# Patient Record
Sex: FEMALE | Race: WHITE | NOT HISPANIC OR LATINO
[De-identification: names, ages, dates, MRNs, and addresses within clinical notes are randomized per-mention and may not be internally consistent; named-entity substitution may affect disease eponyms.]

---

## 2024-07-30 ENCOUNTER — NON-APPOINTMENT (OUTPATIENT)
Age: 79
End: 2024-07-30

## 2024-08-05 PROBLEM — Z00.00 ENCOUNTER FOR PREVENTIVE HEALTH EXAMINATION: Status: ACTIVE | Noted: 2024-08-05

## 2024-08-21 ENCOUNTER — NON-APPOINTMENT (OUTPATIENT)
Age: 79
End: 2024-08-21

## 2024-08-22 ENCOUNTER — APPOINTMENT (OUTPATIENT)
Dept: INTERNAL MEDICINE | Facility: CLINIC | Age: 79
End: 2024-08-22
Payer: MEDICARE

## 2024-08-22 VITALS
BODY MASS INDEX: 29.59 KG/M2 | HEART RATE: 93 BPM | HEIGHT: 63 IN | DIASTOLIC BLOOD PRESSURE: 72 MMHG | WEIGHT: 167 LBS | OXYGEN SATURATION: 97 % | SYSTOLIC BLOOD PRESSURE: 154 MMHG | TEMPERATURE: 97.5 F

## 2024-08-22 DIAGNOSIS — M85.80 OTHER SPECIFIED DISORDERS OF BONE DENSITY AND STRUCTURE, UNSPECIFIED SITE: ICD-10-CM

## 2024-08-22 DIAGNOSIS — I10 ESSENTIAL (PRIMARY) HYPERTENSION: ICD-10-CM

## 2024-08-22 DIAGNOSIS — L65.9 NONSCARRING HAIR LOSS, UNSPECIFIED: ICD-10-CM

## 2024-08-22 DIAGNOSIS — F32.A DEPRESSION, UNSPECIFIED: ICD-10-CM

## 2024-08-22 DIAGNOSIS — E03.9 HYPOTHYROIDISM, UNSPECIFIED: ICD-10-CM

## 2024-08-22 DIAGNOSIS — D80.2 SELECTIVE DEFICIENCY OF IMMUNOGLOBULIN A [IGA]: ICD-10-CM

## 2024-08-22 DIAGNOSIS — H93.19 TINNITUS, UNSPECIFIED EAR: ICD-10-CM

## 2024-08-22 DIAGNOSIS — Z00.00 ENCOUNTER FOR GENERAL ADULT MEDICAL EXAMINATION W/OUT ABNORMAL FINDINGS: ICD-10-CM

## 2024-08-22 DIAGNOSIS — M20.20 HALLUX RIGIDUS, UNSPECIFIED FOOT: ICD-10-CM

## 2024-08-22 DIAGNOSIS — R91.1 SOLITARY PULMONARY NODULE: ICD-10-CM

## 2024-08-22 DIAGNOSIS — R73.03 PREDIABETES.: ICD-10-CM

## 2024-08-22 DIAGNOSIS — K76.0 FATTY (CHANGE OF) LIVER, NOT ELSEWHERE CLASSIFIED: ICD-10-CM

## 2024-08-22 DIAGNOSIS — Z80.1 FAMILY HISTORY OF MALIGNANT NEOPLASM OF TRACHEA, BRONCHUS AND LUNG: ICD-10-CM

## 2024-08-22 PROCEDURE — G0439: CPT

## 2024-08-22 PROCEDURE — G0444 DEPRESSION SCREEN ANNUAL: CPT | Mod: 59

## 2024-08-22 PROCEDURE — 99205 OFFICE O/P NEW HI 60 MIN: CPT | Mod: 25

## 2024-08-22 RX ORDER — HYDROCHLOROTHIAZIDE 25 MG/1
25 TABLET ORAL
Refills: 0 | Status: ACTIVE | COMMUNITY

## 2024-08-22 RX ORDER — CLONAZEPAM 0.5 MG/1
0.5 TABLET ORAL
Refills: 0 | Status: ACTIVE | COMMUNITY

## 2024-08-22 RX ORDER — LISDEXAMFETAMINE 30 MG/1
30 CAPSULE ORAL
Refills: 0 | Status: ACTIVE | COMMUNITY

## 2024-08-22 RX ORDER — POTASSIUM CHLORIDE 1125 MG/1
TABLET, EXTENDED RELEASE ORAL
Refills: 0 | Status: ACTIVE | COMMUNITY

## 2024-08-22 RX ORDER — LEVOTHYROXINE SODIUM 0.07 MG/1
75 TABLET ORAL
Refills: 0 | Status: ACTIVE | COMMUNITY

## 2024-08-22 RX ORDER — PRAMIPEXOLE DIHYDROCHLORIDE 1.5 MG/1
1.5 TABLET ORAL
Refills: 0 | Status: ACTIVE | COMMUNITY

## 2024-08-22 RX ORDER — BUPROPION HYDROCHLORIDE 450 MG/1
450 TABLET, FILM COATED, EXTENDED RELEASE ORAL
Refills: 0 | Status: ACTIVE | COMMUNITY

## 2024-08-22 RX ORDER — ACYCLOVIR 400 MG/1
400 TABLET ORAL
Refills: 0 | Status: ACTIVE | COMMUNITY

## 2024-08-22 RX ORDER — MINOXIDIL 2.5 MG/1
2.5 TABLET ORAL
Refills: 0 | Status: ACTIVE | COMMUNITY

## 2024-08-22 RX ORDER — CARVEDILOL 3.12 MG/1
3.12 TABLET, FILM COATED ORAL
Refills: 0 | Status: ACTIVE | COMMUNITY

## 2024-08-22 NOTE — HEALTH RISK ASSESSMENT
[Yes] : Yes [1 or 2 (0 pts)] : 1 or 2 (0 points) [Never (0 pts)] : Never (0 points) [No] : In the past 12 months have you used drugs other than those required for medical reasons? No [No falls in past year] : Patient reported no falls in the past year [Little interest or pleasure doing things] : 1) Little interest or pleasure doing things [Feeling down, depressed, or hopeless] : 2) Feeling down, depressed, or hopeless [0] : 2) Feeling down, depressed, or hopeless: Not at all (0) [PHQ-2 Negative - No further assessment needed] : PHQ-2 Negative - No further assessment needed [Time Spent: ___ Minutes] : I spent [unfilled] minutes performing a depression screening for this patient. [Patient reported colonoscopy was normal] : Patient reported colonoscopy was normal [Alone] : lives alone [Employed] : employed [Graduate School] : graduate school [] :  [Fully functional (bathing, dressing, toileting, transferring, walking, feeding)] : Fully functional (bathing, dressing, toileting, transferring, walking, feeding) [Fully functional (using the telephone, shopping, preparing meals, housekeeping, doing laundry, using] : Fully functional and needs no help or supervision to perform IADLs (using the telephone, shopping, preparing meals, housekeeping, doing laundry, using transportation, managing medications and managing finances) [Former] : Former [20 or more] : 20 or more [> 15 Years] : > 15 Years [de-identified] : active [de-identified] :  I spent 5 minutes performing a depression screening on this patient [[id number]] : 0 [LowDoseCTScan] : 2024 (pt will send me the copy) [Reports changes in hearing] : Reports no changes in hearing [Reports changes in vision] : Reports no changes in vision [BoneDensityDate] : 2024 [BoneDensityComments] : pt will send me the copy [ColonoscopyDate] : 2020

## 2024-08-22 NOTE — HISTORY OF PRESENT ILLNESS
[FreeTextEntry1] : 79-year-old female with a past medical history of hallux rigidus, depression, osteopenia comes to the clinic to establish care.  She would like to follow-up with ENT for complaint of chronic tinnitus. we did a complete medication reconciliation and discussed about the past medical history in detail. Patient had to walk to the clinic for long distance which is why her blood pressure is slightly elevated.  Patient will have her blood work, recent DEXA and colonoscopy results faxed over to the clinic.  She is up-to-date with her vaccinations.

## 2024-09-10 ENCOUNTER — APPOINTMENT (OUTPATIENT)
Dept: OTOLARYNGOLOGY | Facility: CLINIC | Age: 79
End: 2024-09-10
Payer: MEDICARE

## 2024-09-10 VITALS
SYSTOLIC BLOOD PRESSURE: 166 MMHG | TEMPERATURE: 98.3 F | DIASTOLIC BLOOD PRESSURE: 74 MMHG | HEART RATE: 88 BPM | OXYGEN SATURATION: 97 % | HEIGHT: 63 IN | BODY MASS INDEX: 26.58 KG/M2 | WEIGHT: 150 LBS

## 2024-09-10 DIAGNOSIS — F17.210 NICOTINE DEPENDENCE, CIGARETTES, UNCOMPLICATED: ICD-10-CM

## 2024-09-10 DIAGNOSIS — H93.13 TINNITUS, BILATERAL: ICD-10-CM

## 2024-09-10 DIAGNOSIS — Z82.0 FAMILY HISTORY OF EPILEPSY AND OTHER DISEASES OF THE NERVOUS SYSTEM: ICD-10-CM

## 2024-09-10 DIAGNOSIS — H90.3 SENSORINEURAL HEARING LOSS, BILATERAL: ICD-10-CM

## 2024-09-10 DIAGNOSIS — H61.21 IMPACTED CERUMEN, RIGHT EAR: ICD-10-CM

## 2024-09-10 DIAGNOSIS — Z80.9 FAMILY HISTORY OF MALIGNANT NEOPLASM, UNSPECIFIED: ICD-10-CM

## 2024-09-10 DIAGNOSIS — Z86.79 PERSONAL HISTORY OF OTHER DISEASES OF THE CIRCULATORY SYSTEM: ICD-10-CM

## 2024-09-10 DIAGNOSIS — Z78.9 OTHER SPECIFIED HEALTH STATUS: ICD-10-CM

## 2024-09-10 PROCEDURE — G0268 REMOVAL OF IMPACTED WAX MD: CPT

## 2024-09-10 PROCEDURE — 99203 OFFICE O/P NEW LOW 30 MIN: CPT | Mod: 25

## 2024-09-10 PROCEDURE — 92550 TYMPANOMETRY & REFLEX THRESH: CPT | Mod: 52

## 2024-09-10 PROCEDURE — 92557 COMPREHENSIVE HEARING TEST: CPT

## 2024-09-10 RX ORDER — CLONAZEPAM 2 MG/1
TABLET ORAL
Refills: 0 | Status: ACTIVE | COMMUNITY

## 2024-09-10 RX ORDER — PRAMIPEXOLE 3.75 MG/1
TABLET, EXTENDED RELEASE ORAL
Refills: 0 | Status: ACTIVE | COMMUNITY

## 2024-09-10 NOTE — ASSESSMENT
[FreeTextEntry1] : 1. r cerumen impaction  -cerumen removed -ear felt better -avoid qtip usage 2. b tinnitus, b snhl - showed b hf snhl, b nl tympanograms -results reviewed with pt  -explained tinnitus is likely d/t hearing loss -for tinnitus recommended increasing background noise/ trying a white noise machine, air pod masking apps, hae with maskers, trt -given printed information sheet  RTC in 1 yr with rpt ; call sooner for any issues

## 2024-09-10 NOTE — PROCEDURE
[Cerumen Impaction] : Cerumen Impaction [Same] : same as the Pre Op Dx. [] : Removal of Cerumen [FreeTextEntry5] : l nl, r copious cerumen removed atraumatically with curette, b TMs nl

## 2024-09-10 NOTE — PHYSICAL EXAM
[de-identified] : l nl, r copious cerumen removed atraumatically with curette, b TMs nl  [Normal] : no nystagmus [de-identified] : gait steady

## 2024-09-10 NOTE — HISTORY OF PRESENT ILLNESS
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 Insidious onset. She denies hearing loss or aural fullness. No FH pertinent to cc. No childhood ear issues. Former smoker, quit in 1984.

## 2024-10-07 ENCOUNTER — TRANSCRIPTION ENCOUNTER (OUTPATIENT)
Age: 79
End: 2024-10-07

## 2024-10-22 ENCOUNTER — APPOINTMENT (OUTPATIENT)
Dept: INTERNAL MEDICINE | Facility: CLINIC | Age: 79
End: 2024-10-22
Payer: MEDICARE

## 2024-10-22 VITALS
TEMPERATURE: 97.2 F | HEART RATE: 75 BPM | HEIGHT: 63 IN | OXYGEN SATURATION: 97 % | BODY MASS INDEX: 29.41 KG/M2 | DIASTOLIC BLOOD PRESSURE: 69 MMHG | SYSTOLIC BLOOD PRESSURE: 148 MMHG | WEIGHT: 166 LBS

## 2024-10-22 DIAGNOSIS — Z23 ENCOUNTER FOR IMMUNIZATION: ICD-10-CM

## 2024-10-22 DIAGNOSIS — M20.20 HALLUX RIGIDUS, UNSPECIFIED FOOT: ICD-10-CM

## 2024-10-22 DIAGNOSIS — R63.4 ABNORMAL WEIGHT LOSS: ICD-10-CM

## 2024-10-22 DIAGNOSIS — I10 ESSENTIAL (PRIMARY) HYPERTENSION: ICD-10-CM

## 2024-10-22 PROCEDURE — 90662 IIV NO PRSV INCREASED AG IM: CPT

## 2024-10-22 PROCEDURE — G0008: CPT

## 2024-10-22 PROCEDURE — 99215 OFFICE O/P EST HI 40 MIN: CPT

## 2024-10-22 PROCEDURE — G2211 COMPLEX E/M VISIT ADD ON: CPT

## 2024-10-22 PROCEDURE — 36415 COLL VENOUS BLD VENIPUNCTURE: CPT

## 2024-10-23 ENCOUNTER — NON-APPOINTMENT (OUTPATIENT)
Age: 79
End: 2024-10-23

## 2024-10-23 LAB
ALBUMIN SERPL ELPH-MCNC: 4.3 G/DL
ALP BLD-CCNC: 73 U/L
ALT SERPL-CCNC: 26 U/L
ANION GAP SERPL CALC-SCNC: 13 MMOL/L
AST SERPL-CCNC: 31 U/L
BASOPHILS # BLD AUTO: 0.04 K/UL
BASOPHILS NFR BLD AUTO: 0.6 %
BILIRUB SERPL-MCNC: 0.4 MG/DL
BUN SERPL-MCNC: 28 MG/DL
CALCIUM SERPL-MCNC: 9.4 MG/DL
CHLORIDE SERPL-SCNC: 102 MMOL/L
CHOLEST SERPL-MCNC: 202 MG/DL
CO2 SERPL-SCNC: 23 MMOL/L
CREAT SERPL-MCNC: 1.16 MG/DL
EGFR: 48 ML/MIN/1.73M2
EOSINOPHIL # BLD AUTO: 0.28 K/UL
EOSINOPHIL NFR BLD AUTO: 4.4 %
ESTIMATED AVERAGE GLUCOSE: 120 MG/DL
GLUCOSE SERPL-MCNC: 86 MG/DL
HBA1C MFR BLD HPLC: 5.8 %
HCT VFR BLD CALC: 37.6 %
HDLC SERPL-MCNC: 56 MG/DL
HGB BLD-MCNC: 12.1 G/DL
IMM GRANULOCYTES NFR BLD AUTO: 0.3 %
LDLC SERPL CALC-MCNC: 122 MG/DL
LYMPHOCYTES # BLD AUTO: 1.88 K/UL
LYMPHOCYTES NFR BLD AUTO: 29.5 %
MAN DIFF?: NORMAL
MCHC RBC-ENTMCNC: 29.8 PG
MCHC RBC-ENTMCNC: 32.2 GM/DL
MCV RBC AUTO: 92.6 FL
MONOCYTES # BLD AUTO: 0.61 K/UL
MONOCYTES NFR BLD AUTO: 9.6 %
NEUTROPHILS # BLD AUTO: 3.55 K/UL
NEUTROPHILS NFR BLD AUTO: 55.6 %
NONHDLC SERPL-MCNC: 146 MG/DL
PLATELET # BLD AUTO: 292 K/UL
POTASSIUM SERPL-SCNC: 4.2 MMOL/L
PROT SERPL-MCNC: 6.9 G/DL
RBC # BLD: 4.06 M/UL
RBC # FLD: 15.1 %
SODIUM SERPL-SCNC: 139 MMOL/L
TRIGL SERPL-MCNC: 138 MG/DL
WBC # FLD AUTO: 6.38 K/UL

## 2024-12-12 ENCOUNTER — APPOINTMENT (OUTPATIENT)
Dept: DERMATOLOGY | Facility: CLINIC | Age: 79
End: 2024-12-12
Payer: MEDICARE

## 2024-12-12 VITALS — BODY MASS INDEX: 29.23 KG/M2 | WEIGHT: 165 LBS | HEIGHT: 63 IN

## 2024-12-12 DIAGNOSIS — L82.1 OTHER SEBORRHEIC KERATOSIS: ICD-10-CM

## 2024-12-12 DIAGNOSIS — L85.3 XEROSIS CUTIS: ICD-10-CM

## 2024-12-12 DIAGNOSIS — D22.9 MELANOCYTIC NEVI, UNSPECIFIED: ICD-10-CM

## 2024-12-12 DIAGNOSIS — L81.4 OTHER MELANIN HYPERPIGMENTATION: ICD-10-CM

## 2024-12-12 DIAGNOSIS — L82.0 INFLAMED SEBORRHEIC KERATOSIS: ICD-10-CM

## 2024-12-12 DIAGNOSIS — D18.01 HEMANGIOMA OF SKIN AND SUBCUTANEOUS TISSUE: ICD-10-CM

## 2024-12-12 DIAGNOSIS — I83.90 ASYMPTOMATIC VARICOSE VEINS OF UNSPECIFIED LOWER EXTREMITY: ICD-10-CM

## 2024-12-12 PROCEDURE — 99204 OFFICE O/P NEW MOD 45 MIN: CPT

## 2024-12-12 RX ORDER — AMMONIUM LACTATE 12 %
12 CREAM (GRAM) TOPICAL TWICE DAILY
Qty: 1 | Refills: 3 | Status: ACTIVE | COMMUNITY
Start: 2024-12-12 | End: 1900-01-01

## 2024-12-17 ENCOUNTER — NON-APPOINTMENT (OUTPATIENT)
Age: 79
End: 2024-12-17

## 2024-12-17 ENCOUNTER — APPOINTMENT (OUTPATIENT)
Dept: HEART AND VASCULAR | Facility: CLINIC | Age: 79
End: 2024-12-17
Payer: MEDICARE

## 2024-12-17 VITALS
TEMPERATURE: 97.6 F | BODY MASS INDEX: 29.23 KG/M2 | WEIGHT: 164.99 LBS | OXYGEN SATURATION: 95 % | HEART RATE: 87 BPM | SYSTOLIC BLOOD PRESSURE: 120 MMHG | HEIGHT: 63 IN | DIASTOLIC BLOOD PRESSURE: 58 MMHG

## 2024-12-17 DIAGNOSIS — I10 ESSENTIAL (PRIMARY) HYPERTENSION: ICD-10-CM

## 2024-12-17 DIAGNOSIS — Z86.69 PERSONAL HISTORY OF OTHER DISEASES OF THE NERVOUS SYSTEM AND SENSE ORGANS: ICD-10-CM

## 2024-12-17 DIAGNOSIS — L65.9 NONSCARRING HAIR LOSS, UNSPECIFIED: ICD-10-CM

## 2024-12-17 DIAGNOSIS — Z86.79 PERSONAL HISTORY OF OTHER DISEASES OF THE CIRCULATORY SYSTEM: ICD-10-CM

## 2024-12-17 DIAGNOSIS — R06.09 OTHER FORMS OF DYSPNEA: ICD-10-CM

## 2024-12-17 DIAGNOSIS — R41.840 ATTENTION AND CONCENTRATION DEFICIT: ICD-10-CM

## 2024-12-17 DIAGNOSIS — Z82.0 FAMILY HISTORY OF EPILEPSY AND OTHER DISEASES OF THE NERVOUS SYSTEM: ICD-10-CM

## 2024-12-17 DIAGNOSIS — F32.A DEPRESSION, UNSPECIFIED: ICD-10-CM

## 2024-12-17 DIAGNOSIS — Z12.83 ENCOUNTER FOR SCREENING FOR MALIGNANT NEOPLASM OF SKIN: ICD-10-CM

## 2024-12-17 DIAGNOSIS — Z78.9 OTHER SPECIFIED HEALTH STATUS: ICD-10-CM

## 2024-12-17 DIAGNOSIS — H61.21 IMPACTED CERUMEN, RIGHT EAR: ICD-10-CM

## 2024-12-17 DIAGNOSIS — Z80.9 FAMILY HISTORY OF MALIGNANT NEOPLASM, UNSPECIFIED: ICD-10-CM

## 2024-12-17 DIAGNOSIS — Z87.891 PERSONAL HISTORY OF NICOTINE DEPENDENCE: ICD-10-CM

## 2024-12-17 DIAGNOSIS — Z86.59 PERSONAL HISTORY OF OTHER MENTAL AND BEHAVIORAL DISORDERS: ICD-10-CM

## 2024-12-17 PROCEDURE — G2211 COMPLEX E/M VISIT ADD ON: CPT

## 2024-12-17 PROCEDURE — 99203 OFFICE O/P NEW LOW 30 MIN: CPT

## 2024-12-17 PROCEDURE — 93000 ELECTROCARDIOGRAM COMPLETE: CPT

## 2025-01-16 ENCOUNTER — NON-APPOINTMENT (OUTPATIENT)
Age: 80
End: 2025-01-16

## 2025-01-17 ENCOUNTER — NON-APPOINTMENT (OUTPATIENT)
Age: 80
End: 2025-01-17

## 2025-03-06 ENCOUNTER — APPOINTMENT (OUTPATIENT)
Dept: INTERNAL MEDICINE | Facility: CLINIC | Age: 80
End: 2025-03-06

## 2025-03-06 ENCOUNTER — NON-APPOINTMENT (OUTPATIENT)
Age: 80
End: 2025-03-06

## 2025-03-06 VITALS
WEIGHT: 160 LBS | SYSTOLIC BLOOD PRESSURE: 135 MMHG | HEART RATE: 75 BPM | BODY MASS INDEX: 28.35 KG/M2 | OXYGEN SATURATION: 96 % | DIASTOLIC BLOOD PRESSURE: 67 MMHG | HEIGHT: 63 IN

## 2025-03-06 DIAGNOSIS — R42 DIZZINESS AND GIDDINESS: ICD-10-CM

## 2025-03-06 DIAGNOSIS — E78.5 HYPERLIPIDEMIA, UNSPECIFIED: ICD-10-CM

## 2025-03-06 DIAGNOSIS — Z23 ENCOUNTER FOR IMMUNIZATION: ICD-10-CM

## 2025-03-06 DIAGNOSIS — M20.20 HALLUX RIGIDUS, UNSPECIFIED FOOT: ICD-10-CM

## 2025-03-06 DIAGNOSIS — Z91.89 OTHER SPECIFIED PERSONAL RISK FACTORS, NOT ELSEWHERE CLASSIFIED: ICD-10-CM

## 2025-03-06 PROCEDURE — 99214 OFFICE O/P EST MOD 30 MIN: CPT

## 2025-03-06 PROCEDURE — 91320 SARSCV2 VAC 30MCG TRS-SUC IM: CPT

## 2025-03-06 PROCEDURE — 90480 ADMN SARSCOV2 VAC 1/ONLY CMP: CPT | Mod: GY

## 2025-03-06 PROCEDURE — G0537: CPT

## 2025-03-06 PROCEDURE — G2211 COMPLEX E/M VISIT ADD ON: CPT

## 2025-03-06 RX ORDER — MECLIZINE HYDROCHLORIDE 12.5 MG/1
12.5 TABLET ORAL 3 TIMES DAILY
Qty: 30 | Refills: 1 | Status: ACTIVE | COMMUNITY
Start: 2025-03-06 | End: 1900-01-01

## 2025-03-06 RX ORDER — ROSUVASTATIN CALCIUM 5 MG/1
5 TABLET, FILM COATED ORAL DAILY
Qty: 90 | Refills: 3 | Status: ACTIVE | COMMUNITY
Start: 2025-03-06 | End: 1900-01-01

## 2025-03-21 ENCOUNTER — APPOINTMENT (OUTPATIENT)
Dept: ORTHOPEDIC SURGERY | Facility: CLINIC | Age: 80
End: 2025-03-21
Payer: MEDICARE

## 2025-03-21 VITALS — WEIGHT: 160 LBS | BODY MASS INDEX: 28.35 KG/M2 | HEIGHT: 63 IN

## 2025-03-21 DIAGNOSIS — M19.079 PRIMARY OSTEOARTHRITIS, UNSPECIFIED ANKLE AND FOOT: ICD-10-CM

## 2025-03-21 PROCEDURE — 99203 OFFICE O/P NEW LOW 30 MIN: CPT

## 2025-03-21 PROCEDURE — 73630 X-RAY EXAM OF FOOT: CPT | Mod: 50

## 2025-03-25 ENCOUNTER — NON-APPOINTMENT (OUTPATIENT)
Age: 80
End: 2025-03-25

## 2025-03-27 ENCOUNTER — EMERGENCY (EMERGENCY)
Facility: HOSPITAL | Age: 80
LOS: 1 days | Discharge: HOME | End: 2025-03-27
Admitting: EMERGENCY MEDICINE
Payer: MEDICARE

## 2025-03-27 VITALS
HEART RATE: 88 BPM | HEIGHT: 63 IN | OXYGEN SATURATION: 97 % | TEMPERATURE: 98 F | SYSTOLIC BLOOD PRESSURE: 143 MMHG | WEIGHT: 149.91 LBS | RESPIRATION RATE: 18 BRPM | DIASTOLIC BLOOD PRESSURE: 67 MMHG

## 2025-03-27 DIAGNOSIS — M54.50 LOW BACK PAIN, UNSPECIFIED: ICD-10-CM
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
## 2025-03-27 DIAGNOSIS — E78.5 HYPERLIPIDEMIA, UNSPECIFIED: ICD-10-CM

## 2025-03-27 DIAGNOSIS — I10 ESSENTIAL (PRIMARY) HYPERTENSION: ICD-10-CM

## 2025-03-27 DIAGNOSIS — E03.9 HYPOTHYROIDISM, UNSPECIFIED: ICD-10-CM
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
## 2025-03-27 PROCEDURE — 99283 EMERGENCY DEPT VISIT LOW MDM: CPT | Mod: 25

## 2025-03-27 PROCEDURE — 99284 EMERGENCY DEPT VISIT MOD MDM: CPT

## 2025-03-27 PROCEDURE — 96372 THER/PROPH/DIAG INJ SC/IM: CPT

## 2025-03-27 RX ORDER — KETOROLAC TROMETHAMINE 30 MG/ML
30 INJECTION, SOLUTION INTRAMUSCULAR; INTRAVENOUS ONCE
Refills: 0 | Status: DISCONTINUED | OUTPATIENT
Start: 2025-03-27 | End: 2025-03-27

## 2025-03-27 RX ORDER — LIDOCAINE HYDROCHLORIDE 20 MG/ML
1 JELLY TOPICAL ONCE
Refills: 0 | Status: COMPLETED | OUTPATIENT
Start: 2025-03-27 | End: 2025-03-27

## 2025-03-27 RX ORDER — OXYCODONE HYDROCHLORIDE AND ACETAMINOPHEN 10; 325 MG/1; MG/1
1 TABLET ORAL
Qty: 12 | Refills: 0
Start: 2025-03-27

## 2025-03-27 RX ADMIN — LIDOCAINE HYDROCHLORIDE 1 PATCH: 20 JELLY TOPICAL at 15:05

## 2025-03-27 RX ADMIN — KETOROLAC TROMETHAMINE 30 MILLIGRAM(S): 30 INJECTION, SOLUTION INTRAMUSCULAR; INTRAVENOUS at 15:04

## 2025-03-27 NOTE — ED ADULT NURSE NOTE - OBJECTIVE STATEMENT
Pt presents to the ED with complaints of back pain. Per pt, she recently had a fall, was see at [facility], had scans and was prescribed pain medication. Pt states scans showed no fractures, presents to [facility] with complaints of worsening pain today. On arrival, pt is alert and oriented, ambulatory, denies chest pain, SOB, palpitations, numbness/tingling to lower extremities, or bladder/bowel incontinence. Pt changed into gown for examination.

## 2025-03-27 NOTE — ED ADULT NURSE NOTE - TEMPLATE LIST FOR HEAD TO TOE ASSESSMENT
Gen: Alert, NAD  Head: NC, AT   Eyes: PERRL, EOMI, normal lids/conjunctiva  ENT: normal hearing, patent oropharynx without erythema/exudate, uvula midline  Neck: supple, no tenderness, Trachea midline  Pulm: Bilateral BS, normal resp effort, no wheeze/stridor/retractions  CV: RRR, no 2/6 holosystolic murmur, 2+ radial and dp pulses bl, no edema  Abd: soft, NT/ND, +BS, no hepatosplenomegaly  Mskel: left arm in splint. no ctl spine ttp.   Skin: no rash, no bruising   Neuro: AAOx3, no sensory. 4/5 motor strength in the left upper and lower extremities, CN 2-12 intact Back Pain

## 2025-03-27 NOTE — ED PROVIDER NOTE - MUSCULOSKELETAL, MLM
Spine appears normal, +lower lumbar TTP and upper sacral TTP, no swelling, no crepitus, no deformity, no overlying ecchymosis

## 2025-03-27 NOTE — ED PROVIDER NOTE - PATIENT PORTAL LINK FT
You can access the FollowMyHealth Patient Portal offered by [facility] by registering at the following website: [email or web address]. By joining [facility]’s FollowMyHealth portal, you will also be able to view your health information using other applications (apps) compatible with our system.

## 2025-03-27 NOTE — ED ADULT TRIAGE NOTE - CHIEF COMPLAINT QUOTE
Pt presents to ED here for low back pain after fall Sunday night at home, denies head injury or LOC or blood thinner use. Pt was seen at [facility] and did some xrays  and came out negative for any farctures. Pt was sent home and dc with some tylenol and oxy. Pt A&Ox4, NAD and ambulatory.

## 2025-03-27 NOTE — ED PROVIDER NOTE - CHIEF COMPLAINT
[address] [facility] THERAPY  [address] [facility]  DAILY NOTE     TIME   SLP Individual Minutes  Time In: 0930  Time Out: 1000  Minutes: 30  Timed Code Treatment Minutes: 30 Minutes       Date: 2022  Patient Name: Christine España      CSN: [id number]   : 1962  (61 y.o.)  Gender: male   Referring Physician:  Dr. Mey Cosby   Diagnosis: Acute stroke due to ischemia, right hemiparesis secondary to stroke   Precautions: aspiration, fall risk   Current Diet: Regular diet with thin liquids   Swallowing Strategies: Standard Universal Swallow Precautions  Date of Last MBS/FEES: Not Applicable    Pain:  No pain reported. Subjective:  Patient awake in recliner upon ST arrival. Patient agreeable to transfer to wheelchair for hospital navigation task. Pleasant, cooperative, and actively engaged throughout. Short-Term Goals:  SHORT TERM GOAL #1:  Goal 1: Patient will complete complex executive functioning tasks (i.e., medication management, complex reasoning/deductive reasoning, etc.) with 90% accuracy and minimal cuing in order to allow for safe return to work (40/hours week). INTERVENTIONS:  Hospital Navigation Task   Patient completed hospital wide navigation task this date. Patient independently navigated 10/10 directions. Patient located/answered target information 6/7 independently and 1/7 given min cues. Patient with appropriate visual scanning, reasoning, and attention throughout task this date. SHORT TERM GOAL #2:  Goal 2: Patient will complete higher level recall/short term memory and working memory tasks with 80% accuracy provided independent use of compensatory strategies in order to improve overall recall of newly learned theraputic information and anticipation to return to work ([employer] in [employer]). INTERVENTIONS: Did not address this date d/t focus on other goals.      SHORT TERM GOAL #3:  Goal 3: Patient will demonstrate independent use of compensatory strategies (S-speak up, O-open mouth, S-slow down) within strucutred and unstrucutred speech tasks in order to improve overall articulatory precision. INTERVENTIONS: Did not address due to focus on other goals. Long-Term Goals:  Time Frame for Long Term Goals: 2 weeks from time of evaluation    LONG TERM GOAL #1:  Goal 1: Patient will improve overall cognitive function to return to independent living with wife and careing for children (16 year old). LONG TERM GOAL #2:  Goal 2: Patient will improve overall speech intelligablity and clearity of speech production within informal conversation in order to return to baseline speech to improve successful ability to return to work, speaking with co-works and medical staff and family/friends. Comprehension: 7 - Patient understands complex ideas (math/planning)  Expression: 6 - Device used to express complex ideas/needs  Social Interaction: 7 - Patient has appropriate behavior/relations 100% of the time  Problem Solvin - Independent with device (e.g. notes, schedules)  Memory: 6 - Patient requires device to recall (e.g. memory book)    EDUCATION:  Learner: Patient  Education:  Reviewed results and recommendations of this evaluation, Reviewed ST goals and Plan of Care, and Reviewed recommendations for follow-up  Evaluation of Education: Verbalizes understanding, Demonstrates with assistance, and Family not present    ASSESSMENT/PLAN:  Activity Tolerance:  Patient tolerance of  treatment: good. Assessment/Plan: Patient progressing toward established goals. Continues to require skilled care of licensed speech pathologist to progress toward achievement of established goals and plan of care. .     Plan for Next Session: High level cognitive treatment- deductive thinking, memory  Discharge Recommendations: Home with [address] [place] [address] ([facility] LUCY Gillespie, [address] The patient is a 79y Female complaining of back pain general.

## 2025-03-27 NOTE — ED PROVIDER NOTE - CONDITION AT DISCHARGE:
Reviewed labs with Dr Souza, patient to be admitted to [facility] for biopsy and treatment.  Called Dr Shahid to let him know and he communicated orders to the NP.  Reservation was made, patient called to let her know to come in for admittance at 3 pm per Charge Nurse on TSU.  Patient repeated and verbalized understanding.  
Improved

## 2025-03-27 NOTE — ED ADULT NURSE NOTE - ISOLATION TYPE:
[de-identified] : R thumb \par Swelling MCP\par Stiffness MCP\par No triggering \par \par Xrays neg None

## 2025-03-27 NOTE — ED PROVIDER NOTE - CLINICAL SUMMARY MEDICAL DECISION MAKING FREE TEXT BOX
80 y/o f hx hypothyroidism, HTN, HLD presents c/o persistent low back pain s/p trip and fall yesterday.  Pt with neg w/u at [place] yesterday, had CT lumbar spine which showed no fracture, is ambulatory in ED with no neuro deficits.  Pt given toradol and lidocaine patch in ED, no indication for repeat imaging.  Will d/c with rx percocet for severe pain, to take tylenol for moderate pain, f/u pmd, ortho

## 2025-03-27 NOTE — ED ADULT NURSE NOTE - NSFALLRISKINTERV_ED_ALL_ED

## 2025-03-27 NOTE — ED PROVIDER NOTE - OBJECTIVE STATEMENT
80 y/o f hx hypothyroidism, HTN, HLD 78 y/o f hx hypothyroidism, HTN, HLD presents c/o low back pain s/p trip and fall yesterday.  Pt stating she was seen at [place] ED yesterday, had xrays and CT lumbar spine which showed no fracture.  Pt stating she was given 2 percocets and discharged, took them both over the past day and now having further pain.  Pt stating she is able to walk, just going more slowly than usual.  Pt denies numbness/tingling to ext, weakness, saddle anesthesia, bowel/bladder incontinence, all other ROS negative.

## 2025-03-31 DIAGNOSIS — M54.9 DORSALGIA, UNSPECIFIED: ICD-10-CM

## 2025-04-01 DIAGNOSIS — K59.00 CONSTIPATION, UNSPECIFIED: ICD-10-CM

## 2025-04-01 RX ORDER — STANDARDIZED SENNA CONCENTRATE 8.6 MG/1
8.6 TABLET ORAL
Qty: 1 | Refills: 0 | Status: ACTIVE | COMMUNITY
Start: 2025-04-01 | End: 1900-01-01

## 2025-04-01 RX ORDER — OXYCODONE AND ACETAMINOPHEN 5; 325 MG/1; MG/1
5-325 TABLET ORAL
Qty: 10 | Refills: 0 | Status: ACTIVE | COMMUNITY
Start: 2025-04-01 | End: 1900-01-01

## 2025-04-02 ENCOUNTER — APPOINTMENT (OUTPATIENT)
Dept: ORTHOPEDIC SURGERY | Facility: CLINIC | Age: 80
End: 2025-04-02
Payer: MEDICARE

## 2025-04-02 VITALS — HEIGHT: 63 IN | WEIGHT: 150 LBS | BODY MASS INDEX: 26.58 KG/M2

## 2025-04-02 DIAGNOSIS — M54.50 LOW BACK PAIN, UNSPECIFIED: ICD-10-CM

## 2025-04-02 DIAGNOSIS — S32.010A WEDGE COMPRESSION FRACTURE OF FIRST LUMBAR VERTEBRA, INITIAL ENCOUNTER FOR CLOSED FRACTURE: ICD-10-CM

## 2025-04-02 DIAGNOSIS — M43.16 SPONDYLOLISTHESIS, LUMBAR REGION: ICD-10-CM

## 2025-04-02 PROCEDURE — 99214 OFFICE O/P EST MOD 30 MIN: CPT

## 2025-04-02 PROCEDURE — 72110 X-RAY EXAM L-2 SPINE 4/>VWS: CPT

## 2025-04-02 RX ORDER — ACETAMINOPHEN 500 MG/1
500 TABLET ORAL EVERY 8 HOURS
Qty: 180 | Refills: 0 | Status: ACTIVE | COMMUNITY
Start: 2025-04-02 | End: 1900-01-01

## 2025-04-02 RX ORDER — OXYCODONE 5 MG/1
5 TABLET ORAL EVERY 4 HOURS
Qty: 42 | Refills: 0 | Status: ACTIVE | COMMUNITY
Start: 2025-04-02 | End: 1900-01-01

## 2025-04-02 RX ORDER — IBUPROFEN 800 MG/1
800 TABLET, FILM COATED ORAL 3 TIMES DAILY
Qty: 90 | Refills: 0 | Status: ACTIVE | COMMUNITY
Start: 2025-04-02 | End: 1900-01-01

## 2025-04-02 RX ORDER — CYCLOBENZAPRINE HYDROCHLORIDE 10 MG/1
10 TABLET, FILM COATED ORAL 3 TIMES DAILY
Qty: 90 | Refills: 1 | Status: ACTIVE | COMMUNITY
Start: 2025-04-02 | End: 1900-01-01

## 2025-04-04 ENCOUNTER — NON-APPOINTMENT (OUTPATIENT)
Age: 80
End: 2025-04-04

## 2025-04-16 ENCOUNTER — APPOINTMENT (OUTPATIENT)
Dept: ORTHOPEDIC SURGERY | Facility: CLINIC | Age: 80
End: 2025-04-16
Payer: MEDICARE

## 2025-04-16 DIAGNOSIS — M54.50 LOW BACK PAIN, UNSPECIFIED: ICD-10-CM

## 2025-04-16 DIAGNOSIS — S32.010A WEDGE COMPRESSION FRACTURE OF FIRST LUMBAR VERTEBRA, INITIAL ENCOUNTER FOR CLOSED FRACTURE: ICD-10-CM

## 2025-04-16 PROCEDURE — 99214 OFFICE O/P EST MOD 30 MIN: CPT

## 2025-04-16 PROCEDURE — 72110 X-RAY EXAM L-2 SPINE 4/>VWS: CPT

## 2025-04-16 RX ORDER — LIDOCAINE 5% 700 MG/1
5 PATCH TOPICAL
Qty: 1 | Refills: 2 | Status: ACTIVE | COMMUNITY
Start: 2025-04-16 | End: 1900-01-01

## 2025-04-28 ENCOUNTER — APPOINTMENT (OUTPATIENT)
Dept: INTERNAL MEDICINE | Facility: CLINIC | Age: 80
End: 2025-04-28
Payer: MEDICARE

## 2025-04-28 DIAGNOSIS — R26.89 OTHER ABNORMALITIES OF GAIT AND MOBILITY: ICD-10-CM

## 2025-04-28 PROCEDURE — 99214 OFFICE O/P EST MOD 30 MIN: CPT | Mod: 2W

## 2025-04-28 PROCEDURE — G2211 COMPLEX E/M VISIT ADD ON: CPT | Mod: 2W

## 2025-04-30 ENCOUNTER — APPOINTMENT (OUTPATIENT)
Dept: ORTHOPEDIC SURGERY | Facility: CLINIC | Age: 80
End: 2025-04-30
Payer: MEDICARE

## 2025-04-30 DIAGNOSIS — S32.010A WEDGE COMPRESSION FRACTURE OF FIRST LUMBAR VERTEBRA, INITIAL ENCOUNTER FOR CLOSED FRACTURE: ICD-10-CM

## 2025-04-30 PROCEDURE — 99214 OFFICE O/P EST MOD 30 MIN: CPT

## 2025-04-30 PROCEDURE — 72100 X-RAY EXAM L-S SPINE 2/3 VWS: CPT

## 2025-05-02 ENCOUNTER — NON-APPOINTMENT (OUTPATIENT)
Age: 80
End: 2025-05-02

## 2025-05-03 ENCOUNTER — INPATIENT (INPATIENT)
Facility: HOSPITAL | Age: 80
LOS: 10 days | Discharge: NURSING, REHAB OR LONG-TERM CARE | DRG: 447 | End: 2025-05-14
Attending: STUDENT IN AN ORGANIZED HEALTH CARE EDUCATION/TRAINING PROGRAM | Admitting: INTERNAL MEDICINE
Payer: MEDICARE

## 2025-05-03 VITALS
HEIGHT: 63 IN | WEIGHT: 149.91 LBS | SYSTOLIC BLOOD PRESSURE: 146 MMHG | OXYGEN SATURATION: 98 % | HEART RATE: 98 BPM | RESPIRATION RATE: 17 BRPM | DIASTOLIC BLOOD PRESSURE: 78 MMHG | TEMPERATURE: 98 F

## 2025-05-03 DIAGNOSIS — F32.9 MAJOR DEPRESSIVE DISORDER, SINGLE EPISODE, UNSPECIFIED: ICD-10-CM

## 2025-05-03 DIAGNOSIS — S32.000A WEDGE COMPRESSION FRACTURE OF UNSPECIFIED LUMBAR VERTEBRA, INITIAL ENCOUNTER FOR CLOSED FRACTURE: ICD-10-CM

## 2025-05-03 DIAGNOSIS — Z29.9 ENCOUNTER FOR PROPHYLACTIC MEASURES, UNSPECIFIED: ICD-10-CM

## 2025-05-03 DIAGNOSIS — Z86.59 PERSONAL HISTORY OF OTHER MENTAL AND BEHAVIORAL DISORDERS: ICD-10-CM

## 2025-05-03 DIAGNOSIS — R79.89 OTHER SPECIFIED ABNORMAL FINDINGS OF BLOOD CHEMISTRY: ICD-10-CM

## 2025-05-03 DIAGNOSIS — E78.5 HYPERLIPIDEMIA, UNSPECIFIED: ICD-10-CM

## 2025-05-03 DIAGNOSIS — E03.9 HYPOTHYROIDISM, UNSPECIFIED: ICD-10-CM

## 2025-05-03 DIAGNOSIS — R55 SYNCOPE AND COLLAPSE: ICD-10-CM

## 2025-05-03 DIAGNOSIS — I10 ESSENTIAL (PRIMARY) HYPERTENSION: ICD-10-CM

## 2025-05-03 DIAGNOSIS — N17.9 ACUTE KIDNEY FAILURE, UNSPECIFIED: ICD-10-CM

## 2025-05-03 DIAGNOSIS — R33.9 RETENTION OF URINE, UNSPECIFIED: ICD-10-CM

## 2025-05-03 DIAGNOSIS — R29.6 REPEATED FALLS: ICD-10-CM

## 2025-05-03 DIAGNOSIS — R91.1 SOLITARY PULMONARY NODULE: ICD-10-CM

## 2025-05-03 DIAGNOSIS — Z86.79 PERSONAL HISTORY OF OTHER DISEASES OF THE CIRCULATORY SYSTEM: ICD-10-CM

## 2025-05-03 LAB
ADD ON TEST-SPECIMEN IN LAB: SIGNIFICANT CHANGE UP
ALBUMIN SERPL ELPH-MCNC: 3.9 G/DL — SIGNIFICANT CHANGE UP (ref 3.3–5)
ALBUMIN SERPL ELPH-MCNC: 4.4 G/DL — SIGNIFICANT CHANGE UP (ref 3.3–5)
ALP SERPL-CCNC: 102 U/L — SIGNIFICANT CHANGE UP (ref 40–120)
ALP SERPL-CCNC: 120 U/L — SIGNIFICANT CHANGE UP (ref 40–120)
ALT FLD-CCNC: 10 U/L — SIGNIFICANT CHANGE UP (ref 10–45)
ALT FLD-CCNC: 13 U/L — SIGNIFICANT CHANGE UP (ref 10–45)
ANION GAP SERPL CALC-SCNC: 17 MMOL/L — SIGNIFICANT CHANGE UP (ref 5–17)
ANION GAP SERPL CALC-SCNC: 21 MMOL/L — HIGH (ref 5–17)
APTT BLD: 28.2 SEC — SIGNIFICANT CHANGE UP (ref 26.1–36.8)
AST SERPL-CCNC: 18 U/L — SIGNIFICANT CHANGE UP (ref 10–40)
AST SERPL-CCNC: 19 U/L — SIGNIFICANT CHANGE UP (ref 10–40)
B-OH-BUTYR SERPL-SCNC: 0.3 MMOL/L — SIGNIFICANT CHANGE UP
BASOPHILS # BLD AUTO: 0.02 K/UL — SIGNIFICANT CHANGE UP (ref 0–0.2)
BASOPHILS NFR BLD AUTO: 0.2 % — SIGNIFICANT CHANGE UP (ref 0–2)
BILIRUB SERPL-MCNC: 0.7 MG/DL — SIGNIFICANT CHANGE UP (ref 0.2–1.2)
BILIRUB SERPL-MCNC: 0.8 MG/DL — SIGNIFICANT CHANGE UP (ref 0.2–1.2)
BUN SERPL-MCNC: 72 MG/DL — HIGH (ref 7–23)
BUN SERPL-MCNC: 82 MG/DL — HIGH (ref 7–23)
CALCIUM SERPL-MCNC: 8.4 MG/DL — SIGNIFICANT CHANGE UP (ref 8.4–10.5)
CALCIUM SERPL-MCNC: 8.8 MG/DL — SIGNIFICANT CHANGE UP (ref 8.4–10.5)
CHLORIDE SERPL-SCNC: 95 MMOL/L — LOW (ref 96–108)
CHLORIDE SERPL-SCNC: 98 MMOL/L — SIGNIFICANT CHANGE UP (ref 96–108)
CO2 SERPL-SCNC: 17 MMOL/L — LOW (ref 22–31)
CO2 SERPL-SCNC: 18 MMOL/L — LOW (ref 22–31)
CREAT SERPL-MCNC: 5.25 MG/DL — HIGH (ref 0.5–1.3)
CREAT SERPL-MCNC: 5.86 MG/DL — HIGH (ref 0.5–1.3)
EGFR: 7 ML/MIN/1.73M2 — LOW
EGFR: 7 ML/MIN/1.73M2 — LOW
EGFR: 8 ML/MIN/1.73M2 — LOW
EGFR: 8 ML/MIN/1.73M2 — LOW
EOSINOPHIL # BLD AUTO: 0.01 K/UL — SIGNIFICANT CHANGE UP (ref 0–0.5)
EOSINOPHIL NFR BLD AUTO: 0.1 % — SIGNIFICANT CHANGE UP (ref 0–6)
GLUCOSE SERPL-MCNC: 173 MG/DL — HIGH (ref 70–99)
GLUCOSE SERPL-MCNC: 95 MG/DL — SIGNIFICANT CHANGE UP (ref 70–99)
HCT VFR BLD CALC: 30.7 % — LOW (ref 34.5–45)
HCT VFR BLD CALC: 33.8 % — LOW (ref 34.5–45)
HGB BLD-MCNC: 10.4 G/DL — LOW (ref 11.5–15.5)
HGB BLD-MCNC: 11.6 G/DL — SIGNIFICANT CHANGE UP (ref 11.5–15.5)
IMM GRANULOCYTES NFR BLD AUTO: 0.6 % — SIGNIFICANT CHANGE UP (ref 0–0.9)
INR BLD: 1.05 — SIGNIFICANT CHANGE UP (ref 0.85–1.16)
LACTATE SERPL-SCNC: 1.2 MMOL/L — SIGNIFICANT CHANGE UP (ref 0.5–2)
LYMPHOCYTES # BLD AUTO: 0.71 K/UL — LOW (ref 1–3.3)
LYMPHOCYTES # BLD AUTO: 5.6 % — LOW (ref 13–44)
MAGNESIUM SERPL-MCNC: 1.9 MG/DL — SIGNIFICANT CHANGE UP (ref 1.6–2.6)
MCHC RBC-ENTMCNC: 29.7 PG — SIGNIFICANT CHANGE UP (ref 27–34)
MCHC RBC-ENTMCNC: 30 PG — SIGNIFICANT CHANGE UP (ref 27–34)
MCHC RBC-ENTMCNC: 33.9 G/DL — SIGNIFICANT CHANGE UP (ref 32–36)
MCHC RBC-ENTMCNC: 34.3 G/DL — SIGNIFICANT CHANGE UP (ref 32–36)
MCV RBC AUTO: 87.3 FL — SIGNIFICANT CHANGE UP (ref 80–100)
MCV RBC AUTO: 87.7 FL — SIGNIFICANT CHANGE UP (ref 80–100)
MONOCYTES # BLD AUTO: 0.81 K/UL — SIGNIFICANT CHANGE UP (ref 0–0.9)
MONOCYTES NFR BLD AUTO: 6.4 % — SIGNIFICANT CHANGE UP (ref 2–14)
NEUTROPHILS # BLD AUTO: 11 K/UL — HIGH (ref 1.8–7.4)
NEUTROPHILS NFR BLD AUTO: 87.1 % — HIGH (ref 43–77)
NRBC BLD AUTO-RTO: 0 /100 WBCS — SIGNIFICANT CHANGE UP (ref 0–0)
NRBC BLD AUTO-RTO: 0 /100 WBCS — SIGNIFICANT CHANGE UP (ref 0–0)
PHOSPHATE SERPL-MCNC: 5.6 MG/DL — HIGH (ref 2.5–4.5)
PLATELET # BLD AUTO: 261 K/UL — SIGNIFICANT CHANGE UP (ref 150–400)
PLATELET # BLD AUTO: 313 K/UL — SIGNIFICANT CHANGE UP (ref 150–400)
POTASSIUM SERPL-MCNC: 3.4 MMOL/L — LOW (ref 3.5–5.3)
POTASSIUM SERPL-MCNC: 3.9 MMOL/L — SIGNIFICANT CHANGE UP (ref 3.5–5.3)
POTASSIUM SERPL-SCNC: 3.4 MMOL/L — LOW (ref 3.5–5.3)
POTASSIUM SERPL-SCNC: 3.9 MMOL/L — SIGNIFICANT CHANGE UP (ref 3.5–5.3)
PROT SERPL-MCNC: 7.1 G/DL — SIGNIFICANT CHANGE UP (ref 6–8.3)
PROT SERPL-MCNC: 8 G/DL — SIGNIFICANT CHANGE UP (ref 6–8.3)
PROTHROM AB SERPL-ACNC: 12.1 SEC — SIGNIFICANT CHANGE UP (ref 9.9–13.4)
RBC # BLD: 3.5 M/UL — LOW (ref 3.8–5.2)
RBC # BLD: 3.87 M/UL — SIGNIFICANT CHANGE UP (ref 3.8–5.2)
RBC # FLD: 14.7 % — HIGH (ref 10.3–14.5)
RBC # FLD: 14.9 % — HIGH (ref 10.3–14.5)
SODIUM SERPL-SCNC: 132 MMOL/L — LOW (ref 135–145)
SODIUM SERPL-SCNC: 134 MMOL/L — LOW (ref 135–145)
TROPONIN T, HIGH SENSITIVITY RESULT: 85 NG/L — CRITICAL HIGH (ref 0–51)
TROPONIN T, HIGH SENSITIVITY RESULT: 89 NG/L — CRITICAL HIGH (ref 0–51)
WBC # BLD: 12.62 K/UL — HIGH (ref 3.8–10.5)
WBC # BLD: 9.31 K/UL — SIGNIFICANT CHANGE UP (ref 3.8–10.5)
WBC # FLD AUTO: 12.62 K/UL — HIGH (ref 3.8–10.5)
WBC # FLD AUTO: 9.31 K/UL — SIGNIFICANT CHANGE UP (ref 3.8–10.5)

## 2025-05-03 PROCEDURE — 93010 ELECTROCARDIOGRAM REPORT: CPT

## 2025-05-03 PROCEDURE — 71250 CT THORAX DX C-: CPT | Mod: 26

## 2025-05-03 PROCEDURE — 99285 EMERGENCY DEPT VISIT HI MDM: CPT

## 2025-05-03 PROCEDURE — 72128 CT CHEST SPINE W/O DYE: CPT | Mod: 26

## 2025-05-03 PROCEDURE — 99223 1ST HOSP IP/OBS HIGH 75: CPT

## 2025-05-03 PROCEDURE — 70498 CT ANGIOGRAPHY NECK: CPT | Mod: 26

## 2025-05-03 PROCEDURE — 71045 X-RAY EXAM CHEST 1 VIEW: CPT | Mod: 26

## 2025-05-03 PROCEDURE — 72125 CT NECK SPINE W/O DYE: CPT | Mod: 26

## 2025-05-03 PROCEDURE — 70450 CT HEAD/BRAIN W/O DYE: CPT | Mod: 26,XU

## 2025-05-03 PROCEDURE — 70496 CT ANGIOGRAPHY HEAD: CPT | Mod: 26

## 2025-05-03 PROCEDURE — 0042T: CPT

## 2025-05-03 PROCEDURE — 72131 CT LUMBAR SPINE W/O DYE: CPT | Mod: 26

## 2025-05-03 RX ORDER — CARVEDILOL 3.12 MG/1
3.12 TABLET, FILM COATED ORAL EVERY 12 HOURS
Refills: 0 | Status: DISCONTINUED | OUTPATIENT
Start: 2025-05-03 | End: 2025-05-06

## 2025-05-03 RX ORDER — ACETAMINOPHEN 500 MG/5ML
650 LIQUID (ML) ORAL EVERY 6 HOURS
Refills: 0 | Status: DISCONTINUED | OUTPATIENT
Start: 2025-05-03 | End: 2025-05-10

## 2025-05-03 RX ORDER — LABETALOL HYDROCHLORIDE 200 MG/1
10 TABLET, FILM COATED ORAL ONCE
Refills: 0 | Status: COMPLETED | OUTPATIENT
Start: 2025-05-03 | End: 2025-05-03

## 2025-05-03 RX ORDER — HEPARIN SODIUM 1000 [USP'U]/ML
5000 INJECTION INTRAVENOUS; SUBCUTANEOUS EVERY 8 HOURS
Refills: 0 | Status: DISCONTINUED | OUTPATIENT
Start: 2025-05-03 | End: 2025-05-04

## 2025-05-03 RX ORDER — CLOSTRIDIUM TETANI TOXOID ANTIGEN (FORMALDEHYDE INACTIVATED), CORYNEBACTERIUM DIPHTHERIAE TOXOID ANTIGEN (FORMALDEHYDE INACTIVATED), BORDETELLA PERTUSSIS TOXOID ANTIGEN (GLUTARALDEHYDE INACTIVATED), BORDETELLA PERTUSSIS FILAMENTOUS HEMAGGLUTININ ANTIGEN (FORMALDEHYDE INACTIVATED), BORDETELLA PERTUSSIS PERTACTIN ANTIGEN, AND BORDETELLA PERTUSSIS FIMBRIAE 2/3 ANTIGEN 5; 2; 2.5; 5; 3; 5 [LF]/.5ML; [LF]/.5ML; UG/.5ML; UG/.5ML; UG/.5ML; UG/.5ML
0.5 INJECTION, SUSPENSION INTRAMUSCULAR ONCE
Refills: 0 | Status: COMPLETED | OUTPATIENT
Start: 2025-05-03 | End: 2025-05-03

## 2025-05-03 RX ORDER — MELATONIN 5 MG
3 TABLET ORAL AT BEDTIME
Refills: 0 | Status: DISCONTINUED | OUTPATIENT
Start: 2025-05-03 | End: 2025-05-14

## 2025-05-03 RX ORDER — HEPARIN SODIUM 1000 [USP'U]/ML
5000 INJECTION INTRAVENOUS; SUBCUTANEOUS EVERY 12 HOURS
Refills: 0 | Status: DISCONTINUED | OUTPATIENT
Start: 2025-05-03 | End: 2025-05-03

## 2025-05-03 RX ORDER — ATORVASTATIN CALCIUM 80 MG/1
80 TABLET, FILM COATED ORAL AT BEDTIME
Refills: 0 | Status: DISCONTINUED | OUTPATIENT
Start: 2025-05-03 | End: 2025-05-14

## 2025-05-03 RX ORDER — BUPROPION HYDROBROMIDE 522 MG/1
450 TABLET, EXTENDED RELEASE ORAL EVERY 24 HOURS
Refills: 0 | Status: DISCONTINUED | OUTPATIENT
Start: 2025-05-03 | End: 2025-05-04

## 2025-05-03 RX ORDER — LEVOTHYROXINE SODIUM 300 MCG
75 TABLET ORAL DAILY
Refills: 0 | Status: DISCONTINUED | OUTPATIENT
Start: 2025-05-03 | End: 2025-05-14

## 2025-05-03 RX ADMIN — Medication 1000 MILLILITER(S): at 17:17

## 2025-05-03 RX ADMIN — LABETALOL HYDROCHLORIDE 10 MILLIGRAM(S): 200 TABLET, FILM COATED ORAL at 17:53

## 2025-05-03 RX ADMIN — ATORVASTATIN CALCIUM 80 MILLIGRAM(S): 80 TABLET, FILM COATED ORAL at 23:55

## 2025-05-03 RX ADMIN — CLOSTRIDIUM TETANI TOXOID ANTIGEN (FORMALDEHYDE INACTIVATED), CORYNEBACTERIUM DIPHTHERIAE TOXOID ANTIGEN (FORMALDEHYDE INACTIVATED), BORDETELLA PERTUSSIS TOXOID ANTIGEN (GLUTARALDEHYDE INACTIVATED), BORDETELLA PERTUSSIS FILAMENTOUS HEMAGGLUTININ ANTIGEN (FORMALDEHYDE INACTIVATED), BORDETELLA PERTUSSIS PERTACTIN ANTIGEN, AND BORDETELLA PERTUSSIS FIMBRIAE 2/3 ANTIGEN 0.5 MILLILITER(S): 5; 2; 2.5; 5; 3; 5 INJECTION, SUSPENSION INTRAMUSCULAR at 15:38

## 2025-05-03 RX ADMIN — Medication 40 MILLIEQUIVALENT(S): at 19:46

## 2025-05-03 RX ADMIN — Medication 1000 MILLILITER(S): at 17:56

## 2025-05-03 RX ADMIN — Medication 100 MILLILITER(S): at 23:56

## 2025-05-03 RX ADMIN — BUPROPION HYDROBROMIDE 450 MILLIGRAM(S): 522 TABLET, EXTENDED RELEASE ORAL at 23:55

## 2025-05-03 NOTE — H&P ADULT - PROBLEM SELECTOR PLAN 8
Seen by Dr. Murray in December with instruction to undergo stress test and TTE    - obtain limited TTE   - Home medication bupropion 450mg qd    #ADHD  - Home medication bupropion 450mg qd    #ADHD  Lisdexamfetamine Dimesylate  30 MG Oral Capsule Home medication bupropion 450mg qd  -resume home medication    #ADHD  Lisdexamfetamine Dimesylate  30 MG Oral Capsule  - hold for now

## 2025-05-03 NOTE — H&P ADULT - PROBLEM SELECTOR PLAN 2
Followed by Dr. Barcenas   Patient suffered mechanical fall several weeks ago and has been following with ortho for planned procedure  Outpatient Lumbar MRI: acute/subacute severe anteriorly wedged compression deformity of the L1 vertebral body with moderate associated retropulsion and severe stenosis.   Per note from 5/1, pending T11-L3 Decompression and posterior instrumented fusion.   Started on cyclobenzaprine 10mg TID as need, oxycodone 5mg and percocet  At [facility]   CT Lumbar Spine: Acute-appearing burst type compression fracture at L1 with severe vertebral body height loss and moderate osseous retropulsion. There is severe associated spinal canal stenosis. Acute right transverse process fractures at L1 and L2. Severe spinal canal stenosis at L4-L5.  Patient denies back pain   - ensure patient wears TSLO brace   - will not resume muscle relaxant or opioid medications as they are likely contributing to patient's worsening instability   - ortho consult in AM  - PT eval   - monitor for signs of saddle anesthesia Followed by Dr. Barcenas   Patient suffered mechanical fall several weeks ago and has been following with ortho for planned procedure  Outpatient Lumbar MRI: acute/subacute severe anteriorly wedged compression deformity of the L1 vertebral body with moderate associated retropulsion and severe stenosis.   Per note from 5/1, pending T11-L3 Decompression and posterior instrumented fusion.   Started on cyclobenzaprine 10mg TID as need, oxycodone 5mg and percocet  At [facility]   CT Lumbar Spine: Acute-appearing burst type compression fracture at L1 with severe vertebral body height loss and moderate osseous retropulsion. There is severe associated spinal canal stenosis. Acute right transverse process fractures at L1 and L2. Severe spinal canal stenosis at L4-L5.  Patient denies back pain   --ortho consult  - patient is instructed to wear TSLO brace but is not currently with her   - will not resume muscle relaxant or opioid medications as they are likely contributing to patient's worsening instability   - PT eval once cleared   - monitor for signs of saddle anesthesia

## 2025-05-03 NOTE — ED PROVIDER NOTE - PHYSICAL EXAMINATION
VITAL SIGNS: I have reviewed nursing notes and confirm.  CONSTITUTIONAL: Well appearing, in no acute distress.   SKIN:  warm and dry, no acute rash.   HEAD:  normocephalic, laceration to the scalp.   EYES: EOM intact; conjunctiva and sclera clear.  ENT: No nasal discharge; airway clear.   NECK: Supple.  CARD: S1, S2, Regular rate and rhythm.   RESP:  Clear to auscultation b/l, no wheezes, rales or rhonchi.  ABD: Normal bowel sounds; soft; non-distended; non-tender; no guarding/ rebound.  EXT: Normal ROM. No peripheral edema. Pulses intact and equal b/l.  NEURO: A&Ox3 to name, place, year. CN II-XII intact, except for L facial droop and slurry speech.. 5/5 strength in b/l UE and LE. Symmetrical sensation to light touch to both sides of face, and proximal/distal b/l UE and LE. No pronator drift.    SLAM 2

## 2025-05-03 NOTE — ED ADULT NURSE NOTE - NSFALLHARMRISKINTERV_ED_ALL_ED
Assistance OOB with selected safe patient handling equipment if applicable/Communicate risk of Fall with Harm to all staff, patient, and family/Monitor gait and stability/Provide patient with walking aids/Provide visual cue: red socks, yellow wristband, yellow gown, etc/Reinforce activity limits and safety measures with patient and family/Bed in lowest position, wheels locked, appropriate side rails in place/Call bell, personal items and telephone in reach/Instruct patient to call for assistance before getting out of bed/chair/stretcher/Non-slip footwear applied when patient is off stretcher/[place] to call system/Physically safe environment - no spills, clutter or unnecessary equipment/Purposeful Proactive Rounding/Room/bathroom lighting operational, light cord in reach

## 2025-05-03 NOTE — H&P ADULT - ATTENDING COMMENTS
78 yo F with PMHx HTN, HLD, hypothyroidism, depression, ADHD, frequent falls, recent L1 compression fx (pending OR 5/12) px from home s/p unwitnessed fall with ?head strike, found to have labs concerning for MARY LOU with Cr 5.86, admitted for further evaluation and management of frequent falls and MARY LOU.      ED course notable for 1x /80, improved s/p Labetalol 10mg IVP x1. Remainder of VSS. EKG reviewed (NSR, ). Troponin T 89 > 85. Low suspicion for cardiac event/ACS. Stroke code called on ED arrival due to concern for slurred speech and L facial droop (NIHSS 2), s/p negative CT stroke code imaging in ED for CVA. Labs reviewed. CT Spine demonstrating known L1 compression fx (from recent fall) with acute transverse process fx at L1 and L2. Pending surgical intervention with Dr. Barcenas on 5/12. Unclear etiology of frequent falls. Possible component of polypharmacy given pain regimen, SSRI, BZD use. Remainder of labs/imaging reviewed. WBC 12.62. Na 134. K 3.4. Cl 95. Bicarb 18. AG 21. BUN 82. Cr 5.86. No known hx of CKD. Prior Cr 1.16 on most recent labs 10/2024.      [ ] Repeat BMP/Lactate @ 10PM (Ordered)     [ ] Gentle hydration with mIVF-NS overnight*   [ ] Pickett placement > Strict I/O monitoring   [ ] UA + ULytes   [ ] US Renal    [ ] +/- Renal consult (Pending repeat BMP/Cr)     [ ] Ortho-Spine consult (Lumbar compression fractures)   - Originally planned for OR with orthopedic surgery 5/12   [ ] Pain control PRN  [ ] Fall precaution   [ ] PT evaluation     [ ] TTE (Ordered)     [ ] Outpatient PCP/Pulmonary follow-up on discharge    - CT Chest with 5 cm RUL lesion suspicious for malignancy

## 2025-05-03 NOTE — H&P ADULT - NSICDXPASTMEDICALHX_GEN_ALL_CORE_FT
PAST MEDICAL HISTORY:  Depression     Osteoarthritis      PAST MEDICAL HISTORY:  Depression     HLD (hyperlipidemia)     HTN (hypertension)     Hypothyroidism     Osteoarthritis

## 2025-05-03 NOTE — ED ADULT TRIAGE NOTE - CHIEF COMPLAINT QUOTE
Pt c/o fall with back of head strike. Scheduled for L1 spinal fracture with Dr. Barcenas on 5/12, endorses multiple falls within the past week. Denies anticoagulant use, vision changes, n/v. unknown LOC.

## 2025-05-03 NOTE — CONSULT NOTE ADULT - SUBJECTIVE AND OBJECTIVE BOX
**STROKE CODE CONSULT NOTE**    Last known well time/Time of onset of symptoms: 10am    HPI: 79y Female with PMHx of L1 compression fracture (april 2025, wears TLSO brace, scheduled for surgery with Dr. Barcenas this month) HTN, HLD, hypothyroid, depression, ADHD presents to ED s/p fall. Reports was feeling her baseline, was brushing her teeth at 10am then all of a sudden fell, +LOC, woke up 3 mins later in her bathtub with some blood surrounding her head. Does not remember falling. Denies prodromal sx. Reports recent falls. When her HHA arrived at 1130am she found the patient on the floor next to her bed with dysarthria so brought her to ED. Patient and HHA denied noting any focal weakness, numbness, vision changes.     T(C): 36.4 (05-03-25 @ 14:03), Max: 36.4 (05-03-25 @ 14:03)  HR: 84 (05-03-25 @ 15:45) (62 - 98)  BP: 173/82 (05-03-25 @ 15:45) (146/78 - 182/75)  RR: 18 (05-03-25 @ 15:45) (17 - 18)  SpO2: 98% (05-03-25 @ 15:45) (96% - 99%)    PAST MEDICAL & SURGICAL HISTORY:  Osteoarthritis      Depression          FAMILY HISTORY:    ROS:   see HPI    MEDICATIONS  (STANDING):    MEDICATIONS  (PRN):    Allergies    No Known Allergies    Intolerances      Vital Signs Last 24 Hrs  T(C): 36.4 (03 May 2025 14:03), Max: 36.4 (03 May 2025 14:03)  T(F): 97.5 (03 May 2025 14:03), Max: 97.5 (03 May 2025 14:03)  HR: 84 (03 May 2025 15:45) (62 - 98)  BP: 173/82 (03 May 2025 15:45) (146/78 - 182/75)  BP(mean): --  RR: 18 (03 May 2025 15:45) (17 - 18)  SpO2: 98% (03 May 2025 15:45) (96% - 99%)    Parameters below as of 03 May 2025 15:45  Patient On (Oxygen Delivery Method): room air        Neurologic:  -Mental status: Awake, alert, oriented to person, place, and time. Speech is fluent with intact naming, repetition, and comprehension, mild dysarthria. Recent and remote memory intact. Follows commands. Attention/concentration intact.    -Cranial nerves:   II: Visual fields are full to confrontation.  III, IV, VI: Extraocular movements are intact without nystagmus. Pupils equally round and reactive to light  V:  Facial sensation V1-V3 equal and intact   VII: L NLFF  VIII: Hearing is bilaterally intact to voice  Motor: Normal bulk and tone. No pronator drift. Strength bilateral upper extremity 5/5, bilateral lower extremities 5/5.  Sensation: Intact to light touch bilaterally. No neglect or extinction on double simultaneous testing.  Coordination: No dysmetria on finger-to-nose bilaterally  Gait: unsteady at first, not wide based or leaning to one side, became more steady as she walked    NIHSS: 2    Fingerstick Blood Glucose: CAPILLARY BLOOD GLUCOSE  184 (03 May 2025 16:35)      POCT Blood Glucose.: 184 mg/dL (03 May 2025 14:37)    LABS:                        11.6   12.62 )-----------( 313      ( 03 May 2025 14:34 )             33.8     05-03    134[L]  |  95[L]  |  82[H]  ----------------------------<  173[H]  3.4[L]   |  18[L]  |  5.86[H]    Ca    8.8      03 May 2025 14:34    TPro  8.0  /  Alb  4.4  /  TBili  0.7  /  DBili  x   /  AST  19  /  ALT  13  /  AlkPhos  120  05-03    PT/INR - ( 03 May 2025 14:34 )   PT: 12.1 sec;   INR: 1.05          PTT - ( 03 May 2025 14:34 )  PTT:28.2 sec      Urinalysis Basic - ( 03 May 2025 14:34 )    Color: x / Appearance: x / SG: x / pH: x  Gluc: 173 mg/dL / Ketone: x  / Bili: x / Urobili: x   Blood: x / Protein: x / Nitrite: x   Leuk Esterase: x / RBC: x / WBC x   Sq Epi: x / Non Sq Epi: x / Bacteria: x        RADIOLOGY & ADDITIONAL STUDIES:    NONCONTRAST HEAD CT: No acute intracranial abnormality. Volume loss and chronic microvascular ischemic disease as above..    CTA HEAD: No large vessel occlusion or hemodynamically significant stenosis. 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  CTA NECK: No occlusion, dissection, aneurysm, or hemodynamically significant stenosis. 4.1 x 2.5 cm right upper lobe opacity as detailed above. Further evaluation with dedicated CT of the chest without contrast   is recommended.    CT BRAIN PERFUSION:  Normal.    -----------------------------------------------------------------------------------------------------------------  IV-tenecteplase:    no, OGAMAL

## 2025-05-03 NOTE — H&P ADULT - PROBLEM SELECTOR PLAN 5
Baseline Cr: upon admission BUN/Cr  82/5.86  Likely iso of dehydration as patient shares she has had diminished oral intake 2/2 dry mouth  s/p IL NS in ED     - obtain urine studies   - avoid nephrotoxic medication  - trend Cr daily Patient with hx of HTN on hydrochlorthiazide and coreg at home, has not taken home meds in several weeks since starting muscle relaxant and opioid medications. Denies symptoms of lightheadedness, dizziness, nausea or chest pain   BP >  s/p labetolol 10mg in ED with improvement of blood pressure     - resume home coreg 3.123  - will hold thiazide for now  - if persistently hypertensive add Patient with hx of HTN on hydrochlorthiazide and coreg at home, has not taken home meds in several weeks since starting muscle relaxant and opioid medications. Denies symptoms of lightheadedness, dizziness, nausea or chest pain    BP trend in /78 >  205/80  s/p labetolol 10mg in ED with improvement of blood pressure     - resume home coreg 3.125 BID   - will hold thiazide for now  - goal SBP <160 Patient with hx of HTN on hydrochlorthiazide and coreg at home, has not taken home meds in several weeks since starting muscle relaxant and opioid medications. Denies symptoms of lightheadedness, dizziness, nausea or chest pain    BP trend in /78 >  205/80  s/p labetolol 10mg in ED with improvement of blood pressure   - resume home coreg 3.125 BID   - will hold thiazide for now  - goal SBP <160 Patient with hx of HTN on hydrochlorthiazide and coreg at home, has not taken home meds in several weeks since starting muscle relaxant and opioid medications. Denies symptoms of lightheadedness, dizziness, nausea or chest pain    BP trend in /78 >  205/80  s/p labetalol 10mg in ED with improvement of blood pressure   - resume home coreg 3.125 BID   - will hold thiazide for now  - goal SBP <160

## 2025-05-03 NOTE — H&P ADULT - NSHPSOCIALHISTORY_GEN_ALL_CORE
Lives alone with HHA   Denies prior smoking history; drinks 1,2 times weekly, denies recreational drug use Lives alone with HHA   Patient endorses 20 pack year history with quiting in 1980s  drinks 1,2 times weekly, denies recreational drug use

## 2025-05-03 NOTE — H&P ADULT - PROBLEM SELECTOR PLAN 6
Patient with hx of HTN on hydrochlorthiazide and coreg at home, has not taken home meds in several weeks since starting muscle relaxant and opioid medications. Denies symptoms of lightheadedness, dizziness, nausea or chest pain   BP >  s/p labetolol 10mg in ED with improvement of blood pressure     - resume home coreg 3.123  - will hold thiazide for now  - if persistently hypertensive add Likely 2/2 to demand ischemia, low concern for ACS given normal EKG and denial of anginal symptoms + hemodynamic stability   Troponin trend 89 (peak) > 85    - obtain TTE  - repeat EKG in AM  - continue to monitor for signs of EKG Likely 2/2 to demand ischemia, low concern for ACS given normal EKG and denial of anginal symptoms + hemodynamic stability   Troponin trend 89 (peak) > 85  - obtain TTE  - repeat EKG in AM  - continue to monitor for signs of EKG

## 2025-05-03 NOTE — H&P ADULT - HISTORY OF PRESENT ILLNESS
Patient is 79F with PMHx of HTN, HLD,  hypothyroidism, generalized depression, ADHD and L1 compression fracture 2/2 to recent  fal (april 2025, wears TLSO brace, scheduled for surgery with Dr. Barcenas this month)  rheumatic fever and pericarditis presents after unwitnessed fall. Patient states she was standing up brushing her teeth this morning and suddenly felt lightheaded and woke up on the floor  hitting her head on the bathtub +LOC +HT with noticeable blood from the scalp. Patient does not fully recall the event but denies lightheadedness dizziness, paresthesia blurred vision nausea or aura. She states she arose from the floor and called her  to bring her to the hospital.   Upon arrival to ED; Code stoke due to concern of dysarthria and left sided facial droop. Imaging negative for     Of note, patient shares she has been experiencing several episodes on instability due to weakness in her legs resulting in recurrent falls, most recently in April where she was in bed and missed her footing, falling to the ground. At that time she was sent for imaging of her lumbar spine and found to have acute/subacute severe anteriorly wedged compression deformity of the L1 vertebral body. Instructed to wear TLSO brace and prescribed muscle relaxants and oxycodone for painRecently seen by Dr. Barcenas on 5/1 pending T11-L3 Decompression and posterior instrumented fusion.     Patient also recently seen by PCP Dr. Michael Fields  4/28 with complaints of dry mouth and instability, instructed to discontinue use of cyclobenzaprine         In the ED,  VS: T   97.5, HR 98  , /78 >  205/80 , RR   20 , SpO2    99 % RA  Labs:  Urine:  EKG: normal sinue rhythm with rate of 88; qtc 496  CXR:   Imaging:  CTA HEAD: No large vessel occlusion or hemodynamically significant stenosis. 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  CTA NECK: No occlusion, dissection, aneurysm, or hemodynamically significant stenosis. 4.1 x 2.5 cm right upper lobe opacity as detailed above. Further evaluation with dedicated CT of the chest without contrast is recommended.  CT BRAIN PERFUSION:  Normal.  CT Lumbar Spine: Acute-appearing burst type compression fracture at L1 with severe vertebral body height loss and moderate osseous retropulsion. There is severe associated spinal canal stenosis. Acute right transverse process fractures at L1 and L2. Severe spinal canal stenosis at L4-L5.  CT Chest: 5 cm infiltrative opacity in the right upper lobe suspicious for malignancy. Consider PET/CT and tissue sampling.    Interventions; stroke team notified, s/p labetolol 10mg IVP   Patient is 79F with PMHx of HTN, HLD,  hypothyroidism, generalized depression, ADHD and L1 compression fracture 2/2 to recent  fall  (April 2025, wears TLSO brace, scheduled for surgery with Dr. Barcenas this month)  rheumatic fever and pericarditis presents after unwitnessed fall. Patient states she was standing up brushing her teeth this morning and suddenly felt lightheaded and woke up on the floor  hitting her head on the bathtub +LOC +HT with noticeable blood from the scalp. Patient does not fully recall the event but denies lightheadedness dizziness, paresthesia blurred vision nausea or aura. She states she arose from the floor and called her  to bring her to the hospital.   Upon arrival to ED; Code stoke due to concern of dysarthria and left sided facial droop. Imaging negative for     Of note, patient shares she has been experiencing several episodes on instability due to weakness in her legs resulting in recurrent falls, most recently in April where she was in bed and missed her footing, falling to the ground. At that time she was sent for imaging of her lumbar spine and found to have acute/subacute severe anteriorly wedged compression deformity of the L1 vertebral body. Instructed to wear TLSO brace and prescribed muscle relaxants and oxycodone for painRecently seen by Dr. Barcenas on 5/1 pending T11-L3 Decompression and posterior instrumented fusion.     Patient also recently seen by PCP Dr. Michael Fields  4/28 with complaints of dry mouth and instability, instructed to discontinue use of cyclobenzaprine     In the ED,  VS: T   97.5, HR 98  , /78 >  205/80 , RR   20 , SpO2    99 % RA  Labs: leukocytosis to 12.6, trop 89>85; sodium 134, potassium 3.4 AG 21 with bicarb of 18 BUN 82/Cr 5.86  EKG: normal sinus rhythm with rate of 88; qtc 496    Imaging:  CTA HEAD: No large vessel occlusion or hemodynamically significant stenosis. 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  CTA NECK: No occlusion, dissection, aneurysm, or hemodynamically significant stenosis. 4.1 x 2.5 cm right upper lobe opacity as detailed above. Further evaluation with dedicated CT of the chest without contrast is recommended.  CT BRAIN PERFUSION:  Normal.  CT Lumbar Spine: Acute-appearing burst type compression fracture at L1 with severe vertebral body height loss and moderate osseous retropulsion. There is severe associated spinal canal stenosis. Acute right transverse process fractures at L1 and L2. Severe spinal canal stenosis at L4-L5.  CT Chest: 5 cm infiltrative opacity in the right upper lobe suspicious for malignancy. Consider PET/CT and tissue sampling.    Interventions; stroke team notified, s/p labetolol 10mg IVP   Patient is 79F with PMHx of HTN, HLD,  hypothyroidism, generalized depression, ADHD and L1 compression fracture 2/2 to recent  fall  (April 2025, wears TLSO brace, scheduled for surgery with Dr. Barcenas this month)  rheumatic fever and pericarditis presents after unwitnessed fall. Patient states she was standing up brushing her teeth this morning and suddenly felt lightheaded and woke up on the floor  hitting her head on the bathtub +LOC +HT with noticeable blood from the scalp. Patient does not fully recall the event but denies lightheadedness dizziness, paresthesia blurred vision nausea or aura. She states she arose from the floor and called her  to bring her to the hospital. Upon arrival to ED; Code stoke due to concern of dysarthria and left sided facial droop.     Of note, patient shares she has been experiencing several episodes on instability due to weakness in her legs resulting in recurrent falls, most recently in April where she was in bed and missed her footing, falling to the ground. At that time she was sent for imaging of her lumbar spine and found to have acute/subacute severe anteriorly wedged compression deformity of the L1 vertebral body. Instructed to wear TLSO brace and prescribed muscle relaxants and oxycodone for pain. Recently seen by Dr. Barcenas on 5/1 pending T11-L3 Decompression and posterior instrumented fusion.     Patient also recently seen by PCP Dr. Michael Fields  4/28 with complaints of dry mouth and instability, instructed to discontinue use of cyclobenzaprine     In the ED,  VS: T   97.5, HR 98  , /78 >  205/80 , RR   20 , SpO2    99 % RA  Labs: leukocytosis to 12.6, trop 89>85; sodium 134, potassium 3.4 AG 21 with bicarb of 18 BUN 82/Cr 5.86  EKG: normal sinus rhythm with rate of 88; qtc 496    Imaging:  CTA HEAD: No large vessel occlusion or hemodynamically significant stenosis. 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  CTA NECK: No occlusion, dissection, aneurysm, or hemodynamically significant stenosis. 4.1 x 2.5 cm right upper lobe opacity as detailed above. Further evaluation with dedicated CT of the chest without contrast is recommended.  CT BRAIN PERFUSION:  Normal.  CT Lumbar Spine: Acute-appearing burst type compression fracture at L1 with severe vertebral body height loss and moderate osseous retropulsion. There is severe associated spinal canal stenosis. Acute right transverse process fractures at L1 and L2. Severe spinal canal stenosis at L4-L5.  CT Chest: 5 cm infiltrative opacity in the right upper lobe suspicious for malignancy. Consider PET/CT and tissue sampling.    Interventions; code stroke called, s/p scalp laceration repair, labetolol 10mg IVP

## 2025-05-03 NOTE — ED PROVIDER NOTE - OBJECTIVE STATEMENT
79 F, hx of HTN, HLD, hypothyroidism, lumbar fx with back brace, ambulatory with walker, p/w a fall at 10am today, now with slurry speech and L facial droop. LWK 6pm per home aide. pt states she fell in her bathtub, unable to tell me why or how she fell. Denied LOC, amnesia, vomiting. she crawled back into her room. At 1130am, home aide arrived and found her lying next to her bed with some bleeding on her head. home aide then noticed the slurry speech. pt denied pain to chest, trouble breathing, abd pain, pain in UE or LE

## 2025-05-03 NOTE — ED PROVIDER NOTE - CLINICAL SUMMARY MEDICAL DECISION MAKING FREE TEXT BOX
presenting with fall and symptoms of stroke. .    -presentation concerning for possible CVA. unclear cause of fall. could be related to CVA. Will keep broad ddx infectious vs cardiac vs electrolyte vs other neurological etiology, but primary goal at this time is r/o CVA. will wash and repair wound. will update tdap.   -gem, cbc, bmp, lfts, coags, troponin  -head ct, ct angio h/n, ct perfusion  -ekg  -stroke team consult, PA bedside

## 2025-05-03 NOTE — H&P ADULT - NSHPPHYSICALEXAM_GEN_ALL_CORE
.  VITAL SIGNS:  T(C): 36.4 (05-03-25 @ 14:03), Max: 36.4 (05-03-25 @ 14:03)  T(F): 97.5 (05-03-25 @ 14:03), Max: 97.5 (05-03-25 @ 14:03)  HR: 74 (05-03-25 @ 17:56) (62 - 98)  BP: 170/68 (05-03-25 @ 17:56) (146/78 - 205/80)  BP(mean): --  RR: 20 (05-03-25 @ 17:56) (17 - 20)  SpO2: 99% (05-03-25 @ 17:56) (96% - 99%)  Wt(kg): --    PHYSICAL EXAM:    Constitutional: Resting comfortably in bed; NAD  Head: NC/AT  Eyes: PERRL, EOMI, clear conjunctiva  ENT: no nasal discharge; uvula midline, no oropharyngeal erythema or exudates; MMM  Neck: supple; no JVD or thyromegaly  Respiratory: CTA B/L; no W/R/R, no retractions  Cardiac: +S1/S2; RRR; no M/R/G;  Gastrointestinal: soft, NT/ND; no rebound or guarding; +BSx4  Extremities: WWP, no clubbing or cyanosis; no peripheral edema  Musculoskeletal: NROM x4; no joint swelling, tenderness or erythema  Vascular: 2+ radial, femoral, DP/PT pulses B/L  Dermatologic: skin warm, dry and intact; no rashes, wounds, or scars  Neurologic: AAOx3; CNII-XII grossly intact; no focal deficits  Psychiatric: affect and characteristics of appearance, verbalizations, behaviors are appropriate VITAL SIGNS:  T(C): 36.4 (05-03-25 @ 14:03), Max: 36.4 (05-03-25 @ 14:03)  T(F): 97.5 (05-03-25 @ 14:03), Max: 97.5 (05-03-25 @ 14:03)  HR: 74 (05-03-25 @ 17:56) (62 - 98)  BP: 170/68 (05-03-25 @ 17:56) (146/78 - 205/80)  BP(mean): --  RR: 20 (05-03-25 @ 17:56) (17 - 20)  SpO2: 99% (05-03-25 @ 17:56) (96% - 99%)  Wt(kg): --    PHYSICAL EXAM:    Constitutional: Resting comfortably in bed; NAD  Head: NC/AT  Eyes: PERRL, EOMI, clear conjunctiva  ENT: no nasal discharge; uvula midline, no oropharyngeal erythema or exudates; MMM  Neck: supple; no JVD or thyromegaly  Respiratory: CTA B/L; no W/R/R, no retractions  Cardiac: +S1/S2; RRR; no M/R/G;  Gastrointestinal: soft, NT/ND; no rebound or guarding; +BSx4  Extremities: WWP, no clubbing or cyanosis; no peripheral edema  Musculoskeletal: NROM x4; no joint swelling, tenderness or erythema  Vascular: 2+ radial, femoral, DP/PT pulses B/L  Dermatologic: skin warm, dry and intact; no rashes, wounds, or scars  Neurologic: AAOx3; CNII-XII grossly intact; no focal deficits  Psychiatric: affect and characteristics of appearance, verbalizations, behaviors are appropriate VITAL SIGNS:  T(C): 36.4 (05-03-25 @ 14:03), Max: 36.4 (05-03-25 @ 14:03)  T(F): 97.5 (05-03-25 @ 14:03), Max: 97.5 (05-03-25 @ 14:03)  HR: 74 (05-03-25 @ 17:56) (62 - 98)  BP: 170/68 (05-03-25 @ 17:56) (146/78 - 205/80)  BP(mean): --  RR: 20 (05-03-25 @ 17:56) (17 - 20)  SpO2: 99% (05-03-25 @ 17:56) (96% - 99%)  Wt(kg): --    PHYSICAL EXAM:    Constitutional: Resting comfortably in bed; NAD, pleasant   Head: NC/AT  Eyes: PERRL, EOMI, clear conjunctiva  ENT: no nasal discharge; uvula midline, no oropharyngeal erythema or exudates; MMM  Neck: supple; no JVD or thyromegaly  Respiratory: CTA B/L; no W/R/R, no retractions  Cardiac: +S1/S2; RRR; no M/R/G;  Gastrointestinal: soft, NT/ND; no rebound or guarding; +BSx4; primafit in place   Extremities: WWP, no clubbing or cyanosis; no peripheral edema  Musculoskeletal: NROM x4; no joint swelling, tenderness or erythema  Vascular: 2+ radial, femoral, DP/PT pulses B/L  Dermatologic: skin warm, dry and intact; no rashes, wounds, or scars  Neurologic: AAOx3; CNII-XII grossly intact; with 4/5 UE/LE strength bilaterally, without sensory deficits or paresthesias  Psychiatric: tangential speech requiring frequent reorientation VITAL SIGNS:  T(C): 36.4 (05-03-25 @ 14:03), Max: 36.4 (05-03-25 @ 14:03)  T(F): 97.5 (05-03-25 @ 14:03), Max: 97.5 (05-03-25 @ 14:03)  HR: 74 (05-03-25 @ 17:56) (62 - 98)  BP: 170/68 (05-03-25 @ 17:56) (146/78 - 205/80)  BP(mean): --  RR: 20 (05-03-25 @ 17:56) (17 - 20)  SpO2: 99% (05-03-25 @ 17:56) (96% - 99%)  Wt(kg): --    PHYSICAL EXAM:    Constitutional: Resting comfortably in bed; NAD, pleasant   Head: notable for mild tenderness and dried blood at the base of the scalp s/p laceration repair without active bleeding  Eyes: PERRL, EOMI, clear conjunctiva  ENT: no nasal discharge; uvula midline, no oropharyngeal erythema or exudates; MMM  Neck: supple; no JVD or thyromegaly  Respiratory: CTA B/L; no W/R/R, no retractions  Cardiac: +S1/S2; RRR; no M/R/G;  Gastrointestinal: soft, NT/ND; no rebound or guarding; +BSx4; primafit in place   Extremities: WWP, no clubbing or cyanosis; no peripheral edema  Musculoskeletal: NROM x4; no joint swelling, tenderness or erythema  Vascular: 2+ radial, femoral, DP/PT pulses B/L  Dermatologic: skin warm, dry and intact; no rashes, wounds, or scars  Neurologic: AAOx3; CNII-XII grossly intact; with 4/5 UE/LE strength bilaterally, without sensory deficits or paresthesias  Psychiatric: tangential speech requiring frequent reorientation

## 2025-05-03 NOTE — H&P ADULT - PROBLEM SELECTOR PLAN 7
Likely 2/2 to demand ischemia, low concern for ACS given normal EKG and denial of anginal symptoms + hemodynamic stability   Troponin trend 89 (peak) > 85    - obtain TTE  - repeat EKG in AM  - continue to monitor for signs of EKG Seen by Dr. Murray in December with instruction to undergo stress test and TTE    - obtain limited TTE   - Patient shares prior hx of rheumatic fever when she was [age] years old but denies residual valvulopathy.   Seen by Dr. Murray in December with instruction to undergo stress test and TTE but no record of imaging. EKG normal sinus rhythm, no murmurs auscultated on physical exam     -obtain further collateral from family in AM  - obtain limited TTE   - Patient shares prior hx of rheumatic fever when she was [age] years old but denies residual valvulopathy.   Seen by Dr. Murray in December with instruction to undergo stress test and TTE but no record of imaging. EKG normal sinus rhythm, no murmurs auscultated on physical exam   -obtain further collateral from family in AM  - obtain limited TTE

## 2025-05-03 NOTE — H&P ADULT - PROBLEM SELECTOR PLAN 4
Patient states she has had issue with urinary retention in the past   Denies dysuria     - will monitor off antibiotics for now; low threshold to start ceftriaxone if leukocytosis persists  -pending urinalysis   - bladder scan q6 Patient with 20 pack year smoking history (stopped in 1984) presents after syncopal episode with incidental finding of pulmonary nodule in RUL. Endorses recent loss of appetite, but denies   CT Chest: 5 cm infiltrative opacity in the right upper lobe suspicious for malignancy. Consider PET/CT and tissue sampling. Patient with 20 pack year smoking history (stopped in 1984) presents after syncopal episode with incidental finding of pulmonary nodule in RUL. Endorses recent loss of appetite, but denies cough, weight loss, prior occupational exposures  CT Chest: 5 cm infiltrative opacity in the right upper lobe suspicious for malignancy. Consider PET/CT and tissue sampling.    - pulmonology consult in AM  - Patient with 20 pack year smoking history (stopped in 1984) presents after syncopal episode with incidental finding of pulmonary nodule in RUL. Endorses recent loss of appetite, but denies cough, weight loss, prior occupational exposures  CT Chest: 5 cm infiltrative opacity in the right upper lobe suspicious for malignancy. Consider PET/CT and tissue sampling.    - Outpatient PCP/Pulmonary follow-up on discharge

## 2025-05-03 NOTE — CONSULT NOTE ADULT - ASSESSMENT
79y Female with PMHx of L1 compression fracture (april 2025, wears TLSO brace, scheduled for surgery with Dr. Barcenas this month) HTN, HLD, hypothyroid, depression, ADHD presents to ED s/p fall at 10am. +LOC, woke up 3 mins later in her bathtub with some blood surrounding her head. When her HHA arrived at 1130am she found the patient on the floor next to her bed with dysarthria so brought her to ED. On exam patient with mild dysarthria and mild L NLFF. CTH neg. 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 4.1 x 2.5 cm right upper lobe opacity. CTP neg. Not a candidate for acute intervention. Labs significant for BUN 82, Cr 5.86, trop 89. Recommend medicine admission given lab abnormalities and stroke to follow along.     1. Secondary stroke prevention  - hold off on AC/AP for now pending MRI  - Start Atorvastatin 80mg PO daily    2. Stroke risk factors  - A1C: pls obtain  - LDL: pls obtain  - Atrial fibrillation  - HTN, HLD    3. Further management  - Obtain MRI brain without  - Recommend SBP goal <180  - Recommend q4hr stroke neuro checks  - May need outpt neurology follow up  - provided stroke education about signs and symptoms      Discussed with Neurology Attending Dr. Beauchamp       79y Female with PMHx of L1 compression fracture (april 2025, wears TLSO brace, scheduled for surgery with Dr. Barcenas this month) HTN, HLD, hypothyroid, depression, ADHD presents to ED s/p fall at 10am. +LOC, woke up 3 mins later in her bathtub with some blood surrounding her head. HHA noted dysarthria so brought her to ED. On exam patient with mild dysarthria and mild L NLFF. NIHSS 2. CTH neg. 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 4.1 x 2.5 cm right upper lobe opacity. CTP neg. Not a candidate for acute intervention. Labs significant for BUN 82, Cr 5.86, trop 89. Recommend medicine admission given lab abnormalities and stroke to follow along.     1. Secondary stroke prevention  - hold off on AP for now pending MRI  - Start Atorvastatin 80mg PO daily    2. Stroke risk factors  - A1C: pls obtain  - LDL: pls obtain  - HTN, HLD    3. Further management  - Obtain MRI brain without  - Recommend SBP goal <180  - Recommend q4hr stroke neuro checks  - May need outpt neurology follow up  - provided stroke education about signs and symptoms      Discussed with Neurology Attending Dr. Beauchamp

## 2025-05-03 NOTE — H&P ADULT - PROBLEM SELECTOR PLAN 3
Patient with 20 pack year smoking history (stopped in 1984) presents after syncopal episode with incidental finding of pulmonary nodule in RUL. Endorses recent loss of appetite, but denies   CT Chest: 5 cm infiltrative opacity in the right upper lobe suspicious for malignancy. Consider PET/CT and tissue sampling. #AGMA  Baseline Cr: upon admission BUN/Cr  82/5.86  Likely iso of dehydration as patient shares she has had diminished oral intake 2/2 dry mouth  s/p IL NS in ED   - obtain urine studies   - avoid nephrotoxic medication  - trend Cr daily    #Urinary retention  Patient states she has had issue with urinary retention in the past   Denies dysuria   - will monitor off antibiotics for now; low threshold to start ceftriaxone if leukocytosis persists  -pending urinalysis   - bladder scan q6 #AGMA  Baseline Cr: upon admission BUN/Cr  82/5.86  Likely iso of dehydration as patient shares she has had diminished oral intake 2/2 dry mouth  s/p IL NS in ED   - start maintenance fluids NS @ 100  - obtain renal ultrasound  - obtain urine studies   - avoid nephrotoxic medication  - trend Cr daily  - strict I/O  #Urinary retention  Patient states she has had issue with urinary retention in the past   Denies dysuria   - will monitor off antibiotics for now; low threshold to start ceftriaxone if leukocytosis persists  -pending urinalysis   - bladder scan q6  - paul placement

## 2025-05-03 NOTE — H&P ADULT - ASSESSMENT
Patient is 79F with PMHx of HTN, HLD,  hypothyroidism, generalized depression, ADHD and L1 compression fracture 2/2 mechanical fall (pending surgery),  rheumatic fever, pericarditis presents after syncopal event and head trauma likely 2/2 to medication side effect vs dehydration complicated by MARY LOU with incidental finding of RUL opacity c/f malignancy  Patient is 79F with PMHx of HTN, HLD,  hypothyroidism, generalized depression, ADHD and L1 compression fracture 2/2 mechanical fall (pending surgery),  rheumatic fever, pericarditis presents after syncopal event and head trauma likely 2/2 to medication side effect vs dehydration complicated by MARY LOU, with incidental finding of RUL opacity c/f malignancy  Patient is 79F with PMHx of HTN, HLD,  hypothyroidism, generalized depression, ADHD rheumatic fever, pericarditis with recent  L1 compression fracture 2/2 mechanical fall (pending  orthopedic intervention with Dr. Barcenas),  presents after unwitnessed syncopal event and head trauma likely 2/2 to medication side effect vs dehydration complicated by MARY LOU, with incidental finding of RUL opacity c/f malignancy

## 2025-05-03 NOTE — H&P ADULT - PROBLEM SELECTOR PLAN 10
Home medication bupropion 450mg qd Home medication Home medication: Levothyroxine Sodium 75 MCG Oral  - resume home medication Home medication: Levothyroxine Sodium 75 MCG Oral  - resume home medication  - TSH Home medication: Levothyroxine Sodium 75 MCG Oral  - resume home medication  - TSH in AM

## 2025-05-03 NOTE — H&P ADULT - NSHPLABSRESULTS_GEN_ALL_CORE
.  LABS:                         11.6   12.62 )-----------( 313      ( 03 May 2025 14:34 )             33.8     05-03    134[L]  |  95[L]  |  82[H]  ----------------------------<  173[H]  3.4[L]   |  18[L]  |  5.86[H]    Ca    8.8      03 May 2025 14:34    TPro  8.0  /  Alb  4.4  /  TBili  0.7  /  DBili  x   /  AST  19  /  ALT  13  /  AlkPhos  120  05-03    PT/INR - ( 03 May 2025 14:34 )   PT: 12.1 sec;   INR: 1.05          PTT - ( 03 May 2025 14:34 )  PTT:28.2 sec  Urinalysis Basic - ( 03 May 2025 14:34 )    Color: x / Appearance: x / SG: x / pH: x  Gluc: 173 mg/dL / Ketone: x  / Bili: x / Urobili: x   Blood: x / Protein: x / Nitrite: x   Leuk Esterase: x / RBC: x / WBC x   Sq Epi: x / Non Sq Epi: x / Bacteria: x            RADIOLOGY, EKG & ADDITIONAL TESTS: Reviewed. LABS:                         11.6   12.62 )-----------( 313      ( 03 May 2025 14:34 )             33.8     05-03    134[L]  |  95[L]  |  82[H]  ----------------------------<  173[H]  3.4[L]   |  18[L]  |  5.86[H]    Ca    8.8      03 May 2025 14:34    TPro  8.0  /  Alb  4.4  /  TBili  0.7  /  DBili  x   /  AST  19  /  ALT  13  /  AlkPhos  120  05-03    PT/INR - ( 03 May 2025 14:34 )   PT: 12.1 sec;   INR: 1.05          PTT - ( 03 May 2025 14:34 )  PTT:28.2 sec  Urinalysis Basic - ( 03 May 2025 14:34 )    Color: x / Appearance: x / SG: x / pH: x  Gluc: 173 mg/dL / Ketone: x  / Bili: x / Urobili: x   Blood: x / Protein: x / Nitrite: x   Leuk Esterase: x / RBC: x / WBC x   Sq Epi: x / Non Sq Epi: x / Bacteria: x            RADIOLOGY, EKG & ADDITIONAL TESTS: Reviewed.

## 2025-05-03 NOTE — H&P ADULT - PROBLEM SELECTOR PLAN 9
Home medication rosuvastain 5mg     - initiatie rosuvastin   -obtain lipid panel and A1x Home medication rosuvastatin 5mg     - initiate atorvostatin 80mg qd  -obtain lipid panel and A1c in ED Home medication rosuvastatin 5mg   - initiate atorvostatin 80mg qd  -obtain lipid panel and A1c in ED Home medication rosuvastatin 5mg   - initiate atorvastatin 80mg qd  -obtain lipid panel and A1c in ED

## 2025-05-03 NOTE — ED PROVIDER NOTE - PROGRESS NOTE DETAILS
bladder scan 150  no acute intervnetion per stroke team. no asa. given MARY LOU, admit to medicine service.

## 2025-05-03 NOTE — H&P ADULT - PROBLEM SELECTOR PLAN 1
#Syncope   May be medication induced BEERS (recently started cyclobenzaprine, opiods  vs orthostatic iso poor oral intake less likely arrhythmogenic given normal EKG however given patient's history of rheumatic fever and percardis woulf perform limited TTE   Trop 89>85  Stoke imaging negative, no signs of facial droop or dysarthria on repeat exam   s/p scalp laceration repair in ED with three staples  - obtain orthostatics   - obtain CK  - obtain UA; low concern for infection at this times as patient  denies dysuria but given retention  - obtain limited TTE in AM #Syncope   May be medication induced BEERS (recently started cyclobenzaprine, opiods  vs orthostatic iso poor oral intake less likely arrhythmogenic given normal EKG however given patient's history of rheumatic fever and percarditis would  perform limited TTE   Trop 89>85  Stoke imaging negative, no signs of facial droop or dysarthria on repeat exam   s/p scalp laceration repair in ED with three staples  - hold off  orthostatics for now    - obtain CK  - obtain UA; low concern for infection at this times as patient  denies dysuria but given retention  - obtain limited TTE in AM #Syncope   May be medication induced BEERS (recently started cyclobenzaprine, opiods  vs orthostatic iso poor oral intake less likely arrhythmogenic given normal EKG however given patient's history of rheumatic fever and pericarditis would  perform limited TTE   Trop 89>85  Stroke imaging negative, no signs of facial droop or dysarthria on repeat exam   s/p scalp laceration repair in ED with three staples  - hold off  orthostatics for now    - obtain CK  - obtain UA; low concern for infection at this times as patient  denies dysuria but given retention  - obtain limited TTE in AM

## 2025-05-03 NOTE — H&P ADULT - PROBLEM SELECTOR PLAN 11
Home medication rosuvastain 5mg     - initiatie rosuvastin   -obtain lipid panel and A1x F: None  E: Replete PRN  N: Diet,  DVT ppx:  GI ppx:  Bowel:  Dispo:    FULL CODE F: NS @ 100cc   E: Replete PRN  N: Regular   DVT ppx: Heparin sub q  GI ppx: None  Bowel: Not indicated   Dispo: F     FULL CODE

## 2025-05-04 LAB
A1C WITH ESTIMATED AVERAGE GLUCOSE RESULT: 5.6 % — SIGNIFICANT CHANGE UP (ref 4–5.6)
ALBUMIN SERPL ELPH-MCNC: 3.9 G/DL — SIGNIFICANT CHANGE UP (ref 3.3–5)
ALP SERPL-CCNC: 99 U/L — SIGNIFICANT CHANGE UP (ref 40–120)
ALT FLD-CCNC: 10 U/L — SIGNIFICANT CHANGE UP (ref 10–45)
ANION GAP SERPL CALC-SCNC: 16 MMOL/L — SIGNIFICANT CHANGE UP (ref 5–17)
ANION GAP SERPL CALC-SCNC: 17 MMOL/L — SIGNIFICANT CHANGE UP (ref 5–17)
APPEARANCE UR: CLEAR — SIGNIFICANT CHANGE UP
AST SERPL-CCNC: 18 U/L — SIGNIFICANT CHANGE UP (ref 10–40)
BASOPHILS # BLD AUTO: 0.03 K/UL — SIGNIFICANT CHANGE UP (ref 0–0.2)
BASOPHILS NFR BLD AUTO: 0.4 % — SIGNIFICANT CHANGE UP (ref 0–2)
BILIRUB SERPL-MCNC: 0.8 MG/DL — SIGNIFICANT CHANGE UP (ref 0.2–1.2)
BILIRUB UR-MCNC: NEGATIVE — SIGNIFICANT CHANGE UP
BUN SERPL-MCNC: 67 MG/DL — HIGH (ref 7–23)
BUN SERPL-MCNC: 71 MG/DL — HIGH (ref 7–23)
CALCIUM SERPL-MCNC: 7.8 MG/DL — LOW (ref 8.4–10.5)
CALCIUM SERPL-MCNC: 8.2 MG/DL — LOW (ref 8.4–10.5)
CHLORIDE SERPL-SCNC: 100 MMOL/L — SIGNIFICANT CHANGE UP (ref 96–108)
CHLORIDE SERPL-SCNC: 102 MMOL/L — SIGNIFICANT CHANGE UP (ref 96–108)
CHOLEST SERPL-MCNC: 163 MG/DL — SIGNIFICANT CHANGE UP
CO2 SERPL-SCNC: 18 MMOL/L — LOW (ref 22–31)
CO2 SERPL-SCNC: 18 MMOL/L — LOW (ref 22–31)
COLOR SPEC: YELLOW — SIGNIFICANT CHANGE UP
CREAT ?TM UR-MCNC: 71 MG/DL — SIGNIFICANT CHANGE UP
CREAT SERPL-MCNC: 4.79 MG/DL — HIGH (ref 0.5–1.3)
CREAT SERPL-MCNC: 5.05 MG/DL — HIGH (ref 0.5–1.3)
DIFF PNL FLD: ABNORMAL
EGFR: 8 ML/MIN/1.73M2 — LOW
EGFR: 8 ML/MIN/1.73M2 — LOW
EGFR: 9 ML/MIN/1.73M2 — LOW
EGFR: 9 ML/MIN/1.73M2 — LOW
EOSINOPHIL # BLD AUTO: 0.15 K/UL — SIGNIFICANT CHANGE UP (ref 0–0.5)
EOSINOPHIL NFR BLD AUTO: 1.9 % — SIGNIFICANT CHANGE UP (ref 0–6)
ESTIMATED AVERAGE GLUCOSE: 114 MG/DL — SIGNIFICANT CHANGE UP (ref 68–114)
FOLATE SERPL-MCNC: 13.4 NG/ML — SIGNIFICANT CHANGE UP
GLUCOSE SERPL-MCNC: 127 MG/DL — HIGH (ref 70–99)
GLUCOSE SERPL-MCNC: 91 MG/DL — SIGNIFICANT CHANGE UP (ref 70–99)
GLUCOSE UR QL: NEGATIVE MG/DL — SIGNIFICANT CHANGE UP
HCT VFR BLD CALC: 30.4 % — LOW (ref 34.5–45)
HDLC SERPL-MCNC: 36 MG/DL — LOW
HGB BLD-MCNC: 10.3 G/DL — LOW (ref 11.5–15.5)
IMM GRANULOCYTES NFR BLD AUTO: 0.4 % — SIGNIFICANT CHANGE UP (ref 0–0.9)
KETONES UR-MCNC: NEGATIVE MG/DL — SIGNIFICANT CHANGE UP
LDLC SERPL-MCNC: 103 MG/DL — HIGH
LEUKOCYTE ESTERASE UR-ACNC: ABNORMAL
LIPID PNL WITH DIRECT LDL SERPL: 103 MG/DL — HIGH
LYMPHOCYTES # BLD AUTO: 1.21 K/UL — SIGNIFICANT CHANGE UP (ref 1–3.3)
LYMPHOCYTES # BLD AUTO: 15.7 % — SIGNIFICANT CHANGE UP (ref 13–44)
MAGNESIUM SERPL-MCNC: 1.8 MG/DL — SIGNIFICANT CHANGE UP (ref 1.6–2.6)
MCHC RBC-ENTMCNC: 29.9 PG — SIGNIFICANT CHANGE UP (ref 27–34)
MCHC RBC-ENTMCNC: 33.9 G/DL — SIGNIFICANT CHANGE UP (ref 32–36)
MCV RBC AUTO: 88.4 FL — SIGNIFICANT CHANGE UP (ref 80–100)
MONOCYTES # BLD AUTO: 0.76 K/UL — SIGNIFICANT CHANGE UP (ref 0–0.9)
MONOCYTES NFR BLD AUTO: 9.9 % — SIGNIFICANT CHANGE UP (ref 2–14)
NEUTROPHILS # BLD AUTO: 5.52 K/UL — SIGNIFICANT CHANGE UP (ref 1.8–7.4)
NEUTROPHILS NFR BLD AUTO: 71.7 % — SIGNIFICANT CHANGE UP (ref 43–77)
NITRITE UR-MCNC: NEGATIVE — SIGNIFICANT CHANGE UP
NONHDLC SERPL-MCNC: 127 MG/DL — SIGNIFICANT CHANGE UP
NRBC BLD AUTO-RTO: 0 /100 WBCS — SIGNIFICANT CHANGE UP (ref 0–0)
OSMOLALITY UR: 393 MOSM/KG — SIGNIFICANT CHANGE UP (ref 300–900)
PH UR: 5.5 — SIGNIFICANT CHANGE UP (ref 5–8)
PHOSPHATE SERPL-MCNC: 5 MG/DL — HIGH (ref 2.5–4.5)
PLATELET # BLD AUTO: 282 K/UL — SIGNIFICANT CHANGE UP (ref 150–400)
POTASSIUM SERPL-MCNC: 3.2 MMOL/L — LOW (ref 3.5–5.3)
POTASSIUM SERPL-MCNC: 3.5 MMOL/L — SIGNIFICANT CHANGE UP (ref 3.5–5.3)
POTASSIUM SERPL-SCNC: 3.2 MMOL/L — LOW (ref 3.5–5.3)
POTASSIUM SERPL-SCNC: 3.5 MMOL/L — SIGNIFICANT CHANGE UP (ref 3.5–5.3)
POTASSIUM UR-SCNC: 34 MMOL/L — SIGNIFICANT CHANGE UP
PROT ?TM UR-MCNC: 21 MG/DL — HIGH (ref 0–12)
PROT SERPL-MCNC: 6.9 G/DL — SIGNIFICANT CHANGE UP (ref 6–8.3)
PROT UR-MCNC: 30 MG/DL
PROT/CREAT UR-RTO: 0.3 RATIO — HIGH (ref 0–0.2)
RBC # BLD: 3.44 M/UL — LOW (ref 3.8–5.2)
RBC # FLD: 15.2 % — HIGH (ref 10.3–14.5)
SODIUM SERPL-SCNC: 134 MMOL/L — LOW (ref 135–145)
SODIUM SERPL-SCNC: 137 MMOL/L — SIGNIFICANT CHANGE UP (ref 135–145)
SODIUM UR-SCNC: 51 MMOL/L — SIGNIFICANT CHANGE UP
SP GR SPEC: 1.02 — SIGNIFICANT CHANGE UP (ref 1–1.03)
TRIGL SERPL-MCNC: 133 MG/DL — SIGNIFICANT CHANGE UP
TSH SERPL-MCNC: 1.05 UIU/ML — SIGNIFICANT CHANGE UP (ref 0.27–4.2)
UROBILINOGEN FLD QL: 0.2 MG/DL — SIGNIFICANT CHANGE UP (ref 0.2–1)
UUN UR-MCNC: 433 MG/DL — SIGNIFICANT CHANGE UP
VIT B12 SERPL-MCNC: 739 PG/ML — SIGNIFICANT CHANGE UP (ref 232–1245)
WBC # BLD: 7.7 K/UL — SIGNIFICANT CHANGE UP (ref 3.8–10.5)
WBC # FLD AUTO: 7.7 K/UL — SIGNIFICANT CHANGE UP (ref 3.8–10.5)

## 2025-05-04 PROCEDURE — 99233 SBSQ HOSP IP/OBS HIGH 50: CPT | Mod: GC

## 2025-05-04 PROCEDURE — 76775 US EXAM ABDO BACK WALL LIM: CPT | Mod: 26

## 2025-05-04 PROCEDURE — 70551 MRI BRAIN STEM W/O DYE: CPT | Mod: 26

## 2025-05-04 PROCEDURE — 99223 1ST HOSP IP/OBS HIGH 75: CPT

## 2025-05-04 RX ORDER — DEXTROSE 50 % IN WATER 50 %
15 SYRINGE (ML) INTRAVENOUS ONCE
Refills: 0 | Status: DISCONTINUED | OUTPATIENT
Start: 2025-05-04 | End: 2025-05-14

## 2025-05-04 RX ORDER — DEXTROSE 50 % IN WATER 50 %
25 SYRINGE (ML) INTRAVENOUS ONCE
Refills: 0 | Status: DISCONTINUED | OUTPATIENT
Start: 2025-05-04 | End: 2025-05-14

## 2025-05-04 RX ORDER — SODIUM CHLORIDE 9 G/1000ML
1000 INJECTION, SOLUTION INTRAVENOUS
Refills: 0 | Status: DISCONTINUED | OUTPATIENT
Start: 2025-05-04 | End: 2025-05-14

## 2025-05-04 RX ORDER — SODIUM BICARBONATE 1 MEQ/ML
650 SYRINGE (ML) INTRAVENOUS EVERY 8 HOURS
Refills: 0 | Status: DISCONTINUED | OUTPATIENT
Start: 2025-05-04 | End: 2025-05-05

## 2025-05-04 RX ORDER — INSULIN LISPRO 100 U/ML
INJECTION, SOLUTION INTRAVENOUS; SUBCUTANEOUS
Refills: 0 | Status: DISCONTINUED | OUTPATIENT
Start: 2025-05-04 | End: 2025-05-05

## 2025-05-04 RX ORDER — CYCLOBENZAPRINE HYDROCHLORIDE 15 MG/1
5 CAPSULE, EXTENDED RELEASE ORAL ONCE
Refills: 0 | Status: COMPLETED | OUTPATIENT
Start: 2025-05-04 | End: 2025-05-04

## 2025-05-04 RX ORDER — GLUCAGON 3 MG/1
1 POWDER NASAL ONCE
Refills: 0 | Status: DISCONTINUED | OUTPATIENT
Start: 2025-05-04 | End: 2025-05-14

## 2025-05-04 RX ORDER — DEXTROSE 50 % IN WATER 50 %
12.5 SYRINGE (ML) INTRAVENOUS ONCE
Refills: 0 | Status: DISCONTINUED | OUTPATIENT
Start: 2025-05-04 | End: 2025-05-14

## 2025-05-04 RX ORDER — ACETAMINOPHEN 500 MG/5ML
1000 LIQUID (ML) ORAL ONCE
Refills: 0 | Status: COMPLETED | OUTPATIENT
Start: 2025-05-04 | End: 2025-05-04

## 2025-05-04 RX ORDER — BUPROPION HYDROBROMIDE 522 MG/1
300 TABLET, EXTENDED RELEASE ORAL DAILY
Refills: 0 | Status: DISCONTINUED | OUTPATIENT
Start: 2025-05-04 | End: 2025-05-14

## 2025-05-04 RX ADMIN — CARVEDILOL 3.12 MILLIGRAM(S): 3.12 TABLET, FILM COATED ORAL at 18:33

## 2025-05-04 RX ADMIN — Medication 650 MILLIGRAM(S): at 21:50

## 2025-05-04 RX ADMIN — HEPARIN SODIUM 5000 UNIT(S): 1000 INJECTION INTRAVENOUS; SUBCUTANEOUS at 06:26

## 2025-05-04 RX ADMIN — ATORVASTATIN CALCIUM 80 MILLIGRAM(S): 80 TABLET, FILM COATED ORAL at 21:50

## 2025-05-04 RX ADMIN — Medication 75 MICROGRAM(S): at 06:26

## 2025-05-04 RX ADMIN — CARVEDILOL 3.12 MILLIGRAM(S): 3.12 TABLET, FILM COATED ORAL at 06:26

## 2025-05-04 NOTE — PHYSICAL THERAPY INITIAL EVALUATION ADULT - ADDITIONAL COMMENTS
Pt reports living in apartment, without LORE, alone. Reports being independent with daily activities without use of DME prior to two weeks ago. Reports multiple falls in two weeks, reports  assisting with ambulation and community access.

## 2025-05-04 NOTE — CONSULT NOTE ADULT - ASSESSMENT
79-year-old F with PMH of recent mechanical fall with L1 compression fracture admitted with syncope and TBI.  Patient reports having used ibuprofen on a daily basis.  Nephrology consulted for MARY LOU    1– MARY LOU on CKD:  Last know SCr 1 on 10/24  DDx including hemodynamic MARY LOU (prerenal azotemia, ATN), AIN from NSAID use  On admission (5/3): SCr 5.9, BUN 82, CO2 18, CK 29, no eosinophilia  Patient was started on sodium chloride 0.9% infusion at a rate of 100 mL/h  Most recent labs (5/4): SCr 5, BUN 67, phosphorus 5 CO2 18, AGAP 16 | UA: UPC 0.3, blood large, RBC 32  –Repeat Urinalysis  –Renal ultrasound to rule obstructive uropathy  –Strict I/O monitoring  –Monitor BMP every 12 hours      Thank you for consulting Nephrology.    Latanya Tomas MD  PGY-4 Nephrology Fellow

## 2025-05-04 NOTE — PROGRESS NOTE ADULT - SUBJECTIVE AND OBJECTIVE BOX
Patient is a 79y old  Female who presents with a chief complaint of Fall vs syncope, MARY LOU (04 May 2025 08:24)        SUBJECTIVE:  Patient was seen and examined at bedside.    Overnight Events : Pickett placed last night , no urine noted in bag , patient denies abd pain     Last Bowel Movement: 02-May-2025 (05-04)      Review of systems: 12 point Review of systems negative unless otherwise documented elsewhere in note.     Diet, Regular (05-03-25 @ 21:46) [Active]      MEDICATIONS:  MEDICATIONS  (STANDING):  atorvastatin 80 milliGRAM(s) Oral at bedtime  buPROPion XL (24-Hour) . 450 milliGRAM(s) Oral every 24 hours  carvedilol 3.125 milliGRAM(s) Oral every 12 hours  dextrose 5%. 1000 milliLiter(s) (50 mL/Hr) IV Continuous <Continuous>  dextrose 5%. 1000 milliLiter(s) (100 mL/Hr) IV Continuous <Continuous>  dextrose 50% Injectable 25 Gram(s) IV Push once  dextrose 50% Injectable 12.5 Gram(s) IV Push once  dextrose 50% Injectable 25 Gram(s) IV Push once  glucagon  Injectable 1 milliGRAM(s) IntraMuscular once  heparin   Injectable 5000 Unit(s) SubCutaneous every 8 hours  insulin lispro (ADMELOG) corrective regimen sliding scale   SubCutaneous Before meals and at bedtime  levothyroxine 75 MICROGram(s) Oral daily  sodium bicarbonate 650 milliGRAM(s) Oral every 8 hours  sodium chloride 0.9%. 1000 milliLiter(s) (100 mL/Hr) IV Continuous <Continuous>    MEDICATIONS  (PRN):  acetaminophen     Tablet .. 650 milliGRAM(s) Oral every 6 hours PRN Temp greater or equal to 38C (100.4F), Mild Pain (1 - 3)  dextrose Oral Gel 15 Gram(s) Oral once PRN Blood Glucose LESS THAN 70 milliGRAM(s)/deciliter  melatonin 3 milliGRAM(s) Oral at bedtime PRN Insomnia      Allergies    No Known Allergies    Intolerances        OBJECTIVE:  Vital Signs Last 24 Hrs  T(C): 36.5 (04 May 2025 11:00), Max: 36.7 (04 May 2025 05:31)  T(F): 97.7 (04 May 2025 11:00), Max: 98 (04 May 2025 05:31)  HR: 76 (04 May 2025 11:00) (62 - 98)  BP: 153/71 (04 May 2025 11:00) (146/78 - 205/80)  BP(mean): 98 (04 May 2025 11:00) (98 - 98)  RR: 16 (04 May 2025 11:00) (16 - 20)  SpO2: 97% (04 May 2025 11:00) (96% - 100%)    Parameters below as of 04 May 2025 11:00  Patient On (Oxygen Delivery Method): room air      I&O's Summary    03 May 2025 07:01  -  04 May 2025 07:00  --------------------------------------------------------  IN: 700 mL / OUT: 0 mL / NET: 700 mL    04 May 2025 07:01  -  04 May 2025 13:32  --------------------------------------------------------  IN: 400 mL / OUT: 550 mL / NET: -150 mL        PHYSICAL EXAM:  Gen: Resting in bed at time of exam, not in distress   HEENT: moist mucosa, no lesions   Neck: supple, trachea at midline  CV: RRR, +S1/S2  Pulm: no wheezing , no crackles  no increase in work of breathing  Abd: soft, NTND  Skin: warm and dry, no new rashes   Ext: moving all 4 extremities spontaneously , no edema  ,  Neuro: AOx3, no gross focal neurological deficits  Psych: affect and behavior appropriate, pleasant at time of interview    LABS:                        10.3   7.70  )-----------( 282      ( 04 May 2025 08:39 )             30.4     05-04    134[L]  |  100  |  67[H]  ----------------------------<  91  3.5   |  18[L]  |  5.05[H]    Ca    8.2[L]      04 May 2025 08:39  Phos  5.0     05-04  Mg     1.8     05-04    TPro  6.9  /  Alb  3.9  /  TBili  0.8  /  DBili  x   /  AST  18  /  ALT  10  /  AlkPhos  99  05-04    LIVER FUNCTIONS - ( 04 May 2025 08:39 )  Alb: 3.9 g/dL / Pro: 6.9 g/dL / ALK PHOS: 99 U/L / ALT: 10 U/L / AST: 18 U/L / GGT: x           PT/INR - ( 03 May 2025 14:34 )   PT: 12.1 sec;   INR: 1.05          PTT - ( 03 May 2025 14:34 )  PTT:28.2 sec  CAPILLARY BLOOD GLUCOSE  184 (03 May 2025 16:35)      POCT Blood Glucose.: 184 mg/dL (03 May 2025 14:37)    Urinalysis Basic - ( 04 May 2025 08:39 )    Color: x / Appearance: x / SG: x / pH: x  Gluc: 91 mg/dL / Ketone: x  / Bili: x / Urobili: x   Blood: x / Protein: x / Nitrite: x   Leuk Esterase: x / RBC: x / WBC x   Sq Epi: x / Non Sq Epi: x / Bacteria: x        MICRODATA:      RADIOLOGY/OTHER STUDIES:

## 2025-05-04 NOTE — CONSULT NOTE ADULT - SUBJECTIVE AND OBJECTIVE BOX
NEPHROLOGY SERVICE INITIAL CONSULT NOTE    HPI:  79-year-old F with PMH of recent mechanical fall with L1 compression fracture admitted with syncope and TBI.  Nephrology consulted for MARY LOU  Patient reports having used ibuprofen on a daily basis.  Nephrology consulted for MARY LOU  Patient also recently seen by PCP Dr. Michael Fields   with complaints of dry mouth and instability, instructed to discontinue use of cyclobenzaprine     In the ED,  VS: T   97.5, HR 98  , /78 >  205/80 , RR   20 , SpO2    99 % RA  Labs: leukocytosis to 12.6, trop 89>85; sodium 134, potassium 3.4 AG 21 with bicarb of 18 BUN 82/Cr 5.86  EKG: normal sinus rhythm with rate of 88; qtc 496    Imaging:  CTA HEAD: No large vessel occlusion or hemodynamically significant stenosis. 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  CTA NECK: No occlusion, dissection, aneurysm, or hemodynamically significant stenosis. 4.1 x 2.5 cm right upper lobe opacity as detailed above. Further evaluation with dedicated CT of the chest without contrast is recommended.  CT BRAIN PERFUSION:  Normal.  CT Lumbar Spine: Acute-appearing burst type compression fracture at L1 with severe vertebral body height loss and moderate osseous retropulsion. There is severe associated spinal canal stenosis. Acute right transverse process fractures at L1 and L2. Severe spinal canal stenosis at L4-L5.  CT Chest: 5 cm infiltrative opacity in the right upper lobe suspicious for malignancy. Consider PET/CT and tissue sampling.    Interventions; code stroke called, s/p scalp laceration repair, labetolol 10mg IVP   (03 May 2025 18:57)        REVIEW OF SYSTEMS: Otherwise negative except as specified in HPI  PAST MEDICAL & SURGICAL HISTORY:  Osteoarthritis      Depression      HTN (hypertension)      HLD (hyperlipidemia)      Hypothyroidism      No significant past surgical history        FAMILY HISTORY:  No pertinent family history in first degree relatives      SOCIAL HISTORY:  HOME MEDICATIONS:    Allergies    No Known Allergies    Intolerances        ACTIVE MEDICATIONS:  MEDICATIONS  (STANDING):  atorvastatin 80 milliGRAM(s) Oral at bedtime  buPROPion XL (24-Hour) . 300 milliGRAM(s) Oral daily  carvedilol 3.125 milliGRAM(s) Oral every 12 hours  dextrose 5%. 1000 milliLiter(s) (50 mL/Hr) IV Continuous <Continuous>  dextrose 5%. 1000 milliLiter(s) (100 mL/Hr) IV Continuous <Continuous>  dextrose 50% Injectable 25 Gram(s) IV Push once  dextrose 50% Injectable 12.5 Gram(s) IV Push once  dextrose 50% Injectable 25 Gram(s) IV Push once  glucagon  Injectable 1 milliGRAM(s) IntraMuscular once  heparin   Injectable 5000 Unit(s) SubCutaneous every 8 hours  insulin lispro (ADMELOG) corrective regimen sliding scale   SubCutaneous Before meals and at bedtime  levothyroxine 75 MICROGram(s) Oral daily  sodium bicarbonate 650 milliGRAM(s) Oral every 8 hours  sodium chloride 0.9%. 1000 milliLiter(s) (100 mL/Hr) IV Continuous <Continuous>    MEDICATIONS  (PRN):  acetaminophen     Tablet .. 650 milliGRAM(s) Oral every 6 hours PRN Temp greater or equal to 38C (100.4F), Mild Pain (1 - 3)  dextrose Oral Gel 15 Gram(s) Oral once PRN Blood Glucose LESS THAN 70 milliGRAM(s)/deciliter  melatonin 3 milliGRAM(s) Oral at bedtime PRN Insomnia        VITAL SIGNS:  Vital Signs Last 24 Hrs  T(C): 36.5 (04 May 2025 11:00), Max: 36.7 (04 May 2025 05:31)  T(F): 97.7 (04 May 2025 11:00), Max: 98 (04 May 2025 05:31)  HR: 76 (04 May 2025 11:00) (74 - 84)  BP: 153/71 (04 May 2025 11:00) (153/71 - 205/80)  BP(mean): 98 (04 May 2025 11:00) (98 - 98)  RR: 16 (04 May 2025 11:00) (16 - 20)  SpO2: 97% (04 May 2025 11:00) (97% - 100%)    Parameters below as of 04 May 2025 11:00  Patient On (Oxygen Delivery Method): room air        25 @ 07:01  -  25 @ 07:00  --------------------------------------------------------  IN:    sodium chloride 0.9%: 700 mL  Total IN: 700 mL    OUT:  Total OUT: 0 mL    Total NET: 700 mL      25 @ 07:01  -  25 @ 15:46  --------------------------------------------------------  IN:    sodium chloride 0.9%: 400 mL  Total IN: 400 mL    OUT:    Voided (mL): 550 mL  Total OUT: 550 mL    Total NET: -150 mL          PE:  General: Not in acute distress   Chest: CTAP b/l, no use of accessory respiratory muscles  Heart: RRR, S1/S2 wnl, no MRG  Abdomen: Soft, nontender, nondistended   Extremities: No edema  Neuro: Alert, no apparent focal deficits, answer questions appropriately    Pertinent labs & Imaging:                        10.3   7.70  )-----------( 282      ( 04 May 2025 08:39 )             30.4     05-    134[L]  |  100  |  67[H]  ----------------------------<  91  3.5   |  18[L]  |  5.05[H]    Ca    8.2[L]      04 May 2025 08:39  Phos  5.0     05-  Mg     1.8     -    TPro  6.9  /  Alb  3.9  /  TBili  0.8  /  DBili  x   /  AST  18  /  ALT  10  /  AlkPhos  99  05-04    PT/INR - ( 03 May 2025 14:34 )   PT: 12.1 sec;   INR: 1.05          PTT - ( 03 May 2025 14:34 )  PTT:28.2 sec  Urinalysis Basic - ( 04 May 2025 10:48 )    Color: Yellow / Appearance: Clear / S.024 / pH: x  Gluc: x / Ketone: Negative mg/dL  / Bili: Negative / Urobili: 0.2 mg/dL   Blood: x / Protein: 30 mg/dL / Nitrite: Negative   Leuk Esterase: Moderate / RBC: 32 /HPF / WBC 8 /HPF   Sq Epi: x / Non Sq Epi: 1 /HPF / Bacteria: Few /HPF          CAPILLARY BLOOD GLUCOSE  184 (03 May 2025 16:35)              RADIOLOGY & ADDITIONAL TESTS: Reviewed.

## 2025-05-04 NOTE — PROGRESS NOTE ADULT - SUBJECTIVE AND OBJECTIVE BOX
Neurology Stroke Progress Note    INTERVAL HPI/OVERNIGHT EVENTS:  Patient seen and examined. Pt appears in good spirits, feels better than when she came in. States her speech is back to baseline.     MEDICATIONS  (STANDING):  atorvastatin 80 milliGRAM(s) Oral at bedtime  buPROPion XL (24-Hour) . 450 milliGRAM(s) Oral every 24 hours  carvedilol 3.125 milliGRAM(s) Oral every 12 hours  heparin   Injectable 5000 Unit(s) SubCutaneous every 8 hours  levothyroxine 75 MICROGram(s) Oral daily  sodium chloride 0.9%. 1000 milliLiter(s) (100 mL/Hr) IV Continuous <Continuous>    MEDICATIONS  (PRN):  acetaminophen     Tablet .. 650 milliGRAM(s) Oral every 6 hours PRN Temp greater or equal to 38C (100.4F), Mild Pain (1 - 3)  melatonin 3 milliGRAM(s) Oral at bedtime PRN Insomnia      Allergies    No Known Allergies    Intolerances        Vital Signs Last 24 Hrs  T(C): 36.7 (04 May 2025 05:31), Max: 36.7 (04 May 2025 05:31)  T(F): 98 (04 May 2025 05:31), Max: 98 (04 May 2025 05:31)  HR: 74 (04 May 2025 05:31) (62 - 98)  BP: 161/76 (04 May 2025 05:31) (146/78 - 205/80)  BP(mean): --  RR: 16 (04 May 2025 05:31) (16 - 20)  SpO2: 98% (04 May 2025 05:31) (96% - 100%)    Parameters below as of 03 May 2025 21:45  Patient On (Oxygen Delivery Method): room air        Physical exam:  General: No acute distress, awake and alert  Eyes: Anicteric sclerae, moist conjunctivae, see below for CNs  Cardiovascular: Regular rate and rhythm, no murmurs  Pulmonary: No use of accessory muscles  Extremities: no edema    Neurologic:  -Mental status: Awake, alert, oriented to person, place, and time. Speech is fluent with intact naming, repetition, and comprehension, no dysarthria. Recent and remote memory intact. Follows commands. Attention/concentration intact. Fund of knowledge appropriate.  -Cranial nerves:   II: Visual fields are full to confrontation.  III, IV, VI: Extraocular movements are intact without nystagmus. Pupils equally round and reactive to light  V:  Facial sensation V1-V3 equal and intact   VII: Mild LNLFF  VIII: Hearing is bilaterally intact  XII: Tongue protrudes midline  Motor: Normal bulk and tone. No pronator drift. Strength bilateral upper extremity 5/5, bilateral lower extremities 5/5.  Sensation: Intact to light touch bilaterally. No neglect or extinction on double simultaneous testing.  Coordination: No dysmetria on finger-to-nose   Reflexes: Downgoing toes bilaterally   Gait: deferred     LABS:                        10.4   9.31  )-----------( 261      ( 03 May 2025 22:00 )             30.7     05-03    132[L]  |  98  |  72[H]  ----------------------------<  95  3.9   |  17[L]  |  5.25[H]    Ca    8.4      03 May 2025 22:00  Phos  5.6     05-03  Mg     1.9     05-03    TPro  7.1  /  Alb  3.9  /  TBili  0.8  /  DBili  x   /  AST  18  /  ALT  10  /  AlkPhos  102  05-03    PT/INR - ( 03 May 2025 14:34 )   PT: 12.1 sec;   INR: 1.05          PTT - ( 03 May 2025 14:34 )  PTT:28.2 sec  Urinalysis Basic - ( 03 May 2025 22:00 )    Color: x / Appearance: x / SG: x / pH: x  Gluc: 95 mg/dL / Ketone: x  / Bili: x / Urobili: x   Blood: x / Protein: x / Nitrite: x   Leuk Esterase: x / RBC: x / WBC x   Sq Epi: x / Non Sq Epi: x / Bacteria: x        RADIOLOGY & ADDITIONAL TESTS:    reviewed

## 2025-05-04 NOTE — CONSULT NOTE ADULT - SUBJECTIVE AND OBJECTIVE BOX
PULMONARY SERVICE INITIAL CONSULT NOTE    HPI:  Patient is 79F with PMHx of HTN, HLD,  hypothyroidism, generalized depression, ADHD and L1 compression fracture 2/2 to recent  fall  (2025, wears TLSO brace, scheduled for surgery with Dr. Barcenas this month)  rheumatic fever and pericarditis presents after unwitnessed fall. Patient states she was standing up brushing her teeth this morning and suddenly felt lightheaded and woke up on the floor  hitting her head on the bathtub +LOC +HT with noticeable blood from the scalp. Patient does not fully recall the event but denies lightheadedness dizziness, paresthesia blurred vision nausea or aura. She states she arose from the floor and called her  to bring her to the hospital. Upon arrival to ED; Code stoke due to concern of dysarthria and left sided facial droop.     Of note, patient shares she has been experiencing several episodes on instability due to weakness in her legs resulting in recurrent falls, most recently in April where she was in bed and missed her footing, falling to the ground. At that time she was sent for imaging of her lumbar spine and found to have acute/subacute severe anteriorly wedged compression deformity of the L1 vertebral body. Instructed to wear TLSO brace and prescribed muscle relaxants and oxycodone for pain. Recently seen by Dr. Barcenas on  pending T11-L3 Decompression and posterior instrumented fusion.     Patient also recently seen by PCP Dr. Michael Fields   with complaints of dry mouth and instability, instructed to discontinue use of cyclobenzaprine     In the ED,  VS: T   97.5, HR 98  , /78 >  205/80 , RR   20 , SpO2    99 % RA  Labs: leukocytosis to 12.6, trop 89>85; sodium 134, potassium 3.4 AG 21 with bicarb of 18 BUN 82/Cr 5.86  EKG: normal sinus rhythm with rate of 88; qtc 496    Imaging:  CTA HEAD: No large vessel occlusion or hemodynamically significant stenosis. 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  CTA NECK: No occlusion, dissection, aneurysm, or hemodynamically significant stenosis. 4.1 x 2.5 cm right upper lobe opacity as detailed above. Further evaluation with dedicated CT of the chest without contrast is recommended.  CT BRAIN PERFUSION:  Normal.  CT Lumbar Spine: Acute-appearing burst type compression fracture at L1 with severe vertebral body height loss and moderate osseous retropulsion. There is severe associated spinal canal stenosis. Acute right transverse process fractures at L1 and L2. Severe spinal canal stenosis at L4-L5.  CT Chest: 5 cm infiltrative opacity in the right upper lobe suspicious for malignancy. Consider PET/CT and tissue sampling.    Interventions; code stroke called, s/p scalp laceration repair, labetolol 10mg IVP   (03 May 2025 18:57)    Additional pulmonary history: Consulted for lung mass. Attempted to see patient x2 but off floor for testing. Chart reviewed - CT this admission with 5 cm opacity with mixed ground glass and solid component and spiculated, highly concerning for malignancy. She also has another ipsilateral nodule on R side which may represent separate primary, impacted mucus, or metastatic focus.     REVIEW OF SYSTEMS:  Constitutional: No fever, weight loss or fatigue  Eyes: No eye pain, visual disturbances, or discharge  ENMT:  No difficulty hearing, tinnitus, vertigo; No sinus or throat pain  Neck: No pain, stiffness or neck swelling  Respiratory: see HPI  Cardiovascular: No chest pain, palpitations, dizziness or leg swelling  Gastrointestinal: No abdominal or epigastric pain. No nausea, vomiting or hematemesis; No diarrhea or constipation. No melena or hematochezia.  Genitourinary: No dysuria, frequency, hematuria or incontinence  Neurological: No headaches, memory loss, loss of strength, numbness or tremors  Skin: No itching, burning, rashes or lesions   Lymph Nodes: No enlarged glands  Endocrine: No heat or cold intolerance; No hair loss  Musculoskeletal: No joint pain or swelling; No muscle, back or extremity pain  Psychiatric: No depression, anxiety, mood swings or difficulty sleeping  Heme/Lymph: No easy bruising or bleeding gums  Allergy and Immunologic: No hives or eczema    PAST MEDICAL & SURGICAL HISTORY:  Osteoarthritis      Depression      HTN (hypertension)      HLD (hyperlipidemia)      Hypothyroidism      No significant past surgical history          FAMILY HISTORY:  No pertinent family history in first degree relatives        SOCIAL HISTORY:  Smoking Status: [ ] Current, [ x ] Former, [ ] Never  Pack Years: 20 pack years    MEDICATIONS:  Pulmonary:    Antimicrobials:    Anticoagulants:  heparin   Injectable 5000 Unit(s) SubCutaneous every 8 hours    Onc:    GI/:    Endocrine:  atorvastatin 80 milliGRAM(s) Oral at bedtime  dextrose 50% Injectable 25 Gram(s) IV Push once  dextrose 50% Injectable 12.5 Gram(s) IV Push once  dextrose 50% Injectable 25 Gram(s) IV Push once  dextrose Oral Gel 15 Gram(s) Oral once PRN  glucagon  Injectable 1 milliGRAM(s) IntraMuscular once  insulin lispro (ADMELOG) corrective regimen sliding scale   SubCutaneous Before meals and at bedtime  levothyroxine 75 MICROGram(s) Oral daily    Cardiac:  carvedilol 3.125 milliGRAM(s) Oral every 12 hours    Other Medications:  acetaminophen     Tablet .. 650 milliGRAM(s) Oral every 6 hours PRN  buPROPion XL (24-Hour) . 300 milliGRAM(s) Oral daily  dextrose 5%. 1000 milliLiter(s) IV Continuous <Continuous>  dextrose 5%. 1000 milliLiter(s) IV Continuous <Continuous>  melatonin 3 milliGRAM(s) Oral at bedtime PRN  sodium bicarbonate 650 milliGRAM(s) Oral every 8 hours  sodium chloride 0.9%. 1000 milliLiter(s) IV Continuous <Continuous>      Allergies    No Known Allergies    Intolerances        Vital Signs Last 24 Hrs  T(C): 36.5 (04 May 2025 11:00), Max: 36.7 (04 May 2025 05:31)  T(F): 97.7 (04 May 2025 11:00), Max: 98 (04 May 2025 05:31)  HR: 76 (04 May 2025 11:00) (74 - 84)  BP: 153/71 (04 May 2025 11:00) (153/71 - 205/80)  BP(mean): 98 (04 May 2025 11:00) (98 - 98)  RR: 16 (04 May 2025 11:00) (16 - 20)  SpO2: 97% (04 May 2025 11:00) (97% - 100%)    Parameters below as of 04 May 2025 11:00  Patient On (Oxygen Delivery Method): room air        - @ :  -   @ 07:00  --------------------------------------------------------  IN: 700 mL / OUT: 0 mL / NET: 700 mL     @ 07:01  -  05-04 @ 16:33  --------------------------------------------------------  IN: 400 mL / OUT: 550 mL / NET: -150 mL          PHYSICAL EXAM:  unable to obtain     LABS:      CBC Full  -  ( 04 May 2025 08:39 )  WBC Count : 7.70 K/uL  RBC Count : 3.44 M/uL  Hemoglobin : 10.3 g/dL  Hematocrit : 30.4 %  Platelet Count - Automated : 282 K/uL  Mean Cell Volume : 88.4 fl  Mean Cell Hemoglobin : 29.9 pg  Mean Cell Hemoglobin Concentration : 33.9 g/dL  Auto Neutrophil # : x  Auto Lymphocyte # : x  Auto Monocyte # : x  Auto Eosinophil # : x  Auto Basophil # : x  Auto Neutrophil % : x  Auto Lymphocyte % : x  Auto Monocyte % : x  Auto Eosinophil % : x  Auto Basophil % : x    05-04    134[L]  |  100  |  67[H]  ----------------------------<  91  3.5   |  18[L]  |  5.05[H]    Ca    8.2[L]      04 May 2025 08:39  Phos  5.0     05-04  Mg     1.8     05-04    TPro  6.9  /  Alb  3.9  /  TBili  0.8  /  DBili  x   /  AST  18  /  ALT  10  /  AlkPhos  99  05-04    PT/INR - ( 03 May 2025 14:34 )   PT: 12.1 sec;   INR: 1.05          PTT - ( 03 May 2025 14:34 )  PTT:28.2 sec      Urinalysis Basic - ( 04 May 2025 10:48 )    Color: Yellow / Appearance: Clear / S.024 / pH: x  Gluc: x / Ketone: Negative mg/dL  / Bili: Negative / Urobili: 0.2 mg/dL   Blood: x / Protein: 30 mg/dL / Nitrite: Negative   Leuk Esterase: Moderate / RBC: 32 /HPF / WBC 8 /HPF   Sq Epi: x / Non Sq Epi: 1 /HPF / Bacteria: Few /HPF                RADIOLOGY & ADDITIONAL STUDIES:

## 2025-05-04 NOTE — CONSULT NOTE ADULT - ASSESSMENT
Ms. Pressley is a 79F with PMHx of HTN, HLD,  hypothyroidism, generalized depression, ADHD and L1 compression fracture who presented for mechanical fall. Pulmonary consulted for lung mass seen on CT.     Data Reviewed:  CTA 2016: report only - indeterminate nodules   CT Chest 2021 (isaac): RUL nodule   CT Chest 5/3/2025: 5 cm mixed GGO and solid opacity, emphysematous changes apically, motion artifact, additional RUL peripheral nodule   Labs: reviewed   Outpatient chart: PCP note and Cards note reviewed in HIE; CC was balance complaints attributed to flexeril at that time     #Lung Mass  #Former Smoker     Highly suspicious for primary lung malignancy. Would recommend PET CT and MR brain to assess for metastatic foci - if there are any present, would recommend tissue sampling at a distal site which would serve dual purposes of diagnosis and staging. Will need outpatient PFTs to assess for any surgical planning if she is a candidate pending imaging above.     Recommend:   - PET CT   - MR Brain   - needs outpatient PFTs and follow up   - pending imaging, might be able to perform biopsy and mediastinal staging in next few days; please hold full AC and anti platelet agents until above information is available.     Discussed with Dr. Peck   Will follow closely

## 2025-05-04 NOTE — PHYSICAL THERAPY INITIAL EVALUATION ADULT - GAIT DEVIATIONS NOTED, PT EVAL
pt with one episode of LOB, benefitting from PT assist to recover/decreased emelyn/decreased step length/decreased weight-shifting ability

## 2025-05-04 NOTE — PATIENT PROFILE ADULT - FALL HARM RISK - HARM RISK INTERVENTIONS
Communicate Risk of Fall with Harm to all staff/Reinforce activity limits and safety measures with patient and family/Tailored Fall Risk Interventions/Use of alarms - bed, chair and/or voice tab/Visual Cue: Yellow wristband and red socks/Bed in lowest position, wheels locked, appropriate side rails in place/Call bell, personal items and telephone in reach/Instruct patient to call for assistance before getting out of bed or chair/Non-slip footwear when patient is out of bed/[place] to call system/Physically safe environment - no spills, clutter or unnecessary equipment/Purposeful Proactive Rounding/Room/bathroom lighting operational, light cord in reach

## 2025-05-04 NOTE — PHYSICAL THERAPY INITIAL EVALUATION ADULT - PERTINENT HX OF CURRENT PROBLEM, REHAB EVAL
79F with PMHx of HTN, HLD,  hypothyroidism, generalized depression, ADHD rheumatic fever, pericarditis with recent  L1 compression fracture 2/2 mechanical fall (pending  orthopedic intervention with Dr. Barcenas),  presents after unwitnessed syncopal event and head trauma likely 2/2 to medication side effect vs dehydration complicated by MARY LOU, with incidental finding of RUL opacity c/f malignancy

## 2025-05-04 NOTE — PROGRESS NOTE ADULT - ASSESSMENT
80 yo F with PMHx HTN, HLD, hypothyroidism, depression, ADHD, frequent falls, recent L1 compression fx (pending OR 5/12) px from home s/p unwitnessed fall with ?head strike, found to have labs concerning for MARY LOU with Cr 5.86, admitted for further evaluation and management of frequent falls and MARY LOU.      Impression and Plan  # Oliguric MARY LOU   - last creatinine from 2024 was 1   - Check renal Ultrasound   - Start IV Fluid  ,  Po Bicarbonate   - Nephrology Consultation   - Strict I and O   - Check Urine Lytes     # Lumbar Compression Fracture   - Ortho - Spine Consult     # RUL mass , Pulm Consult     # HTN   - hold HCTZ     # Type II Nstemi , due to MARY LOU     # hx of Rheumatic Fever , no current fever , no new murmurs on exam , no hematuria at this time

## 2025-05-04 NOTE — PHYSICAL THERAPY INITIAL EVALUATION ADULT - THERAPY FREQUENCY, PT EVAL
Patient educated on frequency of inpatient physical therapy at [facility], patient verbalized understanding./2-3x/week

## 2025-05-04 NOTE — PROGRESS NOTE ADULT - ASSESSMENT
79y Female with PMHx of L1 compression fracture (april 2025, wears TLSO brace, scheduled for surgery with Dr. Barcenas this month) HTN, HLD, hypothyroid, depression, ADHD presents to ED s/p fall at 10am. +LOC, woke up 3 mins later in her bathtub with some blood surrounding her head. HHA noted dysarthria so brought her to ED. On exam patient with mild dysarthria and mild L NLFF. NIHSS 2. CTH neg. 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 4.1 x 2.5 cm right upper lobe opacity. CTP neg. Not a candidate for acute intervention. Labs significant for BUN 82, Cr 5.86, trop 89. Admitted to medicine service, stroke following.     1. Secondary stroke prevention  - hold off on AP for now pending MRI  - c/w Atorvastatin 80mg PO daily    2. Stroke risk factors  - A1C: pls obtain  - LDL: pls obtain  - HTN, HLD    3. Further management  - Obtain MRI brain without  - Recommend SBP goal <180  - Recommend q4hr stroke neuro checks  - May need outpt neurology follow up  - provided stroke education about signs and symptoms      Discussed with Neurology Attending Dr. Beauchamp     79y Female with PMHx of L1 compression fracture (april 2025, wears TLSO brace, scheduled for surgery with Dr. Barcenas this month) HTN, HLD, hypothyroid, depression, ADHD presents to ED s/p fall at 10am. +LOC, woke up 3 mins later in her bathtub with some blood surrounding her head. HHA noted dysarthria so brought her to ED. On exam patient with mild dysarthria and mild L NLFF. NIHSS 2. CTH neg. 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 4.1 x 2.5 cm right upper lobe opacity. CTP neg. Not a candidate for acute intervention. Labs significant for BUN 82, Cr 5.86, trop 89. Admitted to medicine service, stroke following.     1. Secondary stroke prevention  - hold off on AP for now pending MRI  - c/w Atorvastatin 80mg PO daily    2. Stroke risk factors  - A1C: 5.6  - LDL: 103  - HTN, HLD    3. Further management  - Obtain MRI brain without  - Recommend SBP goal <180  - Recommend q4hr stroke neuro checks  - May need outpt neurology follow up  - provided stroke education about signs and symptoms      Discussed with Neurology Attending Dr. Beauchamp

## 2025-05-04 NOTE — PHYSICAL THERAPY INITIAL EVALUATION ADULT - GENERAL OBSERVATIONS, REHAB EVAL
PT IE completed. Chart reviewed. MRS 4. Pt received semi-supine, NAD, +IV heplock, +TLSO, +paul. KVNG Coburn cleared pt for PT.

## 2025-05-05 LAB
A1C WITH ESTIMATED AVERAGE GLUCOSE RESULT: 5.6 % — SIGNIFICANT CHANGE UP (ref 4–5.6)
ALBUMIN SERPL ELPH-MCNC: 3.7 G/DL — SIGNIFICANT CHANGE UP (ref 3.3–5)
ALP SERPL-CCNC: 102 U/L — SIGNIFICANT CHANGE UP (ref 40–120)
ALT FLD-CCNC: 11 U/L — SIGNIFICANT CHANGE UP (ref 10–45)
ANION GAP SERPL CALC-SCNC: 15 MMOL/L — SIGNIFICANT CHANGE UP (ref 5–17)
ANION GAP SERPL CALC-SCNC: 9 MMOL/L — SIGNIFICANT CHANGE UP (ref 5–17)
AST SERPL-CCNC: 23 U/L — SIGNIFICANT CHANGE UP (ref 10–40)
BASOPHILS # BLD AUTO: 0.04 K/UL — SIGNIFICANT CHANGE UP (ref 0–0.2)
BASOPHILS NFR BLD AUTO: 0.5 % — SIGNIFICANT CHANGE UP (ref 0–2)
BILIRUB SERPL-MCNC: 0.5 MG/DL — SIGNIFICANT CHANGE UP (ref 0.2–1.2)
BUN SERPL-MCNC: 67 MG/DL — HIGH (ref 7–23)
BUN SERPL-MCNC: 68 MG/DL — HIGH (ref 7–23)
CALCIUM SERPL-MCNC: 7.5 MG/DL — LOW (ref 8.4–10.5)
CALCIUM SERPL-MCNC: 7.6 MG/DL — LOW (ref 8.4–10.5)
CHLORIDE SERPL-SCNC: 106 MMOL/L — SIGNIFICANT CHANGE UP (ref 96–108)
CHLORIDE SERPL-SCNC: 107 MMOL/L — SIGNIFICANT CHANGE UP (ref 96–108)
CHOLEST SERPL-MCNC: 144 MG/DL — SIGNIFICANT CHANGE UP
CO2 SERPL-SCNC: 17 MMOL/L — LOW (ref 22–31)
CO2 SERPL-SCNC: 20 MMOL/L — LOW (ref 22–31)
CREAT SERPL-MCNC: 4.56 MG/DL — HIGH (ref 0.5–1.3)
CREAT SERPL-MCNC: 4.86 MG/DL — HIGH (ref 0.5–1.3)
EGFR: 9 ML/MIN/1.73M2 — LOW
EOSINOPHIL # BLD AUTO: 0.27 K/UL — SIGNIFICANT CHANGE UP (ref 0–0.5)
EOSINOPHIL NFR BLD AUTO: 3.5 % — SIGNIFICANT CHANGE UP (ref 0–6)
ESTIMATED AVERAGE GLUCOSE: 114 MG/DL — SIGNIFICANT CHANGE UP (ref 68–114)
GLUCOSE SERPL-MCNC: 83 MG/DL — SIGNIFICANT CHANGE UP (ref 70–99)
GLUCOSE SERPL-MCNC: 92 MG/DL — SIGNIFICANT CHANGE UP (ref 70–99)
HCT VFR BLD CALC: 29.2 % — LOW (ref 34.5–45)
HDLC SERPL-MCNC: 37 MG/DL — LOW
HGB BLD-MCNC: 9.6 G/DL — LOW (ref 11.5–15.5)
IMM GRANULOCYTES NFR BLD AUTO: 0.4 % — SIGNIFICANT CHANGE UP (ref 0–0.9)
LDLC SERPL-MCNC: 85 MG/DL — SIGNIFICANT CHANGE UP
LIPID PNL WITH DIRECT LDL SERPL: 85 MG/DL — SIGNIFICANT CHANGE UP
LYMPHOCYTES # BLD AUTO: 1.38 K/UL — SIGNIFICANT CHANGE UP (ref 1–3.3)
LYMPHOCYTES # BLD AUTO: 17.8 % — SIGNIFICANT CHANGE UP (ref 13–44)
MAGNESIUM SERPL-MCNC: 1.8 MG/DL — SIGNIFICANT CHANGE UP (ref 1.6–2.6)
MCHC RBC-ENTMCNC: 30.1 PG — SIGNIFICANT CHANGE UP (ref 27–34)
MCHC RBC-ENTMCNC: 32.9 G/DL — SIGNIFICANT CHANGE UP (ref 32–36)
MCV RBC AUTO: 91.5 FL — SIGNIFICANT CHANGE UP (ref 80–100)
MONOCYTES # BLD AUTO: 1.02 K/UL — HIGH (ref 0–0.9)
MONOCYTES NFR BLD AUTO: 13.2 % — SIGNIFICANT CHANGE UP (ref 2–14)
NEUTROPHILS # BLD AUTO: 5.01 K/UL — SIGNIFICANT CHANGE UP (ref 1.8–7.4)
NEUTROPHILS NFR BLD AUTO: 64.6 % — SIGNIFICANT CHANGE UP (ref 43–77)
NONHDLC SERPL-MCNC: 107 MG/DL — SIGNIFICANT CHANGE UP
NRBC BLD AUTO-RTO: 0 /100 WBCS — SIGNIFICANT CHANGE UP (ref 0–0)
PHOSPHATE SERPL-MCNC: 4.2 MG/DL — SIGNIFICANT CHANGE UP (ref 2.5–4.5)
PLATELET # BLD AUTO: 261 K/UL — SIGNIFICANT CHANGE UP (ref 150–400)
POTASSIUM SERPL-MCNC: 4 MMOL/L — SIGNIFICANT CHANGE UP (ref 3.5–5.3)
POTASSIUM SERPL-MCNC: 4.6 MMOL/L — SIGNIFICANT CHANGE UP (ref 3.5–5.3)
POTASSIUM SERPL-SCNC: 4 MMOL/L — SIGNIFICANT CHANGE UP (ref 3.5–5.3)
POTASSIUM SERPL-SCNC: 4.6 MMOL/L — SIGNIFICANT CHANGE UP (ref 3.5–5.3)
PROT SERPL-MCNC: 6.7 G/DL — SIGNIFICANT CHANGE UP (ref 6–8.3)
RBC # BLD: 3.19 M/UL — LOW (ref 3.8–5.2)
RBC # FLD: 15.4 % — HIGH (ref 10.3–14.5)
SODIUM SERPL-SCNC: 136 MMOL/L — SIGNIFICANT CHANGE UP (ref 135–145)
SODIUM SERPL-SCNC: 138 MMOL/L — SIGNIFICANT CHANGE UP (ref 135–145)
TRIGL SERPL-MCNC: 121 MG/DL — SIGNIFICANT CHANGE UP
WBC # BLD: 7.75 K/UL — SIGNIFICANT CHANGE UP (ref 3.8–10.5)
WBC # FLD AUTO: 7.75 K/UL — SIGNIFICANT CHANGE UP (ref 3.8–10.5)

## 2025-05-05 PROCEDURE — 99221 1ST HOSP IP/OBS SF/LOW 40: CPT

## 2025-05-05 PROCEDURE — 99233 SBSQ HOSP IP/OBS HIGH 50: CPT | Mod: GC

## 2025-05-05 PROCEDURE — 99232 SBSQ HOSP IP/OBS MODERATE 35: CPT

## 2025-05-05 RX ORDER — CYCLOBENZAPRINE HYDROCHLORIDE 15 MG/1
5 CAPSULE, EXTENDED RELEASE ORAL ONCE
Refills: 0 | Status: COMPLETED | OUTPATIENT
Start: 2025-05-05 | End: 2025-05-05

## 2025-05-05 RX ADMIN — CARVEDILOL 3.12 MILLIGRAM(S): 3.12 TABLET, FILM COATED ORAL at 18:22

## 2025-05-05 RX ADMIN — Medication 75 MICROGRAM(S): at 07:11

## 2025-05-05 RX ADMIN — Medication 100 MILLILITER(S): at 01:26

## 2025-05-05 RX ADMIN — CARVEDILOL 3.12 MILLIGRAM(S): 3.12 TABLET, FILM COATED ORAL at 06:35

## 2025-05-05 RX ADMIN — Medication 650 MILLIGRAM(S): at 21:44

## 2025-05-05 RX ADMIN — ATORVASTATIN CALCIUM 80 MILLIGRAM(S): 80 TABLET, FILM COATED ORAL at 21:44

## 2025-05-05 RX ADMIN — Medication 1000 MILLIGRAM(S): at 01:34

## 2025-05-05 RX ADMIN — Medication 400 MILLIGRAM(S): at 00:24

## 2025-05-05 RX ADMIN — Medication 650 MILLIGRAM(S): at 15:54

## 2025-05-05 RX ADMIN — CYCLOBENZAPRINE HYDROCHLORIDE 5 MILLIGRAM(S): 15 CAPSULE, EXTENDED RELEASE ORAL at 00:51

## 2025-05-05 RX ADMIN — Medication 40 MILLIEQUIVALENT(S): at 00:21

## 2025-05-05 RX ADMIN — Medication 650 MILLIGRAM(S): at 16:40

## 2025-05-05 RX ADMIN — Medication 650 MILLIGRAM(S): at 06:34

## 2025-05-05 RX ADMIN — Medication 650 MILLIGRAM(S): at 22:45

## 2025-05-05 RX ADMIN — BUPROPION HYDROBROMIDE 300 MILLIGRAM(S): 522 TABLET, EXTENDED RELEASE ORAL at 11:34

## 2025-05-05 RX ADMIN — Medication 650 MILLIGRAM(S): at 13:52

## 2025-05-05 RX ADMIN — BUPROPION HYDROBROMIDE 300 MILLIGRAM(S): 522 TABLET, EXTENDED RELEASE ORAL at 00:19

## 2025-05-05 RX ADMIN — CYCLOBENZAPRINE HYDROCHLORIDE 5 MILLIGRAM(S): 15 CAPSULE, EXTENDED RELEASE ORAL at 15:54

## 2025-05-05 RX ADMIN — Medication 40 MILLIEQUIVALENT(S): at 03:01

## 2025-05-05 NOTE — PROGRESS NOTE ADULT - PROBLEM SELECTOR PLAN 9
Home medication rosuvastatin 5mg   - initiate atorvastatin 80mg qd  -obtain lipid panel and A1c in ED Home medication rosuvastatin 5mg   Lipid panel wnl; A1c 5.6  - initiate atorvastatin 80mg qd

## 2025-05-05 NOTE — DIETITIAN INITIAL EVALUATION ADULT - PROBLEM SELECTOR PLAN 8
Home medication bupropion 450mg qd  -resume home medication    #ADHD  Lisdexamfetamine Dimesylate  30 MG Oral Capsule  - hold for now

## 2025-05-05 NOTE — PROGRESS NOTE ADULT - ASSESSMENT
79-year-old F with PMH of recent mechanical fall with L1 compression fracture admitted with syncope and TBI.  Patient reports having used ibuprofen on a daily basis.  Nephrology consulted for MARY LOU    1– MARY LOU on CKD:  Last know SCr 1 on 10/24  DDx including hemodynamic MARY LOU (prerenal azotemia, ATN), AIN from NSAID use  On admission (5/3): SCr 5.9, BUN 82, CO2 18, CK 29, no eosinophilia  Patient was started on sodium chloride 0.9% infusion at a rate of 100 mL/h  Most recent labs (5/4): SCr 5, BUN 67, phosphorus 5 CO2 18, AGAP 16 | UA: UPC 0.3, blood large, RBC 32  –Repeat Urinalysis  –Renal ultrasound to rule obstructive uropathy  –Strict I/O monitoring  –Monitor BMP every 12 hours    Recommendations are not considered final until attending attestation  79-year-old F with PMH of recent mechanical fall with L1 compression fracture admitted with syncope and TBI.  Patient reports having used ibuprofen on a daily basis.  Nephrology consulted for MARY LOU    1– MARY LOU on CKD:  Last know SCr 1 on 10/24  DDx including hemodynamic MARY LOU iso (prerenal azotemia, ATN ), AIN from NSAID use  On admission (5/3): SCr 5.9, BUN 82, CO2 18, CK 29, no eosinophilia  Patient was started on sodium chloride 0.9% infusion at a rate of 100 mL/h  Most recent labs (5/4): SCr 5, BUN 67, phosphorus 5 CO2 18, AGAP 16 | UA: UPC 0.3, blood large, RBC 32  –Repeat Urinalysis  –Strict I/O monitoring  –Monitor BMP every 12 hours    Recommendations are not considered final until attending attestation  79-year-old F with PMH of recent mechanical fall with L1 compression fracture, HTN (on coreg, HCTZ), CKD (stage 3) admitted with syncope and TBI, found to have MARY LOU on CKD and HTN emergency (SBP 200s with MARY LOU) .  Patient reports having used ibuprofen on a daily basis.  Nephrology consulted for MARY LOU on CKD    #Non-oliguric MARY LOU on CKD  Last know HIE SCr 1.16, GFR 48 (CKD3) on 10/24  On admission (5/3): SCr 5.9, BUN 82, CO2 18, CK 29, no eosinophilia. She received 1L of NS on admission and started on NS maintenance fluids at 100cc/hr, continued on 5/4 and 5/5 in AM   Pickett placed: ?for I/O, no documentation of retention. Renal US on 5/4 with no hydronephrosis normal size kidneys, no echogenicity   5/3: creatinine 5.86-> 5.25 (10pm)  5/4: SCrr 5.05 (in AM), 4.79 (at 8pm)   5/5: SCr 4.86 in AM  -Urine studies on 5/4: with 8 WBC, but also with bacteria, 32 RBC, no cast. FeNa 2.7 (Intrinsic), FeUrea 46 (Intrinsic), spec grav 1024    DDx: iso HTN emergency (SBP in 200s in ED required labetalol IV), vs less likely AIN from NSAID use (no serum eosinophilia, no urine cast) vs unlikely prerenal (Urine studies consistent with Intrinsic etiology, has been on fluids continuously with no drastic change)     Recommendation:   -check Bladder scan q4 hours (now that Pickett is removed)   -stop maintenance fluids   -check cystatin C   -stop Bicarb tablets (as this is MARY LOU, electrolytes will likely  stabilize one creatinine stabilizes)   –Strict I/O monitoring    Recommendations are not considered final until attending attestation  79-year-old F with PMH of recent mechanical fall with L1 compression fracture, HTN (on coreg, HCTZ), CKD (stage 3) admitted with syncope and TBI, found to have MARY LOU on CKD and HTN emergency (SBP 200s with MARY LOU) .  Patient reports having used ibuprofen on a daily basis.  Nephrology consulted for MARY LOU on CKD    #Non-oliguric MARY LOU on CKD  Last know HIE SCr 1.16, GFR 48 (CKD3) on 10/24  On admission (5/3): SCr 5.9, BUN 82, CO2 18, CK 29, no eosinophilia. She received 1L of NS on admission and started on NS maintenance fluids at 100cc/hr, continued on 5/4 and 5/5 in AM   Pickett placed: ?for I/O, no documentation of retention. Renal US on 5/4 with no hydronephrosis normal size kidneys, no echogenicity   5/3: creatinine 5.86-> 5.25 (10pm)  5/4: SCrr 5.05 (in AM), 4.79 (at 8pm)   5/5: SCr 4.86 in AM  -Urine studies on 5/4: with 8 WBC, but also with bacteria, 32 RBC, no cast. FeNa 2.7 (Intrinsic), FeUrea 46 (Intrinsic), spec grav 1024    DDx: iso HTN emergency (SBP in 200s in ED required labetalol IV), vs less likely AIN from NSAID use vs unlikely prerenal (Urine studies consistent with Intrinsic etiology, has been on fluids continuously with no drastic change)     Recommendation:   -check Bladder scan q4 hours (now that Pickett is removed)   -stop maintenance fluids   -check cystatin C   -stop Bicarb tablets (as this is MARY LOU, electrolytes will likely  stabilize one creatinine stabilizes)   –Strict I/O monitoring    Discussed with primary team

## 2025-05-05 NOTE — PROGRESS NOTE ADULT - PROBLEM SELECTOR PLAN 5
Patient with hx of HTN on hydrochlorthiazide and coreg at home, has not taken home meds in several weeks since starting muscle relaxant and opioid medications. Denies symptoms of lightheadedness, dizziness, nausea or chest pain    BP trend in /78 >  205/80  s/p labetalol 10mg in ED with improvement of blood pressure   - resume home coreg 3.125 BID   - will hold thiazide for now  - goal SBP <160

## 2025-05-05 NOTE — PROGRESS NOTE ADULT - SUBJECTIVE AND OBJECTIVE BOX
**INCOMPLETE NOTE    INTERVAL/OVERNIGHT EVENTS: None    SUBJECTIVE:  Patient seen and examined at bedside, comfortable, NAD. Denied fever, chest pain, dyspnea, abdominal pain.     Vital Signs Last 12 Hrs  T(F): 97.7 (05-05-25 @ 05:30), Max: 97.7 (05-05-25 @ 05:30)  HR: 74 (05-05-25 @ 05:30) (74 - 74)  BP: 153/75 (05-05-25 @ 05:30) (139/83 - 153/75)  BP(mean): --  RR: 18 (05-05-25 @ 05:30) (18 - 18)  SpO2: 98% (05-05-25 @ 05:30) (98% - 99%)  I&O's Summary    04 May 2025 07:01  -  05 May 2025 07:00  --------------------------------------------------------  IN: 400 mL / OUT: 1150 mL / NET: -750 mL        PHYSICAL EXAM:  General: NAD  HEENT: PERRL, EOM intact, sclera anicteric, MMM  Cardiovascular: RRR; no MRG; no JVD  Respiratory: CTAB; no WRR  GI/: soft; NTND; BS x4  Extremities: WWP; 2+ peripheral pulses bilaterally; no LE edema  Skin: normal color & turgor; no rash  Neurologic: aox3; no focal deficits    LABS:                        10.3   7.70  )-----------( 282      ( 04 May 2025 08:39 )             30.4     05-04    137  |  102  |  71[H]  ----------------------------<  127[H]  3.2[L]   |  18[L]  |  4.79[H]    Ca    7.8[L]      04 May 2025 20:00  Phos  5.0     05-04  Mg     1.8     05-04    TPro  6.9  /  Alb  3.9  /  TBili  0.8  /  DBili  x   /  AST  18  /  ALT  10  /  AlkPhos  99  05-04    PT/INR - ( 03 May 2025 14:34 )   PT: 12.1 sec;   INR: 1.05          PTT - ( 03 May 2025 14:34 )  PTT:28.2 sec  Urinalysis Basic - ( 04 May 2025 20:00 )    Color: x / Appearance: x / SG: x / pH: x  Gluc: 127 mg/dL / Ketone: x  / Bili: x / Urobili: x   Blood: x / Protein: x / Nitrite: x   Leuk Esterase: x / RBC: x / WBC x   Sq Epi: x / Non Sq Epi: x / Bacteria: x          RADIOLOGY & ADDITIONAL TESTS:    MEDICATIONS  (STANDING):  atorvastatin 80 milliGRAM(s) Oral at bedtime  buPROPion XL (24-Hour) . 300 milliGRAM(s) Oral daily  carvedilol 3.125 milliGRAM(s) Oral every 12 hours  dextrose 5%. 1000 milliLiter(s) (50 mL/Hr) IV Continuous <Continuous>  dextrose 5%. 1000 milliLiter(s) (100 mL/Hr) IV Continuous <Continuous>  dextrose 50% Injectable 25 Gram(s) IV Push once  dextrose 50% Injectable 12.5 Gram(s) IV Push once  dextrose 50% Injectable 25 Gram(s) IV Push once  glucagon  Injectable 1 milliGRAM(s) IntraMuscular once  insulin lispro (ADMELOG) corrective regimen sliding scale   SubCutaneous Before meals and at bedtime  levothyroxine 75 MICROGram(s) Oral daily  sodium bicarbonate 650 milliGRAM(s) Oral every 8 hours  sodium chloride 0.9%. 1000 milliLiter(s) (100 mL/Hr) IV Continuous <Continuous>    MEDICATIONS  (PRN):  acetaminophen     Tablet .. 650 milliGRAM(s) Oral every 6 hours PRN Temp greater or equal to 38C (100.4F), Mild Pain (1 - 3)  dextrose Oral Gel 15 Gram(s) Oral once PRN Blood Glucose LESS THAN 70 milliGRAM(s)/deciliter  melatonin 3 milliGRAM(s) Oral at bedtime PRN Insomnia       INTERVAL/OVERNIGHT EVENTS: None    SUBJECTIVE:  Patient seen and examined at bedside, comfortable, NAD. Denied fever, chest pain, dyspnea, abdominal pain.     Vital Signs Last 12 Hrs  T(F): 97.7 (05-05-25 @ 05:30), Max: 97.7 (05-05-25 @ 05:30)  HR: 74 (05-05-25 @ 05:30) (74 - 74)  BP: 153/75 (05-05-25 @ 05:30) (139/83 - 153/75)  BP(mean): --  RR: 18 (05-05-25 @ 05:30) (18 - 18)  SpO2: 98% (05-05-25 @ 05:30) (98% - 99%)  I&O's Summary    04 May 2025 07:01  -  05 May 2025 07:00  --------------------------------------------------------  IN: 400 mL / OUT: 1150 mL / NET: -750 mL        PHYSICAL EXAM:  General: NAD  HEENT: PERRL, EOM intact, sclera anicteric, MMM  Cardiovascular: RRR; no MRG; no JVD  Respiratory: CTAB; no WRR  GI/: soft; NTND; BS x4  Extremities: WWP; 2+ peripheral pulses bilaterally; no LE edema  Skin: normal color & turgor; no rash  Neurologic: aox3; no focal deficits    LABS:                        10.3   7.70  )-----------( 282      ( 04 May 2025 08:39 )             30.4     05-04    137  |  102  |  71[H]  ----------------------------<  127[H]  3.2[L]   |  18[L]  |  4.79[H]    Ca    7.8[L]      04 May 2025 20:00  Phos  5.0     05-04  Mg     1.8     05-04    TPro  6.9  /  Alb  3.9  /  TBili  0.8  /  DBili  x   /  AST  18  /  ALT  10  /  AlkPhos  99  05-04    PT/INR - ( 03 May 2025 14:34 )   PT: 12.1 sec;   INR: 1.05          PTT - ( 03 May 2025 14:34 )  PTT:28.2 sec  Urinalysis Basic - ( 04 May 2025 20:00 )    Color: x / Appearance: x / SG: x / pH: x  Gluc: 127 mg/dL / Ketone: x  / Bili: x / Urobili: x   Blood: x / Protein: x / Nitrite: x   Leuk Esterase: x / RBC: x / WBC x   Sq Epi: x / Non Sq Epi: x / Bacteria: x          RADIOLOGY & ADDITIONAL TESTS:    MEDICATIONS  (STANDING):  atorvastatin 80 milliGRAM(s) Oral at bedtime  buPROPion XL (24-Hour) . 300 milliGRAM(s) Oral daily  carvedilol 3.125 milliGRAM(s) Oral every 12 hours  dextrose 5%. 1000 milliLiter(s) (50 mL/Hr) IV Continuous <Continuous>  dextrose 5%. 1000 milliLiter(s) (100 mL/Hr) IV Continuous <Continuous>  dextrose 50% Injectable 25 Gram(s) IV Push once  dextrose 50% Injectable 12.5 Gram(s) IV Push once  dextrose 50% Injectable 25 Gram(s) IV Push once  glucagon  Injectable 1 milliGRAM(s) IntraMuscular once  insulin lispro (ADMELOG) corrective regimen sliding scale   SubCutaneous Before meals and at bedtime  levothyroxine 75 MICROGram(s) Oral daily  sodium bicarbonate 650 milliGRAM(s) Oral every 8 hours  sodium chloride 0.9%. 1000 milliLiter(s) (100 mL/Hr) IV Continuous <Continuous>    MEDICATIONS  (PRN):  acetaminophen     Tablet .. 650 milliGRAM(s) Oral every 6 hours PRN Temp greater or equal to 38C (100.4F), Mild Pain (1 - 3)  dextrose Oral Gel 15 Gram(s) Oral once PRN Blood Glucose LESS THAN 70 milliGRAM(s)/deciliter  melatonin 3 milliGRAM(s) Oral at bedtime PRN Insomnia

## 2025-05-05 NOTE — CONSULT NOTE ADULT - SUBJECTIVE AND OBJECTIVE BOX
HPI:  Patient is 79F with PMHx of HTN, HLD,  hypothyroidism, generalized depression, ADHD and L1 compression fracture 2/2 to recent  fall  (April 2025, wears TLSO brace, scheduled for surgery with Dr. Barcenas this month)  rheumatic fever and pericarditis presents after unwitnessed fall. Patient states she was standing up brushing her teeth this morning and suddenly felt lightheaded and woke up on the floor  hitting her head on the bathtub +LOC +HT with noticeable blood from the scalp. Patient does not fully recall the event but denies lightheadedness dizziness, paresthesia blurred vision nausea or aura. She states she arose from the floor and called her  to bring her to the hospital. Upon arrival to ED; Code stoke due to concern of dysarthria and left sided facial droop.     Of note, patient shares she has been experiencing several episodes on instability due to weakness in her legs resulting in recurrent falls, most recently in April where she was in bed and missed her footing, falling to the ground. At that time she was sent for imaging of her lumbar spine and found to have acute/subacute severe anteriorly wedged compression deformity of the L1 vertebral body. Instructed to wear TLSO brace and prescribed muscle relaxants and oxycodone for pain. Recently seen by Dr. Barcenas on 5/1 pending T11-L3 Decompression and posterior instrumented fusion.     Patient also recently seen by PCP Dr. Michael Fields  4/28 with complaints of dry mouth and instability, instructed to discontinue use of cyclobenzaprine     In the ED,  VS: T   97.5, HR 98  , /78 >  205/80 , RR   20 , SpO2    99 % RA  Labs: leukocytosis to 12.6, trop 89>85; sodium 134, potassium 3.4 AG 21 with bicarb of 18 BUN 82/Cr 5.86  EKG: normal sinus rhythm with rate of 88; qtc 496    Imaging:  CTA HEAD: No large vessel occlusion or hemodynamically significant stenosis. 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  CTA NECK: No occlusion, dissection, aneurysm, or hemodynamically significant stenosis. 4.1 x 2.5 cm right upper lobe opacity as detailed above. Further evaluation with dedicated CT of the chest without contrast is recommended.  CT BRAIN PERFUSION:  Normal.  CT Lumbar Spine: Acute-appearing burst type compression fracture at L1 with severe vertebral body height loss and moderate osseous retropulsion. There is severe associated spinal canal stenosis. Acute right transverse process fractures at L1 and L2. Severe spinal canal stenosis at L4-L5.  CT Chest: 5 cm infiltrative opacity in the right upper lobe suspicious for malignancy. Consider PET/CT and tissue sampling.    Interventions; code stroke called, s/p scalp laceration repair, labetolol 10mg IVP   (03 May 2025 18:57)    In the last month, pt states she has had 2 falls. The first occurred when she slid off her bed. She was awake during the episode. No CP, SOB, palpitations noted. She subsequently had an episode of syncope the day of admission. She was brushing her teeth and the next thing she knew, she was lying on her back in the bathtub with blood around her head. Denies CP, SOB, palpitations, feeling of warmth, or lightheadedness prior to episode. States her legs felt unsteady but she was able to seek medical attention. She does endorse back and leg pain over the last one month. Pt states that prior to 1 month ago, she was able to ambulate 2 miles independently without cardiopulmonary symptoms. Denies recent ischemic evaluation. States she saw a Cardiologist years ago for pericarditis.     ROS: A 10-point review of systems was otherwise negative.    PAST MEDICAL & SURGICAL HISTORY:  Osteoarthritis      Depression      HTN (hypertension)      HLD (hyperlipidemia)      Hypothyroidism      No significant past surgical history          SOCIAL HISTORY:  FAMILY HISTORY:  No pertinent family history in first degree relatives        ALLERGIES: 	  No Known Allergies            MEDICATIONS:  acetaminophen     Tablet .. 650 milliGRAM(s) Oral every 6 hours PRN  atorvastatin 80 milliGRAM(s) Oral at bedtime  buPROPion XL (24-Hour) . 300 milliGRAM(s) Oral daily  carvedilol 3.125 milliGRAM(s) Oral every 12 hours  dextrose 5%. 1000 milliLiter(s) IV Continuous <Continuous>  dextrose 5%. 1000 milliLiter(s) IV Continuous <Continuous>  dextrose 50% Injectable 25 Gram(s) IV Push once  dextrose 50% Injectable 12.5 Gram(s) IV Push once  dextrose 50% Injectable 25 Gram(s) IV Push once  dextrose Oral Gel 15 Gram(s) Oral once PRN  glucagon  Injectable 1 milliGRAM(s) IntraMuscular once  insulin lispro (ADMELOG) corrective regimen sliding scale   SubCutaneous Before meals and at bedtime  levothyroxine 75 MICROGram(s) Oral daily  melatonin 3 milliGRAM(s) Oral at bedtime PRN      HOME MEDICATIONS:  Adderall:  orally   KlonoPIN:  orally   Wellbutrin:  orally       PHYSICAL EXAM:      I/O Summary 24H    IN: 400 mL / OUT: 1150 mL / NET: -750 mL        T(F): 97.8 (05-05-25 @ 09:45), Max: 97.8 (05-05-25 @ 09:45)  HR: 76 (05-05-25 @ 09:45) (74 - 76)  BP: 143/82 (05-05-25 @ 09:45) (139/83 - 153/75)  BP(mean): --  ABP: --  ABP(mean): --  RR: 18 (05-05-25 @ 09:45) (18 - 18)  SpO2: 99% (05-05-25 @ 09:45) (98% - 99%)  CVP(mm Hg): --    GEN: Awake, comfortable. NAD.   HEENT: NCAT, PERRL, EOMI. Mucosa moist. No JVD.   RESP: CTA b/l  CV: RRR, normal s1/s2. No m/r/g.  ABD: Soft, NTND. BS+   EXT: Warm. No edema, clubbing, or cyanosis.   NEURO: AAOx3. No focal deficits.  BACK: Tenderness to palpation paraspinal regions      	  LABS:	 	    Cardiac Markers           CBC 05-05-25 @ 07:33                        9.6    7.75  )-----------( 261                   29.2     Hgb trend: 9.6 <-- , 10.3 <-- , 10.4 <-- , 11.6 <--   WBC trend: 7.75 <-- , 7.70 <-- , 9.31 <-- , 12.62 <--     CMP 05-05-25 @ 07:33    136  |  107  |  68[H]  ----------------------------<  92  4.6   |  20[L]  |  4.86[H]    Ca    7.6[L]      05-05-25 @ 07:33  Phos  4.2     05-05  Mg     1.8     05-05    TPro  6.7  /  Alb  3.7  /  TBili  0.5  /  DBili  x   /  AST  23  /  ALT  11  /  AlkPhos  102     05-05    Serum Cr (eGFR) trend: 4.86 (9) <-- , 4.79 (9) <-- , 5.05 (8) <-- , 5.25 (8) <-- , 5.86 (7) <--     proBNP:   Lipid Profile:   HgA1c:   TSH:     TELEMETRY: 	    ECG:  	  RADIOLOGY:   ECHO:  STRESS:  CATH:

## 2025-05-05 NOTE — PROGRESS NOTE ADULT - PROBLEM SELECTOR PLAN 2
Followed by Dr. Barcenas   Patient suffered mechanical fall several weeks ago and has been following with ortho for planned procedure  Outpatient Lumbar MRI: acute/subacute severe anteriorly wedged compression deformity of the L1 vertebral body with moderate associated retropulsion and severe stenosis.   Per note from 5/1, pending T11-L3 Decompression and posterior instrumented fusion.   Started on cyclobenzaprine 10mg TID as need, oxycodone 5mg and percocet  At [facility]   CT Lumbar Spine: Acute-appearing burst type compression fracture at L1 with severe vertebral body height loss and moderate osseous retropulsion. There is severe associated spinal canal stenosis. Acute right transverse process fractures at L1 and L2. Severe spinal canal stenosis at L4-L5.  Patient denies back pain   --ortho consult  - patient is instructed to wear TSLO brace but is not currently with her   - will not resume muscle relaxant or opioid medications as they are likely contributing to patient's worsening instability   - PT eval once cleared   - monitor for signs of saddle anesthesia Followed by Dr. Barcenas   Patient suffered mechanical fall several weeks ago and has been following with ortho for planned procedure  Outpatient Lumbar MRI: acute/subacute severe anteriorly wedged compression deformity of the L1 vertebral body with moderate associated retropulsion and severe stenosis.   Per note from 5/1, pending T11-L3 Decompression and posterior instrumented fusion.   Started on cyclobenzaprine 10mg TID as need, oxycodone 5mg and percocet  At [facility]   CT Lumbar Spine: Acute-appearing burst type compression fracture at L1 with severe vertebral body height loss and moderate osseous retropulsion. There is severe associated spinal canal stenosis. Acute right transverse process fractures at L1 and L2. Severe spinal canal stenosis at L4-L5  Ortho following recs appreciated;   - c/w WBAT in TSLO brace   - pending OR this week, pending clearence  - will not resume muscle relaxant or opioid medications as they are likely contributing to patient's worsening instability   - PT eval once cleared   - monitor for signs of saddle anesthesia

## 2025-05-05 NOTE — DIETITIAN INITIAL EVALUATION ADULT - PROBLEM SELECTOR PLAN 3
#AGMA  Baseline Cr: upon admission BUN/Cr  82/5.86  Likely iso of dehydration as patient shares she has had diminished oral intake 2/2 dry mouth  s/p IL NS in ED   - start maintenance fluids NS @ 100  - obtain renal ultrasound  - obtain urine studies   - avoid nephrotoxic medication  - trend Cr daily  - strict I/O  #Urinary retention  Patient states she has had issue with urinary retention in the past   Denies dysuria   - will monitor off antibiotics for now; low threshold to start ceftriaxone if leukocytosis persists  -pending urinalysis   - bladder scan q6  - paul placement

## 2025-05-05 NOTE — DIETITIAN INITIAL EVALUATION ADULT - OTHER CALCULATIONS
*Using ideal body weight as pt is >120% ideal body weight. Needs adjusted for malnutrition, advanced age. ideal body weight: 115 pounds; % ideal body weight: 130%

## 2025-05-05 NOTE — DIETITIAN INITIAL EVALUATION ADULT - NSFNSGIIOFT_GEN_A_CORE
Pt endorsed nausea (denied vomiting) and denied diarrhea, however endorsed constipation, stated last BM 5/2.

## 2025-05-05 NOTE — OCCUPATIONAL THERAPY INITIAL EVALUATION ADULT - GENERAL OBSERVATIONS, REHAB EVAL
OT IE completed. Orders received, chart reviewed, pt cleared for OT by KVNG Murphy. Pt received semi supine in bed, NAD, +heplock, +TLSO. Pt A&Ox4, agreeable to OT, and tolerated session well.

## 2025-05-05 NOTE — DIETITIAN INITIAL EVALUATION ADULT - PROBLEM SELECTOR PLAN 2
Followed by Dr. Barcenas   Patient suffered mechanical fall several weeks ago and has been following with ortho for planned procedure  Outpatient Lumbar MRI: acute/subacute severe anteriorly wedged compression deformity of the L1 vertebral body with moderate associated retropulsion and severe stenosis.   Per note from 5/1, pending T11-L3 Decompression and posterior instrumented fusion.   Started on cyclobenzaprine 10mg TID as need, oxycodone 5mg and percocet  At [facility]   CT Lumbar Spine: Acute-appearing burst type compression fracture at L1 with severe vertebral body height loss and moderate osseous retropulsion. There is severe associated spinal canal stenosis. Acute right transverse process fractures at L1 and L2. Severe spinal canal stenosis at L4-L5.  Patient denies back pain   --ortho consult  - patient is instructed to wear TSLO brace but is not currently with her   - will not resume muscle relaxant or opioid medications as they are likely contributing to patient's worsening instability   - PT eval once cleared   - monitor for signs of saddle anesthesia

## 2025-05-05 NOTE — DIETITIAN INITIAL EVALUATION ADULT - PERTINENT LABORATORY DATA
05-05    136  |  107  |  68[H]  ----------------------------<  92  4.6   |  20[L]  |  4.86[H]    Ca    7.6[L]      05 May 2025 07:33  Phos  4.2     05-05  Mg     1.8     05-05    TPro  6.7  /  Alb  3.7  /  TBili  0.5  /  DBili  x   /  AST  23  /  ALT  11  /  AlkPhos  102  05-05  POCT Blood Glucose.: 117 mg/dL (05-05-25 @ 13:38)  A1C with Estimated Average Glucose Result: 5.6 % (05-05-25 @ 07:33)  A1C with Estimated Average Glucose Result: 5.6 % (05-04-25 @ 08:39)

## 2025-05-05 NOTE — OCCUPATIONAL THERAPY INITIAL EVALUATION ADULT - PERTINENT HX OF CURRENT PROBLEM, REHAB EVAL
79F with PMHx of HTN, HLD,  hypothyroidism, generalized depression, ADHD rheumatic fever, pericarditis with recent  L1 compression fracture 2/2 mechanical fall (pending  orthopedic intervention with Dr. Barcenas),  presents after unwitnessed syncopal event and head trauma likely 2/2 to medication side effect vs dehydration complicated by MARY LOU, with incidental finding of RUL opacity c/f malignancy.

## 2025-05-05 NOTE — CONSULT NOTE ADULT - ASSESSMENT
79F with PMHx of HTN, HLD, hypothyroidism, generalized depression, ADHD and L1 compression fracture 2/2 to recent  fall  (April 2025, wears TLSO brace, scheduled for surgery with Dr. Barcenas this month), rheumatic fever and pericarditis presents after syncope and fall c/b head trauma, found to have lumbar compression fracture, severe spinal stenosis, and incidentally found lung mass, now pending biopsy.     REVIEW OF STUDIES  ECG 5/3/25: NSR, QTc 496.   TTE 12/17/2024: LVEF 61%. Normal RV size/function. LA mildly dilated. +PFO. Mild MR.     #Preoperative Cardiovascular Assessment  EKG: As above, nonischemic  Functional status: Prior to falls, patient with excellent functional capacity, METs > 4  Patient without evidence of active ischemia, decompensated HF, or unstable arrhythmia  -Patient is intermediate risk for intermediate risk procedure  -No further pre-operative cardiovascular workup  -Continue Lipitor 80mg and carvedilol perioperatively.   -TTE not necessary pre-operatively; can obtain routinely    D/w Dr. Hess 79F with PMHx of HTN, HLD, hypothyroidism, generalized depression, ADHD and L1 compression fracture 2/2 to recent  fall  (April 2025, wears TLSO brace, scheduled for surgery with Dr. Barcenas this month), rheumatic fever and pericarditis presents after syncope and fall c/b head trauma, found to have lumbar compression fracture, severe spinal stenosis, and incidentally found lung mass, now pending biopsy.     REVIEW OF STUDIES  ECG 5/3/25: NSR, QTc 496.   TTE 12/17/2024: LVEF 61%. Normal RV size/function. LA mildly dilated. +PFO. Mild MR.     #Preoperative Cardiovascular Assessment  EKG: As above, nonischemic  Functional status: Prior to falls, patient with excellent functional capacity, METs > 4  Patient without evidence of active ischemia, decompensated HF, or unstable arrhythmia  -Patient is intermediate risk for intermediate risk procedure  -No further pre-operative cardiovascular workup  -Continue Lipitor 80mg and carvedilol perioperatively.   -TTE not necessary pre-operatively, see below    #Syncope  No clear prodrome  EKG nonischemic, no clear etiology  -Obtain TTE  -Monitor on hospitalist tele for 24h    D/w Dr. Hess 79F with PMHx of HTN, HLD, hypothyroidism, generalized depression, ADHD and L1 compression fracture 2/2 to recent  fall  (April 2025, wears TLSO brace, scheduled for surgery with Dr. Barcenas this month), rheumatic fever and pericarditis presents after syncope and fall c/b head trauma, found to have lumbar compression fracture, severe spinal stenosis, and incidentally found lung mass, now pending biopsy.     REVIEW OF STUDIES  ECG 5/3/25: NSR, QTc 496.   TTE 2013: LVEF 61%. Normal RV size/function. LA mildly dilated. +PFO. Mild MR.     #Preoperative Cardiovascular Assessment  EKG: As above, nonischemic  Functional status: Prior to falls, patient with excellent functional capacity, METs > 4  Patient without evidence of active ischemia, decompensated HF, or unstable arrhythmia  -Patient is intermediate risk for intermediate risk procedure  -No further pre-operative cardiovascular workup  -Continue Lipitor 80mg and carvedilol perioperatively.   -TTE not necessary pre-operatively, see below    #Syncope  No clear prodrome  EKG nonischemic, no clear etiology  -Obtain TTE  -Monitor on hospitalist tele for 24h    D/w Dr. Hess

## 2025-05-05 NOTE — PROGRESS NOTE ADULT - ASSESSMENT
79y Female with PMHx of L1 compression fracture (april 2025, wears TLSO brace, scheduled for surgery with Dr. Barcenas this month) HTN, HLD, hypothyroid, depression, ADHD presents to ED s/p fall at 10am. +LOC, woke up 3 mins later in her bathtub with some blood surrounding her head. HHA noted dysarthria so brought her to the ED on 5/3. On initial exam patient noted to have mild dysarthria and mild L NLFF. NIHSS 2. CTH neg. 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 4.1 x 2.5 cm right upper lobe opacity. CTP neg. Pt was deemed not a candidate for acute intervention. Labs significant for BUN 82, Cr 5.86, trop 89. Therefore, pt was admitted to internal medicine service.     Impression: Syncope vs. seizure in the setting of acute on chronic kidney injury     No further w/u from stroke neurology standpoint, unlikely a vascular event.     Case discussed with Stroke Neurology Attending, Dr. Neelam Hayden.

## 2025-05-05 NOTE — PROGRESS NOTE ADULT - PROBLEM SELECTOR PLAN 3
#AGMA  Baseline Cr: upon admission BUN/Cr  82/5.86  Likely iso of dehydration as patient shares she has had diminished oral intake 2/2 dry mouth  s/p IL NS in ED   - start maintenance fluids NS @ 100  - obtain renal ultrasound  - obtain urine studies   - avoid nephrotoxic medication  - trend Cr daily  - strict I/O  #Urinary retention  Patient states she has had issue with urinary retention in the past   Denies dysuria   - will monitor off antibiotics for now; low threshold to start ceftriaxone if leukocytosis persists  -pending urinalysis   - bladder scan q6  - paul placement #AGMA  Baseline Cr: upon admission BUN/Cr  82/5.86  Likely iso of dehydration as patient shares she has had diminished oral intake 2/2 dry mouth  s/p IL NS in ED and NaHCO3 tables;  renal ultrasound; unremarkable   FENa: 2.4 instrinsic eitology likely ATN iso NSAID use  - pending cystain C  - avoid nephrotoxic medication  - trend Cr daily  - strict I/O  #Urinary retention  Patient states she has had issue with urinary retention in the past   Denies dysuria   - will monitor off antibiotics for now; low threshold to start ceftriaxone if leukocytosis persists  -pending urinalysis   - bladder scan q6  - paul placement

## 2025-05-05 NOTE — PROGRESS NOTE ADULT - PROBLEM SELECTOR PLAN 6
Likely 2/2 to demand ischemia, low concern for ACS given normal EKG and denial of anginal symptoms + hemodynamic stability   Troponin trend 89 (peak) > 85  - obtain TTE  - repeat EKG in AM  - continue to monitor for signs of EKG

## 2025-05-05 NOTE — PROGRESS NOTE ADULT - SUBJECTIVE AND OBJECTIVE BOX
**INCOMPLETE NOTE    OVERNIGHT EVENTS:    SUBJECTIVE:  Patient seen and examined at bedside.    Vital Signs Last 12 Hrs  T(F): 97.7 (05-05-25 @ 05:30), Max: 97.7 (05-05-25 @ 05:30)  HR: 74 (05-05-25 @ 05:30) (74 - 74)  BP: 153/75 (05-05-25 @ 05:30) (153/75 - 153/75)  BP(mean): --  RR: 18 (05-05-25 @ 05:30) (18 - 18)  SpO2: 98% (05-05-25 @ 05:30) (98% - 98%)  I&O's Summary    04 May 2025 07:01  -  05 May 2025 07:00  --------------------------------------------------------  IN: 400 mL / OUT: 1150 mL / NET: -750 mL        PHYSICAL EXAM:  Constitutional: NAD, comfortable in bed.  HEENT: NC/AT, PERRLA, EOMI, no conjunctival pallor or scleral icterus, MMM  Neck: Supple, no JVD  Respiratory: CTA B/L. No w/r/r.   Cardiovascular: RRR, normal S1 and S2, no m/r/g.   Gastrointestinal: +BS, soft NTND, no guarding or rebound tenderness, no palpable masses   Extremities: wwp; no cyanosis, clubbing or edema.   Vascular: Pulses equal and strong throughout.   Neurological: AAOx3, no CN deficits, strength and sensation intact throughout.   Skin: No gross skin abnormalities or rashes        LABS:                        9.6    7.75  )-----------( 261      ( 05 May 2025 07:33 )             29.2     05-05    136  |  107  |  68[H]  ----------------------------<  92  4.6   |  20[L]  |  4.86[H]    Ca    7.6[L]      05 May 2025 07:33  Phos  4.2     05-05  Mg     1.8     05-05    TPro  6.7  /  Alb  3.7  /  TBili  0.5  /  DBili  x   /  AST  23  /  ALT  11  /  AlkPhos  102  05-05    PT/INR - ( 03 May 2025 14:34 )   PT: 12.1 sec;   INR: 1.05          PTT - ( 03 May 2025 14:34 )  PTT:28.2 sec  Urinalysis Basic - ( 05 May 2025 07:33 )    Color: x / Appearance: x / SG: x / pH: x  Gluc: 92 mg/dL / Ketone: x  / Bili: x / Urobili: x   Blood: x / Protein: x / Nitrite: x   Leuk Esterase: x / RBC: x / WBC x   Sq Epi: x / Non Sq Epi: x / Bacteria: x          RADIOLOGY & ADDITIONAL TESTS:    MEDICATIONS  (STANDING):  atorvastatin 80 milliGRAM(s) Oral at bedtime  buPROPion XL (24-Hour) . 300 milliGRAM(s) Oral daily  carvedilol 3.125 milliGRAM(s) Oral every 12 hours  dextrose 5%. 1000 milliLiter(s) (100 mL/Hr) IV Continuous <Continuous>  dextrose 5%. 1000 milliLiter(s) (50 mL/Hr) IV Continuous <Continuous>  dextrose 50% Injectable 25 Gram(s) IV Push once  dextrose 50% Injectable 12.5 Gram(s) IV Push once  dextrose 50% Injectable 25 Gram(s) IV Push once  glucagon  Injectable 1 milliGRAM(s) IntraMuscular once  insulin lispro (ADMELOG) corrective regimen sliding scale   SubCutaneous Before meals and at bedtime  levothyroxine 75 MICROGram(s) Oral daily  sodium bicarbonate 650 milliGRAM(s) Oral every 8 hours  sodium chloride 0.9%. 1000 milliLiter(s) (100 mL/Hr) IV Continuous <Continuous>    MEDICATIONS  (PRN):  acetaminophen     Tablet .. 650 milliGRAM(s) Oral every 6 hours PRN Temp greater or equal to 38C (100.4F), Mild Pain (1 - 3)  dextrose Oral Gel 15 Gram(s) Oral once PRN Blood Glucose LESS THAN 70 milliGRAM(s)/deciliter  melatonin 3 milliGRAM(s) Oral at bedtime PRN Insomnia   OVERNIGHT EVENTS: GINA    SUBJECTIVE:  Patient seen and examined at bedside. She reports she feels well, she has been eating well. Denies headache, dizziness, changes to vision.     Vital Signs Last 12 Hrs  T(F): 97.7 (05-05-25 @ 05:30), Max: 97.7 (05-05-25 @ 05:30)  HR: 74 (05-05-25 @ 05:30) (74 - 74)  BP: 153/75 (05-05-25 @ 05:30) (153/75 - 153/75)  BP(mean): --  RR: 18 (05-05-25 @ 05:30) (18 - 18)  SpO2: 98% (05-05-25 @ 05:30) (98% - 98%)  I&O's Summary    04 May 2025 07:01  -  05 May 2025 07:00  --------------------------------------------------------  IN: 400 mL / OUT: 1150 mL / NET: -750 mL        PHYSICAL EXAM:  Constitutional: NAD, comfortable in bed.  HEENT: NC/AT, PERRLA, EOMI, no conjunctival pallor or scleral icterus, MMM  Neck: Supple, no JVD  Respiratory: CTA B/L. No w/r/r.   Cardiovascular: RRR, normal S1 and S2, no m/r/g.   Gastrointestinal: +BS, soft NTND, no guarding or rebound tenderness, no palpable masses   Extremities: wwp; no cyanosis, clubbing or edema.    Neurological: AAOx3, strength 4/5 in UE and LE makenzie. no asterixis, noted dysarthria   Skin: No gross skin abnormalities or rashes        LABS:                        9.6    7.75  )-----------( 261      ( 05 May 2025 07:33 )             29.2     05-05    136  |  107  |  68[H]  ----------------------------<  92  4.6   |  20[L]  |  4.86[H]    Ca    7.6[L]      05 May 2025 07:33  Phos  4.2     05-05  Mg     1.8     05-05    TPro  6.7  /  Alb  3.7  /  TBili  0.5  /  DBili  x   /  AST  23  /  ALT  11  /  AlkPhos  102  05-05    PT/INR - ( 03 May 2025 14:34 )   PT: 12.1 sec;   INR: 1.05          PTT - ( 03 May 2025 14:34 )  PTT:28.2 sec  Urinalysis Basic - ( 05 May 2025 07:33 )    Color: x / Appearance: x / SG: x / pH: x  Gluc: 92 mg/dL / Ketone: x  / Bili: x / Urobili: x   Blood: x / Protein: x / Nitrite: x   Leuk Esterase: x / RBC: x / WBC x   Sq Epi: x / Non Sq Epi: x / Bacteria: x          RADIOLOGY & ADDITIONAL TESTS:    MEDICATIONS  (STANDING):  atorvastatin 80 milliGRAM(s) Oral at bedtime  buPROPion XL (24-Hour) . 300 milliGRAM(s) Oral daily  carvedilol 3.125 milliGRAM(s) Oral every 12 hours  dextrose 5%. 1000 milliLiter(s) (100 mL/Hr) IV Continuous <Continuous>  dextrose 5%. 1000 milliLiter(s) (50 mL/Hr) IV Continuous <Continuous>  dextrose 50% Injectable 25 Gram(s) IV Push once  dextrose 50% Injectable 12.5 Gram(s) IV Push once  dextrose 50% Injectable 25 Gram(s) IV Push once  glucagon  Injectable 1 milliGRAM(s) IntraMuscular once  insulin lispro (ADMELOG) corrective regimen sliding scale   SubCutaneous Before meals and at bedtime  levothyroxine 75 MICROGram(s) Oral daily  sodium bicarbonate 650 milliGRAM(s) Oral every 8 hours  sodium chloride 0.9%. 1000 milliLiter(s) (100 mL/Hr) IV Continuous <Continuous>    MEDICATIONS  (PRN):  acetaminophen     Tablet .. 650 milliGRAM(s) Oral every 6 hours PRN Temp greater or equal to 38C (100.4F), Mild Pain (1 - 3)  dextrose Oral Gel 15 Gram(s) Oral once PRN Blood Glucose LESS THAN 70 milliGRAM(s)/deciliter  melatonin 3 milliGRAM(s) Oral at bedtime PRN Insomnia   OVERNIGHT EVENTS: GINA    SUBJECTIVE:  Patient seen and examined at bedside. She reports she feels well, she has been eating well. Denies headache, dizziness, changes to vision.     Vital Signs Last 12 Hrs  T(F): 97.7 (05-05-25 @ 05:30), Max: 97.7 (05-05-25 @ 05:30)  HR: 74 (05-05-25 @ 05:30) (74 - 74)  BP: 153/75 (05-05-25 @ 05:30) (153/75 - 153/75)  BP(mean): --  RR: 18 (05-05-25 @ 05:30) (18 - 18)  SpO2: 98% (05-05-25 @ 05:30) (98% - 98%)  I&O's Summary    04 May 2025 07:01  -  05 May 2025 07:00  --------------------------------------------------------  IN: 400 mL / OUT: 1150 mL / NET: -750 mL        PHYSICAL EXAM:  Constitutional: NAD, comfortable in bed.  HEENT: NC/AT, PERRLA, EOMI, no conjunctival pallor or scleral icterus, MMM  Neck: Supple, no JVD  Respiratory: CTA B/L. No w/r/r.   Cardiovascular: RRR, normal S1 and S2, no m/r/g.   Gastrointestinal: +BS, soft NTND, no guarding or rebound tenderness, no palpable masses   Extremities: wwp; no cyanosis, clubbing or edema.    Neurological: AAOx3, strength 4/5 in UE and LE makenzie. no asterixis, + noted dysarthria   Skin: No gross skin abnormalities or rashes        LABS:                        9.6    7.75  )-----------( 261      ( 05 May 2025 07:33 )             29.2     05-05    136  |  107  |  68[H]  ----------------------------<  92  4.6   |  20[L]  |  4.86[H]    Ca    7.6[L]      05 May 2025 07:33  Phos  4.2     05-05  Mg     1.8     05-05    TPro  6.7  /  Alb  3.7  /  TBili  0.5  /  DBili  x   /  AST  23  /  ALT  11  /  AlkPhos  102  05-05    PT/INR - ( 03 May 2025 14:34 )   PT: 12.1 sec;   INR: 1.05          PTT - ( 03 May 2025 14:34 )  PTT:28.2 sec  Urinalysis Basic - ( 05 May 2025 07:33 )    Color: x / Appearance: x / SG: x / pH: x  Gluc: 92 mg/dL / Ketone: x  / Bili: x / Urobili: x   Blood: x / Protein: x / Nitrite: x   Leuk Esterase: x / RBC: x / WBC x   Sq Epi: x / Non Sq Epi: x / Bacteria: x          RADIOLOGY & ADDITIONAL TESTS:    MEDICATIONS  (STANDING):  atorvastatin 80 milliGRAM(s) Oral at bedtime  buPROPion XL (24-Hour) . 300 milliGRAM(s) Oral daily  carvedilol 3.125 milliGRAM(s) Oral every 12 hours  dextrose 5%. 1000 milliLiter(s) (100 mL/Hr) IV Continuous <Continuous>  dextrose 5%. 1000 milliLiter(s) (50 mL/Hr) IV Continuous <Continuous>  dextrose 50% Injectable 25 Gram(s) IV Push once  dextrose 50% Injectable 12.5 Gram(s) IV Push once  dextrose 50% Injectable 25 Gram(s) IV Push once  glucagon  Injectable 1 milliGRAM(s) IntraMuscular once  insulin lispro (ADMELOG) corrective regimen sliding scale   SubCutaneous Before meals and at bedtime  levothyroxine 75 MICROGram(s) Oral daily  sodium bicarbonate 650 milliGRAM(s) Oral every 8 hours  sodium chloride 0.9%. 1000 milliLiter(s) (100 mL/Hr) IV Continuous <Continuous>    MEDICATIONS  (PRN):  acetaminophen     Tablet .. 650 milliGRAM(s) Oral every 6 hours PRN Temp greater or equal to 38C (100.4F), Mild Pain (1 - 3)  dextrose Oral Gel 15 Gram(s) Oral once PRN Blood Glucose LESS THAN 70 milliGRAM(s)/deciliter  melatonin 3 milliGRAM(s) Oral at bedtime PRN Insomnia

## 2025-05-05 NOTE — PROGRESS NOTE ADULT - PROBLEM SELECTOR PLAN 1
#Syncope   May be medication induced BEERS (recently started cyclobenzaprine, opiods  vs orthostatic iso poor oral intake less likely arrhythmogenic given normal EKG however given patient's history of rheumatic fever and pericarditis would  perform limited TTE   Trop 89>85  Stroke imaging negative, no signs of facial droop or dysarthria on repeat exam   s/p scalp laceration repair in ED with three staples  - hold off  orthostatics for now    - obtain CK  - obtain UA; low concern for infection at this times as patient  denies dysuria but given retention  - obtain limited TTE in AM  - f/u MR Brain #Syncope   May be medication induced BEERS (recently started cyclobenzaprine, opiods  vs orthostatic iso poor oral intake less likely arrhythmogenic given normal EKG however given patient's history of rheumatic fever and pericarditis would  perform limited TTE   Stroke imaging negative, no signs of facial droop or dysarthria on repeat exam; MR Brain unremarkable  s/p scalp laceration repair in ED with three staples; UA: mod leuk esterase, nitrite neg with * WBC; monitoring of abx  as patient is asymptomatic  - hold off  orthostatics for now    - obtain  TTE   -

## 2025-05-05 NOTE — PROGRESS NOTE ADULT - SUBJECTIVE AND OBJECTIVE BOX
Neurology Stroke Progress Note    INTERVAL HPI/OVERNIGHT EVENTS:  Patient seen and examined. She is A&Ox3, cooperative throughout the examination.     MEDICATIONS  (STANDING):  atorvastatin 80 milliGRAM(s) Oral at bedtime  buPROPion XL (24-Hour) . 300 milliGRAM(s) Oral daily  carvedilol 3.125 milliGRAM(s) Oral every 12 hours  dextrose 5%. 1000 milliLiter(s) (100 mL/Hr) IV Continuous <Continuous>  dextrose 5%. 1000 milliLiter(s) (50 mL/Hr) IV Continuous <Continuous>  dextrose 50% Injectable 25 Gram(s) IV Push once  dextrose 50% Injectable 12.5 Gram(s) IV Push once  dextrose 50% Injectable 25 Gram(s) IV Push once  glucagon  Injectable 1 milliGRAM(s) IntraMuscular once  insulin lispro (ADMELOG) corrective regimen sliding scale   SubCutaneous Before meals and at bedtime  levothyroxine 75 MICROGram(s) Oral daily    MEDICATIONS  (PRN):  acetaminophen     Tablet .. 650 milliGRAM(s) Oral every 6 hours PRN Temp greater or equal to 38C (100.4F), Mild Pain (1 - 3)  dextrose Oral Gel 15 Gram(s) Oral once PRN Blood Glucose LESS THAN 70 milliGRAM(s)/deciliter  melatonin 3 milliGRAM(s) Oral at bedtime PRN Insomnia      Allergies    No Known Allergies    Intolerances        Vital Signs Last 24 Hrs  T(C): 36.6 (05 May 2025 09:45), Max: 36.6 (05 May 2025 09:45)  T(F): 97.8 (05 May 2025 09:45), Max: 97.8 (05 May 2025 09:45)  HR: 76 (05 May 2025 09:45) (74 - 76)  BP: 143/82 (05 May 2025 09:45) (139/83 - 153/75)  BP(mean): --  RR: 18 (05 May 2025 09:45) (18 - 18)  SpO2: 99% (05 May 2025 09:45) (98% - 99%)    Parameters below as of 05 May 2025 09:45  Patient On (Oxygen Delivery Method): room air        Physical exam:  General: No acute distress, awake and alert  Eyes: Anicteric sclerae, moist conjunctivae, see below for CNs  Neck: trachea midline, FROM  Pulmonary: No use of accessory muscles    Neurologic:  -Mental status: Awake, alert, oriented to person, place, and time. Mild ongoing dysarthria and mild dysfluency (as per pt has been having word finding difficulty x the past 2 years).   -Cranial nerves:   II: Visual fields are full to confrontation.  III, IV, VI: Extraocular movements are intact without nystagmus. Pupils equally round and reactive to light  V:  Facial sensation V1-V3 equal and intact   VII: Increased R smile activation (at baseline from childhood) - mild L NLFF   VIII: Hearing is bilaterally intact to voice   Motor: Normal bulk and tone. No pronator drift. Strength bilateral upper extremity 5/5, bilateral lower extremities 5/5.  Sensation: Intact to light touch bilaterally. No neglect or extinction on double simultaneous testing.  Coordination: No dysmetria on finger-to-nose bilaterally  Gait: Deferred     LABS:                        9.6    7.75  )-----------( 261      ( 05 May 2025 07:33 )             29.2     05-05    136  |  107  |  68[H]  ----------------------------<  92  4.6   |  20[L]  |  4.86[H]    Ca    7.6[L]      05 May 2025 07:33  Phos  4.2     05-05  Mg     1.8     05-05    TPro  6.7  /  Alb  3.7  /  TBili  0.5  /  DBili  x   /  AST  23  /  ALT  11  /  AlkPhos  102  05-05      Urinalysis Basic - ( 05 May 2025 07:33 )    Color: x / Appearance: x / SG: x / pH: x  Gluc: 92 mg/dL / Ketone: x  / Bili: x / Urobili: x   Blood: x / Protein: x / Nitrite: x   Leuk Esterase: x / RBC: x / WBC x   Sq Epi: x / Non Sq Epi: x / Bacteria: x        RADIOLOGY & ADDITIONAL TESTS:  MRI Brain (5/4):   No acute intracranial hemorrhage or evidence of acute ischemia.    Multiple patchy confluent nonspecific abnormal white matter foci of T2/FLAIR prolongation statistically favoring microvascular type changes.

## 2025-05-05 NOTE — OCCUPATIONAL THERAPY INITIAL EVALUATION ADULT - ADDITIONAL COMMENTS
PTA, pt able to perform all mobility/ADLs independently and w/o use of AD/AE, until recently when she began using rollator. Pt reporting that she has a  who assists her with IADLs each day.

## 2025-05-05 NOTE — DIETITIAN INITIAL EVALUATION ADULT - PROBLEM SELECTOR PLAN 9
Home medication rosuvastatin 5mg   - initiate atorvastatin 80mg qd  -obtain lipid panel and A1c in ED

## 2025-05-05 NOTE — PROGRESS NOTE ADULT - PROBLEM SELECTOR PLAN 8
Home medication bupropion 450mg qd  -resume home medication    #ADHD  Lisdexamfetamine Dimesylate  30 MG Oral Capsule  - hold for now Home medication bupropion 450mg qd per last note from PCP  -c/w wellbutrin 300 given elevated qtc    #ADHD  Lisdexamfetamine Dimesylate  30 MG Oral Capsule  - hold for now

## 2025-05-05 NOTE — PROGRESS NOTE ADULT - PROBLEM SELECTOR PLAN 11
F: NS @ 100cc   E: Replete PRN  N: Regular   DVT ppx: Heparin sub q  GI ppx: None  Bowel: Not indicated   Dispo: F     FULL CODE F: nonw  E: Replete PRN  N: Regular   DVT ppx: holding   GI ppx: None  Bowel: Not indicated   Dispo: F     FULL CODE

## 2025-05-05 NOTE — CONSULT NOTE ADULT - ASSESSMENT
I M    78 yo F with PMHx HTN, HLD, hypothyroidism, depression, ADHD, frequent falls, recent L1 compression fx (pending OR 5/12) px from home s/p unwitnessed fall with ?head strike, found to have labs concerning for MARY LOU with Cr 5.86, admitted for further evaluation and management of frequent falls and MARY LOU.      Impression and Plan  # Oliguric MARY LOU   - last creatinine from 2024 was 1   - Check renal Ultrasound   - Start IV Fluid  ,  Po Bicarbonate   - Nephrology Consultation   - Strict I and O   - Check Urine Lytes     # Lumbar Compression Fracture   - Ortho - Spine Consult     # RUL mass , Pulm Consult     # HTN   - hold HCTZ     # Type II Nstemi , due to MARY LOU     # hx of Rheumatic Fever , no current fever , no new murmurs on exam , no hematuria at this time

## 2025-05-05 NOTE — DIETITIAN INITIAL EVALUATION ADULT - NSICDXPASTMEDICALHX_GEN_ALL_CORE_FT
PAST MEDICAL HISTORY:  Depression     HLD (hyperlipidemia)     HTN (hypertension)     Hypothyroidism     Osteoarthritis

## 2025-05-05 NOTE — CONSULT NOTE ADULT - SUBJECTIVE AND OBJECTIVE BOX
Orthopaedic Spine Surgery Consult Note    CC: Patient is a 79y old  Female who presents with a chief complaint of Fall vs syncope, MARY LOU (05 May 2025 10:27)      HPI:  79F pmhx HTN, HLD,  hypothyroidism, generalized depression, ADHD admitted to medicine service 5/3/25 s/p unwitnessed mechanical fall. Patient has a known L1 compression fracture s/p fall in April for which she has been seen for follow up by Dr. Barcenas on 5/2 and is planning for operative fixation next week. Patient reports that she was in her bathroom brushing her teeth and woke up with her head against her bathtub / bleeding from her scalp. Pt had a head CT upon arrival to ED that was negative. Pt denies numbness/tingling or weakness of her lower extremities. She reports no new symptoms as it relates to her spine since her fall. She has been wearing her TLSO at all times as instructed however reports that it is uncomfortable. Of note, right upper lobe opacity found on imaging that is being worked up by primary team (PET scan pending today). Pt ambulates with a rollator at baseline.     ROS: Positive for  Denies fevers, chills, headache, dizziness, chest pain, shortness of breath, nausea, vomiting.   All other systems negative, except for above.     Allergies    No Known Allergies    Intolerances      PAST MEDICAL & SURGICAL HISTORY:  Osteoarthritis      Depression      HTN (hypertension)      HLD (hyperlipidemia)      Hypothyroidism      No significant past surgical history        Social History:  Lives alone with A   Patient endorses 20 pack year history with quiting in 1980s  drinks 1,2 times weekly, denies recreational drug use (03 May 2025 18:57)    FAMILY HISTORY:  No pertinent family history in first degree relatives      Medications:     Vital Signs Last 24 Hrs  T(C): 36.5 (05 May 2025 05:30), Max: 36.5 (04 May 2025 15:31)  T(F): 97.7 (05 May 2025 05:30), Max: 97.7 (04 May 2025 15:31)  HR: 74 (05 May 2025 05:30) (74 - 75)  BP: 153/75 (05 May 2025 05:30) (139/83 - 168/71)  BP(mean): --  RR: 18 (05 May 2025 05:30) (18 - 18)  SpO2: 98% (05 May 2025 05:30) (98% - 99%)    Parameters below as of 05 May 2025 05:30  Patient On (Oxygen Delivery Method): room air        PE:  General: Laying comfortably in bed, NAD   Neuro: Alert, oriented x 3  Psych: Normal mood & affect   Skin: Warm, dry, extremities well perfused, no obvious rash  MSK:    -Inspection/palpation: Pt wearing TLSO brace. No spinal point tenderness or pain with cervical spine ROM. No paraspinal tenderness     -Sensation: SLT in tact throughout bilateral lower extremities     -Motor:  EHL/FHL/TA/GS/Quad/Ham 5/5 motor strength bilateral lower extremities. Negative SLR bilaterally , calves soft and nontender to palpation     -Pulses: DP pulses palpable bilaterally                             9.6    7.75  )-----------( 261      ( 05 May 2025 07:33 )             29.2     05-05    136  |  107  |  68[H]  ----------------------------<  92  4.6   |  20[L]  |  4.86[H]    Ca    7.6[L]      05 May 2025 07:33  Phos  4.2     05-05  Mg     1.8     05-05    TPro  6.7  /  Alb  3.7  /  TBili  0.5  /  DBili  x   /  AST  23  /  ALT  11  /  AlkPhos  102  05-05    PT/INR - ( 03 May 2025 14:34 )   PT: 12.1 sec;   INR: 1.05          PTT - ( 03 May 2025 14:34 )  PTT:28.2 sec    Imaging: ACC: [id number] EXAM:  CT LUMBAR SPINE   ORDERED BY: RYAN PARRA     ACC: [id number] EXAM:  CT THORACIC SPINE   ORDERED BY: RYAN PARRA     PROCEDURE DATE:  05/03/2025          INTERPRETATION:  CT THORACIC AND LUMBAR SPINE WITHOUT CONTRAST    TECHNIQUE: Multiple axial images of the thoracic and lumbar spine were   obtained without intravenous contrast.  Multiplanar reformats were   created according to the standard protocol.    INDICATION: trauma  COMPARISON: Head CT without contrast and CT of the cervical spine without   contrast 5/3/2025.  ______________________  FINDINGS:    THORACIC SPINE:  There are 12 thoracic type vertebral bodies. Mild upper thoracic   kyphosis. Very mild anterolisthesis of T1 on T2. Mild thoracic   levoscoliosis with an apex near T10-T11. Thoracic alignment otherwise   maintained. Thoracic vertebral body heights maintained. No acute fracture   of the thoracic spine. Osteopenia. No aggressive osseous lesions.    No findings suspicious for high grade thoracic spinal canal stenosis.    Paraspinal Tissues: Moderate atherosclerotic disease. Incompletely   evaluated but statistically likely benign left renal cyst. Right upper   lobe opacity as on the same day CTA of the head and neck.    LUMBAR SPINE:  There are 5 lumbartype vertebral bodies. Osteopenia. No apparent   aggressive osseous lesions. Osteoarthritis of the sacroiliac joints. Mild   lumbar levoscoliosis with an apex near L2-L3. Grade 1 anterolisthesis of   L4 on L5. Aside from the burst type compression fracture at L1. Lumbar   alignment otherwise maintained.    Mildly displaced right transverse process fractures at L1 and L2.  Burst type compression fracture at L1 with severe vertebral body height   loss, multiple fracture lines, and moderate osseous retropulsion. There   is severe associated spinal canal stenosis.    Severe spinal canal stenosis at L4-L5.    Paraspinal Tissues: Severe atherosclerotic disease. Incompletely   evaluated but statistically likely benign left renal cyst.  ______________________  IMPRESSION:  1.  Acute-appearing burst type compression fracture at L1 with severe   vertebral body height loss and moderate osseous retropulsion. There is   severe associated spinal canal stenosis.  2.  Acute right transverse process fractures at L1 and L2.  3.  No acute fracture of the thoracic spine.  4.  Severe spinal canal stenosis at L4-L5.    --- End of Report ---      MARK GOODRICH MD; Attending Radiologist  This document has been electronically signed. May  3 2025  5:29PM      A/P: 79yFemale with L1 compression fracture with retropulsion, will plan for operative fixation while admitted. T11-L3 Decompression and Posterior Spinal Fusion.  WBAT in TLSO brace, PT   Pain control as needed  Please start optimization process for OR this week, possibly Thurs or Fri.  Patient will need cardiac clearance.  Will follow up results of PET scan  Above Plan Discussed with Dr. Barcenas   Reviewed imaging and lab data   Ortho Pager [phone]    60 minutes spent on total encounter, over 50% of the visit was spent counseling and/or coordinating care.

## 2025-05-05 NOTE — PROGRESS NOTE ADULT - ASSESSMENT
Patient is 79F with PMHx of HTN, HLD,  hypothyroidism, generalized depression, ADHD rheumatic fever, pericarditis with recent  L1 compression fracture 2/2 mechanical fall (pending  orthopedic intervention with Dr. Barcenas),  presents after unwitnessed syncopal event and head trauma likely 2/2 to medication side effect vs dehydration complicated by MARY LOU, with incidental finding of RUL opacity c/f malignancy

## 2025-05-05 NOTE — OCCUPATIONAL THERAPY INITIAL EVALUATION ADULT - MODALITIES TREATMENT COMMENTS
Pt able to perform bed mobility with CGAx1, demo impaired postural control, impacting ability to weight shift. Pt performed UB dressing and readjustment of TLSO while seated at EOB, requiring Min Ax1 2/2 impaired dynamic balance and decreased functional reach. Pt performed sit<->stand with CGAx1, and able to ambulate in room/hallway with CGAx1 using rollator. Pt demo unsteadiness with decreased step length and impaired ability to weight shift, however no buckling or LOB observed throughout. Pt returned to room and left seated in chair, +all lines, +call [facility] toth, KVNG Murphy made aware of outcome. Pt would benefit from continued OT services to facilitate increased independence with functional mobility/ADLs.

## 2025-05-05 NOTE — DIETITIAN INITIAL EVALUATION ADULT - PERTINENT MEDS FT
MEDICATIONS  (STANDING):  atorvastatin 80 milliGRAM(s) Oral at bedtime  buPROPion XL (24-Hour) . 300 milliGRAM(s) Oral daily  carvedilol 3.125 milliGRAM(s) Oral every 12 hours  dextrose 5%. 1000 milliLiter(s) (100 mL/Hr) IV Continuous <Continuous>  dextrose 5%. 1000 milliLiter(s) (50 mL/Hr) IV Continuous <Continuous>  dextrose 50% Injectable 25 Gram(s) IV Push once  dextrose 50% Injectable 12.5 Gram(s) IV Push once  dextrose 50% Injectable 25 Gram(s) IV Push once  glucagon  Injectable 1 milliGRAM(s) IntraMuscular once  insulin lispro (ADMELOG) corrective regimen sliding scale   SubCutaneous Before meals and at bedtime  levothyroxine 75 MICROGram(s) Oral daily  sodium bicarbonate 650 milliGRAM(s) Oral every 8 hours  sodium chloride 0.9%. 1000 milliLiter(s) (100 mL/Hr) IV Continuous <Continuous>    MEDICATIONS  (PRN):  acetaminophen     Tablet .. 650 milliGRAM(s) Oral every 6 hours PRN Temp greater or equal to 38C (100.4F), Mild Pain (1 - 3)  dextrose Oral Gel 15 Gram(s) Oral once PRN Blood Glucose LESS THAN 70 milliGRAM(s)/deciliter  melatonin 3 milliGRAM(s) Oral at bedtime PRN Insomnia

## 2025-05-05 NOTE — DIETITIAN INITIAL EVALUATION ADULT - PROBLEM SELECTOR PLAN 4
Patient with 20 pack year smoking history (stopped in 1984) presents after syncopal episode with incidental finding of pulmonary nodule in RUL. Endorses recent loss of appetite, but denies cough, weight loss, prior occupational exposures  CT Chest: 5 cm infiltrative opacity in the right upper lobe suspicious for malignancy. Consider PET/CT and tissue sampling.    - Outpatient PCP/Pulmonary follow-up on discharge

## 2025-05-05 NOTE — CONSULT NOTE ADULT - SUBJECTIVE AND OBJECTIVE BOX
Patient is a 79y old  Female who presents with a chief complaint of Fall vs syncope, MARY LOU (05 May 2025 07:39)      HPI:  Patient is 79F with PMHx of HTN, HLD,  hypothyroidism, generalized depression, ADHD and L1 compression fracture 2/2 to recent  fall  (April 2025, wears TLSO brace, scheduled for surgery with Dr. Barcenas this month)  rheumatic fever and pericarditis presents after unwitnessed fall. Patient states she was standing up brushing her teeth this morning and suddenly felt lightheaded and woke up on the floor  hitting her head on the bathtub +LOC +HT with noticeable blood from the scalp. Patient does not fully recall the event but denies lightheadedness dizziness, paresthesia blurred vision nausea or aura. She states she arose from the floor and called her  to bring her to the hospital. Upon arrival to ED; Code stoke due to concern of dysarthria and left sided facial droop.     Of note, patient shares she has been experiencing several episodes on instability due to weakness in her legs resulting in recurrent falls, most recently in April where she was in bed and missed her footing, falling to the ground. At that time she was sent for imaging of her lumbar spine and found to have acute/subacute severe anteriorly wedged compression deformity of the L1 vertebral body. Instructed to wear TLSO brace and prescribed muscle relaxants and oxycodone for pain. Recently seen by Dr. Barcenas on 5/1 pending T11-L3 Decompression and posterior instrumented fusion.     Patient also recently seen by PCP Dr. Michael Fields  4/28 with complaints of dry mouth and instability, instructed to discontinue use of cyclobenzaprine     In the ED,  VS: T   97.5, HR 98  , /78 >  205/80 , RR   20 , SpO2    99 % RA  Labs: leukocytosis to 12.6, trop 89>85; sodium 134, potassium 3.4 AG 21 with bicarb of 18 BUN 82/Cr 5.86  EKG: normal sinus rhythm with rate of 88; qtc 496    Imaging:  CTA HEAD: No large vessel occlusion or hemodynamically significant stenosis. 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  CTA NECK: No occlusion, dissection, aneurysm, or hemodynamically significant stenosis. 4.1 x 2.5 cm right upper lobe opacity as detailed above. Further evaluation with dedicated CT of the chest without contrast is recommended.  CT BRAIN PERFUSION:  Normal.  CT Lumbar Spine: Acute-appearing burst type compression fracture at L1 with severe vertebral body height loss and moderate osseous retropulsion. There is severe associated spinal canal stenosis. Acute right transverse process fractures at L1 and L2. Severe spinal canal stenosis at L4-L5.  CT Chest: 5 cm infiltrative opacity in the right upper lobe suspicious for malignancy. Consider PET/CT and tissue sampling.    Interventions; code stroke called, s/p scalp laceration repair, labetolol 10mg IVP   (03 May 2025 18:57)    PAST MEDICAL & SURGICAL HISTORY:  Osteoarthritis      Depression      HTN (hypertension)      HLD (hyperlipidemia)      Hypothyroidism      No significant past surgical history        MEDICATIONS  (STANDING):  atorvastatin 80 milliGRAM(s) Oral at bedtime  buPROPion XL (24-Hour) . 300 milliGRAM(s) Oral daily  carvedilol 3.125 milliGRAM(s) Oral every 12 hours  dextrose 5%. 1000 milliLiter(s) (100 mL/Hr) IV Continuous <Continuous>  dextrose 5%. 1000 milliLiter(s) (50 mL/Hr) IV Continuous <Continuous>  dextrose 50% Injectable 25 Gram(s) IV Push once  dextrose 50% Injectable 12.5 Gram(s) IV Push once  dextrose 50% Injectable 25 Gram(s) IV Push once  glucagon  Injectable 1 milliGRAM(s) IntraMuscular once  insulin lispro (ADMELOG) corrective regimen sliding scale   SubCutaneous Before meals and at bedtime  levothyroxine 75 MICROGram(s) Oral daily  sodium bicarbonate 650 milliGRAM(s) Oral every 8 hours  sodium chloride 0.9%. 1000 milliLiter(s) (100 mL/Hr) IV Continuous <Continuous>    MEDICATIONS  (PRN):  acetaminophen     Tablet .. 650 milliGRAM(s) Oral every 6 hours PRN Temp greater or equal to 38C (100.4F), Mild Pain (1 - 3)  dextrose Oral Gel 15 Gram(s) Oral once PRN Blood Glucose LESS THAN 70 milliGRAM(s)/deciliter  melatonin 3 milliGRAM(s) Oral at bedtime PRN Insomnia      FAMILY HISTORY:  No pertinent family history in first degree relatives        CBC Full  -  ( 05 May 2025 07:33 )  WBC Count : 7.75 K/uL  RBC Count : 3.19 M/uL  Hemoglobin : 9.6 g/dL  Hematocrit : 29.2 %  Platelet Count - Automated : 261 K/uL  Mean Cell Volume : 91.5 fl  Mean Cell Hemoglobin : 30.1 pg  Mean Cell Hemoglobin Concentration : 32.9 g/dL  Auto Neutrophil # : x  Auto Lymphocyte # : x  Auto Monocyte # : x  Auto Eosinophil # : x  Auto Basophil # : x  Auto Neutrophil % : x  Auto Lymphocyte % : x  Auto Monocyte % : x  Auto Eosinophil % : x  Auto Basophil % : x      05-04    137  |  102  |  71[H]  ----------------------------<  127[H]  3.2[L]   |  18[L]  |  4.79[H]    Ca    7.8[L]      04 May 2025 20:00  Phos  5.0     05-04  Mg     1.8     05-04    TPro  6.9  /  Alb  3.9  /  TBili  0.8  /  DBili  x   /  AST  18  /  ALT  10  /  AlkPhos  99  05-04      Urinalysis Basic - ( 04 May 2025 20:00 )    Color: x / Appearance: x / SG: x / pH: x  Gluc: 127 mg/dL / Ketone: x  / Bili: x / Urobili: x   Blood: x / Protein: x / Nitrite: x   Leuk Esterase: x / RBC: x / WBC x   Sq Epi: x / Non Sq Epi: x / Bacteria: x        Radiology :   ACC: [id number] EXAM:  CT ANGIO BRAIN STROKE PROTC IC   ORDERED BY: RYAN PARRA     ACC: [id number] EXAM:  CT ANGIO NECK STROKE PROTCL IC   ORDERED BY: RYAN PARRA     ACC: [id number] EXAM:  CT BRAIN STROKE PROTOCOL   ORDERED BY: RYAN PARRA     ACC: [id number] EXAM:  CT BRAIN PERFUSION MAPS STROKE   ORDERED BY: RYAN PARRA     PROCEDURE DATE:  05/03/2025          INTERPRETATION:  CT HEAD WITHOUT CONTRAST, CTA OF THE HEAD AND NECK, AND   CT BRAIN PERFUSION    INDICATION: Stroke Code    TECHNIQUE: Multiple axial images of the head were obtained from the skull   base to the vertex without intravenous contrast.  Multiplanar reformats   were created according to the standard protocol.    Perfusion CT of the brain was obtained following intravenous contrast   administration. Processing of the CT perfusion data, specifically, maps   of mean transit time, cerebral blood flow and cerebral blood volume were   generated using iSchemaView RAPID.    Subsequently, following the intravenous injection of non-ionic contrast   material, serial thin images were obtained with attention to the cervical   and intracranial arterial vasculature.  Multiplanar reconstructions were   performed including reformatting using a dedicated 3D software package   with viewingon a dedicated workstation in multiple planes.  MIP image   analysis was performed on a separate workstation.    CTA CONTRAST: 80 mL Isovue-370 was administered. 20 mL was discarded.  CT BRAIN PERFUSION CONTRAST: 40 mL Isovue-370 was administered. 60mL was   discarded.    COMPARISON: None available at the time of interpretation.  __________________  FINDINGS:    NONCONTRAST HEAD CT:    Mild diffuse cerebral volume loss. No hydrocephalus. Maintenance of   gray-white differentiation in the large vascular distributions. No   significant midline shift. Very mild hypoattenuation of the   periventricular/deep white matter most consistent with chronic   microvascular ischemic disease. No acute intracranial hemorrhage. .    Calvarium and Scalp: Unremarkable.    Paranasal Sinuses, Mastoid Air Cells, and Orbits: Visualized paranasal   sinuses are clear. Visualized mastoid air cells clear. Bilateral lens   replacement.  ______    CTA HEAD:    Right Anterior Circulation: The right petrous internal carotid artery is   patent without stenosis. The right cavernous internal carotid artery is   patent without stenosis. The right supraclinoid internal carotid artery   is patent without stenosis. The A1 and A2 segments of the right anterior   cerebral artery are patent without stenosis. The M1 and M2 segments of   the right middle cerebral artery are patent without stenosis.    Left Anterior Circulation: The left petrous internal carotid artery is   patent without stenosis. The left cavernous internal carotid artery is   patent without stenosis. The left supraclinoid internal carotid artery is   patent without stenosis. The A1 and A2 segments of the left anterior   cerebral artery are patent without stenosis. The M1 and M2 segments of   the left middle cerebral artery are patent without stenosis.    Posterior Circulation: Posteriorly, the distal vertebral arteries are   patent without stenosis. The basilar artery is patent without stenosis.   The P1 and P2 segments of the posterior cerebralarteries are patent   without stenosis.    Aneurysm/Vascular Malformation: 1-2 mm aneurysm or infundibulum about the   origin of the superior ophthalmic artery bilaterally.    Dural Venous Sinuses: The visualized dural venous sinuses are patent   without evidence of dural venous sinus thrombosis.    Additional Findings: None.  ______    CTA NECK:    Right Carotid: The right common carotid artery is patent without   stenosis. The right internal carotid artery is patent without stenosis.    Left Carotid: The left common carotid artery is patent without stenosis.   The left internal carotid artery is patent without stenosis.    Right Vertebral: The right cervical vertebral artery is patent without   stenosis.    Left Vertebral: The left cervicalvertebral artery is patent without   stenosis.    Arch/Great Vessels: The brachiocephalic and subclavian arteries are   patent without high grade stenosis.    Additional Findings: 4.1 x 2.5 cm patchy right upper lobe opacity with   some foci of groundglass opacification, increased in size in comparison   to 10/1/2021. 4 mm solid left upper lobe nodule..  ______    CT BRAIN PERFUSION:    Parametric Maps: There is no convincing regional perfusion abnormality.    CBF <30%: 0 mL  TMax > 6s: 0 mL  Mismatch volume: 0 mL  Mismatch ratio: None.    Additional Findings: None.    __________________  IMPRESSION:    NONCONTRAST HEAD CT:  1.  No acute intracranial abnormality. Volume loss and chronic   microvascular ischemic disease as above..    Dr. Mahmooditiated communication of this result at 5/3/2025 2:59 PM.  Critical findings discussed with Lucia Gonzales PA-C by Dr. Cholo Hull   at 5/3/2025 3:00 PM.    CTA HEAD AND NECK:  1.   CTA HEAD: No large vessel occlusion or hemodynamically significant   stenosis. 1-2 mm aneurysm or infundibulum about the origin of the   superior ophthalmic artery bilaterally.  2.   CTA NECK: No occlusion, dissection, aneurysm, or hemodynamically   significant stenosis. 4.1 x 2.5 cm right upper lobe opacity as detailed   above. Further evaluation with dedicated CT of the chest without contrast   is recommended.    CT BRAIN PERFUSION:  1.  Normal.      ACC: [id number] EXAM:  CT CHEST   ORDERED BY: RYAN PARRA     PROCEDURE DATE:  05/03/2025    HEART: Heart size is normal. No pericardial effusion.  MEDIASTINUM AND NICOLÁS: No lymphadenopathy.  CHEST WALL AND LOWER NECK: Within normal limits.  VISUALIZED UPPER ABDOMEN: Left renal cyst partially visualized.  BONES: Mild degenerative changes. No acute fracture.          Vital Signs Last 24 Hrs  T(C): 36.5 (05 May 2025 05:30), Max: 36.5 (04 May 2025 11:00)  T(F): 97.7 (05 May 2025 05:30), Max: 97.7 (04 May 2025 11:00)  HR: 74 (05 May 2025 05:30) (74 - 76)  BP: 153/75 (05 May 2025 05:30) (139/83 - 168/71)  BP(mean): 98 (04 May 2025 11:00) (98 - 98)  RR: 18 (05 May 2025 05:30) (16 - 18)  SpO2: 98% (05 May 2025 05:30) (97% - 99%)    Parameters below as of 05 May 2025 05:30  Patient On (Oxygen Delivery Method): room air      Physical Exam:  79 y o woman lying comfortably in semi Butcher's position , awake , alert , no acute complaints     Head: normocephalic , atraumatic    Eyes: PERRLA , EOMI , no nystagmus , sclera anicteric    ENT / FACE: neg nasal discharge , uvula midline , no oropharyngeal erythema / exudate    Neck: supple , negative JVD , negative carotid bruits , no thyromegaly    Chest: CTA bilaterally      Cardiovascular: regular rate and rhythm , neg murmurs / rubs / gallops    Abdomen: soft , non distended , no tenderness to palpation in all 4 quadrants ,  normal bowel sounds     Extremities: WWP , neg cyanosis /clubbing / edema      Neurologic Exam:     Alert and oriented to person , place , date/year , speech fluent w/o dysarthria , follows commands      Cranial Nerves:           II:                         pupils equal , round and reactive to light , visual fields intact         III/ IV/VI:             extraocular movements intact , neg nystagmus , neg ptosis        V:                        facial sensation intact , V1-3 normal        VII:                      face symmetric , no droop , normal eye closure and smile        VIII:                     hearing intact to finger rub bilaterally        IX and X:             no hoarseness , gag intact , palate/ uvula rise symmetrically        XI:                       SCM / trapezius strength intact bilateral        XII:                      no tongue deviation    Motor Exam:        > 3+/5 x 4 extremities , without drift     Sensation:         intact to light touch x 4 extremities                            no neglect or extinction on double simultaneous testing    DTR:           biceps/brachioradialis: equal                            patella/ankle: equal          neg Babinski     Coordination:            Finger to Nose:  neg dysmetria bilaterally        Gait:  not tested         PM&R Impression: admitted for fall, mary lou     - no acute pathology on CT brain imaging     - deconditioned    - no focal weakness         Recommendations / Plan:       1) Physical / Occupational therapy focusing on therapeutic exercises , equipment evaluation , bed mobility/transfer out of bed evaluation , progressive ambulation with assistive devices prn .    2) Current disposition plan recommendation:   inpatient rehab placement     No

## 2025-05-05 NOTE — PROGRESS NOTE ADULT - PROBLEM SELECTOR PLAN 10
Home medication: Levothyroxine Sodium 75 MCG Oral  - resume home medication  - TSH in AM Home medication: Levothyroxine Sodium 75 MCG Oral; TSH 1.050  - resume home medication

## 2025-05-05 NOTE — DIETITIAN INITIAL EVALUATION ADULT - OTHER INFO
Per H&P: Patient is 79F with PMHx of HTN, HLD,  hypothyroidism, generalized depression, ADHD and L1 compression fracture 2/2 to recent  fall  (April 2025, wears TLSO brace, scheduled for surgery with Dr. Barcenas this month)  rheumatic fever and pericarditis presents after unwitnessed fall. Patient states she was standing up brushing her teeth this morning and suddenly felt lightheaded and woke up on the floor  hitting her head on the bathtub +LOC +HT with noticeable blood from the scalp. Patient does not fully recall the event but denies lightheadedness dizziness, paresthesia blurred vision nausea or aura. She states she arose from the floor and called her  to bring her to the hospital. Upon arrival to ED; Code stoke due to concern of dysarthria and left sided facial droop.     Met with pt this AM at bedside. Pt was seated upright and able to articulate her nutrition hx well. No known food allergies nor food intolerances reported. Pt reported good appetite and PO intake at baseline, however mentioned it has been low last 3-4 weeks s/p fall early April 2025. Pt denied following any specific diet PTA, however mentioned has been eating higher carbohydrate and sugar intake x 1 month than baseline. Pt denied chewing/swallowing difficulties. Pt endorsed nausea (denied vomiting) and denied diarrhea, however endorsed constipation, stated last BM 5/2. RD encouraged adequate fluid and fiber (fruit + vegetable) intake to assist with BMs. Pt reported usual body weight 162 pounds and has been gradually losing weight - pt mentioned intentional weight loss once it began, however now lost more than intended, stated usual body weight ~160 pounds (6% wt loss - meets criteria for malnutrition). RD reviewed protein sources (chicken, fish, beans, nut, etc.) and encouraged adequate protein consumption in order to regain strength, maintain muscle mass and reach adequate nutritional status. Pt was accepting to RD suggestion and asking appropriate questions. Pt was accepting to RD suggestion and asking appropriate questions - pt to trial vanilla strawberry protein smoothie BID.     *Note:   - Corrected Ca (5/5): ([4 - 3.7] x 0.8) + 7.6 = 7.84 mg/dL (low)

## 2025-05-05 NOTE — DIETITIAN INITIAL EVALUATION ADULT - PROBLEM SELECTOR PLAN 1
#Syncope   May be medication induced BEERS (recently started cyclobenzaprine, opiods  vs orthostatic iso poor oral intake less likely arrhythmogenic given normal EKG however given patient's history of rheumatic fever and pericarditis would  perform limited TTE   Trop 89>85  Stroke imaging negative, no signs of facial droop or dysarthria on repeat exam   s/p scalp laceration repair in ED with three staples  - hold off  orthostatics for now    - obtain CK  - obtain UA; low concern for infection at this times as patient  denies dysuria but given retention  - obtain limited TTE in AM

## 2025-05-05 NOTE — OCCUPATIONAL THERAPY INITIAL EVALUATION ADULT - PLANNED THERAPY INTERVENTIONS, OT EVAL
ADL retraining/balance training/bed mobility training/cognitive, visual perceptual/neuromuscular re-education/strengthening/stretching/transfer training

## 2025-05-05 NOTE — DIETITIAN INITIAL EVALUATION ADULT - ORAL INTAKE PTA/DIET HISTORY
No known food allergies nor food intolerances reported. Pt reported good appetite and PO intake at baseline, however mentioned it has been low last 3-4 weeks s/p fall early April 2025. Pt denied following any specific diet PTA, however mentioned has been eating higher carbohydrate and sugar intake x 1 month than baseline.

## 2025-05-05 NOTE — PROGRESS NOTE ADULT - NS ATTEND AMEND GEN_ALL_CORE FT
Inpatient progress note, DOS 5/5/2025    I performed: review of objective data, interview of patient, and discussion of assessment and plan with patient/family/multidisciplinary team. I agree with ACP/ resident and/or fellow documentation except where indicated above

## 2025-05-05 NOTE — PROGRESS NOTE ADULT - SUBJECTIVE AND OBJECTIVE BOX
PULMONARY CONSULT SERVICE FOLLOW-UP NOTE    INTERVAL HPI:  Reviewed chart and overnight events; patient seen and examined. She denies cough, wheezing, hemoptysis, sob, loss of weight. She noticed recent loss of appetite due to the recent pain.   Smoking history: 2 packs per day for 20 years (40py) stopped in 1982. 25 years of second hand smoking exposure.   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     Plan discussed with patient at bedside and is agreeable to the bronchoscopy.     MEDICATIONS:    Cardiac:  carvedilol 3.125 milliGRAM(s) Oral every 12 hours      Allergies    No Known Allergies    Intolerances        Vital Signs Last 24 Hrs  T(C): 36.6 (05 May 2025 09:45), Max: 36.6 (05 May 2025 09:45)  T(F): 97.8 (05 May 2025 09:45), Max: 97.8 (05 May 2025 09:45)  HR: 76 (05 May 2025 09:45) (74 - 76)  BP: 143/82 (05 May 2025 09:45) (139/83 - 153/75)  BP(mean): --  RR: 18 (05 May 2025 09:45) (18 - 18)  SpO2: 99% (05 May 2025 09:45) (98% - 99%)    Parameters below as of 05 May 2025 09:45  Patient On (Oxygen Delivery Method): room air        05-04 @ 07:01  -  05-05 @ 07:00  --------------------------------------------------------  IN: 400 mL / OUT: 1150 mL / NET: -750 mL    05-05 @ 07:01  -  05-05 @ 17:17  --------------------------------------------------------  IN: 0 mL / OUT: 300 mL / NET: -300 mL          PHYSICAL EXAM:  Constitutional: well-appearing, in no apparent respiratory distress  HEENT: NC/AT; , anicteric sclera; moist mucous membranes  Neck: supple, no JVD or LAD appreciated  Cardiovascular: +S1/S2, Regular rate and rhythm, no murmurs appreciated   Respiratory: Clear breath sounds bilaterally. No wheezes, rhonchi or rales   Gastrointestinal: soft, non-tender, non-distended. Normoactive bowel sounds.   Extremities: no edema, clubbing or cyanosis  Vascular: 2+ radial pulses B/L  Neurological: awake and alert; oriented x3    LABS:      CBC Full  -  ( 05 May 2025 07:33 )  WBC Count : 7.75 K/uL  RBC Count : 3.19 M/uL  Hemoglobin : 9.6 g/dL  Hematocrit : 29.2 %  Platelet Count - Automated : 261 K/uL  Mean Cell Volume : 91.5 fl  Mean Cell Hemoglobin : 30.1 pg  Mean Cell Hemoglobin Concentration : 32.9 g/dL  Auto Neutrophil # : x  Auto Lymphocyte # : x  Auto Monocyte # : x  Auto Eosinophil # : x  Auto Basophil # : x  Auto Neutrophil % : x  Auto Lymphocyte % : x  Auto Monocyte % : x  Auto Eosinophil % : x  Auto Basophil % : x    05-05    136  |  107  |  68[H]  ----------------------------<  92  4.6   |  20[L]  |  4.86[H]    Ca    7.6[L]      05 May 2025 07:33  Phos  4.2     05-05  Mg     1.8     05-05    TPro  6.7  /  Alb  3.7  /  TBili  0.5  /  DBili  x   /  AST  23  /  ALT  11  /  AlkPhos  102  05-05                    RADIOLOGY & ADDITIONAL STUDIES:

## 2025-05-05 NOTE — PROGRESS NOTE ADULT - PROBLEM SELECTOR PLAN 4
Patient with 20 pack year smoking history (stopped in 1984) presents after syncopal episode with incidental finding of pulmonary nodule in RUL. Endorses recent loss of appetite, but denies cough, weight loss, prior occupational exposures  CT Chest: 5 cm infiltrative opacity in the right upper lobe suspicious for malignancy. Consider PET/CT and tissue sampling.  Pulmonary following   - pending PET CT; possible biopsy pending imaging   - hold AC for now Patient with 20 pack year smoking history (stopped in 1984) presents after syncopal episode with incidental finding of pulmonary nodule in RUL. Endorses recent loss of appetite, but denies cough, weight loss, prior occupational exposures  CT Chest: 5 cm infiltrative opacity in the right upper lobe suspicious for malignancy. Consider PET/CT and tissue sampling.  Pulmonary following   - pending PET CT; possible biopsy pending imaging  - discussed with NM regarding PET - possible today only with cancellation, if not patient will undergo testing tomorrow   - hold AC for now Patient with 20 pack year smoking history (stopped in 1984) presents after syncopal episode with incidental finding of pulmonary nodule in RUL. Endorses recent loss of appetite, but denies cough, weight loss, prior occupational exposures. c/f for primary lung malignancy   CT Chest: 5 cm infiltrative opacity in the right upper lobe suspicious for malignancy. Consider PET/CT and tissue sampling.  Pulmonary following   - pending PET CT; possible biopsy pending imaging  - discussed with NM regarding PET - possible today only with cancellation, if not patient will undergo testing tomorrow   - hold AC for now

## 2025-05-05 NOTE — PROGRESS NOTE ADULT - PROBLEM SELECTOR PLAN 7
Patient shares prior hx of rheumatic fever when she was [age] years old but denies residual valvulopathy.   Seen by Dr. Murray in December with instruction to undergo stress test and TTE but no record of imaging. EKG normal sinus rhythm, no murmurs auscultated on physical exam   -obtain further collateral from family in AM  - obtain limited TTE

## 2025-05-05 NOTE — PROGRESS NOTE ADULT - ASSESSMENT
Ms. Pressley is a 79F with PMHx of HTN, HLD,  hypothyroidism, generalized depression, ADHD and L1 compression fracture who presented for mechanical fall. Pulmonary consulted for lung mass seen on CT.   Smoking history: 2 packs per day for 20 years (40py) stopped in 1982. 25 years of second hand smoking exposure.   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       Data Reviewed:  CTA 2016: report only - indeterminate nodules   CT Chest 2021 ([facility]): RUL nodule   CT Chest 5/3/2025: 5 cm mixed GGO and solid opacity, emphysematous changes apically, motion artifact, additional RUL peripheral nodule   Labs: reviewed   Outpatient chart: PCP note and Cards note reviewed in [facility]IE; CC was balance complaints attributed to flexeril at that time     #Lung Mass  #Former Smoker   # L1 compression fracutre    Highly suspicious for primary lung malignancy. Patient is scheduled for PET tomorrow (05/05/25) AM. She is scheduled for a robotic bronchoscopy for lung mass biopsy tomorrow.   MR Brain: no signs of mets.   Suspicious of potential malignant etiology of compression fracture     Recommend:   - PET CT planned for tomorrow morning  - Robotic bronchoscopy with lung mass biopsy scheduled for tomorrow morning  - Pending cardiac clearance   - Optimize patient for procedure tomorrow. NPO starting midnight of 5/5/25, pre-operative labs, hold AC and antiplatelet today.   - Needs outpatient PFTs and follow up   - Follow with ortho recommendations for compression fracture           Ms. Pressley is a 79F with PMHx of HTN, HLD,  hypothyroidism, generalized depression, ADHD and L1 compression fracture who presented for mechanical fall. Pulmonary consulted for lung mass seen on CT.   Smoking history: 2 packs per day for 20 years (40py) stopped in 1982. 25 years of second hand smoking exposure.   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       Data Reviewed:  CTA 2016: report only - indeterminate nodules   CT Chest 2021 ([facility]): RUL nodule   CT Chest 5/3/2025: 5 cm mixed GGO and solid opacity, emphysematous changes apically, motion artifact, additional RUL peripheral nodule   Labs: reviewed   Outpatient chart: PCP note and Cards note reviewed in [facility]IE; CC was balance complaints attributed to flexeril at that time     #Lung Mass  #Former Smoker   # L1 compression fracutre    RUL nodule highly suspicious for primary lung malignancy. Patient is scheduled for PET tomorrow (05/06/25) AM. She is also scheduled for a robotic bronchoscopy for lung mass biopsy tomorrow (5/6/25) afternoon with EBUS for staging. The PET, biopsy and EBUS will determine treatment options moving forward, as well as the ortho surgery for vertebral fracture to determine whether it is a malignant compression fracture constituting metastatic disease. Patient will need cardiac risk stratification prior to bronchoscopy, please consult cardiology.  MR Brain: no signs of mets.      Recommend:   - PET CT planned for tomorrow morning  - Robotic bronchoscopy with lung mass biopsy scheduled for tomorrow afternoon  - Pending cardiac clearance, please consult cardiology  - Optimize patient for procedure tomorrow. NPO starting midnight of 5/6/25, pre-operative labs, hold AC and antiplatelet today  - Further recs pending completion of this workup  - Needs outpatient PFTs and follow up   - Follow with ortho recommendations for compression fracture    Case S/E/D with Dr. Peck

## 2025-05-05 NOTE — DIETITIAN INITIAL EVALUATION ADULT - ADD RECOMMEND
1. Continue with Regular diet  - Encourage adequate PO and protein intake  - Honor food preferences, as medically able  2. Recommend Strawberry Vanilla Protein Smoothie BID  - Provides 230 kcal, 17 g pro per serving  3. Recommend MVI for general nutrient coverage  4. Recommend thiamin 100 mg/d in setting of malnutrition  5. Consider bowel regimen in setting of constipation  6. Appreciate weekly weight trends

## 2025-05-06 ENCOUNTER — RESULT REVIEW (OUTPATIENT)
Age: 80
End: 2025-05-06

## 2025-05-06 LAB
ALBUMIN SERPL ELPH-MCNC: 3.5 G/DL — SIGNIFICANT CHANGE UP (ref 3.3–5)
ALP SERPL-CCNC: 420 U/L — HIGH (ref 40–120)
ALT FLD-CCNC: 71 U/L — HIGH (ref 10–45)
ANION GAP SERPL CALC-SCNC: 14 MMOL/L — SIGNIFICANT CHANGE UP (ref 5–17)
APPEARANCE UR: CLEAR — SIGNIFICANT CHANGE UP
APTT BLD: 28.1 SEC — SIGNIFICANT CHANGE UP (ref 26.1–36.8)
AST SERPL-CCNC: 94 U/L — HIGH (ref 10–40)
BASOPHILS # BLD AUTO: 0.04 K/UL — SIGNIFICANT CHANGE UP (ref 0–0.2)
BASOPHILS NFR BLD AUTO: 0.6 % — SIGNIFICANT CHANGE UP (ref 0–2)
BILIRUB SERPL-MCNC: 0.5 MG/DL — SIGNIFICANT CHANGE UP (ref 0.2–1.2)
BILIRUB UR-MCNC: NEGATIVE — SIGNIFICANT CHANGE UP
BLD GP AB SCN SERPL QL: NEGATIVE — SIGNIFICANT CHANGE UP
BUN SERPL-MCNC: 59 MG/DL — HIGH (ref 7–23)
CALCIUM SERPL-MCNC: 7.8 MG/DL — LOW (ref 8.4–10.5)
CHLORIDE SERPL-SCNC: 105 MMOL/L — SIGNIFICANT CHANGE UP (ref 96–108)
CO2 SERPL-SCNC: 17 MMOL/L — LOW (ref 22–31)
COLOR SPEC: YELLOW — SIGNIFICANT CHANGE UP
CREAT SERPL-MCNC: 4.45 MG/DL — HIGH (ref 0.5–1.3)
CYSTATIN C SERPL-MCNC: 3.13 MG/L — HIGH (ref 0.68–1.36)
DIFF PNL FLD: ABNORMAL
EGFR: 10 ML/MIN/1.73M2 — LOW
EGFR: 10 ML/MIN/1.73M2 — LOW
EOSINOPHIL # BLD AUTO: 0.29 K/UL — SIGNIFICANT CHANGE UP (ref 0–0.5)
EOSINOPHIL NFR BLD AUTO: 4.1 % — SIGNIFICANT CHANGE UP (ref 0–6)
GFR/BSA.PRED SERPLBLD CYS-BASED-ARV: 15 ML/MIN/1.73M2 — LOW
GLUCOSE SERPL-MCNC: 82 MG/DL — SIGNIFICANT CHANGE UP (ref 70–99)
GLUCOSE UR QL: NEGATIVE MG/DL — SIGNIFICANT CHANGE UP
HCT VFR BLD CALC: 29.2 % — LOW (ref 34.5–45)
HGB BLD-MCNC: 9.7 G/DL — LOW (ref 11.5–15.5)
IMM GRANULOCYTES NFR BLD AUTO: 0.4 % — SIGNIFICANT CHANGE UP (ref 0–0.9)
INR BLD: 1.04 — SIGNIFICANT CHANGE UP (ref 0.85–1.16)
KETONES UR-MCNC: NEGATIVE MG/DL — SIGNIFICANT CHANGE UP
LEUKOCYTE ESTERASE UR-ACNC: ABNORMAL
LYMPHOCYTES # BLD AUTO: 1.26 K/UL — SIGNIFICANT CHANGE UP (ref 1–3.3)
LYMPHOCYTES # BLD AUTO: 17.7 % — SIGNIFICANT CHANGE UP (ref 13–44)
MAGNESIUM SERPL-MCNC: 1.7 MG/DL — SIGNIFICANT CHANGE UP (ref 1.6–2.6)
MCHC RBC-ENTMCNC: 29.8 PG — SIGNIFICANT CHANGE UP (ref 27–34)
MCHC RBC-ENTMCNC: 33.2 G/DL — SIGNIFICANT CHANGE UP (ref 32–36)
MCV RBC AUTO: 89.8 FL — SIGNIFICANT CHANGE UP (ref 80–100)
MONOCYTES # BLD AUTO: 0.74 K/UL — SIGNIFICANT CHANGE UP (ref 0–0.9)
MONOCYTES NFR BLD AUTO: 10.4 % — SIGNIFICANT CHANGE UP (ref 2–14)
NEUTROPHILS # BLD AUTO: 4.75 K/UL — SIGNIFICANT CHANGE UP (ref 1.8–7.4)
NEUTROPHILS NFR BLD AUTO: 66.8 % — SIGNIFICANT CHANGE UP (ref 43–77)
NITRITE UR-MCNC: NEGATIVE — SIGNIFICANT CHANGE UP
NRBC BLD AUTO-RTO: 0 /100 WBCS — SIGNIFICANT CHANGE UP (ref 0–0)
PH UR: 5.5 — SIGNIFICANT CHANGE UP (ref 5–8)
PHOSPHATE SERPL-MCNC: 4.2 MG/DL — SIGNIFICANT CHANGE UP (ref 2.5–4.5)
PLATELET # BLD AUTO: 262 K/UL — SIGNIFICANT CHANGE UP (ref 150–400)
POTASSIUM SERPL-MCNC: 3.7 MMOL/L — SIGNIFICANT CHANGE UP (ref 3.5–5.3)
POTASSIUM SERPL-SCNC: 3.7 MMOL/L — SIGNIFICANT CHANGE UP (ref 3.5–5.3)
PROT SERPL-MCNC: 6.4 G/DL — SIGNIFICANT CHANGE UP (ref 6–8.3)
PROT UR-MCNC: SIGNIFICANT CHANGE UP MG/DL
PROTHROM AB SERPL-ACNC: 12 SEC — SIGNIFICANT CHANGE UP (ref 9.9–13.4)
RBC # BLD: 3.25 M/UL — LOW (ref 3.8–5.2)
RBC # FLD: 15.7 % — HIGH (ref 10.3–14.5)
RH IG SCN BLD-IMP: POSITIVE — SIGNIFICANT CHANGE UP
SODIUM SERPL-SCNC: 136 MMOL/L — SIGNIFICANT CHANGE UP (ref 135–145)
SP GR SPEC: 1.02 — SIGNIFICANT CHANGE UP (ref 1–1.03)
UROBILINOGEN FLD QL: 0.2 MG/DL — SIGNIFICANT CHANGE UP (ref 0.2–1)
WBC # BLD: 7.11 K/UL — SIGNIFICANT CHANGE UP (ref 3.8–10.5)
WBC # FLD AUTO: 7.11 K/UL — SIGNIFICANT CHANGE UP (ref 3.8–10.5)

## 2025-05-06 PROCEDURE — 78815 PET IMAGE W/CT SKULL-THIGH: CPT | Mod: 26,PI

## 2025-05-06 PROCEDURE — 31628 BRONCHOSCOPY/LUNG BX EACH: CPT

## 2025-05-06 PROCEDURE — 99233 SBSQ HOSP IP/OBS HIGH 50: CPT

## 2025-05-06 PROCEDURE — 93306 TTE W/DOPPLER COMPLETE: CPT | Mod: 26

## 2025-05-06 PROCEDURE — 31654 BRONCH EBUS IVNTJ PERPH LES: CPT

## 2025-05-06 PROCEDURE — 31627 NAVIGATIONAL BRONCHOSCOPY: CPT

## 2025-05-06 PROCEDURE — 88305 TISSUE EXAM BY PATHOLOGIST: CPT | Mod: 26

## 2025-05-06 PROCEDURE — 88173 CYTOPATH EVAL FNA REPORT: CPT | Mod: 26

## 2025-05-06 PROCEDURE — 88341 IMHCHEM/IMCYTCHM EA ADD ANTB: CPT | Mod: 26

## 2025-05-06 PROCEDURE — 88342 IMHCHEM/IMCYTCHM 1ST ANTB: CPT | Mod: 26

## 2025-05-06 PROCEDURE — 99232 SBSQ HOSP IP/OBS MODERATE 35: CPT

## 2025-05-06 PROCEDURE — 71045 X-RAY EXAM CHEST 1 VIEW: CPT | Mod: 26

## 2025-05-06 PROCEDURE — 93010 ELECTROCARDIOGRAM REPORT: CPT

## 2025-05-06 PROCEDURE — 99233 SBSQ HOSP IP/OBS HIGH 50: CPT | Mod: GC

## 2025-05-06 RX ORDER — POVIDONE-IODINE 7.5 %
1 SOLUTION, NON-ORAL TOPICAL ONCE
Refills: 0 | Status: COMPLETED | OUTPATIENT
Start: 2025-05-08 | End: 2025-05-08

## 2025-05-06 RX ORDER — CARVEDILOL 3.12 MG/1
6.25 TABLET, FILM COATED ORAL EVERY 12 HOURS
Refills: 0 | Status: DISCONTINUED | OUTPATIENT
Start: 2025-05-06 | End: 2025-05-09

## 2025-05-06 RX ADMIN — ATORVASTATIN CALCIUM 80 MILLIGRAM(S): 80 TABLET, FILM COATED ORAL at 22:09

## 2025-05-06 RX ADMIN — Medication 1 APPLICATION(S): at 17:57

## 2025-05-06 RX ADMIN — CARVEDILOL 6.25 MILLIGRAM(S): 3.12 TABLET, FILM COATED ORAL at 19:22

## 2025-05-06 RX ADMIN — CARVEDILOL 3.12 MILLIGRAM(S): 3.12 TABLET, FILM COATED ORAL at 17:40

## 2025-05-06 RX ADMIN — Medication 75 MICROGRAM(S): at 06:39

## 2025-05-06 RX ADMIN — CARVEDILOL 3.12 MILLIGRAM(S): 3.12 TABLET, FILM COATED ORAL at 06:39

## 2025-05-06 RX ADMIN — Medication 650 MILLIGRAM(S): at 06:39

## 2025-05-06 RX ADMIN — Medication 650 MILLIGRAM(S): at 07:32

## 2025-05-06 RX ADMIN — BUPROPION HYDROBROMIDE 300 MILLIGRAM(S): 522 TABLET, EXTENDED RELEASE ORAL at 12:14

## 2025-05-06 NOTE — PROGRESS NOTE ADULT - SUBJECTIVE AND OBJECTIVE BOX
**INCOMPLETE NOTE    OVERNIGHT EVENTS:    SUBJECTIVE:  Patient seen and examined at bedside.    Vital Signs Last 12 Hrs  T(F): 97.7 (05-06-25 @ 06:11), Max: 97.7 (05-06-25 @ 06:11)  HR: 80 (05-06-25 @ 06:11) (80 - 80)  BP: 167/76 (05-06-25 @ 06:11) (167/76 - 167/76)  BP(mean): --  RR: 18 (05-06-25 @ 06:11) (18 - 18)  SpO2: 98% (05-06-25 @ 06:11) (98% - 98%)  I&O's Summary    05 May 2025 07:01  -  06 May 2025 07:00  --------------------------------------------------------  IN: 0 mL / OUT: 300 mL / NET: -300 mL          PHYSICAL EXAM:  Constitutional: NAD, comfortable in bed.  HEENT: NC/AT, PERRLA, EOMI, no conjunctival pallor or scleral icterus, MMM  Neck: Supple, no JVD  Respiratory: CTA B/L. No w/r/r.   Cardiovascular: RRR, normal S1 and S2, no m/r/g.   Gastrointestinal: +BS, soft NTND, no guarding or rebound tenderness, no palpable masses   Extremities: wwp; no cyanosis, clubbing or edema.    Neurological: AAOx3, strength 4/5 in UE and LE makenzie. no asterixis, + noted dysarthria   Skin: No gross skin abnormalities or rashes        LABS:                        9.7    7.11  )-----------( 262      ( 06 May 2025 07:06 )             29.2     05-06    136  |  105  |  59[H]  ----------------------------<  82  3.7   |  17[L]  |  4.45[H]    Ca    7.8[L]      06 May 2025 07:06  Phos  4.2     05-06  Mg     1.7     05-06    TPro  6.4  /  Alb  3.5  /  TBili  0.5  /  DBili  x   /  AST  94[H]  /  ALT  71[H]  /  AlkPhos  420[H]  05-06    PT/INR - ( 06 May 2025 07:06 )   PT: 12.0 sec;   INR: 1.04          PTT - ( 06 May 2025 07:06 )  PTT:28.1 sec  Urinalysis Basic - ( 06 May 2025 07:06 )    Color: x / Appearance: x / SG: x / pH: x  Gluc: 82 mg/dL / Ketone: x  / Bili: x / Urobili: x   Blood: x / Protein: x / Nitrite: x   Leuk Esterase: x / RBC: x / WBC x   Sq Epi: x / Non Sq Epi: x / Bacteria: x          RADIOLOGY & ADDITIONAL TESTS:    MEDICATIONS  (STANDING):  atorvastatin 80 milliGRAM(s) Oral at bedtime  buPROPion XL (24-Hour) . 300 milliGRAM(s) Oral daily  carvedilol 3.125 milliGRAM(s) Oral every 12 hours  chlorhexidine 2% Cloths 1 Application(s) Topical two times a day  dextrose 5%. 1000 milliLiter(s) (100 mL/Hr) IV Continuous <Continuous>  dextrose 5%. 1000 milliLiter(s) (50 mL/Hr) IV Continuous <Continuous>  dextrose 50% Injectable 25 Gram(s) IV Push once  dextrose 50% Injectable 12.5 Gram(s) IV Push once  dextrose 50% Injectable 25 Gram(s) IV Push once  glucagon  Injectable 1 milliGRAM(s) IntraMuscular once  levothyroxine 75 MICROGram(s) Oral daily    MEDICATIONS  (PRN):  acetaminophen     Tablet .. 650 milliGRAM(s) Oral every 6 hours PRN Temp greater or equal to 38C (100.4F), Mild Pain (1 - 3)  dextrose Oral Gel 15 Gram(s) Oral once PRN Blood Glucose LESS THAN 70 milliGRAM(s)/deciliter  melatonin 3 milliGRAM(s) Oral at bedtime PRN Insomnia   OVERNIGHT EVENTS: GINA    SUBJECTIVE:  Patient seen and examined at bedside. She reports she feels well. She has been eating well .Denies dizziness, headache, nausea, vomiting, diarrhea     Vital Signs Last 12 Hrs  T(F): 97.7 (05-06-25 @ 06:11), Max: 97.7 (05-06-25 @ 06:11)  HR: 80 (05-06-25 @ 06:11) (80 - 80)  BP: 167/76 (05-06-25 @ 06:11) (167/76 - 167/76)  BP(mean): --  RR: 18 (05-06-25 @ 06:11) (18 - 18)  SpO2: 98% (05-06-25 @ 06:11) (98% - 98%)  I&O's Summary    05 May 2025 07:01  -  06 May 2025 07:00  --------------------------------------------------------  IN: 0 mL / OUT: 300 mL / NET: -300 mL          PHYSICAL EXAM:  Constitutional: NAD, comfortable in bed.  HEENT: NC/AT, PERRLA, EOMI, no conjunctival pallor or scleral icterus, MMM  Neck: Supple, no JVD  Respiratory: CTA B/L. No w/r/r.   Cardiovascular: RRR, normal S1 and S2, no m/r/g.   Gastrointestinal: +BS, soft NTND, no guarding or rebound tenderness, no palpable masses   Extremities: wwp; no cyanosis, clubbing or edema.    Neurological: AAOx3, strength 4/5 in UE and LE makenzie. no asterixis, + noted dysarthria   Skin: No gross skin abnormalities or rashes        LABS:                        9.7    7.11  )-----------( 262      ( 06 May 2025 07:06 )             29.2     05-06    136  |  105  |  59[H]  ----------------------------<  82  3.7   |  17[L]  |  4.45[H]    Ca    7.8[L]      06 May 2025 07:06  Phos  4.2     05-06  Mg     1.7     05-06    TPro  6.4  /  Alb  3.5  /  TBili  0.5  /  DBili  x   /  AST  94[H]  /  ALT  71[H]  /  AlkPhos  420[H]  05-06    PT/INR - ( 06 May 2025 07:06 )   PT: 12.0 sec;   INR: 1.04          PTT - ( 06 May 2025 07:06 )  PTT:28.1 sec  Urinalysis Basic - ( 06 May 2025 07:06 )    Color: x / Appearance: x / SG: x / pH: x  Gluc: 82 mg/dL / Ketone: x  / Bili: x / Urobili: x   Blood: x / Protein: x / Nitrite: x   Leuk Esterase: x / RBC: x / WBC x   Sq Epi: x / Non Sq Epi: x / Bacteria: x          RADIOLOGY & ADDITIONAL TESTS:    MEDICATIONS  (STANDING):  atorvastatin 80 milliGRAM(s) Oral at bedtime  buPROPion XL (24-Hour) . 300 milliGRAM(s) Oral daily  carvedilol 3.125 milliGRAM(s) Oral every 12 hours  chlorhexidine 2% Cloths 1 Application(s) Topical two times a day  dextrose 5%. 1000 milliLiter(s) (100 mL/Hr) IV Continuous <Continuous>  dextrose 5%. 1000 milliLiter(s) (50 mL/Hr) IV Continuous <Continuous>  dextrose 50% Injectable 25 Gram(s) IV Push once  dextrose 50% Injectable 12.5 Gram(s) IV Push once  dextrose 50% Injectable 25 Gram(s) IV Push once  glucagon  Injectable 1 milliGRAM(s) IntraMuscular once  levothyroxine 75 MICROGram(s) Oral daily    MEDICATIONS  (PRN):  acetaminophen     Tablet .. 650 milliGRAM(s) Oral every 6 hours PRN Temp greater or equal to 38C (100.4F), Mild Pain (1 - 3)  dextrose Oral Gel 15 Gram(s) Oral once PRN Blood Glucose LESS THAN 70 milliGRAM(s)/deciliter  melatonin 3 milliGRAM(s) Oral at bedtime PRN Insomnia

## 2025-05-06 NOTE — PROGRESS NOTE ADULT - PROBLEM SELECTOR PLAN 7
Patient shares prior hx of rheumatic fever when she was [age] years old but denies residual valvulopathy.   Seen by Dr. Murray in December with instruction to undergo stress test and TTE but no record of imaging. EKG normal sinus rhythm, no murmurs auscultated on physical exam   -obtain further collateral from family in AM  - obtain limited TTE Patient shares prior hx of rheumatic fever when she was [age] years old but denies residual valvulopathy.   Seen by Dr. Murray in December with instruction to undergo stress test and TTE but no record of imaging. EKG normal sinus rhythm, no murmurs auscultated on physical exam   -obtain further collateral

## 2025-05-06 NOTE — PROGRESS NOTE ADULT - PROBLEM SELECTOR PLAN 1
#Syncope   May be medication induced BEERS (recently started cyclobenzaprine, opiods  vs orthostatic iso poor oral intake less likely arrhythmogenic given normal EKG however given patient's history of rheumatic fever and pericarditis would  perform limited TTE   Stroke imaging negative, no signs of facial droop or dysarthria on repeat exam; MR Brain unremarkable  s/p scalp laceration repair in ED with three staples; UA: mod leuk esterase, nitrite neg with * WBC; monitoring of abx  as patient is asymptomatic  - hold off  orthostatics for now    - obtain  TTE   - #Syncope   May be medication induced BEERS (recently started cyclobenzaprine, opiods  vs orthostatic iso poor oral intake less likely arrhythmogenic given normal EKG however given patient's history of rheumatic fever and pericarditis would  perform limited TTE   Stroke imaging negative, no signs of facial droop or dysarthria on repeat exam; MR Brain unremarkable  s/p scalp laceration repair in ED with three staples; UA: mod leuk esterase, nitrite neg with * WBC; monitoring of abx  as patient is asymptomatic  TTE: EF 70% without significant valvular disease   - hold off  orthostatics for now    - transfer to hospitalist tele   -

## 2025-05-06 NOTE — PROGRESS NOTE ADULT - PROBLEM SELECTOR PLAN 3
#AGMA  Baseline Cr: upon admission BUN/Cr  82/5.86  Likely iso of dehydration as patient shares she has had diminished oral intake 2/2 dry mouth  s/p IL NS in ED and NaHCO3 tables;  renal ultrasound; unremarkable   FENa: 2.4 instrinsic eitology likely ATN iso NSAID use  - pending cystain C  - avoid nephrotoxic medication  - trend Cr daily  - strict I/O  #Urinary retention  Patient states she has had issue with urinary retention in the past   Denies dysuria   - will monitor off antibiotics for now; low threshold to start ceftriaxone if leukocytosis persists  -pending urinalysis   - bladder scan q6  - paul placement #AGMA  Baseline Cr: upon admission BUN/Cr  82/5.86  Likely iso of dehydration as patient shares she has had diminished oral intake 2/2 dry mouth  s/p IL NS in ED and NaHCO3 tables;  renal ultrasound; unremarkable   FENa: 2.4 instrinsic eitology likely ATN iso NSAID use  - pending cystain C  - avoid nephrotoxic medication  - trend Cr daily  - strict I/O

## 2025-05-06 NOTE — PROGRESS NOTE ADULT - ASSESSMENT
Ms. Pressley is a 79F with PMHx of HTN, HLD,  hypothyroidism, generalized depression, ADHD and L1 compression fracture who presented for mechanical fall. Pulmonary consulted for lung mass seen on CT.   Smoking history: 2 packs per day for 20 years (40py) stopped in 1982. 25 years of second hand smoking exposure.   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       Data Reviewed:  CTA 2016: report only - indeterminate nodules   CT Chest 2021 ([facility]): RUL nodule   CT Chest 5/3/2025: 5 cm mixed GGO and solid opacity, emphysematous changes apically, motion artifact, additional RUL peripheral nodule   Labs: reviewed   Outpatient chart: PCP note and Cards note reviewed in HIE; CC was balance complaints attributed to flexeril at that time     #Lung Mass  #Former Smoker   # L1 compression fracutre    RUL nodule highly suspicious for primary lung malignancy. Patient is scheduled for PET tomorrow (05/06/25) AM. She is also scheduled for a robotic bronchoscopy for lung mass biopsy tomorrow (5/6/25) afternoon with EBUS for staging. The PET, biopsy and EBUS will determine treatment options moving forward, as well as the ortho surgery for vertebral fracture to determine whether it is a malignant compression fracture constituting metastatic disease. Patient will need cardiac risk stratification prior to bronchoscopy, please consult cardiology.  MR Brain: no signs of mets.      INCOMPLETE NOTE    Recommend:   - Follow up PET CT results for staging  - Scheduled today at noon for Robotic bronchoscopy with lung mass biopsy   - Further recs pending completion of this workup  - Needs outpatient PFTs and follow up   - Follow with ortho recommendations for compression fracture    INCOMPLETE NOTE     Ms. Pressley is a 79F with PMHx of HTN, HLD,  hypothyroidism, generalized depression, ADHD and L1 compression fracture who presented for mechanical fall. Pulmonary consulted for lung mass seen on CT.   Smoking history: 2 packs per day for 20 years (40py) stopped in 1982. 25 years of second hand smoking exposure.   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       Data Reviewed:  CTA 2016: report only - indeterminate nodules   CT Chest 2021 ([facility]): RUL nodule   CT Chest 5/3/2025: 5 cm mixed GGO and solid opacity, emphysematous changes apically, motion artifact, additional RUL peripheral nodule   Labs: reviewed   Outpatient chart: PCP note and Cards note reviewed in HIE; CC was balance complaints attributed to flexeril at that time     #Lung Mass  #Former Smoker   # L1 compression fracutre    RUL nodule highly suspicious for primary lung malignancy. Patient is scheduled for PET tomorrow (05/06/25) AM. She is also scheduled for a robotic bronchoscopy for lung mass biopsy tomorrow (5/6/25) afternoon with EBUS for staging. The PET, biopsy and EBUS will determine treatment options moving forward, as well as the ortho surgery for vertebral fracture to determine whether it is a malignant compression fracture constituting metastatic disease. Patient will need cardiac risk stratification prior to bronchoscopy, please consult cardiology.  MR Brain: no signs of mets.      INCOMPLETE NOTE    Recommend:   - PET CT:   - Robotic bronchoscopy with biopsy performed. awaiting   - Further recs pending completion of this workup  - Needs outpatient PFTs and follow up   - Follow with ortho recommendations for compression fracture    INCOMPLETE NOTE     Ms. Pressley is a 79F with PMHx of HTN, HLD,  hypothyroidism, generalized depression, ADHD and L1 compression fracture who presented for mechanical fall. Pulmonary consulted for lung mass seen on CT.   Smoking history: 2 packs per day for 20 years (40py) stopped in 1982. 25 years of second hand smoking exposure.   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       Data Reviewed:  CTA 2016: report only - indeterminate nodules   CT Chest 2021 ([facility]): RUL nodule   CT Chest 5/3/2025: 5 cm mixed GGO and solid opacity, emphysematous changes apically, motion artifact, additional RUL peripheral nodule   Labs: reviewed   Outpatient chart: PCP note and Cards note reviewed in HIE; CC was balance complaints attributed to flexeril at that time     #Lung Mass  #Former Smoker   # L1 compression fracture    RUL nodule highly suspicious for primary lung malignancy. Patient underwent a PET CT scan which showed a growing right upper lobe lung mass measuring 3.4 x 4.9 cm can not exclude malignancy. PET showing severe compression deformity in the L1 vertebrae. She underwent a bronchoscopy today with lung mass biopsy with EBUS for staging today. There is high suspicion the L1 vertebral compression fracture could be a metastatic involvement. Would recommend ortho to perform a biopsy during the decompression of the lumbar spine which is tentatively planned for Thursday /Friday.     Recommend:   - PET CT:  right upper lobe lung mass measuring 3.4 x 4.9 cm can not exclude malignancy vs infectious process. severe compression deformity in the L1 vertebrae.  - Underwent robotic bronchoscopy with EBUS w/ biopsy, awaiting biopsy results   - Recommend ortho to obtain biopsy when patient undergoes the decompression of the L1 vertebral compression fracture as there is high suspicion of metastatic involvement on the spine  - Needs outpatient PFTs and follow up        Ms. Pressley is a 79F with PMHx of HTN, HLD,  hypothyroidism, generalized depression, ADHD and L1 compression fracture who presented for mechanical fall. Pulmonary consulted for lung mass seen on CT.   Smoking history: 2 packs per day for 20 years (40py) stopped in 1982. 25 years of second hand smoking exposure.   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       Data Reviewed:  CTA 2016: report only - indeterminate nodules   CT Chest 2021 ([facility]): RUL nodule   CT Chest 5/3/2025: 5 cm mixed GGO and solid opacity, emphysematous changes apically, motion artifact, additional RUL peripheral nodule   Labs: reviewed   Outpatient chart: PCP note and Cards note reviewed in HIE; CC was balance complaints attributed to flexeril at that time     #Lung Mass  #Former Smoker   # L1 compression fracture    RUL nodule highly suspicious for primary lung malignancy. Patient underwent a PET CT scan which showed a growing right upper lobe lung mass measuring 3.4 x 4.9 cm can not exclude malignancy. PET showing severe compression deformity in the L1 vertebrae. She underwent a bronchoscopy w/ EBUS for with lung mass biopsy for staging. There is high suspicion the L1 vertebral compression fracture could be a metastatic involvement. Would recommend ortho to perform a biopsy during the decompression of the lumbar spine which is tentatively planned for Thursday /Friday.     Recommend:   - PET CT:  right upper lobe lung mass measuring 3.4 x 4.9 cm can not exclude malignancy vs infectious process. severe compression deformity in the L1 vertebrae.  - Underwent robotic bronchoscopy with EBUS w/ biopsy, awaiting biopsy results   - Recommend ortho to obtain biopsy when patient undergoes the decompression of the L1 vertebral compression fracture as there is high suspicion of metastatic involvement on the spine  - Needs outpatient PFTs and follow up        Ms. Pressley is a 79F with PMHx of HTN, HLD,  hypothyroidism, generalized depression, ADHD and L1 compression fracture who presented for mechanical fall. Pulmonary consulted for lung mass seen on CT.   Smoking history: 2 packs per day for 20 years (40py) stopped in 1982. 25 years of second hand smoking exposure.   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       Data Reviewed:  CTA 2016: report only - indeterminate nodules   CT Chest 2021 ([facility]): RUL nodule   CT Chest 5/3/2025: 5 cm mixed GGO and solid opacity, emphysematous changes apically, motion artifact, additional RUL peripheral nodule   Labs: reviewed   Outpatient chart: PCP note and Cards note reviewed in HIE; CC was balance complaints attributed to flexeril at that time     #Lung Mass  #Former Smoker   # L1 compression fracture    RUL nodule highly suspicious for primary lung malignancy. Patient underwent a PET CT scan which showed a growing right upper lobe lung mass measuring 3.4 x 4.9 cm can not exclude malignancy. PET showing severe compression deformity in the L1 vertebrae. She underwent a bronchoscopy w/ EBUS for with lung mass biopsy for staging. There is high suspicion the L1 vertebral compression fracture could be a metastatic involvement. Would recommend ortho to perform a biopsy during the decompression of the lumbar spine which is tentatively planned for Thursday /Friday.     Recommend:   - PET CT:  right upper lobe lung mass measuring 3.4 x 4.9 cm can not exclude malignancy vs low likelihood of infectious process given wbc and absence of symptoms suggestive of infection. severe compression deformity in the L1 vertebrae.  - Underwent robotic bronchoscopy with EBUS w/ biopsy, awaiting biopsy results   - Recommend ortho to obtain biopsy when patient undergoes the decompression of the L1 vertebral compression fracture as there is high suspicion of metastatic involvement on the spine  - Needs outpatient PFTs and follow up        Ms. Pressley is a 79F with PMHx of HTN, HLD,  hypothyroidism, generalized depression, ADHD and L1 compression fracture who presented for mechanical fall. Pulmonary consulted for lung mass seen on CT.     Smoking history: 2 packs per day for 20 years (40py) stopped in 1982. 25 years of second hand smoking exposure.   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       Data Reviewed:  CTA 2016: report only - indeterminate nodules   CT Chest 2021 ([facility]): RUL nodule   CT Chest 5/3/2025: 5 cm mixed GGO and solid opacity, emphysematous changes apically, motion artifact, additional RUL peripheral nodule   Labs: reviewed   Outpatient chart: PCP note and Cards note reviewed in HIE; CC was balance complaints attributed to flexeril at that time     #Lung Mass  #Former Smoker   # L1 compression fracture    RUL nodule highly suspicious for primary lung malignancy. Patient underwent a PET CT scan which showed a growing right upper lobe lung mass measuring 3.4 x 4.9 cm as well as severe compression deformity in the L1 vertebrae. There is a possibility the L1 vertebral compression fracture could be a metastatic involvement. She underwent a bronchoscopy w/ EBUS on 5/6 for diagnosis and staging. Given PET findings, would recommend ortho to perform a biopsy during the decompression of the lumbar spine which is tentatively planned for Thursday /Friday to evaluate for metastatic disease.     Recommendations:  - s/p robotic bronchoscopy with EBUS w/ biopsy today, 5/6, awaiting pathology results   - Recommend ortho to obtain biopsy when patient undergoes the decompression of the L1 vertebral compression fracture to rule out metastatic involvement on the spine  - Will need to establish care with pulmonary outpatient for PFTs - can follow with Dr. Peck    Discussed with Dr. Peck

## 2025-05-06 NOTE — PROGRESS NOTE ADULT - ASSESSMENT
79F with PMHx of HTN, HLD, hypothyroidism, generalized depression, ADHD and L1 compression fracture 2/2 to recent  fall  (April 2025, wears TLSO brace, scheduled for surgery with Dr. Barcenas this month), rheumatic fever and pericarditis presents after syncope and fall c/b head trauma, found to have lumbar compression fracture, severe spinal stenosis, and incidentally found lung mass, now pending biopsy.     REVIEW OF STUDIES  TTE 5/6/25: Normal biventricular function  ECG 5/3/25: NSR, QTc 496.   TTE 2013: LVEF 61%. Normal RV size/function. LA mildly dilated. +PFO. Mild MR.     #Preoperative Cardiovascular Assessment  EKG: As above, nonischemic  Functional status: Prior to falls, patient with excellent functional capacity, METs > 4  Patient without evidence of active ischemia, decompensated HF, or unstable arrhythmia  -Patient is intermediate risk for intermediate risk procedure  -No further pre-operative cardiovascular workup  -Continue Lipitor 80mg and carvedilol perioperatively.       #Syncope  No clear prodrome  No structural heart disease  EKG nonischemic, no clear etiology  -Consider hospitalist tele for 24h  -May benefit from outpatient rhythm monitoring    #HTN  -Increase coreg to 6.25mg BID post operatively    D/w Dr. Hess

## 2025-05-06 NOTE — PROGRESS NOTE ADULT - PROBLEM SELECTOR PLAN 11
F: nonw  E: Replete PRN  N: Regular   DVT ppx: holding   GI ppx: None  Bowel: Not indicated   Dispo: F     FULL CODE

## 2025-05-06 NOTE — PROGRESS NOTE ADULT - SUBJECTIVE AND OBJECTIVE BOX
INTERVAL EVENTS:  -back pain is improved      Cardiac medications administered in the last 48h:  carvedilol: 3.125 milliGRAM(s) Oral (05-06-25 @ 06:39)  carvedilol: 3.125 milliGRAM(s) Oral (05-05-25 @ 18:22)  carvedilol: 3.125 milliGRAM(s) Oral (05-05-25 @ 06:35)  carvedilol: 3.125 milliGRAM(s) Oral (05-04-25 @ 18:33)      MEDICATIONS  (STANDING):  atorvastatin 80 milliGRAM(s) Oral at bedtime  buPROPion XL (24-Hour) . 300 milliGRAM(s) Oral daily  carvedilol 3.125 milliGRAM(s) Oral every 12 hours  chlorhexidine 2% Cloths 1 Application(s) Topical two times a day  dextrose 5%. 1000 milliLiter(s) (100 mL/Hr) IV Continuous <Continuous>  dextrose 5%. 1000 milliLiter(s) (50 mL/Hr) IV Continuous <Continuous>  dextrose 50% Injectable 25 Gram(s) IV Push once  dextrose 50% Injectable 12.5 Gram(s) IV Push once  dextrose 50% Injectable 25 Gram(s) IV Push once  glucagon  Injectable 1 milliGRAM(s) IntraMuscular once  levothyroxine 75 MICROGram(s) Oral daily    MEDICATIONS  (PRN):  acetaminophen     Tablet .. 650 milliGRAM(s) Oral every 6 hours PRN Temp greater or equal to 38C (100.4F), Mild Pain (1 - 3)  dextrose Oral Gel 15 Gram(s) Oral once PRN Blood Glucose LESS THAN 70 milliGRAM(s)/deciliter  melatonin 3 milliGRAM(s) Oral at bedtime PRN Insomnia      VITALS 24H  T(F): 97.6 (05-06-25 @ 12:00), Max: 98.2 (05-05-25 @ 21:23)  HR: 76 (05-06-25 @ 14:07) (74 - 80)  BP: 169/72 (05-06-25 @ 14:07) (163/75 - 174/77)  BP(mean): 104 (05-06-25 @ 14:07) (104 - 104)  ABP: --  ABP(mean): --  RR: 18 (05-06-25 @ 14:07) (18 - 18)  SpO2: 98% (05-06-25 @ 14:07) (98% - 99%)  CVP(mm Hg): --    I/O Detail 24H    05 May 2025 07:01  -  06 May 2025 07:00  --------------------------------------------------------  IN:  Total IN: 0 mL    OUT:    Voided (mL): 300 mL  Total OUT: 300 mL    Total NET: -300 mL          PHYSICAL EXAM:  GEN: NAD  HEENT: EOMI   RESP: CTA b/l  CV: RRR. Normal S1/S2. No m/r/g.  ABD: soft, non-distended  EXT: No edema   NEURO: alert and attentive    LABS:  CBC 05-06-25 @ 07:06                        9.7    7.11  )-----------( 262                   29.2     Hgb trend: 9.7 <-- , 9.6 <-- , 10.3 <-- , 10.4 <--   WBC trend: 7.11 <-- , 7.75 <-- , 7.70 <-- , 9.31 <--       CMP 05-06-25 @ 07:06    136  |  105  |  59[H]  ----------------------------<  82  3.7   |  17[L]  |  4.45[H]    Ca    7.8[L]      05-06-25 @ 07:06  Phos  4.2     05-06  Mg     1.7     05-06    TPro  6.4  /  Alb  3.5  /  TBili  0.5  /  DBili  x   /  AST  94[H]  /  ALT  71[H]  /  AlkPhos  420[H]     05-06    Serum Cr (eGFR) trend: 4.45 (10) <-- , 4.56 (9) <-- , 4.86 (9) <-- , 4.79 (9) <-- , 5.05 (8) <-- , 5.25 (8) <--       PT/INR - ( 06 May 2025 07:06 )   PT: 12.0 sec;   INR: 1.04     PTT - ( 06 May 2025 07:06 ):28.1 sec    Cardiac Markers

## 2025-05-06 NOTE — PROGRESS NOTE ADULT - PROBLEM SELECTOR PLAN 2
Followed by Dr. Barcenas   Patient suffered mechanical fall several weeks ago and has been following with ortho for planned procedure  Outpatient Lumbar MRI: acute/subacute severe anteriorly wedged compression deformity of the L1 vertebral body with moderate associated retropulsion and severe stenosis.   Per note from 5/1, pending T11-L3 Decompression and posterior instrumented fusion.   Started on cyclobenzaprine 10mg TID as need, oxycodone 5mg and percocet  At [facility]   CT Lumbar Spine: Acute-appearing burst type compression fracture at L1 with severe vertebral body height loss and moderate osseous retropulsion. There is severe associated spinal canal stenosis. Acute right transverse process fractures at L1 and L2. Severe spinal canal stenosis at L4-L5  Ortho following recs appreciated;   - c/w WBAT in TSLO brace   - pending OR this week, pending clearence  - will not resume muscle relaxant or opioid medications as they are likely contributing to patient's worsening instability   - PT eval once cleared   - monitor for signs of saddle anesthesia Followed by Dr. Barcenas   Patient suffered mechanical fall several weeks ago and has been following with ortho for planned procedure  Outpatient Lumbar MRI: acute/subacute severe anteriorly wedged compression deformity of the L1 vertebral body with moderate associated retropulsion and severe stenosis.   Per note from 5/1, pending T11-L3 Decompression and posterior instrumented fusion.   Started on cyclobenzaprine 10mg TID as need, oxycodone 5mg and percocet  At [facility]   CT Lumbar Spine: Acute-appearing burst type compression fracture at L1 with severe vertebral body height loss and moderate osseous retropulsion. There is severe associated spinal canal stenosis. Acute right transverse process fractures at L1 and L2. Severe spinal canal stenosis at L4-L5  Ortho following recs appreciated;   - c/w WBAT in TSLO brace   - pending OR this week, pending clearance  - will not resume muscle relaxant or opioid medications as they are likely contributing to patient's worsening instability    - monitor for signs of saddle anesthesia

## 2025-05-06 NOTE — PROGRESS NOTE ADULT - SUBJECTIVE AND OBJECTIVE BOX
**INCOMPLETE NOTE    INTERVAL/OVERNIGHT EVENTS: None    SUBJECTIVE:  Patient seen and examined at bedside, comfortable, NAD. Denied fever, chest pain, dyspnea, abdominal pain.     Vital Signs Last 12 Hrs  T(F): 97.7 (05-06-25 @ 06:11), Max: 98.2 (05-05-25 @ 21:23)  HR: 80 (05-06-25 @ 06:11) (74 - 80)  BP: 167/76 (05-06-25 @ 06:11) (163/75 - 167/76)  BP(mean): --  RR: 18 (05-06-25 @ 06:11) (18 - 18)  SpO2: 98% (05-06-25 @ 06:11) (98% - 98%)  I&O's Summary    05 May 2025 07:01  -  06 May 2025 07:00  --------------------------------------------------------  IN: 0 mL / OUT: 300 mL / NET: -300 mL        PHYSICAL EXAM:  General: NAD  HEENT: PERRL, EOM intact, sclera anicteric, MMM  Cardiovascular: RRR; no MRG; no JVD  Respiratory: CTAB; no WRR  GI/: soft; NTND; BS x4  Extremities: WWP; 2+ peripheral pulses bilaterally; no LE edema  Skin: normal color & turgor; no rash  Neurologic: aox3; no focal deficits    LABS:                        9.6    7.75  )-----------( 261      ( 05 May 2025 07:33 )             29.2     05-05    138  |  106  |  67[H]  ----------------------------<  83  4.0   |  17[L]  |  4.56[H]    Ca    7.5[L]      05 May 2025 18:39  Phos  4.2     05-05  Mg     1.8     05-05    TPro  6.7  /  Alb  3.7  /  TBili  0.5  /  DBili  x   /  AST  23  /  ALT  11  /  AlkPhos  102  05-05      Urinalysis Basic - ( 05 May 2025 18:39 )    Color: x / Appearance: x / SG: x / pH: x  Gluc: 83 mg/dL / Ketone: x  / Bili: x / Urobili: x   Blood: x / Protein: x / Nitrite: x   Leuk Esterase: x / RBC: x / WBC x   Sq Epi: x / Non Sq Epi: x / Bacteria: x          RADIOLOGY & ADDITIONAL TESTS:    MEDICATIONS  (STANDING):  atorvastatin 80 milliGRAM(s) Oral at bedtime  buPROPion XL (24-Hour) . 300 milliGRAM(s) Oral daily  carvedilol 3.125 milliGRAM(s) Oral every 12 hours  dextrose 5%. 1000 milliLiter(s) (100 mL/Hr) IV Continuous <Continuous>  dextrose 5%. 1000 milliLiter(s) (50 mL/Hr) IV Continuous <Continuous>  dextrose 50% Injectable 25 Gram(s) IV Push once  dextrose 50% Injectable 12.5 Gram(s) IV Push once  dextrose 50% Injectable 25 Gram(s) IV Push once  glucagon  Injectable 1 milliGRAM(s) IntraMuscular once  levothyroxine 75 MICROGram(s) Oral daily    MEDICATIONS  (PRN):  acetaminophen     Tablet .. 650 milliGRAM(s) Oral every 6 hours PRN Temp greater or equal to 38C (100.4F), Mild Pain (1 - 3)  dextrose Oral Gel 15 Gram(s) Oral once PRN Blood Glucose LESS THAN 70 milliGRAM(s)/deciliter  melatonin 3 milliGRAM(s) Oral at bedtime PRN Insomnia       INTERVAL/OVERNIGHT EVENTS: None    SUBJECTIVE: Upon arrival to patient's room she has been taken for formal TTE and PET scan; unable to assess subjective data    Vital Signs Last 12 Hrs  T(F): 97.7 (05-06-25 @ 06:11), Max: 98.2 (05-05-25 @ 21:23)  HR: 80 (05-06-25 @ 06:11) (74 - 80)  BP: 167/76 (05-06-25 @ 06:11) (163/75 - 167/76)  BP(mean): --  RR: 18 (05-06-25 @ 06:11) (18 - 18)  SpO2: 98% (05-06-25 @ 06:11) (98% - 98%)  I&O's Summary    05 May 2025 07:01  -  06 May 2025 07:00  --------------------------------------------------------  IN: 0 mL / OUT: 300 mL / NET: -300 mL        PHYSICAL EXAM:  General: NAD  HEENT: PERRL, EOM intact, sclera anicteric, MMM  Cardiovascular: RRR; no MRG; no JVD  Respiratory: CTAB; no WRR  GI/: soft; NTND; BS x4  Extremities: WWP; 2+ peripheral pulses bilaterally; no LE edema  Skin: normal color & turgor; no rash  Neurologic: aox3; no focal deficits    LABS:                        9.6    7.75  )-----------( 261      ( 05 May 2025 07:33 )             29.2     05-05    138  |  106  |  67[H]  ----------------------------<  83  4.0   |  17[L]  |  4.56[H]    Ca    7.5[L]      05 May 2025 18:39  Phos  4.2     05-05  Mg     1.8     05-05    TPro  6.7  /  Alb  3.7  /  TBili  0.5  /  DBili  x   /  AST  23  /  ALT  11  /  AlkPhos  102  05-05      Urinalysis Basic - ( 05 May 2025 18:39 )    Color: x / Appearance: x / SG: x / pH: x  Gluc: 83 mg/dL / Ketone: x  / Bili: x / Urobili: x   Blood: x / Protein: x / Nitrite: x   Leuk Esterase: x / RBC: x / WBC x   Sq Epi: x / Non Sq Epi: x / Bacteria: x          RADIOLOGY & ADDITIONAL TESTS:    MEDICATIONS  (STANDING):  atorvastatin 80 milliGRAM(s) Oral at bedtime  buPROPion XL (24-Hour) . 300 milliGRAM(s) Oral daily  carvedilol 3.125 milliGRAM(s) Oral every 12 hours  dextrose 5%. 1000 milliLiter(s) (100 mL/Hr) IV Continuous <Continuous>  dextrose 5%. 1000 milliLiter(s) (50 mL/Hr) IV Continuous <Continuous>  dextrose 50% Injectable 25 Gram(s) IV Push once  dextrose 50% Injectable 12.5 Gram(s) IV Push once  dextrose 50% Injectable 25 Gram(s) IV Push once  glucagon  Injectable 1 milliGRAM(s) IntraMuscular once  levothyroxine 75 MICROGram(s) Oral daily    MEDICATIONS  (PRN):  acetaminophen     Tablet .. 650 milliGRAM(s) Oral every 6 hours PRN Temp greater or equal to 38C (100.4F), Mild Pain (1 - 3)  dextrose Oral Gel 15 Gram(s) Oral once PRN Blood Glucose LESS THAN 70 milliGRAM(s)/deciliter  melatonin 3 milliGRAM(s) Oral at bedtime PRN Insomnia

## 2025-05-06 NOTE — PROGRESS NOTE ADULT - PROBLEM SELECTOR PLAN 8
Home medication bupropion 450mg qd per last note from PCP  -c/w wellbutrin 300 given elevated qtc    #ADHD  Lisdexamfetamine Dimesylate  30 MG Oral Capsule  - hold for now

## 2025-05-06 NOTE — PROGRESS NOTE ADULT - ASSESSMENT
79-year-old F with PMH of recent mechanical fall with L1 compression fracture, HTN (on coreg, HCTZ), CKD (stage 3) admitted with syncope and TBI, found to have MARY LOU on CKD and HTN emergency (SBP 200s with MARY LOU) .  Patient reports having used ibuprofen on a daily basis.  Nephrology consulted for MARY LOU on CKD    #Non-oliguric MARY LOU on CKD- Improving   Last know HIE SCr 1.16, GFR 48 (CKD3) on 10/24  On admission (5/3): SCr 5.9, BUN 82, CO2 18, CK 29, no eosinophilia. She received 1L of NS on admission and started on NS maintenance fluids at 100cc/hr, continued on 5/4 and 5/5 in AM   Pickett placed: ?for I/O, no documentation of retention. Renal US on 5/4 with no hydronephrosis normal size kidneys, no echogenicity   5/3: creatinine 5.86-> 5.25 (10pm), 5/4: SCrr 5.05 (in AM), 4.79 (at 8pm) , 5/5: SCr 4.86 in AM  -Urine studies on 5/4: with 8 WBC, but also with bacteria, 32 RBC, no cast. FeNa 2.7 (Intrinsic), FeUrea 46 (Intrinsic), spec grav 1024  -5/6: creatinine 4.45. Plan for Bronchoscopy today on 5/6 and OR with ortho on 5/8 or 5/9     DDx: iso HTN emergency (SBP in 200s in ED required labetalol IV), vs less likely AIN from NSAID use vs unlikely prerenal (Urine studies consistent with Intrinsic etiology, has been on fluids continuously with no drastic change)     Recommendation:  -f/u cystatin C   –Strict I/O monitoring    Discussed with primary team    79-year-old F with PMH of recent mechanical fall with L1 compression fracture, HTN (on coreg, HCTZ), CKD (stage 3) admitted with syncope and TBI, found to have MARY LOU on CKD and HTN emergency (SBP 200s with MARY LOU) .  Patient reports having used ibuprofen on a daily basis.  Nephrology consulted for MARY LOU on CKD    #Non-oliguric MARY LOU on CKD- Improving   Last know HIE SCr 1.16, GFR 48 (CKD3) on 10/24  On admission (5/3): SCr 5.9, BUN 82, CO2 18, CK 29, no eosinophilia. She received 1L of NS on admission and started on NS maintenance fluids at 100cc/hr, continued on 5/4 and 5/5 in AM   Pickett placed: ?for I/O, no documentation of retention. Renal US on 5/4 with no hydronephrosis normal size kidneys, no echogenicity   5/3: creatinine 5.86-> 5.25 (10pm), 5/4: SCrr 5.05 (in AM), 4.79 (at 8pm) , 5/5: SCr 4.86 in AM  -Urine studies on 5/4: with 8 WBC, but also with bacteria, 32 RBC, no cast. FeNa 2.7 (Intrinsic), FeUrea 46 (Intrinsic), spec grav 1024  -5/6: creatinine 4.45. Plan for Bronchoscopy today on 5/6 and OR with ortho on 5/8 or 5/9     DDx: iso HTN emergency (SBP in 200s in ED required labetalol IV), vs less likely AIN from NSAID use vs unlikely prerenal (Urine studies consistent with Intrinsic etiology, has been on fluids continuously with no drastic change)     Recommendation:  -At current renal function trend there are no renal contraindications to proceeding with bronchoscopy or surgery   -Monitor BMP daily   -f/u cystatin C   –Strict I/O monitoring    Recommendations are not considered final until attending attestation   Discussed with primary team    79-year-old F with PMH of recent mechanical fall with L1 compression fracture, HTN (on coreg, HCTZ), CKD (stage 3) admitted with syncope and TBI, found to have MARY LOU on CKD and HTN emergency (SBP 200s with MARY LOU) .  Patient reports having used ibuprofen on a daily basis.  Nephrology consulted for MARY LOU on CKD    #Non-oliguric MARY LOU on CKD- Improving   Last know HIE SCr 1.16, GFR 48 (CKD3) on 10/24  On admission (5/3): SCr 5.9, BUN 82, CO2 18, CK 29, no eosinophilia. She received 1L of NS on admission and started on NS maintenance fluids at 100cc/hr, continued on 5/4 and 5/5 in AM   Pickett placed: ?for I/O, no documentation of retention. Renal US on 5/4 with no hydronephrosis normal size kidneys, no echogenicity   5/3: creatinine 5.86-> 5.25 (10pm), 5/4: SCrr 5.05 (in AM), 4.79 (at 8pm) , 5/5: SCr 4.86 in AM  -Urine studies on 5/4: with 8 WBC, but also with bacteria, 32 RBC, no cast. FeNa 2.7 (Intrinsic), FeUrea 46 (Intrinsic), spec grav 1024  -5/6: creatinine 4.45. Plan for Bronchoscopy today on 5/6 and OR with ortho on 5/8 or 5/9     DDx: iso HTN emergency (SBP in 200s in ED required labetalol IV), vs less likely AIN from NSAID use vs unlikely prerenal (Urine studies consistent with Intrinsic etiology, has been on fluids continuously with no drastic change)     Recommendation:  -At current renal function trend there are no renal contraindications to proceeding with bronchoscopy or surgery   -Monitor BMP daily   -f/u cystatin C   –Strict I/O monitoring    Discussed with primary team

## 2025-05-06 NOTE — PROGRESS NOTE ADULT - PROBLEM SELECTOR PLAN 4
Patient with 20 pack year smoking history (stopped in 1984) presents after syncopal episode with incidental finding of pulmonary nodule in RUL. Endorses recent loss of appetite, but denies cough, weight loss, prior occupational exposures. c/f for primary lung malignancy   CT Chest: 5 cm infiltrative opacity in the right upper lobe suspicious for malignancy. Consider PET/CT and tissue sampling.  Pulmonary following   - pending PET CT; possible biopsy pending imaging  - discussed with NM regarding PET - possible today only with cancellation, if not patient will undergo testing tomorrow   - hold AC for now Patient with 20 pack year smoking history (stopped in 1984) presents after syncopal episode with incidental finding of pulmonary nodule in RUL. Endorses recent loss of appetite, but denies cough, weight loss, prior occupational exposures. c/f for primary lung malignancy   CT Chest: 5 cm infiltrative opacity in the right upper lobe suspicious for malignancy.  Pulmonary following   - pending PET CT and bronchoscopy today   - resume AC s/p procedure

## 2025-05-06 NOTE — PROGRESS NOTE ADULT - SUBJECTIVE AND OBJECTIVE BOX
PULMONARY CONSULT SERVICE FOLLOW-UP NOTE    INTERVAL HPI:  Reviewed chart and overnight events; patient seen and examined.     MEDICATIONS:  Pulmonary:    Antimicrobials:    Anticoagulants:    Cardiac:  carvedilol 3.125 milliGRAM(s) Oral every 12 hours      Allergies    No Known Allergies    Intolerances        Vital Signs Last 24 Hrs  T(C): 36.5 (06 May 2025 06:11), Max: 36.8 (05 May 2025 21:23)  T(F): 97.7 (06 May 2025 06:11), Max: 98.2 (05 May 2025 21:23)  HR: 80 (06 May 2025 06:11) (74 - 80)  BP: 167/76 (06 May 2025 06:11) (143/82 - 174/77)  BP(mean): --  RR: 18 (06 May 2025 06:11) (18 - 18)  SpO2: 98% (06 May 2025 06:11) (98% - 99%)    Parameters below as of 06 May 2025 06:11  Patient On (Oxygen Delivery Method): room air        05-05 @ 07:01  -  05-06 @ 07:00  --------------------------------------------------------  IN: 0 mL / OUT: 300 mL / NET: -300 mL          PHYSICAL EXAM:  Constitutional: well-appearing, in no apparent respiratory distress  HEENT: NC/AT; PERRL, anicteric sclera; moist mucous membranes  Neck: supple, no JVD or LAD appreciated  Cardiovascular: +S1/S2, Regular rate and rhythm, no murmurs appreciated   Respiratory: Clear breath sounds bilaterally. No wheezes, rhonchi or rales   Gastrointestinal: soft, non-tender, non-distended. Normoactive bowel sounds.   Extremities: no edema, clubbing or cyanosis  Vascular: 2+ radial pulses B/L  Neurological: awake and alert; oriented x3    LABS:      CBC Full  -  ( 06 May 2025 07:06 )  WBC Count : 7.11 K/uL  RBC Count : 3.25 M/uL  Hemoglobin : 9.7 g/dL  Hematocrit : 29.2 %  Platelet Count - Automated : 262 K/uL  Mean Cell Volume : 89.8 fl  Mean Cell Hemoglobin : 29.8 pg  Mean Cell Hemoglobin Concentration : 33.2 g/dL  Auto Neutrophil # : x  Auto Lymphocyte # : x  Auto Monocyte # : x  Auto Eosinophil # : x  Auto Basophil # : x  Auto Neutrophil % : x  Auto Lymphocyte % : x  Auto Monocyte % : x  Auto Eosinophil % : x  Auto Basophil % : x    05-06    136  |  105  |  59[H]  ----------------------------<  82  3.7   |  17[L]  |  4.45[H]    Ca    7.8[L]      06 May 2025 07:06  Phos  4.2     05-06  Mg     1.7     05-06    TPro  6.4  /  Alb  3.5  /  TBili  0.5  /  DBili  x   /  AST  94[H]  /  ALT  71[H]  /  AlkPhos  420[H]  05-06    PT/INR - ( 06 May 2025 07:06 )   PT: 12.0 sec;   INR: 1.04          PTT - ( 06 May 2025 07:06 )  PTT:28.1 sec                RADIOLOGY & ADDITIONAL STUDIES: PULMONARY CONSULT SERVICE FOLLOW-UP NOTE    INTERVAL HPI:  Reviewed chart and overnight events; Patient had undergone a PET CT and bronchoscopy with biopsy.     MEDICATIONS:  Pulmonary:    Antimicrobials:    Anticoagulants:    Cardiac:  carvedilol 3.125 milliGRAM(s) Oral every 12 hours      Allergies    No Known Allergies    Intolerances        Vital Signs Last 24 Hrs  T(C): 36.5 (06 May 2025 06:11), Max: 36.8 (05 May 2025 21:23)  T(F): 97.7 (06 May 2025 06:11), Max: 98.2 (05 May 2025 21:23)  HR: 80 (06 May 2025 06:11) (74 - 80)  BP: 167/76 (06 May 2025 06:11) (143/82 - 174/77)  BP(mean): --  RR: 18 (06 May 2025 06:11) (18 - 18)  SpO2: 98% (06 May 2025 06:11) (98% - 99%)    Parameters below as of 06 May 2025 06:11  Patient On (Oxygen Delivery Method): room air        05-05 @ 07:01  -  05-06 @ 07:00  --------------------------------------------------------  IN: 0 mL / OUT: 300 mL / NET: -300 mL          PHYSICAL EXAM:  Constitutional: well-appearing, in no apparent respiratory distress  HEENT: anicteric sclera; moist mucous membranes  Neck: supple, no JVD or LAD appreciated  Cardiovascular: +S1/S2, Regular rate and rhythm, no murmurs appreciated   Respiratory: Clear breath sounds bilaterally. No wheezes, rhonchi or rales   Gastrointestinal: soft, non-tender, non-distended. Normoactive bowel sounds.   Extremities: no edema, clubbing or cyanosis  Vascular: 2+ radial pulses B/L  Neurological: awake and alert; oriented x3    LABS:      CBC Full  -  ( 06 May 2025 07:06 )  WBC Count : 7.11 K/uL  RBC Count : 3.25 M/uL  Hemoglobin : 9.7 g/dL  Hematocrit : 29.2 %  Platelet Count - Automated : 262 K/uL  Mean Cell Volume : 89.8 fl  Mean Cell Hemoglobin : 29.8 pg  Mean Cell Hemoglobin Concentration : 33.2 g/dL  Auto Neutrophil # : x  Auto Lymphocyte # : x  Auto Monocyte # : x  Auto Eosinophil # : x  Auto Basophil # : x  Auto Neutrophil % : x  Auto Lymphocyte % : x  Auto Monocyte % : x  Auto Eosinophil % : x  Auto Basophil % : x    05-06    136  |  105  |  59[H]  ----------------------------<  82  3.7   |  17[L]  |  4.45[H]    Ca    7.8[L]      06 May 2025 07:06  Phos  4.2     05-06  Mg     1.7     05-06    TPro  6.4  /  Alb  3.5  /  TBili  0.5  /  DBili  x   /  AST  94[H]  /  ALT  71[H]  /  AlkPhos  420[H]  05-06    PT/INR - ( 06 May 2025 07:06 )   PT: 12.0 sec;   INR: 1.04          PTT - ( 06 May 2025 07:06 )  PTT:28.1 sec                RADIOLOGY & ADDITIONAL STUDIES:

## 2025-05-07 ENCOUNTER — APPOINTMENT (OUTPATIENT)
Dept: ORTHOPEDIC SURGERY | Facility: CLINIC | Age: 80
End: 2025-05-07

## 2025-05-07 PROBLEM — E78.5 HYPERLIPIDEMIA, UNSPECIFIED: Chronic | Status: ACTIVE | Noted: 2025-05-03

## 2025-05-07 PROBLEM — I10 ESSENTIAL (PRIMARY) HYPERTENSION: Chronic | Status: ACTIVE | Noted: 2025-05-03

## 2025-05-07 PROBLEM — E03.9 HYPOTHYROIDISM, UNSPECIFIED: Chronic | Status: ACTIVE | Noted: 2025-05-03

## 2025-05-07 LAB
ALBUMIN SERPL ELPH-MCNC: 3.5 G/DL — SIGNIFICANT CHANGE UP (ref 3.3–5)
ALP SERPL-CCNC: 380 U/L — HIGH (ref 40–120)
ALT FLD-CCNC: 51 U/L — HIGH (ref 10–45)
ANION GAP SERPL CALC-SCNC: 15 MMOL/L — SIGNIFICANT CHANGE UP (ref 5–17)
AST SERPL-CCNC: 59 U/L — HIGH (ref 10–40)
BILIRUB SERPL-MCNC: 0.4 MG/DL — SIGNIFICANT CHANGE UP (ref 0.2–1.2)
BLD GP AB SCN SERPL QL: NEGATIVE — SIGNIFICANT CHANGE UP
BUN SERPL-MCNC: 59 MG/DL — HIGH (ref 7–23)
C DIFF GDH STL QL: NEGATIVE — SIGNIFICANT CHANGE UP
C DIFF GDH STL QL: SIGNIFICANT CHANGE UP
CALCIUM SERPL-MCNC: 7.6 MG/DL — LOW (ref 8.4–10.5)
CHLORIDE SERPL-SCNC: 106 MMOL/L — SIGNIFICANT CHANGE UP (ref 96–108)
CO2 SERPL-SCNC: 16 MMOL/L — LOW (ref 22–31)
CREAT SERPL-MCNC: 4.14 MG/DL — HIGH (ref 0.5–1.3)
EGFR: 10 ML/MIN/1.73M2 — LOW
EGFR: 10 ML/MIN/1.73M2 — LOW
GAS PNL BLDA: SIGNIFICANT CHANGE UP
GGT SERPL-CCNC: 233 U/L — HIGH (ref 8–40)
GI PCR PANEL: SIGNIFICANT CHANGE UP
GLUCOSE SERPL-MCNC: 76 MG/DL — SIGNIFICANT CHANGE UP (ref 70–99)
HCT VFR BLD CALC: 30.6 % — LOW (ref 34.5–45)
HGB BLD-MCNC: 9.9 G/DL — LOW (ref 11.5–15.5)
MAGNESIUM SERPL-MCNC: 1.4 MG/DL — LOW (ref 1.6–2.6)
MCHC RBC-ENTMCNC: 30 PG — SIGNIFICANT CHANGE UP (ref 27–34)
MCHC RBC-ENTMCNC: 32.4 G/DL — SIGNIFICANT CHANGE UP (ref 32–36)
MCV RBC AUTO: 92.7 FL — SIGNIFICANT CHANGE UP (ref 80–100)
NRBC BLD AUTO-RTO: 0 /100 WBCS — SIGNIFICANT CHANGE UP (ref 0–0)
PHOSPHATE SERPL-MCNC: 4 MG/DL — SIGNIFICANT CHANGE UP (ref 2.5–4.5)
PLATELET # BLD AUTO: 255 K/UL — SIGNIFICANT CHANGE UP (ref 150–400)
POTASSIUM SERPL-MCNC: 3.9 MMOL/L — SIGNIFICANT CHANGE UP (ref 3.5–5.3)
POTASSIUM SERPL-SCNC: 3.9 MMOL/L — SIGNIFICANT CHANGE UP (ref 3.5–5.3)
PROT SERPL-MCNC: 6.2 G/DL — SIGNIFICANT CHANGE UP (ref 6–8.3)
RBC # BLD: 3.3 M/UL — LOW (ref 3.8–5.2)
RBC # FLD: 15.9 % — HIGH (ref 10.3–14.5)
RH IG SCN BLD-IMP: POSITIVE — SIGNIFICANT CHANGE UP
SODIUM SERPL-SCNC: 137 MMOL/L — SIGNIFICANT CHANGE UP (ref 135–145)
WBC # BLD: 7.47 K/UL — SIGNIFICANT CHANGE UP (ref 3.8–10.5)
WBC # FLD AUTO: 7.47 K/UL — SIGNIFICANT CHANGE UP (ref 3.8–10.5)

## 2025-05-07 PROCEDURE — 99233 SBSQ HOSP IP/OBS HIGH 50: CPT | Mod: GC

## 2025-05-07 PROCEDURE — 76705 ECHO EXAM OF ABDOMEN: CPT | Mod: 26

## 2025-05-07 PROCEDURE — 99233 SBSQ HOSP IP/OBS HIGH 50: CPT

## 2025-05-07 PROCEDURE — 99232 SBSQ HOSP IP/OBS MODERATE 35: CPT | Mod: GC

## 2025-05-07 PROCEDURE — 99232 SBSQ HOSP IP/OBS MODERATE 35: CPT

## 2025-05-07 RX ORDER — LOPERAMIDE HCL 1 MG/7.5ML
2 SOLUTION ORAL EVERY 4 HOURS
Refills: 0 | Status: DISCONTINUED | OUTPATIENT
Start: 2025-05-07 | End: 2025-05-14

## 2025-05-07 RX ORDER — HEPARIN SODIUM 1000 [USP'U]/ML
5000 INJECTION INTRAVENOUS; SUBCUTANEOUS EVERY 8 HOURS
Refills: 0 | Status: DISCONTINUED | OUTPATIENT
Start: 2025-05-07 | End: 2025-05-07

## 2025-05-07 RX ORDER — MAGNESIUM SULFATE 500 MG/ML
2 SYRINGE (ML) INJECTION ONCE
Refills: 0 | Status: COMPLETED | OUTPATIENT
Start: 2025-05-07 | End: 2025-05-07

## 2025-05-07 RX ADMIN — CARVEDILOL 6.25 MILLIGRAM(S): 3.12 TABLET, FILM COATED ORAL at 05:50

## 2025-05-07 RX ADMIN — CARVEDILOL 6.25 MILLIGRAM(S): 3.12 TABLET, FILM COATED ORAL at 16:18

## 2025-05-07 RX ADMIN — Medication 75 MICROGRAM(S): at 07:32

## 2025-05-07 RX ADMIN — ATORVASTATIN CALCIUM 80 MILLIGRAM(S): 80 TABLET, FILM COATED ORAL at 21:49

## 2025-05-07 RX ADMIN — HEPARIN SODIUM 5000 UNIT(S): 1000 INJECTION INTRAVENOUS; SUBCUTANEOUS at 14:54

## 2025-05-07 RX ADMIN — BUPROPION HYDROBROMIDE 300 MILLIGRAM(S): 522 TABLET, EXTENDED RELEASE ORAL at 10:25

## 2025-05-07 RX ADMIN — Medication 25 GRAM(S): at 10:25

## 2025-05-07 RX ADMIN — Medication 40 MILLIEQUIVALENT(S): at 16:18

## 2025-05-07 RX ADMIN — LOPERAMIDE HCL 2 MILLIGRAM(S): 1 SOLUTION ORAL at 20:22

## 2025-05-07 RX ADMIN — Medication 1 APPLICATION(S): at 05:50

## 2025-05-07 NOTE — PROGRESS NOTE ADULT - PROBLEM SELECTOR PLAN 3
#AGMA  Baseline Cr: upon admission BUN/Cr  82/5.86  Likely iso of dehydration as patient shares she has had diminished oral intake 2/2 dry mouth  s/p IL NS in ED and NaHCO3 tables;  renal ultrasound; unremarkable   FENa: 2.4 instrinsic eitology likely ATN iso NSAID use  - pending cystain C  - avoid nephrotoxic medication  - trend Cr daily  - strict I/O

## 2025-05-07 NOTE — PROGRESS NOTE ADULT - ASSESSMENT
Ms. Pressley is a 79 year old female with HTN, HLD,  hypothyroidism, generalized depression, ADHD and L1 compression fracture who presented for mechanical fall. Pulmonary consulted for lung mass seen on CT.     Smoking history: 2 packs per day for 20 years (40py) stopped in 1982. 25 years of second hand smoking exposure.   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       Data Reviewed:  CTA 2016: report only - indeterminate nodules   CT Chest 2021 (isaac): RUL nodule   CT Chest 5/3/2025: 5 cm mixed GGO and solid opacity, emphysematous changes apically, motion artifact, additional RUL peripheral nodule   Labs: reviewed   Outpatient chart: PCP note and Cards note reviewed in HIE; CC was balance complaints attributed to flexeril at that time     #Lung Mass  #Former Smoker   # L1 compression fracture    RUL nodule highly suspicious for primary lung malignancy. Patient underwent a PET CT scan which showed a growing right upper lobe lung mass measuring 3.4 x 4.9 cm as well as severe compression deformity in the L1 vertebrae. There is a possibility the L1 vertebral compression fracture could be a metastatic involvement. She underwent a bronchoscopy with EBUS on 5/6 for diagnosis and staging. Given PET findings, would recommend ortho to perform a biopsy during the decompression of the lumbar spine which is tentatively planned for Thursday /Friday to evaluate for metastatic disease.     Recommendations:  - Follow up pathology results from bronchoscopy with biopsy on 5/6  - Recommend ortho to obtain biopsy when patient undergoes the decompression of the L1 vertebral compression fracture to rule out metastatic involvement on the spine  - Will need to establish care with pulmonary outpatient for PFTs - can follow with Dr. Peck    Discussed with Dr. Peck

## 2025-05-07 NOTE — PROGRESS NOTE ADULT - ASSESSMENT
79F with PMHx of HTN, HLD, hypothyroidism, generalized depression, ADHD and L1 compression fracture 2/2 to recent  fall  (April 2025, wears TLSO brace, scheduled for surgery with Dr. Barcenas this month), rheumatic fever and pericarditis presents after syncope and fall c/b head trauma, found to have lumbar compression fracture, severe spinal stenosis, and incidentally found lung mass, now pending biopsy.     REVIEW OF STUDIES  TTE 5/6/25: Normal biventricular function  ECG 5/3/25: NSR, QTc 496.   TTE 2013: LVEF 61%. Normal RV size/function. LA mildly dilated. +PFO. Mild MR.     #Preoperative Cardiovascular Assessment  EKG: As above, nonischemic  Functional status: Prior to falls, patient with excellent functional capacity, METs > 4  Patient without evidence of active ischemia, decompensated HF, or unstable arrhythmia  -Patient is intermediate risk for intermediate risk procedure (spinal fusion/decompression)  -No further pre-operative cardiovascular workup  -Continue Lipitor 80mg and carvedilol perioperatively      #Syncope  No clear prodrome  No structural heart disease  EKG nonischemic, no clear etiology  -No tele events to explain syncope  -May benefit from outpatient rhythm monitoring    #HTN  -Increase coreg to 6.25mg BID post operatively    D/w Dr. Hess

## 2025-05-07 NOTE — PROGRESS NOTE ADULT - PROBLEM SELECTOR PLAN 1
#Syncope   May be medication induced BEERS (recently started cyclobenzaprine, opiods  vs orthostatic iso poor oral intake less likely arrhythmogenic given normal EKG however given patient's history of rheumatic fever and pericarditis would  perform limited TTE   Stroke imaging negative, no signs of facial droop or dysarthria on repeat exam; MR Brain unremarkable  s/p scalp laceration repair in ED with three staples; UA: mod leuk esterase, nitrite neg with * WBC; monitoring of abx  as patient is asymptomatic  TTE: EF 70% without significant valvular disease   - hold off  orthostatics for now    - transfer to hospitalist tele   -

## 2025-05-07 NOTE — PROGRESS NOTE ADULT - SUBJECTIVE AND OBJECTIVE BOX
INTERVAL EVENTS:    Cardiac medications administered in the last 48h:  carvedilol: 6.25 milliGRAM(s) Oral (05-07-25 @ 05:50)  carvedilol: 6.25 milliGRAM(s) Oral (05-06-25 @ 19:22)  carvedilol: 3.125 milliGRAM(s) Oral (05-06-25 @ 17:40)  carvedilol: 3.125 milliGRAM(s) Oral (05-06-25 @ 06:39)  carvedilol: 3.125 milliGRAM(s) Oral (05-05-25 @ 18:22)      MEDICATIONS  (STANDING):  atorvastatin 80 milliGRAM(s) Oral at bedtime  buPROPion XL (24-Hour) . 300 milliGRAM(s) Oral daily  carvedilol 6.25 milliGRAM(s) Oral every 12 hours  chlorhexidine 2% Cloths 1 Application(s) Topical two times a day  dextrose 5%. 1000 milliLiter(s) (100 mL/Hr) IV Continuous <Continuous>  dextrose 5%. 1000 milliLiter(s) (50 mL/Hr) IV Continuous <Continuous>  dextrose 50% Injectable 25 Gram(s) IV Push once  dextrose 50% Injectable 12.5 Gram(s) IV Push once  dextrose 50% Injectable 25 Gram(s) IV Push once  glucagon  Injectable 1 milliGRAM(s) IntraMuscular once  heparin   Injectable 5000 Unit(s) SubCutaneous every 8 hours  levothyroxine 75 MICROGram(s) Oral daily    MEDICATIONS  (PRN):  acetaminophen     Tablet .. 650 milliGRAM(s) Oral every 6 hours PRN Temp greater or equal to 38C (100.4F), Mild Pain (1 - 3)  dextrose Oral Gel 15 Gram(s) Oral once PRN Blood Glucose LESS THAN 70 milliGRAM(s)/deciliter  melatonin 3 milliGRAM(s) Oral at bedtime PRN Insomnia      VITALS 24H  T(F): 97.9 (05-07-25 @ 13:21), Max: 98.3 (05-07-25 @ 09:14)  HR: 75 (05-07-25 @ 11:24) (73 - 80)  BP: 150/70 (05-07-25 @ 11:24) (147/66 - 181/85)  BP(mean): 100 (05-07-25 @ 11:24) (95 - 118)  ABP: --  ABP(mean): --  RR: 16 (05-07-25 @ 11:24) (16 - 20)  SpO2: 99% (05-07-25 @ 11:24) (97% - 99%)  CVP(mm Hg): --    I/O Detail 24H      PHYSICAL EXAM:  GEN: NAD  HEENT: EOMI   RESP: CTA b/l  CV: RRR. Normal S1/S2. No m/r/g.  ABD: soft, non-distended  EXT: No edema   NEURO: alert and attentive    LABS:  CBC 05-07-25 @ 06:35                        9.9    7.47  )-----------( 255                   30.6     Hgb trend: 9.9 <-- , 9.7 <-- , 9.6 <--   WBC trend: 7.47 <-- , 7.11 <-- , 7.75 <--       CMP 05-07-25 @ 06:35    137  |  106  |  59[H]  ----------------------------<  76  3.9   |  16[L]  |  4.14[H]    Ca    7.6[L]      05-07-25 @ 06:35  Phos  4.0     05-07  Mg     1.4     05-07    TPro  6.2  /  Alb  3.5  /  TBili  0.4  /  DBili  x   /  AST  59[H]  /  ALT  51[H]  /  AlkPhos  380[H]     05-07    Serum Cr (eGFR) trend: 4.14 (10) <-- , 4.45 (10) <-- , 4.56 (9) <-- , 4.86 (9) <-- , 4.79 (9) <--       PT/INR - ( 06 May 2025 07:06 )   PT: 12.0 sec;   INR: 1.04     PTT - ( 06 May 2025 07:06 ):28.1 sec    Cardiac Markers

## 2025-05-07 NOTE — PROGRESS NOTE ADULT - SUBJECTIVE AND OBJECTIVE BOX
OVERNIGHT EVENTS: GINA    SUBJECTIVE:  Patient seen and examined at bedside. Patient reported having 5 episodes of diarrhea overnight. Otherwise, has been keeping hydrated and eating well. Denies nausea, or emesis or abdominal pain     Vital Signs Last 12 Hrs  T(F): 98.3 (05-07-25 @ 09:14), Max: 98.3 (05-07-25 @ 09:14)  HR: 75 (05-07-25 @ 11:24) (73 - 78)  BP: 150/70 (05-07-25 @ 11:24) (147/66 - 170/74)  BP(mean): 100 (05-07-25 @ 11:24) (95 - 106)  RR: 16 (05-07-25 @ 11:24) (16 - 20)  SpO2: 99% (05-07-25 @ 11:24) (97% - 99%)  I&O's Summary      PHYSICAL EXAM:  Constitutional: NAD, comfortable in bed.  HEENT: NC/AT, PERRLA, EOMI, no conjunctival pallor or scleral icterus, MMM  Neck: Supple, no JVD  Respiratory: CTA B/L. No w/r/r.   Cardiovascular: RRR, normal S1 and S2, no m/r/g.   Gastrointestinal: +BS, soft NTND, no guarding or rebound tenderness, no palpable masses   Extremities: wwp; no cyanosis, clubbing or edema.    Neurological: AAOx3, strength 4/5 in UE and LE makenzie. no asterixis, + noted dysarthria   Skin: No gross skin abnormalities or rashes        LABS:                        9.9    7.47  )-----------( 255      ( 07 May 2025 06:35 )             30.6     05-07    137  |  106  |  59[H]  ----------------------------<  76  3.9   |  16[L]  |  4.14[H]    Ca    7.6[L]      07 May 2025 06:35  Phos  4.0     05-07  Mg     1.4     05-07    TPro  6.2  /  Alb  3.5  /  TBili  0.4  /  DBili  x   /  AST  59[H]  /  ALT  51[H]  /  AlkPhos  380[H]  05-07    PT/INR - ( 06 May 2025 07:06 )   PT: 12.0 sec;   INR: 1.04          PTT - ( 06 May 2025 07:06 )  PTT:28.1 sec  Urinalysis Basic - ( 07 May 2025 06:35 )    Color: x / Appearance: x / SG: x / pH: x  Gluc: 76 mg/dL / Ketone: x  / Bili: x / Urobili: x   Blood: x / Protein: x / Nitrite: x   Leuk Esterase: x / RBC: x / WBC x   Sq Epi: x / Non Sq Epi: x / Bacteria: x          RADIOLOGY & ADDITIONAL TESTS:    MEDICATIONS  (STANDING):  atorvastatin 80 milliGRAM(s) Oral at bedtime  buPROPion XL (24-Hour) . 300 milliGRAM(s) Oral daily  carvedilol 6.25 milliGRAM(s) Oral every 12 hours  chlorhexidine 2% Cloths 1 Application(s) Topical two times a day  dextrose 5%. 1000 milliLiter(s) (100 mL/Hr) IV Continuous <Continuous>  dextrose 5%. 1000 milliLiter(s) (50 mL/Hr) IV Continuous <Continuous>  dextrose 50% Injectable 25 Gram(s) IV Push once  dextrose 50% Injectable 12.5 Gram(s) IV Push once  dextrose 50% Injectable 25 Gram(s) IV Push once  glucagon  Injectable 1 milliGRAM(s) IntraMuscular once  heparin   Injectable 5000 Unit(s) SubCutaneous every 8 hours  levothyroxine 75 MICROGram(s) Oral daily    MEDICATIONS  (PRN):  acetaminophen     Tablet .. 650 milliGRAM(s) Oral every 6 hours PRN Temp greater or equal to 38C (100.4F), Mild Pain (1 - 3)  dextrose Oral Gel 15 Gram(s) Oral once PRN Blood Glucose LESS THAN 70 milliGRAM(s)/deciliter  melatonin 3 milliGRAM(s) Oral at bedtime PRN Insomnia

## 2025-05-07 NOTE — PROGRESS NOTE ADULT - SUBJECTIVE AND OBJECTIVE BOX
Ortho Note    Pt comfortable without complaints, pain controlled  Denies CP, SOB, N/V, numbness/tingling     Vital Signs Last 24 Hrs  T(C): 36.3 (05-07-25 @ 05:09), Max: 36.3 (05-07-25 @ 05:09)  T(F): 97.4 (05-07-25 @ 05:09), Max: 97.4 (05-07-25 @ 05:09)  HR: 73 (05-07-25 @ 05:00) (73 - 73)  BP: 170/74 (05-07-25 @ 05:00) (170/74 - 170/74)  BP(mean): 106 (05-07-25 @ 05:00) (106 - 106)  RR: 20 (05-07-25 @ 05:00) (20 - 20)  SpO2: 98% (05-07-25 @ 05:00) (98% - 98%)  I&O's Summary      General: Pt Alert and oriented, NAD  Pulses: 2+  Sensation: SILT  Motor: 5/5 Quad/Ham/EHL/FHL/TA/GS                          9.9    7.47  )-----------( 255      ( 07 May 2025 06:35 )             30.6     05-07    137  |  106  |  59[H]  ----------------------------<  76  3.9   |  16[L]  |  4.14[H]    Ca    7.6[L]      07 May 2025 06:35  Phos  4.0     05-07  Mg     1.4     05-07    TPro  6.2  /  Alb  3.5  /  TBili  0.4  /  DBili  x   /  AST  59[H]  /  ALT  51[H]  /  AlkPhos  380[H]  05-07      A/P: 79yFemale with L1 compression fx  - Stable  - Pain Control  - DVT ppx: SCDs  - PT, WBS: WBAT  - Preop and Plan for OR tomorrow with Dr. Barcenas (T11-L3 decompression and instrumented fusion), on schedule  - NPO at 8AM tmrw    Ortho Pager [phone]

## 2025-05-07 NOTE — PROGRESS NOTE ADULT - PROBLEM SELECTOR PLAN 7
Patient shares prior hx of rheumatic fever when she was [age] years old but denies residual valvulopathy.   Seen by Dr. Murray in December with instruction to undergo stress test and TTE but no record of imaging. EKG normal sinus rhythm, no murmurs auscultated on physical exam   -obtain further collateral Patient shares prior hx of rheumatic fever when she was [age] years old but denies residual valvulopathy.   Seen by Dr. Murray in December with instruction to undergo stress test and TTE but no record of imaging. EKG normal sinus rhythm, no murmurs auscultated on physical exam   TTE w.o valvulopathy; EF 70%

## 2025-05-07 NOTE — PROGRESS NOTE ADULT - SUBJECTIVE AND OBJECTIVE BOX
**INCOMPLETE NOTE    INTERVAL/OVERNIGHT EVENTS: None    SUBJECTIVE:  Patient seen and examined at bedside, comfortable, NAD. Denied fever, chest pain, dyspnea, abdominal pain.     Vital Signs Last 12 Hrs  T(F): 97.4 (05-07-25 @ 05:09), Max: 97.9 (05-06-25 @ 21:32)  HR: 73 (05-07-25 @ 05:00) (73 - 75)  BP: 170/74 (05-07-25 @ 05:00) (160/70 - 170/74)  BP(mean): 106 (05-07-25 @ 05:00) (100 - 108)  RR: 20 (05-07-25 @ 05:00) (20 - 20)  SpO2: 98% (05-07-25 @ 05:00) (97% - 98%)  I&O's Summary      PHYSICAL EXAM:  General: NAD  HEENT: PERRL, EOM intact, sclera anicteric, MMM  Cardiovascular: RRR; no MRG; no JVD  Respiratory: CTAB; no WRR  GI/: soft; NTND; BS x4  Extremities: WWP; 2+ peripheral pulses bilaterally; no LE edema  Skin: normal color & turgor; no rash  Neurologic: aox3; no focal deficits    LABS:                        9.9    7.47  )-----------( 255      ( 07 May 2025 06:35 )             30.6     05-07    137  |  106  |  x   ----------------------------<  76  3.9   |  x   |  x     Ca    7.8[L]      06 May 2025 07:06  Phos  4.0     05-07  Mg     1.7     05-06    TPro  6.4  /  Alb  3.5  /  TBili  0.5  /  DBili  x   /  AST  94[H]  /  ALT  71[H]  /  AlkPhos  420[H]  05-06    PT/INR - ( 06 May 2025 07:06 )   PT: 12.0 sec;   INR: 1.04          PTT - ( 06 May 2025 07:06 )  PTT:28.1 sec  Urinalysis Basic - ( 07 May 2025 06:35 )    Color: x / Appearance: x / SG: x / pH: x  Gluc: 76 mg/dL / Ketone: x  / Bili: x / Urobili: x   Blood: x / Protein: x / Nitrite: x   Leuk Esterase: x / RBC: x / WBC x   Sq Epi: x / Non Sq Epi: x / Bacteria: x          RADIOLOGY & ADDITIONAL TESTS:    MEDICATIONS  (STANDING):  atorvastatin 80 milliGRAM(s) Oral at bedtime  buPROPion XL (24-Hour) . 300 milliGRAM(s) Oral daily  carvedilol 6.25 milliGRAM(s) Oral every 12 hours  chlorhexidine 2% Cloths 1 Application(s) Topical two times a day  dextrose 5%. 1000 milliLiter(s) (100 mL/Hr) IV Continuous <Continuous>  dextrose 5%. 1000 milliLiter(s) (50 mL/Hr) IV Continuous <Continuous>  dextrose 50% Injectable 25 Gram(s) IV Push once  dextrose 50% Injectable 12.5 Gram(s) IV Push once  dextrose 50% Injectable 25 Gram(s) IV Push once  glucagon  Injectable 1 milliGRAM(s) IntraMuscular once  levothyroxine 75 MICROGram(s) Oral daily    MEDICATIONS  (PRN):  acetaminophen     Tablet .. 650 milliGRAM(s) Oral every 6 hours PRN Temp greater or equal to 38C (100.4F), Mild Pain (1 - 3)  dextrose Oral Gel 15 Gram(s) Oral once PRN Blood Glucose LESS THAN 70 milliGRAM(s)/deciliter  melatonin 3 milliGRAM(s) Oral at bedtime PRN Insomnia   INTERVAL/OVERNIGHT EVENTS: None    SUBJECTIVE:  Patient seen and examined at bedside, comfortable, NAD. Denied fever, chest pain, dyspnea, abdominal pain but endorses recurrent loose stool since procedure yesterday     Vital Signs Last 12 Hrs  T(F): 97.4 (05-07-25 @ 05:09), Max: 97.9 (05-06-25 @ 21:32)  HR: 73 (05-07-25 @ 05:00) (73 - 75)  BP: 170/74 (05-07-25 @ 05:00) (160/70 - 170/74)  BP(mean): 106 (05-07-25 @ 05:00) (100 - 108)  RR: 20 (05-07-25 @ 05:00) (20 - 20)  SpO2: 98% (05-07-25 @ 05:00) (97% - 98%)  I&O's Summary      PHYSICAL EXAM:  General: NAD  HEENT: PERRL, EOM intact, sclera anicteric, MMM  Cardiovascular: RRR; no MRG; no JVD  Respiratory: CTAB; no WRR  GI/: soft; nontender mildly distended; BS x4  Extremities: WWP; 2+ peripheral pulses bilaterally; no LE edema  Skin: normal color & turgor; no rash  Neurologic: aox3; no focal deficits    LABS:                        9.9    7.47  )-----------( 255      ( 07 May 2025 06:35 )             30.6     05-07    137  |  106  |  x   ----------------------------<  76  3.9   |  x   |  x     Ca    7.8[L]      06 May 2025 07:06  Phos  4.0     05-07  Mg     1.7     05-06    TPro  6.4  /  Alb  3.5  /  TBili  0.5  /  DBili  x   /  AST  94[H]  /  ALT  71[H]  /  AlkPhos  420[H]  05-06    PT/INR - ( 06 May 2025 07:06 )   PT: 12.0 sec;   INR: 1.04          PTT - ( 06 May 2025 07:06 )  PTT:28.1 sec  Urinalysis Basic - ( 07 May 2025 06:35 )    Color: x / Appearance: x / SG: x / pH: x  Gluc: 76 mg/dL / Ketone: x  / Bili: x / Urobili: x   Blood: x / Protein: x / Nitrite: x   Leuk Esterase: x / RBC: x / WBC x   Sq Epi: x / Non Sq Epi: x / Bacteria: x          RADIOLOGY & ADDITIONAL TESTS:    MEDICATIONS  (STANDING):  atorvastatin 80 milliGRAM(s) Oral at bedtime  buPROPion XL (24-Hour) . 300 milliGRAM(s) Oral daily  carvedilol 6.25 milliGRAM(s) Oral every 12 hours  chlorhexidine 2% Cloths 1 Application(s) Topical two times a day  dextrose 5%. 1000 milliLiter(s) (100 mL/Hr) IV Continuous <Continuous>  dextrose 5%. 1000 milliLiter(s) (50 mL/Hr) IV Continuous <Continuous>  dextrose 50% Injectable 25 Gram(s) IV Push once  dextrose 50% Injectable 12.5 Gram(s) IV Push once  dextrose 50% Injectable 25 Gram(s) IV Push once  glucagon  Injectable 1 milliGRAM(s) IntraMuscular once  levothyroxine 75 MICROGram(s) Oral daily    MEDICATIONS  (PRN):  acetaminophen     Tablet .. 650 milliGRAM(s) Oral every 6 hours PRN Temp greater or equal to 38C (100.4F), Mild Pain (1 - 3)  dextrose Oral Gel 15 Gram(s) Oral once PRN Blood Glucose LESS THAN 70 milliGRAM(s)/deciliter  melatonin 3 milliGRAM(s) Oral at bedtime PRN Insomnia

## 2025-05-07 NOTE — PROGRESS NOTE ADULT - SUBJECTIVE AND OBJECTIVE BOX
PULMONARY CONSULT SERVICE FOLLOW-UP NOTE    INTERVAL HPI:  Reviewed chart and yesterday's events. She underwent PET scan and bronchoscopy with EBUS and biopsy yesterday.  Patient seen and examined. She endorses no cough, sore throat, or hemoptysis after the bronchoscopy yesterday.  Her only complaint this morning is diarrhea. She denies nausea and abdominal pain, but reports eating less to decrease the diarrhea.    MEDICATIONS:  Pulmonary:    Antimicrobials:    Anticoagulants:  heparin   Injectable 5000 Unit(s) SubCutaneous every 8 hours    Cardiac:  carvedilol 6.25 milliGRAM(s) Oral every 12 hours      Allergies    No Known Allergies    Intolerances        Vital Signs Last 24 Hrs  T(C): 36.3 (07 May 2025 05:09), Max: 36.6 (06 May 2025 21:32)  T(F): 97.4 (07 May 2025 05:09), Max: 97.9 (06 May 2025 21:32)  HR: 73 (07 May 2025 05:00) (73 - 80)  BP: 170/74 (07 May 2025 05:00) (160/70 - 181/85)  BP(mean): 106 (07 May 2025 05:00) (100 - 118)  RR: 20 (07 May 2025 05:00) (18 - 20)  SpO2: 98% (07 May 2025 05:00) (97% - 98%)    Parameters below as of 07 May 2025 05:00  Patient On (Oxygen Delivery Method): room air        PHYSICAL EXAM:  Constitutional: well-appearing, in no apparent respiratory distress  HEENT: anicteric sclera; moist mucous membranes  Neck: supple, no JVD appreciated  Cardiovascular: +S1/S2, regular rate and rhythm, no murmurs appreciated   Respiratory: on RA, clear breath sounds bilaterally. No wheezes, rhonchi or rales   Gastrointestinal: soft, non-tender, non-distended  Extremities: no edema  Neurological: awake and alert; oriented x3        LABS:  CBC Full  -  ( 07 May 2025 06:35 )  WBC Count : 7.47 K/uL  RBC Count : 3.30 M/uL  Hemoglobin : 9.9 g/dL  Hematocrit : 30.6 %  Platelet Count - Automated : 255 K/uL  Mean Cell Volume : 92.7 fl  Mean Cell Hemoglobin : 30.0 pg  Mean Cell Hemoglobin Concentration : 32.4 g/dL  Auto Neutrophil # : x  Auto Lymphocyte # : x  Auto Monocyte # : x  Auto Eosinophil # : x  Auto Basophil # : x  Auto Neutrophil % : x  Auto Lymphocyte % : x  Auto Monocyte % : x  Auto Eosinophil % : x  Auto Basophil % : x    05-07    137  |  106  |  59[H]  ----------------------------<  76  3.9   |  16[L]  |  4.14[H]    Ca    7.6[L]      07 May 2025 06:35  Phos  4.0     05-07  Mg     1.4     05-07    TPro  6.2  /  Alb  3.5  /  TBili  0.4  /  DBili  x   /  AST  59[H]  /  ALT  51[H]  /  AlkPhos  380[H]  05-07    PT/INR - ( 06 May 2025 07:06 )   PT: 12.0 sec;   INR: 1.04          PTT - ( 06 May 2025 07:06 )  PTT:28.1 sec          RADIOLOGY & ADDITIONAL STUDIES:  < from: CT Chest No Cont (05.03.25 @ 18:07) >  ACC: [id number] EXAM:  CT CHEST   ORDERED BY: RYAN PARRA     PROCEDURE DATE:  05/03/2025          INTERPRETATION:  CLINICAL INFORMATION: Right upper lobe consolidation   seen on same day CT angiogram of the neck.    COMPARISON: 10/1/2021    CONTRAST/COMPLICATIONS:  IV Contrast: NONE  Oral Contrast: NONE  .    PROCEDURE:  CT of the Chest was performed.  Sagittal and coronal reformats were performed.    FINDINGS:    LUNGS AND LARGE AIRWAYS: Images are degraded by respiratory motion.   Emphysematous changes. Patent central airways. 5 cm irregular shaped   groundglass and more solid peribronchial opacity in the right upper lobe.   Cluster of nodular branching densities in the left lung apex, likely   related to small airway disease.  PLEURA: No pleural effusion.  VESSELS: Within normal limits.  HEART: Heart size is normal. No pericardial effusion.  MEDIASTINUM AND NICOLÁS: No lymphadenopathy.  CHEST WALL AND LOWER NECK: Within normal limits.  VISUALIZED UPPER ABDOMEN: Left renal cyst partially visualized.  BONES: Mild degenerative changes. No acute fracture.    IMPRESSION:    5 cm infiltrative opacity in the right upper lobe suspicious for   malignancy. Consider PET/CT and tissue sampling.    --- End of Report ---    < end of copied text >   PULMONARY CONSULT SERVICE FOLLOW-UP NOTE    INTERVAL HPI:  Reviewed chart and yesterday's events. She underwent PET scan and bronchoscopy with EBUS and biopsy yesterday.  Patient seen and examined. She endorses no change in cough, sore throat, or hemoptysis after the bronchoscopy yesterday.  Her only complaint this morning is diarrhea. She denies nausea and abdominal pain, but reports eating less to decrease the diarrhea.    MEDICATIONS:  Pulmonary:    Antimicrobials:    Anticoagulants:  heparin   Injectable 5000 Unit(s) SubCutaneous every 8 hours    Cardiac:  carvedilol 6.25 milliGRAM(s) Oral every 12 hours      Allergies    No Known Allergies    Intolerances        Vital Signs Last 24 Hrs  T(C): 36.3 (07 May 2025 05:09), Max: 36.6 (06 May 2025 21:32)  T(F): 97.4 (07 May 2025 05:09), Max: 97.9 (06 May 2025 21:32)  HR: 73 (07 May 2025 05:00) (73 - 80)  BP: 170/74 (07 May 2025 05:00) (160/70 - 181/85)  BP(mean): 106 (07 May 2025 05:00) (100 - 118)  RR: 20 (07 May 2025 05:00) (18 - 20)  SpO2: 98% (07 May 2025 05:00) (97% - 98%)    Parameters below as of 07 May 2025 05:00  Patient On (Oxygen Delivery Method): room air        PHYSICAL EXAM:  Constitutional: well-appearing, in no apparent respiratory distress  HEENT: anicteric sclera; moist mucous membranes  Neck: supple, no JVD appreciated  Cardiovascular: +S1/S2, regular rate and rhythm, no murmurs appreciated   Respiratory: on RA, clear breath sounds bilaterally. No wheezes, rhonchi or rales   Gastrointestinal: soft, non-tender, non-distended  Extremities: no edema  Neurological: awake and alert; oriented x3        LABS:  CBC Full  -  ( 07 May 2025 06:35 )  WBC Count : 7.47 K/uL  RBC Count : 3.30 M/uL  Hemoglobin : 9.9 g/dL  Hematocrit : 30.6 %  Platelet Count - Automated : 255 K/uL  Mean Cell Volume : 92.7 fl  Mean Cell Hemoglobin : 30.0 pg  Mean Cell Hemoglobin Concentration : 32.4 g/dL  Auto Neutrophil # : x  Auto Lymphocyte # : x  Auto Monocyte # : x  Auto Eosinophil # : x  Auto Basophil # : x  Auto Neutrophil % : x  Auto Lymphocyte % : x  Auto Monocyte % : x  Auto Eosinophil % : x  Auto Basophil % : x    05-07    137  |  106  |  59[H]  ----------------------------<  76  3.9   |  16[L]  |  4.14[H]    Ca    7.6[L]      07 May 2025 06:35  Phos  4.0     05-07  Mg     1.4     05-07    TPro  6.2  /  Alb  3.5  /  TBili  0.4  /  DBili  x   /  AST  59[H]  /  ALT  51[H]  /  AlkPhos  380[H]  05-07    PT/INR - ( 06 May 2025 07:06 )   PT: 12.0 sec;   INR: 1.04          PTT - ( 06 May 2025 07:06 )  PTT:28.1 sec          RADIOLOGY & ADDITIONAL STUDIES:  < from: CT Chest No Cont (05.03.25 @ 18:07) >  ACC: [id number] EXAM:  CT CHEST   ORDERED BY: RYAN PARRA     PROCEDURE DATE:  05/03/2025          INTERPRETATION:  CLINICAL INFORMATION: Right upper lobe consolidation   seen on same day CT angiogram of the neck.    COMPARISON: 10/1/2021    CONTRAST/COMPLICATIONS:  IV Contrast: NONE  Oral Contrast: NONE  .    PROCEDURE:  CT of the Chest was performed.  Sagittal and coronal reformats were performed.    FINDINGS:    LUNGS AND LARGE AIRWAYS: Images are degraded by respiratory motion.   Emphysematous changes. Patent central airways. 5 cm irregular shaped   groundglass and more solid peribronchial opacity in the right upper lobe.   Cluster of nodular branching densities in the left lung apex, likely   related to small airway disease.  PLEURA: No pleural effusion.  VESSELS: Within normal limits.  HEART: Heart size is normal. No pericardial effusion.  MEDIASTINUM AND NICOLÁS: No lymphadenopathy.  CHEST WALL AND LOWER NECK: Within normal limits.  VISUALIZED UPPER ABDOMEN: Left renal cyst partially visualized.  BONES: Mild degenerative changes. No acute fracture.    IMPRESSION:    5 cm infiltrative opacity in the right upper lobe suspicious for   malignancy. Consider PET/CT and tissue sampling.    --- End of Report ---    < end of copied text >

## 2025-05-07 NOTE — PROGRESS NOTE ADULT - PROBLEM SELECTOR PLAN 5
Patient with hx of HTN on hydrochlorthiazide and coreg at home, has not taken home meds in several weeks since starting muscle relaxant and opioid medications. Denies symptoms of lightheadedness, dizziness, nausea or chest pain    BP trend in /78 >  205/80  s/p labetalol 10mg in ED with improvement of blood pressure   - resume home coreg 3.125 BID   - will hold thiazide for now  - goal SBP <160 Patient with hx of HTN on hydrochlorthiazide and coreg at home, has not taken home meds in several weeks since starting muscle relaxant and opioid medications. Denies symptoms of lightheadedness, dizziness, nausea or chest pain    BP trend in /78 >  205/80  s/p labetalol 10mg in ED with improvement of blood pressure   Home mediction: coreg 3.125 BID   - c/w coreg 6.25 BID post-op  - will hold thiazide for now  - goal SBP <160

## 2025-05-07 NOTE — PROGRESS NOTE ADULT - PROBLEM SELECTOR PLAN 4
Patient with 20 pack year smoking history (stopped in 1984) presents after syncopal episode with incidental finding of pulmonary nodule in RUL. Endorses recent loss of appetite, but denies cough, weight loss, prior occupational exposures. c/f for primary lung malignancy   CT Chest: 5 cm infiltrative opacity in the right upper lobe suspicious for malignancy.  Pulmonary following   - pending PET CT and bronchoscopy today   - resume AC s/p procedure Patient with 20 pack year smoking history (stopped in 1984) presents after syncopal episode with incidental finding of pulmonary nodule in RUL. Endorses recent loss of appetite, but denies cough, weight loss, prior occupational exposures. c/f for primary lung malignancy   CT Chest: 5 cm infiltrative opacity in the right upper lobe suspicious for malignancy.  Pulmonary following   PET/CT: performed pending official pathology report  - outpatient pulm follow up

## 2025-05-07 NOTE — PROGRESS NOTE ADULT - PROBLEM SELECTOR PLAN 2
Followed by Dr. Barcenas   Patient suffered mechanical fall several weeks ago and has been following with ortho for planned procedure  Outpatient Lumbar MRI: acute/subacute severe anteriorly wedged compression deformity of the L1 vertebral body with moderate associated retropulsion and severe stenosis.   Per note from 5/1, pending T11-L3 Decompression and posterior instrumented fusion.   Started on cyclobenzaprine 10mg TID as need, oxycodone 5mg and percocet  At [facility]   CT Lumbar Spine: Acute-appearing burst type compression fracture at L1 with severe vertebral body height loss and moderate osseous retropulsion. There is severe associated spinal canal stenosis. Acute right transverse process fractures at L1 and L2. Severe spinal canal stenosis at L4-L5  Ortho following recs appreciated;   - c/w WBAT in TSLO brace   - pending OR this week, pending clearance  - will not resume muscle relaxant or opioid medications as they are likely contributing to patient's worsening instability    - monitor for signs of saddle anesthesia Followed by Dr. Barcenas   Patient suffered mechanical fall several weeks ago and has been following with ortho for planned procedure  Outpatient Lumbar MRI: acute/subacute severe anteriorly wedged compression deformity of the L1 vertebral body with moderate associated retropulsion and severe stenosis.   Per note from 5/1, pending T11-L3 Decompression and posterior instrumented fusion.   Started on cyclobenzaprine 10mg TID as need, oxycodone 5mg and percocet  At [facility]   CT Lumbar Spine: Acute-appearing burst type compression fracture at L1 with severe vertebral body height loss and moderate osseous retropulsion. There is severe associated spinal canal stenosis. Acute right transverse process fractures at L1 and L2. Severe spinal canal stenosis at L4-L5  Ortho following recs appreciated;   - c/w WBAT in TSLO brace   - pending OR 5/8  - will not resume muscle relaxant or opioid medications as they are likely contributing to patient's worsening instability    - monitor for signs of saddle anesthesia

## 2025-05-07 NOTE — PROGRESS NOTE ADULT - ASSESSMENT
79-year-old F with PMH of recent mechanical fall with L1 compression fracture, HTN (on coreg, HCTZ), CKD (stage 3) admitted with syncope and TBI, found to have MARY LOU on CKD and HTN emergency (SBP 200s with MARY LOU) .  Patient reports having used ibuprofen on a daily basis.  Nephrology consulted for MARY LOU on CKD    #Non-oliguric MARY LOU on CKD- Improving   Last know HIE SCr 1.16, GFR 48 (CKD3) on 10/24  On admission (5/3): SCr 5.9, BUN 82, CO2 18, CK 29, no eosinophilia. She received 1L of NS on admission and started on NS maintenance fluids at 100cc/hr, continued on 5/4 and 5/5 in AM   Pickett placed: ?for I/O, no documentation of retention. Renal US on 5/4 with no hydronephrosis normal size kidneys, no echogenicity   5/3: creatinine 5.86-> 5.25 (10pm), 5/4: SCrr 5.05 (in AM), 4.79 (at 8pm) , 5/5: SCr 4.86 in AM  -Urine studies on 5/4: with 8 WBC, but also with bacteria, 32 RBC, no cast. FeNa 2.7 (Intrinsic), FeUrea 46 (Intrinsic), spec grav 1024  -5/6: creatinine 4.45. s/p Bronchoscopy on 5/6, plan for OR with ortho on 5/8     DDx: iso HTN emergency (SBP in 200s in ED required labetalol IV), vs less likely AIN from NSAID use vs unlikely prerenal (Urine studies consistent with Intrinsic etiology, has been on fluids continuously with no drastic change)     Recommendation:  -At current renal function trend there are no renal contraindications to proceeding with surgery   -Ensure appropriate PO hydration given diarrhea   -Monitor BMP daily   -check Ionized Calcium (can check VBG)   -f/u cystatin C   –Strict I/O monitoring    Recommendations are not considered final until attending attestation 79-year-old F with PMH of recent mechanical fall with L1 compression fracture, HTN (on coreg, HCTZ), CKD (stage 3) admitted with syncope and TBI, found to have MARY LOU on CKD and HTN emergency (SBP 200s with MARY LOU) .  Patient reports having used ibuprofen on a daily basis.  Nephrology consulted for MARY LOU on CKD    #Non-oliguric MARY LOU on CKD- Improving   Last know HIE SCr 1.16, GFR 48 (CKD3) on 10/24  On admission (5/3): SCr 5.9, BUN 82, CO2 18, CK 29, no eosinophilia. She received 1L of NS on admission and started on NS maintenance fluids at 100cc/hr, continued on 5/4 and 5/5 in AM   Pickett placed: ?for I/O, no documentation of retention. Renal US on 5/4 with no hydronephrosis normal size kidneys, no echogenicity   5/3: creatinine 5.86-> 5.25 (10pm), 5/4: SCrr 5.05 (in AM), 4.79 (at 8pm) , 5/5: SCr 4.86 in AM  -Urine studies on 5/4: with 8 WBC, but also with bacteria, 32 RBC, no cast. FeNa 2.7 (Intrinsic), FeUrea 46 (Intrinsic), spec grav 1024  -5/6: creatinine 4.45. GFR 10. By Cystatin GFR 15  s/p Bronchoscopy on 5/6, plan for OR with ortho on 5/8   -5/7 SCr 4.14     DDx: iso HTN emergency (SBP in 200s in ED required labetalol IV), vs less likely AIN from NSAID use vs unlikely prerenal (Urine studies consistent with Intrinsic etiology, has been on fluids continuously with no drastic change)     Recommendation:  -At current renal function trend there are no renal contraindications to proceeding with surgery   -Ensure appropriate PO hydration given diarrhea   -Monitor BMP daily   –Strict I/O monitoring    #Hypocalcemia   Noted Since 5/4. However on admission Ca 8.4 borderline low normal. Initially with elevated Phosphorus now normalized   5/7: Ca corrected 8 (albumin 3.5)     Etiology: May be iso initial hyperphosphoremia, unlikely iso CKD as baseline GFR is ~50. May consider further work up if ionized Ca is low       Plan:   -check Ionized Calcium (can check VBG)     Recommendations are not considered final until attending attestation 79-year-old F with PMH of recent mechanical fall with L1 compression fracture, HTN (on coreg, HCTZ), CKD (stage 3) admitted with syncope and TBI, found to have MARY LOU on CKD and HTN emergency (SBP 200s with MARY LOU) .  Patient reports having used ibuprofen on a daily basis.  Nephrology consulted for MARY LOU on CKD    #Non-oliguric MARY LOU on CKD- Improving   Last know HIE SCr 1.16, GFR 48 (CKD3) on 10/24  On admission (5/3): SCr 5.9, BUN 82, CO2 18, CK 29, no eosinophilia. She received 1L of NS on admission and started on NS maintenance fluids at 100cc/hr, continued on 5/4 and 5/5 in AM   Pickett placed: ?for I/O, no documentation of retention. Renal US on 5/4 with no hydronephrosis normal size kidneys, no echogenicity   5/3: creatinine 5.86-> 5.25 (10pm), 5/4: SCrr 5.05 (in AM), 4.79 (at 8pm) , 5/5: SCr 4.86 in AM  -Urine studies on 5/4: with 8 WBC, but also with bacteria, 32 RBC, no cast. FeNa 2.7 (Intrinsic), FeUrea 46 (Intrinsic), spec grav 1024  -5/6: creatinine 4.45. GFR 10. By Cystatin GFR 15  s/p Bronchoscopy on 5/6, plan for OR with ortho on 5/8   -5/7 SCr 4.14     DDx: iso HTN emergency (SBP in 200s in ED required labetalol IV), vs  AIN from NSAID use (less likely) vs prerenal (unlikely since Urine studies consistent with Intrinsic etiology, has been on fluids continuously with no drastic change)     Recommendation:  -At present there are no renal contraindications to proceeding with planned T11-L3 decompression   -Ensure appropriate hydration given new complaints of diarrhea   -Monitor BMP daily   –Strict I/O monitoring    #Hypocalcemia (asymptomatic)  Noted Since 5/4. However on admission Ca 8.4 borderline low normal. Initially with elevated Phosphorus now normalized   5/7: Ca corrected 8 (albumin 3.5)     Etiology: May be iso initial hyperphosphoremia, unlikely iso CKD as baseline GFR is ~50. May consider further work up if ionized Ca is low       Plan:   -check Ionized Calcium (can check VBG)   - check serum PTH     Discussed with primary team

## 2025-05-07 NOTE — PROGRESS NOTE ADULT - PROBLEM SELECTOR PLAN 6
Likely 2/2 to demand ischemia, low concern for ACS given normal EKG and denial of anginal symptoms + hemodynamic stability   Troponin trend 89 (peak) > 85  - obtain TTE  - repeat EKG in AM  - continue to monitor for signs of EKG Likely 2/2 to demand ischemia, low concern for ACS given normal EKG and denial of anginal symptoms + hemodynamic stability   Troponin trend 89 (peak) > 85

## 2025-05-08 ENCOUNTER — APPOINTMENT (OUTPATIENT)
Dept: ORTHOPEDIC SURGERY | Facility: HOSPITAL | Age: 80
End: 2025-05-08

## 2025-05-08 DIAGNOSIS — D80.2 SELECTIVE DEFICIENCY OF IMMUNOGLOBULIN A [IGA]: ICD-10-CM

## 2025-05-08 LAB
ADD ON TEST-SPECIMEN IN LAB: SIGNIFICANT CHANGE UP
ANION GAP SERPL CALC-SCNC: 15 MMOL/L — SIGNIFICANT CHANGE UP (ref 5–17)
BLD GP AB SCN SERPL QL: NEGATIVE — SIGNIFICANT CHANGE UP
BUN SERPL-MCNC: 52 MG/DL — HIGH (ref 7–23)
CALCIUM SERPL-MCNC: 7.8 MG/DL — LOW (ref 8.4–10.5)
CHLORIDE SERPL-SCNC: 105 MMOL/L — SIGNIFICANT CHANGE UP (ref 96–108)
CO2 SERPL-SCNC: 17 MMOL/L — LOW (ref 22–31)
CREAT SERPL-MCNC: 3.73 MG/DL — HIGH (ref 0.5–1.3)
EGFR: 12 ML/MIN/1.73M2 — LOW
EGFR: 12 ML/MIN/1.73M2 — LOW
GLUCOSE SERPL-MCNC: 86 MG/DL — SIGNIFICANT CHANGE UP (ref 70–99)
HCT VFR BLD CALC: 29.2 % — LOW (ref 34.5–45)
HGB BLD-MCNC: 9.7 G/DL — LOW (ref 11.5–15.5)
MAGNESIUM SERPL-MCNC: 1.8 MG/DL — SIGNIFICANT CHANGE UP (ref 1.6–2.6)
MCHC RBC-ENTMCNC: 29.7 PG — SIGNIFICANT CHANGE UP (ref 27–34)
MCHC RBC-ENTMCNC: 33.2 G/DL — SIGNIFICANT CHANGE UP (ref 32–36)
MCV RBC AUTO: 89.3 FL — SIGNIFICANT CHANGE UP (ref 80–100)
NRBC BLD AUTO-RTO: 0 /100 WBCS — SIGNIFICANT CHANGE UP (ref 0–0)
PHOSPHATE SERPL-MCNC: 3.3 MG/DL — SIGNIFICANT CHANGE UP (ref 2.5–4.5)
PLATELET # BLD AUTO: 257 K/UL — SIGNIFICANT CHANGE UP (ref 150–400)
POTASSIUM SERPL-MCNC: 3.6 MMOL/L — SIGNIFICANT CHANGE UP (ref 3.5–5.3)
POTASSIUM SERPL-SCNC: 3.6 MMOL/L — SIGNIFICANT CHANGE UP (ref 3.5–5.3)
RBC # BLD: 3.27 M/UL — LOW (ref 3.8–5.2)
RBC # FLD: 15.9 % — HIGH (ref 10.3–14.5)
RH IG SCN BLD-IMP: POSITIVE — SIGNIFICANT CHANGE UP
SODIUM SERPL-SCNC: 137 MMOL/L — SIGNIFICANT CHANGE UP (ref 135–145)
WBC # BLD: 7.17 K/UL — SIGNIFICANT CHANGE UP (ref 3.8–10.5)
WBC # FLD AUTO: 7.17 K/UL — SIGNIFICANT CHANGE UP (ref 3.8–10.5)

## 2025-05-08 PROCEDURE — 99233 SBSQ HOSP IP/OBS HIGH 50: CPT | Mod: GC

## 2025-05-08 PROCEDURE — 99232 SBSQ HOSP IP/OBS MODERATE 35: CPT

## 2025-05-08 PROCEDURE — 99223 1ST HOSP IP/OBS HIGH 75: CPT

## 2025-05-08 RX ORDER — HEPARIN SODIUM 1000 [USP'U]/ML
5000 INJECTION INTRAVENOUS; SUBCUTANEOUS EVERY 8 HOURS
Refills: 0 | Status: DISCONTINUED | OUTPATIENT
Start: 2025-05-08 | End: 2025-05-09

## 2025-05-08 RX ADMIN — ATORVASTATIN CALCIUM 80 MILLIGRAM(S): 80 TABLET, FILM COATED ORAL at 21:33

## 2025-05-08 RX ADMIN — LOPERAMIDE HCL 2 MILLIGRAM(S): 1 SOLUTION ORAL at 06:17

## 2025-05-08 RX ADMIN — BUPROPION HYDROBROMIDE 300 MILLIGRAM(S): 522 TABLET, EXTENDED RELEASE ORAL at 21:33

## 2025-05-08 RX ADMIN — Medication 1 APPLICATION(S): at 07:17

## 2025-05-08 RX ADMIN — CARVEDILOL 6.25 MILLIGRAM(S): 3.12 TABLET, FILM COATED ORAL at 06:18

## 2025-05-08 RX ADMIN — Medication 1 APPLICATION(S): at 21:33

## 2025-05-08 RX ADMIN — Medication 1 APPLICATION(S): at 06:17

## 2025-05-08 RX ADMIN — LOPERAMIDE HCL 2 MILLIGRAM(S): 1 SOLUTION ORAL at 01:02

## 2025-05-08 RX ADMIN — Medication 75 MICROGRAM(S): at 06:17

## 2025-05-08 RX ADMIN — HEPARIN SODIUM 5000 UNIT(S): 1000 INJECTION INTRAVENOUS; SUBCUTANEOUS at 21:33

## 2025-05-08 RX ADMIN — CARVEDILOL 6.25 MILLIGRAM(S): 3.12 TABLET, FILM COATED ORAL at 15:09

## 2025-05-08 NOTE — CONSULT NOTE ADULT - ATTENDING COMMENTS
Case reviewed with Dr. Salvador Tomas and primary team.  Pt seen at bedside and history taken.   Pt is a 79-year-old F with PMH of recent mechanical fall with L1 compression fracture admitted with syncope and TBI.  Patient reports having used ibuprofen on a daily basis for some time.  Nephrology consulted for MARY LOU.  Agree with fellow's assessment above.   Last know SCr 1 in 10/24  DDx including hemodynamic MARY LOU (prerenal azotemia, ATN), AIN from NSAID use  On admission (5/3): SCr 5.9, BUN 82, CO2 18, CK 29, no eosinophilia  Patient was started on sodium chloride 0.9% infusion at a rate of 100 mL/h  Most recent labs (5/4): SCr 5, BUN 67, phosphorus 5 CO2 18, AGAP 16 | UA: UPC 0.3, blood large, RBC 32  Recommended repeat Urinalysis; obtaining renal ultrasound to rule obstructive uropathy  Trend labs. Avoid nephrotoxic agents. Renally adjust meds.  Renal consult team will follow with you.
Ct scan reviewed and does have worsening nodule that needs biopsy with concern that her fall is related to oncologic metastasis to the brain. Will plan to obtain MRI and PET CT then will plan biopsy as able. Please hold AC and will need cardiology clearance.
79-year-old woman with a 40-pack-year smoking history, quit 20 years ago, who presented after a syncopal event attributed to dehydration, medication effect, and acute kidney injury. During workup, she was found to have an L1 compression fracture following a mechanical fall, now pending orthopedic intervention. 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     So far disease appears localized, although tissue obtained at ortho procedure will rule out pathologic fx due to spinal involvement. If not metastatic, her lung lesion may represent stage II disease, and we will consider curative-intent approaches including resection with perioperative chemotherapy, immunotherapy, and potentially targeted therapy depending on molecular profiling. Circulating tumor DNA has been sent, and we recommend NGS on the lung tumor tissue. We will continue to follow and adjust recommendations based on evolving diagnostic information.
Patient is a 78 yo Female with PMHx of Rheumatic Fever as a child without known Valvular heart disease, Remote Hx of Pericarditis, IgA deficiency HTN, HLD, Hypothyroidism, generalized depression, ADHD and L1 compression fracture 2/2 to recent  fall  (April 2025, wears TLSO brace, scheduled for surgery with Dr. Barcenas this month), admitted after Syncopal episode resulting in traumatic Fall with head trauma, found to have lumbar compression fracture, severe spinal stenosis, Oliguric MARY LOU and incidental lung mass, now pending biopsy. Cardiology is consulted for Pre-Operative CV Risk Assessment and Optimization    REVIEW OF STUDIES  - ECG 5/3/25: NSR, QTc 496.     # Pre-Operative CV Risk Assessment and Optimization  # Syncope  # H/o Rheumatic Fever    - Patient has no known ASCVD. SHe reports Remote Hx of Rheumatic Heart Disease as a child and Pericarditis 10 years ago  - She denies prior MI or CVA. She has never undergone ischemic evaluation.   - She was seen by Dr Murray in 12/2024 and Referred for Echo and Stress Echo. No Echo available for review of File  - Prior to recent falls she reported good functional status, reportedly able to walk over 10-12 blocks and go up 2 flights of stairs without exertional symptoms. Pt reported SOTO after 2 flights of stairs but no angina  - Patient reports she recently was brushing her teeth and recalls waking up with a head fracture with bloody skull. She denies prodromal symptoms and could not recall what precipitated her fall  - ECG reviewed showing NSR without ischemic changes  -At this time, cause of patient's syncopal episode is unclear. Differential includes orthostatic episode vs Vasovagal episode vs Drug induced. Arrhythmogenic syncope is less likely but remains a possibility  - Would consider transferring to [facility] for 24 hours for inpatient monitoring perioperatively   - At this time there are no active CV contraindication to proceeding with lung biopsy. Patient is considered at intermediate risk for low risk procedure  - Would obtain Echo for structural assessment. Echo does not have to be done prior to Biopsy  - Cardiology will continue to follow with you, please call with any questions

## 2025-05-08 NOTE — PROGRESS NOTE ADULT - ASSESSMENT
79-year-old F with PMH of recent mechanical fall with L1 compression fracture, HTN (on coreg, HCTZ), CKD (stage 3) admitted with syncope and TBI, found to have MARY LOU on CKD and HTN emergency (SBP 200s with MARY LOU) .  Patient reports having used ibuprofen on a daily basis.  Nephrology consulted for MARY LOU on CKD    #Non-oliguric MARY LOU on CKD- Improving   Last know HIE SCr 1.16, GFR 48 (CKD3) on 10/24  On admission (5/3): SCr 5.9, BUN 82, CO2 18, CK 29, no eosinophilia. She received 1L of NS on admission and started on NS maintenance fluids at 100cc/hr, continued on 5/4 and 5/5 in AM   Pickett placed: ?for I/O, no documentation of retention. Renal US on 5/4 with no hydronephrosis normal size kidneys, no echogenicity   5/3: creatinine 5.86-> 5.25 (10pm), 5/4: SCrr 5.05 (in AM), 4.79 (at 8pm) , 5/5: SCr 4.86 in AM  -Urine studies on 5/4: with 8 WBC, but also with bacteria, 32 RBC, no cast. FeNa 2.7 (Intrinsic), FeUrea 46 (Intrinsic), spec grav 1024  -5/6: creatinine 4.45. GFR 10. By Cystatin GFR 15  s/p Bronchoscopy on 5/6, plan for OR with ortho on 5/8   -5/8 SCr 3.73     DDx: iso HTN emergency (SBP in 200s in ED required labetalol IV), vs  AIN from NSAID use (less likely) vs prerenal (unlikely since Urine studies consistent with Intrinsic etiology, has been on fluids continuously with no drastic change)     Recommendation:  -At present there are no renal contraindications to proceeding with planned T11-L3 decompression   -Ensure appropriate PO hydration  -Monitor BMP daily   –Strict I/O monitoring  -Recommend maintaining MAP > 65 perioperatively.   -Avoid NSAIDs for pain control perioperatively    #Mild Hypocalcemia (asymptomatic)  -No known History of Hypocalcemia or Vit D deficiency. In Oct 2024 Ca 9.4  -Had hx of osteoporosis treated with Infusions (does not know name), last dose >15 years ago, was told osteoporosis had resolved    On admission, Ca 8.8 wnl, s/p NS administration Ca dropped. On 5/4: Ca 8.2->7.8 (corrected for albumin), while on NS ar 100cc/hr, Ca was 7.7 (lowest value) on 5/5. Since stopping the NS fluids Ca uptrended and stabilized to 8-8.2. Initially with elevated Phosphorus (5.6->5) now normalized   5/7: Ionized Ca 1.03   5/8: Ca corrected 8.2 (albumin 3.5)     Etiology: Given History of severe IgA deficiency suspect Vit D deficiency iso malabsorption. Low concern for CKD as primary etiology (given GFR>15 at baseline)     Plan:    - check serum PTH   -check Vitamin D 25OH and Vitamin D 1.25OH    Recommendations are not considered final until attending attestation      79-year-old F with PMH of recent mechanical fall with L1 compression fracture, HTN (on coreg, HCTZ), CKD (stage 3) admitted with syncope and TBI, found to have MARY LOU on CKD and HTN emergency (SBP 200s with MARY LOU) .  Patient reports having used ibuprofen on a daily basis.  Nephrology consulted for MARY LOU on CKD    #Non-oliguric MARY LOU on CKD- Improving   Last know HIE SCr 1.16, GFR 48 (CKD3) on 10/24  On admission (5/3): SCr 5.9, BUN 82, CO2 18, CK 29, no eosinophilia. She received 1L of NS on admission and started on NS maintenance fluids at 100cc/hr, continued on 5/4 and 5/5 in AM   Pickett placed: ?for I/O, no documentation of retention. Renal US on 5/4 with no hydronephrosis normal size kidneys, no echogenicity   5/3: creatinine 5.86-> 5.25 (10pm), 5/4: SCrr 5.05 (in AM), 4.79 (at 8pm) , 5/5: SCr 4.86 in AM  -Urine studies on 5/4: with 8 WBC, but also with bacteria, 32 RBC, no cast. FeNa 2.7 (Intrinsic), FeUrea 46 (Intrinsic), spec grav 1024  -5/6: creatinine 4.45. GFR 10. By Cystatin GFR 15  s/p Bronchoscopy on 5/6, plan for OR with ortho on 5/8   -5/8 SCr 3.73     DDx: iso HTN emergency (SBP in 200s in ED required labetalol IV), vs  AIN from NSAID use (less likely) vs prerenal (unlikely since Urine studies consistent with Intrinsic etiology, has been on fluids continuously with no drastic change)     Recommendation:  -At present there are no renal contraindications to proceeding with planned T11-L3 decompression   -Ensure appropriate PO hydration  -Monitor BMP daily   –Strict I/O monitoring  -Recommend maintaining MAP > 65 perioperatively.   -Avoid NSAIDs for pain control perioperatively    #Mild Hypocalcemia (asymptomatic)  -No known History of Hypocalcemia or Vit D deficiency. In Oct 2024 Ca 9.4  -Had hx of osteoporosis treated with Infusions (does not know name), last dose >15 years ago, was told osteoporosis had resolved    On admission, Ca 8.8 wnl, s/p NS administration Ca dropped. On 5/4: Ca 8.2->7.8 (corrected for albumin), while on NS ar 100cc/hr, Ca was 7.7 (lowest value) on 5/5. Since stopping the NS fluids Ca uptrended and stabilized to 8-8.2. Initially with elevated Phosphorus (5.6->5) now normalized   5/7: Ionized Ca 1.03   5/8: Ca corrected 8.2 (albumin 3.5)     Etiology: Given History of severe IgA deficiency suspect Vit D deficiency iso malabsorption. Low concern for CKD as primary etiology (given GFR>15 at baseline)     Plan:    - check serum PTH   -check Vitamin D 25OH and Vitamin D 1.25OH

## 2025-05-08 NOTE — PROGRESS NOTE ADULT - SUBJECTIVE AND OBJECTIVE BOX
OVERNIGHT EVENTS: GINA    SUBJECTIVE:  Patient seen and examined at bedside. She reports she feels well, the diarrhea has resolved. Denies paresthesias, nausea, vomiting, diarrhea, dizziness, headaches.     Vital Signs Last 12 Hrs  T(F): 97.8 (05-08-25 @ 09:23), Max: 97.8 (05-08-25 @ 09:23)  HR: 65 (05-08-25 @ 08:39) (65 - 69)  BP: 163/67 (05-08-25 @ 08:39) (134/59 - 163/67)  BP(mean): 96 (05-08-25 @ 08:39) (85 - 96)  RR: 18 (05-08-25 @ 08:39) (16 - 18)  SpO2: 98% (05-08-25 @ 08:39) (98% - 98%)  I&O's Summary    07 May 2025 07:01  -  08 May 2025 07:00  --------------------------------------------------------  IN: 50 mL / OUT: 0 mL / NET: 50 mL    08 May 2025 07:01  -  08 May 2025 11:07  --------------------------------------------------------  IN: 0 mL / OUT: 0 mL / NET: 0 mL        PHYSICAL EXAM:  Constitutional: NAD, comfortable in bed.  HEENT: NC/AT, PERRLA, EOMI, no conjunctival pallor or scleral icterus, MMM  Neck: Supple, no JVD  Respiratory: CTA B/L. No w/r/r.   Cardiovascular: RRR, normal S1 and S2, no m/r/g.   Gastrointestinal: +BS, soft NTND, no guarding or rebound tenderness, no palpable masses   Extremities: wwp; no cyanosis, clubbing or edema.    Neurological: AAOx3, strength 4/5 in UE and LE makenzie. no asterixis, + noted dysarthria. -Chvostek sign  Skin: No gross skin abnormalities or rashes        LABS:                        9.7    7.17  )-----------( 257      ( 08 May 2025 05:30 )             29.2     05-08    137  |  105  |  52[H]  ----------------------------<  86  3.6   |  17[L]  |  3.73[H]    Ca    7.8[L]      08 May 2025 05:30  Phos  3.3     05-08  Mg     1.8     05-08    TPro  6.2  /  Alb  3.5  /  TBili  0.4  /  DBili  x   /  AST  59[H]  /  ALT  51[H]  /  AlkPhos  380[H]  05-07      Urinalysis Basic - ( 08 May 2025 05:30 )    Color: x / Appearance: x / SG: x / pH: x  Gluc: 86 mg/dL / Ketone: x  / Bili: x / Urobili: x   Blood: x / Protein: x / Nitrite: x   Leuk Esterase: x / RBC: x / WBC x   Sq Epi: x / Non Sq Epi: x / Bacteria: x          RADIOLOGY & ADDITIONAL TESTS:    MEDICATIONS  (STANDING):  atorvastatin 80 milliGRAM(s) Oral at bedtime  buPROPion XL (24-Hour) . 300 milliGRAM(s) Oral daily  carvedilol 6.25 milliGRAM(s) Oral every 12 hours  chlorhexidine 2% Cloths 1 Application(s) Topical two times a day  dextrose 5%. 1000 milliLiter(s) (100 mL/Hr) IV Continuous <Continuous>  dextrose 5%. 1000 milliLiter(s) (50 mL/Hr) IV Continuous <Continuous>  dextrose 50% Injectable 25 Gram(s) IV Push once  dextrose 50% Injectable 12.5 Gram(s) IV Push once  dextrose 50% Injectable 25 Gram(s) IV Push once  glucagon  Injectable 1 milliGRAM(s) IntraMuscular once  levothyroxine 75 MICROGram(s) Oral daily    MEDICATIONS  (PRN):  acetaminophen     Tablet .. 650 milliGRAM(s) Oral every 6 hours PRN Temp greater or equal to 38C (100.4F), Mild Pain (1 - 3)  dextrose Oral Gel 15 Gram(s) Oral once PRN Blood Glucose LESS THAN 70 milliGRAM(s)/deciliter  loperamide 2 milliGRAM(s) Oral every 4 hours PRN Diarrhea  melatonin 3 milliGRAM(s) Oral at bedtime PRN Insomnia

## 2025-05-08 NOTE — PROGRESS NOTE ADULT - PROBLEM SELECTOR PLAN 2
Followed by Dr. Barcenas   Patient suffered mechanical fall several weeks ago and has been following with ortho for planned procedure  Outpatient Lumbar MRI: acute/subacute severe anteriorly wedged compression deformity of the L1 vertebral body with moderate associated retropulsion and severe stenosis.   Per note from 5/1, pending T11-L3 Decompression and posterior instrumented fusion.   Started on cyclobenzaprine 10mg TID as need, oxycodone 5mg and percocet  At [facility]   CT Lumbar Spine: Acute-appearing burst type compression fracture at L1 with severe vertebral body height loss and moderate osseous retropulsion. There is severe associated spinal canal stenosis. Acute right transverse process fractures at L1 and L2. Severe spinal canal stenosis at L4-L5  Ortho following recs appreciated;   - c/w WBAT in TSLO brace   - pending OR 5/8  - will not resume muscle relaxant or opioid medications as they are likely contributing to patient's worsening instability    - monitor for signs of saddle anesthesia Patient suffered mechanical fall several weeks ago and has been following with ortho for planned procedure  Outpatient Lumbar MRI: acute/subacute severe anteriorly wedged compression deformity of the L1 vertebral body with moderate associated retropulsion and severe stenosis.   Started on cyclobenzaprine 10mg TID as need, oxycodone 5mg and percocet  At [facility]   CT Lumbar Spine: Acute-appearing burst type compression fracture at L1 with severe vertebral body height loss and moderate osseous retropulsion. There is severe associated spinal canal stenosis. Acute right transverse process fractures at L1 and L2. Severe spinal canal stenosis at L4-L5  Followed by Dr. Barcenas; pending T11-L3 Decompression and posterior instrumented fusion.   Ortho following recs appreciated;   - c/w WBAT in TSLO brace   - surgery deferred today pending workup of suspected IgA deficiency; rescheduled for 5/9  - plan to order/prepare IgA acceptable blood products for 5/9 surgery as a contingency if immunoglobulin panel does not result or returns positive   - NPO at 7am on 5/9; discussed request of biopsy to evaluate for metastasis   - will not resume muscle relaxant or opioid medications as they are likely contributing to patient's worsening instability    - monitor for signs of saddle anesthesia

## 2025-05-08 NOTE — PROGRESS NOTE ADULT - ASSESSMENT
Patient is 79F with PMHx of HTN, HLD,  hypothyroidism, generalized depression, ADHD rheumatic fever, pericarditis with recent  L1 compression fracture 2/2 mechanical fall (pending  orthopedic intervention with Dr. Barcenas),  presents after unwitnessed syncopal event and head trauma likely 2/2 to medication side effect vs dehydration complicated by MARY LOU, with incidental finding of RUL opacity c/f malignancy  Patient is 79F with PMHx of HTN, HLD,  hypothyroidism, generalized depression, ADHD rheumatic fever, pericarditis with recent  L1 compression fracture 2/2 mechanical fall (pending  orthopedic intervention with Dr. Barcenas),  presents after unwitnessed syncopal event and head trauma likely 2/2 to medication side effect vs dehydration complicated by MARY LOU, with incidental finding of RUL opacity c/f malignancy now s/p bronchoscopy pending surgical intervention with orthopedic surgery but complicated by suspicion of history of IgA deficiency

## 2025-05-08 NOTE — PROGRESS NOTE ADULT - PROBLEM SELECTOR PLAN 6
Likely 2/2 to demand ischemia, low concern for ACS given normal EKG and denial of anginal symptoms + hemodynamic stability   Troponin trend 89 (peak) > 85 Patient with hx of HTN on hydrochlorthiazide and coreg at home, has not taken home meds in several weeks since starting muscle relaxant and opioid medications. Denies symptoms of lightheadedness, dizziness, nausea or chest pain    BP trend in /78 >  205/80  s/p labetalol 10mg in ED with improvement of blood pressure   Home mediction: coreg 3.125 BID   - c/w coreg 6.25 BID post-op  - will hold thiazide for now  - goal SBP <160

## 2025-05-08 NOTE — PROGRESS NOTE ADULT - PROBLEM SELECTOR PLAN 7
Patient shares prior hx of rheumatic fever when she was [age] years old but denies residual valvulopathy.   Seen by Dr. Murray in December with instruction to undergo stress test and TTE but no record of imaging. EKG normal sinus rhythm, no murmurs auscultated on physical exam   TTE w.o valvulopathy; EF 70% Likely 2/2 to demand ischemia, low concern for ACS given normal EKG and denial of anginal symptoms + hemodynamic stability   Troponin trend 89 (peak) > 85

## 2025-05-08 NOTE — PROGRESS NOTE ADULT - SUBJECTIVE AND OBJECTIVE BOX
**INCOMPLETE NOTE    INTERVAL/OVERNIGHT EVENTS: None    SUBJECTIVE:  Patient seen and examined at bedside, comfortable, NAD. Denied fever, chest pain, dyspnea, abdominal pain.     Vital Signs Last 12 Hrs  T(F): 97.5 (05-08-25 @ 05:19), Max: 97.9 (05-07-25 @ 22:09)  HR: 69 (05-08-25 @ 04:15) (69 - 93)  BP: 134/59 (05-08-25 @ 04:15) (134/59 - 159/70)  BP(mean): 85 (05-08-25 @ 04:15) (85 - 100)  RR: 16 (05-08-25 @ 04:15) (16 - 16)  SpO2: 98% (05-08-25 @ 04:15) (98% - 100%)  I&O's Summary    07 May 2025 07:01  -  08 May 2025 07:00  --------------------------------------------------------  IN: 50 mL / OUT: 0 mL / NET: 50 mL        PHYSICAL EXAM:  General: NAD  HEENT: PERRL, EOM intact, sclera anicteric, MMM  Cardiovascular: RRR; no MRG; no JVD  Respiratory: CTAB; no WRR  GI/: soft; NTND; BS x4  Extremities: WWP; 2+ peripheral pulses bilaterally; no LE edema  Skin: normal color & turgor; no rash  Neurologic: aox3; no focal deficits    LABS:                        9.7    7.17  )-----------( 257      ( 08 May 2025 05:30 )             29.2     05-08    137  |  105  |  52[H]  ----------------------------<  86  3.6   |  17[L]  |  3.73[H]    Ca    7.8[L]      08 May 2025 05:30  Phos  3.3     05-08  Mg     1.8     05-08    TPro  6.2  /  Alb  3.5  /  TBili  0.4  /  DBili  x   /  AST  59[H]  /  ALT  51[H]  /  AlkPhos  380[H]  05-07      Urinalysis Basic - ( 08 May 2025 05:30 )    Color: x / Appearance: x / SG: x / pH: x  Gluc: 86 mg/dL / Ketone: x  / Bili: x / Urobili: x   Blood: x / Protein: x / Nitrite: x   Leuk Esterase: x / RBC: x / WBC x   Sq Epi: x / Non Sq Epi: x / Bacteria: x          RADIOLOGY & ADDITIONAL TESTS:    MEDICATIONS  (STANDING):  atorvastatin 80 milliGRAM(s) Oral at bedtime  buPROPion XL (24-Hour) . 300 milliGRAM(s) Oral daily  carvedilol 6.25 milliGRAM(s) Oral every 12 hours  chlorhexidine 2% Cloths 1 Application(s) Topical two times a day  dextrose 5%. 1000 milliLiter(s) (100 mL/Hr) IV Continuous <Continuous>  dextrose 5%. 1000 milliLiter(s) (50 mL/Hr) IV Continuous <Continuous>  dextrose 50% Injectable 25 Gram(s) IV Push once  dextrose 50% Injectable 12.5 Gram(s) IV Push once  dextrose 50% Injectable 25 Gram(s) IV Push once  glucagon  Injectable 1 milliGRAM(s) IntraMuscular once  levothyroxine 75 MICROGram(s) Oral daily    MEDICATIONS  (PRN):  acetaminophen     Tablet .. 650 milliGRAM(s) Oral every 6 hours PRN Temp greater or equal to 38C (100.4F), Mild Pain (1 - 3)  dextrose Oral Gel 15 Gram(s) Oral once PRN Blood Glucose LESS THAN 70 milliGRAM(s)/deciliter  loperamide 2 milliGRAM(s) Oral every 4 hours PRN Diarrhea  melatonin 3 milliGRAM(s) Oral at bedtime PRN Insomnia   Hospital Course:   Patient is 79F with PMHx of HTN, HLD,  hypothyroidism, generalized depression, ADHD rheumatic fever, pericarditis with recent  L1 compression fracture 2/2 mechanical fall (pending  orthopedic intervention with Dr. Barcenas),  presents after unwitnessed syncopal event and head trauma likely 2/2 to medication side effect vs dehydration complicated by MARY LOU, with incidental finding of RUL opacity c/f malignancy now s/p bronchoscopy (pending pathology report) pending surgical intervention with orthopedic surgery but complicated by suspicion of history of IgA deficiency. Heme onc consulted, blood bank aware, immunoglobulin panel sent. Plan for ortho procedure 5/10 AM, see contingency plan below    INTERVAL/OVERNIGHT EVENTS: None    SUBJECTIVE:  Patient seen and examined at bedside, comfortable, NAD. Denied fever, chest pain, dyspnea, abdominal pain.     Vital Signs Last 12 Hrs  T(F): 97.5 (05-08-25 @ 05:19), Max: 97.9 (05-07-25 @ 22:09)  HR: 69 (05-08-25 @ 04:15) (69 - 93)  BP: 134/59 (05-08-25 @ 04:15) (134/59 - 159/70)  BP(mean): 85 (05-08-25 @ 04:15) (85 - 100)  RR: 16 (05-08-25 @ 04:15) (16 - 16)  SpO2: 98% (05-08-25 @ 04:15) (98% - 100%)  I&O's Summary    07 May 2025 07:01  -  08 May 2025 07:00  --------------------------------------------------------  IN: 50 mL / OUT: 0 mL / NET: 50 mL        PHYSICAL EXAM:  General: NAD  HEENT: PERRL, EOM intact, sclera anicteric, MMM  Cardiovascular: RRR; no MRG; no JVD  Respiratory: CTAB; no WRR  GI/: soft; NTND; BS x4  Extremities: WWP; 2+ peripheral pulses bilaterally; no LE edema  Skin: normal color & turgor; no rash  Neurologic: aox3; no focal deficits    LABS:                        9.7    7.17  )-----------( 257      ( 08 May 2025 05:30 )             29.2     05-08    137  |  105  |  52[H]  ----------------------------<  86  3.6   |  17[L]  |  3.73[H]    Ca    7.8[L]      08 May 2025 05:30  Phos  3.3     05-08  Mg     1.8     05-08    TPro  6.2  /  Alb  3.5  /  TBili  0.4  /  DBili  x   /  AST  59[H]  /  ALT  51[H]  /  AlkPhos  380[H]  05-07      Urinalysis Basic - ( 08 May 2025 05:30 )    Color: x / Appearance: x / SG: x / pH: x  Gluc: 86 mg/dL / Ketone: x  / Bili: x / Urobili: x   Blood: x / Protein: x / Nitrite: x   Leuk Esterase: x / RBC: x / WBC x   Sq Epi: x / Non Sq Epi: x / Bacteria: x          RADIOLOGY & ADDITIONAL TESTS:    MEDICATIONS  (STANDING):  atorvastatin 80 milliGRAM(s) Oral at bedtime  buPROPion XL (24-Hour) . 300 milliGRAM(s) Oral daily  carvedilol 6.25 milliGRAM(s) Oral every 12 hours  chlorhexidine 2% Cloths 1 Application(s) Topical two times a day  dextrose 5%. 1000 milliLiter(s) (100 mL/Hr) IV Continuous <Continuous>  dextrose 5%. 1000 milliLiter(s) (50 mL/Hr) IV Continuous <Continuous>  dextrose 50% Injectable 25 Gram(s) IV Push once  dextrose 50% Injectable 12.5 Gram(s) IV Push once  dextrose 50% Injectable 25 Gram(s) IV Push once  glucagon  Injectable 1 milliGRAM(s) IntraMuscular once  levothyroxine 75 MICROGram(s) Oral daily    MEDICATIONS  (PRN):  acetaminophen     Tablet .. 650 milliGRAM(s) Oral every 6 hours PRN Temp greater or equal to 38C (100.4F), Mild Pain (1 - 3)  dextrose Oral Gel 15 Gram(s) Oral once PRN Blood Glucose LESS THAN 70 milliGRAM(s)/deciliter  loperamide 2 milliGRAM(s) Oral every 4 hours PRN Diarrhea  melatonin 3 milliGRAM(s) Oral at bedtime PRN Insomnia

## 2025-05-08 NOTE — PROGRESS NOTE ADULT - PROBLEM SELECTOR PLAN 11
F: nonw  E: Replete PRN  N: Regular   DVT ppx: holding   GI ppx: None  Bowel: Not indicated   Dispo: F     FULL CODE Home medication: Levothyroxine Sodium 75 MCG Oral; TSH 1.050  - resume home medication

## 2025-05-08 NOTE — PROGRESS NOTE ADULT - PROBLEM SELECTOR PLAN 1
#Syncope   May be medication induced BEERS (recently started cyclobenzaprine, opiods  vs orthostatic iso poor oral intake less likely arrhythmogenic given normal EKG however given patient's history of rheumatic fever and pericarditis would  perform limited TTE   Stroke imaging negative, no signs of facial droop or dysarthria on repeat exam; MR Brain unremarkable  s/p scalp laceration repair in ED with three staples; UA: mod leuk esterase, nitrite neg with * WBC; monitoring of abx  as patient is asymptomatic  TTE: EF 70% without significant valvular disease   - hold off  orthostatics for now    - transfer to hospitalist tele   - #Syncope   May be medication induced BEERS (recently started cyclobenzaprine, opiods  vs orthostatic iso poor oral intake less likely arrhythmogenic given normal EKG however given patient's history of rheumatic fever and pericarditis would  perform limited TTE   Stroke imaging negative, no signs of facial droop or dysarthria on repeat exam; MR Brain unremarkable  s/p scalp laceration repair in ED with three staples; UA: mod leuk esterase, nitrite neg with * WBC; monitoring of abx  as patient is asymptomatic  Cardiology team following; TTE: EF 70% without significant valvular disease   - hold off  orthostatics for now    - transfer to hospitalist tele   -

## 2025-05-08 NOTE — PROGRESS NOTE ADULT - PROBLEM SELECTOR PLAN 3
#AGMA  Baseline Cr: upon admission BUN/Cr  82/5.86  Likely iso of dehydration as patient shares she has had diminished oral intake 2/2 dry mouth  s/p IL NS in ED and NaHCO3 tables;  renal ultrasound; unremarkable   FENa: 2.4 instrinsic eitology likely ATN iso NSAID use  - pending cystain C  - avoid nephrotoxic medication  - trend Cr daily  - strict I/O Patient noted to be wearing "severe IgA deficiency bracelet" on 5/8. States she was diagnosed by an endocrinologist several decades ago however her PCP has low suspicion of this. Patient and family member deny prior history of blood transfusions or anaphylaxis.   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 States she was diagnosed by an endocrinologist several decades ago however her PCP has low suspicion of this. Patient and family member deny prior history of blood transfusions or anaphylaxis.   Orthopedic surgery, blood bank and hematology aware  - Planned 5/8 procedure deferred, recheduled for 5/10 AM  - will send immunoglobulin panel today for diagnostic testing (IgA less than 7 with normal IgG and IgM levels)  - Per core lab this is a send out test that can take 1-3 days to result but will attempt to expedite  - plan to order/prepare IgA acceptable blood products for 5/10 surgery as a contingency if immunoglobulin panel does not result or returns positive

## 2025-05-08 NOTE — PROGRESS NOTE ADULT - ASSESSMENT
I M    79F with PMHx of HTN, HLD,  hypothyroidism, generalized depression, ADHD rheumatic fever, pericarditis with recent  L1 compression fracture 2/2 mechanical fall (pending  orthopedic intervention with Dr. Barcenas),  presents after unwitnessed syncopal event and head trauma likely 2/2 to medication side effect vs dehydration complicated by MARY LOU, with incidental finding of RUL opacity c/f malignancy       Nutritional Assessment:  · Nutritional Assessment  This patient has been assessed with a concern for Malnutrition and has been determined to have a diagnosis/diagnoses of Moderate protein-calorie malnutrition.    This patient is being managed with:   Diet NPO-  NPO for Procedure/Test     NPO Start Date: 01-May-2025   NPO Start Time: 07:00  Entered: May  8 2025  6:07AM    Problem/Plan - 1:  ·  Problem: Unwitnessed fall.   ·  Plan: #Syncope   May be medication induced BEERS (recently started cyclobenzaprine, opiods  vs orthostatic iso poor oral intake less likely arrhythmogenic given normal EKG however given patient's history of rheumatic fever and pericarditis would  perform limited TTE   Stroke imaging negative, no signs of facial droop or dysarthria on repeat exam; MR Brain unremarkable  s/p scalp laceration repair in ED with three staples; UA: mod leuk esterase, nitrite neg with * WBC; monitoring of abx  as patient is asymptomatic  TTE: EF 70% without significant valvular disease   - hold off  orthostatics for now    - transfer to hospitalist tele   -.    Problem/Plan - 2:  ·  Problem: Lumbar compression fracture.   ·  Plan: Followed by Dr. Barcenas   Patient suffered mechanical fall several weeks ago and has been following with ortho for planned procedure  Outpatient Lumbar MRI: acute/subacute severe anteriorly wedged compression deformity of the L1 vertebral body with moderate associated retropulsion and severe stenosis.   Per note from 5/1, pending T11-L3 Decompression and posterior instrumented fusion.   Started on cyclobenzaprine 10mg TID as need, oxycodone 5mg and percocet  At [facility]   CT Lumbar Spine: Acute-appearing burst type compression fracture at L1 with severe vertebral body height loss and moderate osseous retropulsion. There is severe associated spinal canal stenosis. Acute right transverse process fractures at L1 and L2. Severe spinal canal stenosis at L4-L5  Ortho following recs appreciated;   - c/w WBAT in TSLO brace   - pending OR 5/8  - will not resume muscle relaxant or opioid medications as they are likely contributing to patient's worsening instability    - monitor for signs of saddle anesthesia.    Problem/Plan - 3:  ·  Problem: MARY LOU (acute kidney injury).   ·  Plan: #AGMA  Baseline Cr: upon admission BUN/Cr  82/5.86  Likely iso of dehydration as patient shares she has had diminished oral intake 2/2 dry mouth  s/p IL NS in ED and NaHCO3 tables;  renal ultrasound; unremarkable   FENa: 2.4 instrinsic eitology likely ATN iso NSAID use  - pending cystain C  - avoid nephrotoxic medication  - trend Cr daily  - strict I/O.    Problem/Plan - 4:  ·  Problem: Lung nodule.   ·  Plan: Patient with 20 pack year smoking history (stopped in 1984) presents after syncopal episode with incidental finding of pulmonary nodule in RUL. Endorses recent loss of appetite, but denies cough, weight loss, prior occupational exposures. c/f for primary lung malignancy   CT Chest: 5 cm infiltrative opacity in the right upper lobe suspicious for malignancy.  Pulmonary following   PET/CT: performed pending official pathology report  - outpatient pulm follow up.    Problem/Plan - 5:  ·  Problem: Hypertension.   ·  Plan: Patient with hx of HTN on hydrochlorthiazide and coreg at home, has not taken home meds in several weeks since starting muscle relaxant and opioid medications. Denies symptoms of lightheadedness, dizziness, nausea or chest pain    BP trend in /78 >  205/80  s/p labetalol 10mg in ED with improvement of blood pressure   Home mediction: coreg 3.125 BID   - c/w coreg 6.25 BID post-op  - will hold thiazide for now  - goal SBP <160.    Problem/Plan - 6:  ·  Problem: Elevated troponin.   ·  Plan: Likely 2/2 to demand ischemia, low concern for ACS given normal EKG and denial of anginal symptoms + hemodynamic stability   Troponin trend 89 (peak) > 85.    Problem/Plan - 7:  ·  Problem: History of rheumatic fever.   ·  Plan: Patient shares prior hx of rheumatic fever when she was [age] years old but denies residual valvulopathy.   Seen by Dr. Murray in December with instruction to undergo stress test and TTE but no record of imaging. EKG normal sinus rhythm, no murmurs auscultated on physical exam   TTE w.o valvulopathy; EF 70%.    Problem/Plan - 8:  ·  Problem: Major depression.   ·  Plan: Home medication bupropion 450mg qd per last note from PCP  -c/w wellbutrin 300 given elevated qtc    #ADHD  Lisdexamfetamine Dimesylate  30 MG Oral Capsule  - hold for now.    Problem/Plan - 9:  ·  Problem: Hyperlipidemia.   ·  Plan: Home medication rosuvastatin 5mg   Lipid panel wnl; A1c 5.6  - initiate atorvastatin 80mg qd.    Problem/Plan - 10:  ·  Problem: Hypothyroidism.   ·  Plan; Home medication: Levothyroxine Sodium 75 MCG Oral; TSH 1.050  - resume home medication.    Problem/Plan - 11:  ·  Problem: Prophylactic measure.   ·  Plan: F: nonw  E: Replete PRN  N: Regular   DVT ppx: holding   GI ppx: None  Bowel: Not indicated   Dispo: [facility]     FULL CODE.

## 2025-05-08 NOTE — PROGRESS NOTE ADULT - PROBLEM SELECTOR PLAN 9
Home medication rosuvastatin 5mg   Lipid panel wnl; A1c 5.6  - initiate atorvastatin 80mg qd Home medication bupropion 450mg qd per last note from PCP  -c/w wellbutrin 300 given elevated qtc    #ADHD  Lisdexamfetamine Dimesylate  30 MG Oral Capsule  - hold for now

## 2025-05-08 NOTE — PROGRESS NOTE ADULT - PROBLEM SELECTOR PLAN 8
Home medication bupropion 450mg qd per last note from PCP  -c/w wellbutrin 300 given elevated qtc    #ADHD  Lisdexamfetamine Dimesylate  30 MG Oral Capsule  - hold for now Patient shares prior hx of rheumatic fever when she was [age] years old but denies residual valvulopathy.   Seen by Dr. Murray in December with instruction to undergo stress test and TTE but no record of imaging. EKG normal sinus rhythm, no murmurs auscultated on physical exam   TTE w.o valvulopathy; EF 70%

## 2025-05-08 NOTE — PROGRESS NOTE ADULT - PROBLEM SELECTOR PLAN 10
Home medication: Levothyroxine Sodium 75 MCG Oral; TSH 1.050  - resume home medication Home medication rosuvastatin 5mg   Lipid panel wnl; A1c 5.6  - initiate atorvastatin 80mg qd

## 2025-05-08 NOTE — PROGRESS NOTE ADULT - SUBJECTIVE AND OBJECTIVE BOX
Ortho Note    Pt comfortable without complaints, pain controlled  Denies CP, SOB, N/V, numbness/tingling     Vital Signs Last 24 Hrs  T(C): 36.4 (05-08-25 @ 05:19), Max: 36.4 (05-08-25 @ 05:19)  T(F): 97.5 (05-08-25 @ 05:19), Max: 97.5 (05-08-25 @ 05:19)  HR: 69 (05-08-25 @ 04:15) (69 - 69)  BP: 134/59 (05-08-25 @ 04:15) (134/59 - 134/59)  BP(mean): 85 (05-08-25 @ 04:15) (85 - 85)  RR: 16 (05-08-25 @ 04:15) (16 - 16)  SpO2: 98% (05-08-25 @ 04:15) (98% - 98%)  I&O's Summary    07 May 2025 07:01  -  08 May 2025 07:00  --------------------------------------------------------  IN: 50 mL / OUT: 0 mL / NET: 50 mL        General: Pt Alert and oriented, NAD  Pulses: 2+  Sensation: SILT  Motor: 5/5 Quad/Ham/EHL/FHL/TA/GS                          9.7    7.17  )-----------( 257      ( 08 May 2025 05:30 )             29.2     05-08    137  |  105  |  52[H]  ----------------------------<  86  3.6   |  17[L]  |  3.73[H]    Ca    7.8[L]      08 May 2025 05:30  Phos  3.3     05-08  Mg     1.8     05-08    TPro  6.2  /  Alb  3.5  /  TBili  0.4  /  DBili  x   /  AST  59[H]  /  ALT  51[H]  /  AlkPhos  380[H]  05-07      A/P: 79yFemale with L1 compression fx, planning for OR today at 4PM  - Stable  - Pain Control  - NPO @8AM, plan for OR today at 4PM    Ortho Pager [phone]

## 2025-05-08 NOTE — PROGRESS NOTE ADULT - PROBLEM SELECTOR PLAN 4
Patient with 20 pack year smoking history (stopped in 1984) presents after syncopal episode with incidental finding of pulmonary nodule in RUL. Endorses recent loss of appetite, but denies cough, weight loss, prior occupational exposures. c/f for primary lung malignancy   CT Chest: 5 cm infiltrative opacity in the right upper lobe suspicious for malignancy.  Pulmonary following   PET/CT: performed pending official pathology report  - outpatient pulm follow up

## 2025-05-08 NOTE — PROGRESS NOTE ADULT - SUBJECTIVE AND OBJECTIVE BOX
"    [facility] Medicine Services  HISTORY AND PHYSICAL    Date of Admission: 2020  Primary Care Physician: Jovi Arita Jr., MD    Subjective     Chief Complaint:   Nausea, vomiting, diarrhea    History of Present Illness    This 64-year-old white female was admitted with a chief complaint of nausea, vomiting and diarrhea.  The patient's symptoms have been present for the past 3 days.  She states that she has had vomiting and diarrhea at least 20 times over that period.  Patient has had no nausea or vomiting today but feels \"terrible\".  She presented to the emergency department for further evaluation and treatment.    Urine drug screen is positive for THC and opiates.  Specific gravity is greater than 1.030 with 31-50 RBCs and no bacteria.  Troponin and COVID-19 screening negative.  Lactate elevated at 3.0.  Glucose 178, sodium 134, white blood cell count 22,100 (likely reactive).  Lipase is negative.  CT scan of the abdomen pelvis shows no acute intra-abdominal pathology.    Review of Systems   Constitutional: Positive for appetite change.   HENT: Negative.    Eyes: Negative.    Respiratory: Negative.    Cardiovascular: Negative.    Gastrointestinal: Positive for abdominal pain, diarrhea and vomiting.   Endocrine: Negative.    Genitourinary: Negative.    Musculoskeletal: Negative.    Allergic/Immunologic: Negative.    Neurological: Positive for weakness.   Hematological: Negative.    Psychiatric/Behavioral: Negative.         Otherwise complete ROS reviewed and negative except as mentioned in the HPI.      Past Medical History:     Past Medical History:   Diagnosis Date   • Arthritis    • COPD (chronic obstructive pulmonary disease) (CMS/[facility])    • GERD (gastroesophageal reflux disease)        Past Surgical History:  Past Surgical History:   Procedure Laterality Date   • ABDOMINAL SURGERY     •  SECTION     • ENDOSCOPY N/A 10/20/2017    Procedure: "   Physical Medicine and Rehabilitation Progress Note :       Patient is a 79y old  Female who presents with a chief complaint of Fall vs syncope, MARY LOU (08 May 2025 11:06)      HPI:  Patient is 79F with PMHx of HTN, HLD,  hypothyroidism, generalized depression, ADHD and L1 compression fracture 2/2 to recent  fall  (April 2025, wears TLSO brace, scheduled for surgery with Dr. Barcenas this month)  rheumatic fever and pericarditis presents after unwitnessed fall. Patient states she was standing up brushing her teeth this morning and suddenly felt lightheaded and woke up on the floor  hitting her head on the bathtub +LOC +HT with noticeable blood from the scalp. Patient does not fully recall the event but denies lightheadedness dizziness, paresthesia blurred vision nausea or aura. She states she arose from the floor and called her  to bring her to the hospital. Upon arrival to ED; Code stoke due to concern of dysarthria and left sided facial droop.     Of note, patient shares she has been experiencing several episodes on instability due to weakness in her legs resulting in recurrent falls, most recently in April where she was in bed and missed her footing, falling to the ground. At that time she was sent for imaging of her lumbar spine and found to have acute/subacute severe anteriorly wedged compression deformity of the L1 vertebral body. Instructed to wear TLSO brace and prescribed muscle relaxants and oxycodone for pain. Recently seen by Dr. Barcenas on 5/1 pending T11-L3 Decompression and posterior instrumented fusion.     Patient also recently seen by PCP Dr. Michael Fields  4/28 with complaints of dry mouth and instability, instructed to discontinue use of cyclobenzaprine     In the ED,  VS: T   97.5, HR 98  , /78 >  205/80 , RR   20 , SpO2    99 % RA  Labs: leukocytosis to 12.6, trop 89>85; sodium 134, potassium 3.4 AG 21 with bicarb of 18 BUN 82/Cr 5.86  EKG: normal sinus rhythm with rate of 88; qtc 496    Imaging:  CTA HEAD: No large vessel occlusion or hemodynamically significant stenosis. 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  CTA NECK: No occlusion, dissection, aneurysm, or hemodynamically significant stenosis. 4.1 x 2.5 cm right upper lobe opacity as detailed above. Further evaluation with dedicated CT of the chest without contrast is recommended.  CT BRAIN PERFUSION:  Normal.  CT Lumbar Spine: Acute-appearing burst type compression fracture at L1 with severe vertebral body height loss and moderate osseous retropulsion. There is severe associated spinal canal stenosis. Acute right transverse process fractures at L1 and L2. Severe spinal canal stenosis at L4-L5.  CT Chest: 5 cm infiltrative opacity in the right upper lobe suspicious for malignancy. Consider PET/CT and tissue sampling.    Interventions; code stroke called, s/p scalp laceration repair, labetolol 10mg IVP   (03 May 2025 18:57)                            9.7    7.17  )-----------( 257      ( 08 May 2025 05:30 )             29.2       05-08    137  |  105  |  52[H]  ----------------------------<  86  3.6   |  17[L]  |  3.73[H]    Ca    7.8[L]      08 May 2025 05:30  Phos  3.3     05-08  Mg     1.8     05-08    TPro  6.2  /  Alb  3.5  /  TBili  0.4  /  DBili  x   /  AST  59[H]  /  ALT  51[H]  /  AlkPhos  380[H]  05-07    Vital Signs Last 24 Hrs  T(C): 36.6 (08 May 2025 09:23), Max: 36.6 (07 May 2025 13:21)  T(F): 97.8 (08 May 2025 09:23), Max: 97.9 (07 May 2025 13:21)  HR: 65 (08 May 2025 08:39) (65 - 93)  BP: 163/67 (08 May 2025 08:39) (134/59 - 188/72)  BP(mean): 96 (08 May 2025 08:39) (85 - 124)  RR: 18 (08 May 2025 08:39) (16 - 48)  SpO2: 98% (08 May 2025 08:39) (98% - 100%)    Parameters below as of 08 May 2025 08:39  Patient On (Oxygen Delivery Method): room air        MEDICATIONS  (STANDING):  atorvastatin 80 milliGRAM(s) Oral at bedtime  buPROPion XL (24-Hour) . 300 milliGRAM(s) Oral daily  carvedilol 6.25 milliGRAM(s) Oral every 12 hours  chlorhexidine 2% Cloths 1 Application(s) Topical two times a day  dextrose 5%. 1000 milliLiter(s) (100 mL/Hr) IV Continuous <Continuous>  dextrose 5%. 1000 milliLiter(s) (50 mL/Hr) IV Continuous <Continuous>  dextrose 50% Injectable 25 Gram(s) IV Push once  dextrose 50% Injectable 12.5 Gram(s) IV Push once  dextrose 50% Injectable 25 Gram(s) IV Push once  glucagon  Injectable 1 milliGRAM(s) IntraMuscular once  levothyroxine 75 MICROGram(s) Oral daily    MEDICATIONS  (PRN):  acetaminophen     Tablet .. 650 milliGRAM(s) Oral every 6 hours PRN Temp greater or equal to 38C (100.4F), Mild Pain (1 - 3)  dextrose Oral Gel 15 Gram(s) Oral once PRN Blood Glucose LESS THAN 70 milliGRAM(s)/deciliter  loperamide 2 milliGRAM(s) Oral every 4 hours PRN Diarrhea  melatonin 3 milliGRAM(s) Oral at bedtime PRN Insomnia      T(C): 36.6 (05-08-25 @ 09:23), Max: 36.6 (05-07-25 @ 13:21)  HR: 65 (05-08-25 @ 08:39) (65 - 93)  BP: 163/67 (05-08-25 @ 08:39) (134/59 - 188/72)  RR: 18 (05-08-25 @ 08:39) (16 - 48)  SpO2: 98% (05-08-25 @ 08:39) (98% - 100%)    Physical Exam:   79 y o woman lying comfortably in semi Butcher's position , awake , alert , no acute complaints     Head: normocephalic , atraumatic    Eyes: PERRLA , EOMI , no nystagmus , sclera anicteric    ENT / FACE: neg nasal discharge , uvula midline , no oropharyngeal erythema / exudate    Neck: supple , negative JVD , negative carotid bruits , no thyromegaly    Chest: CTA bilaterally      Cardiovascular: regular rate and rhythm , neg murmurs / rubs / gallops    Abdomen: soft , non distended , no tenderness to palpation in all 4 quadrants ,  normal bowel sounds     Extremities: WWP , neg cyanosis /clubbing / edema      Neurologic Exam:     Alert and oriented to person , place , date/year , speech fluent w/o dysarthria , follows commands      Cranial Nerves:           II:                         pupils equal , round and reactive to light , visual fields intact         III/ IV/VI:             extraocular movements intact , neg nystagmus , neg ptosis        V:                        facial sensation intact , V1-3 normal        VII:                      face symmetric , no droop , normal eye closure and smile        VIII:                     hearing intact to finger rub bilaterally        IX and X:             no hoarseness , gag intact , palate/ uvula rise symmetrically        XI:                       SCM / trapezius strength intact bilateral        XII:                      no tongue deviation    Motor Exam:        > 3+/5 x 4 extremities , without drift     Sensation:         intact to light touch x 4 extremities                            no neglect or extinction on double simultaneous testing    DTR:           biceps/brachioradialis: equal                            patella/ankle: equal          neg Babinski     Coordination:            Finger to Nose:  neg dysmetria bilaterally        5/7/2025  Functional Status Assessment :       Pain Assessment/Number Scale (0-10) Adult  Presence of Pain: denies pain/discomfort (Rating = 0)  Pain Rating (0-10): Rest: 0 (no pain/absence of nonverbal indicators of pain)  Pain Rating (0-10): Activity: 3 (mild pain)    Safety      AM[facility] Functional Assessment: Basic Mobility  Type of Assessment: Daily assessment  Turning from your back to your side while in a flat bed without using bedrails?: 3 = A little assistance  Moving from lying on your back to sitting on the flat side of a flat bed without using bedrails?: 3 = A little assistance  Moving to and from a bed to a chair (including a wheelchair)?: 3 = A little assistance  Standing up from a chair using your arms (e.g. wheelchair or bedside chair)?: 3 = A little assistance  Walking in hospital room?: 3 = A little assistance  Climbing 3-5 steps with a railing?: 3 = A little assistance     Score: 18   Row Comment: Ask the patient "How much help from another person do you currently need? (If the patient hasn't done an activity recently, how much help from another person do you think he/she needs if he/she tried?)    Cognitive/Neuro      Cognitive/Neuro/Behavioral  Cognitive/Neuro/Behavioral [WDL Definition: Alert; opens eyes spontaneously; arouses to voice or touch; oriented x 4; follows commands; speech spontaneous, logical; purposeful motor response; behavior appropriate to situation]: WDL    Language Assistance  Preferred Language to Address Healthcare Preferred Language to Address Healthcare: English    Therapeutic Interventions      Bed Mobility  Bed Mobility Training Sit-to-Supine: minimum assist (75% patient effort);  1 person assist  Bed Mobility Training Supine-to-Sit: minimum assist (75% patient effort);  1 person assist  Bed Mobility Training Limitations: pain;  decreased strength;  patient educated on log roll method    Sit-Stand Transfer Training  Transfer Training Stand-to-Sit Transfer: minimum assist (75% patient effort);  1 person assist;  rollator;  WBAT + TSLO  Sit-to-Stand Transfer Training Transfer Safety Analysis: decreased balance;  decreased weight-shifting ability;  pain;  decreased strength;  impaired balance;  impaired postural control;  rollator    Toilet Transfer Training  Transfer Training Toilet Transfer: contact guard;  1 person assist;  rollaotr;  WBAT + TLSO  Toilet Transfer Training Transfer Safety Analysis: decreased balance;  decreased weight-shifting ability;  decreased step length;  impaired balance;  decreased strength;  impaired postural control;  rollator    Gait Training  Gait Training: contact guard;  1 person assist;  100 feet;  x2;  rollator  Gait Analysis: swing-through gait   moderately unsteady, no acute LOB or LE buckling   decreased emelyn;  decreased step length;  decreased weight-shifting ability;  impaired balance;  decreased strength;  impaired postural control;  pain;  100 feet;  x2;  rollator      AM-PAC Functional Assessment: Daily Activity  Type of Assessment: Daily assessment  Putting on and taking off regular lower body clothing?: 3 = A little assistance  Bathing (including washing, rinsing, drying)?: 3 = A little assistance  Toileting, which includes using toilet, bedpan or urinal?: 3 = A little assistance  Putting on and taking off regular upper body clothing?: 4 = No assist / stand by assistance  Take care of personal grooming such as brushing teeth?: 4 = No assist / stand by assistance  Eating meals?: 4 = No assist / stand by assistance  Score: 21   Row Comment: Ask the patient "How much help from another person do you currently need? (If the patient hasn't done an activity recently, how much help from another person do you think he/she needs if he/she tried?)    Cognitive/Neuro      Cognitive/Neuro/Behavioral  Cognitive/Neuro/Behavioral [WDL Definition: Alert; opens eyes spontaneously; arouses to voice or touch; oriented x 4; follows commands; speech spontaneous, logical; purposeful motor response; behavior appropriate to situation]: WDL  Level of Consciousness: alert  Arousal Level: arouses to voice  Orientation: oriented x 4  Speech: clear;  spontaneous  Mood/Behavior: calm;  cooperative    Language Assistance  Preferred Language to Address Healthcare Preferred Language to Address Healthcare: English    Therapeutic Interventions      Bed Mobility  Bed Mobility Training Rolling/Turning: minimum assist (75% patient effort);  1 person assist;  nonverbal cues (demo/gestures);  verbal cues;  log rolling to the R   Bed Mobility Training Scooting: minimum assist (75% patient effort);  1 person assist;  nonverbal cues (demo/gestures);  verbal cues  Bed Mobility Training Sit-to-Supine: minimum assist (75% patient effort);  1 person assist;  nonverbal cues (demo/gestures);  verbal cues  Bed Mobility Training Supine-to-Sit: minimum assist (75% patient effort);  1 person assist;  nonverbal cues (demo/gestures);  verbal cues  Bed Mobility Training Limitations: decreased flexibility;  decreased ROM;  decreased strength;  impaired balance;  impaired postural control    Sit-Stand Transfer Training  Transfer Training Sit-to-Stand Transfer: minimum assist (75% patient effort);  1 person assist;  nonverbal cues (demo/gestures);  verbal cues;  weight-bearing as tolerated   rollator, +TLSO donned   Transfer Training Stand-to-Sit Transfer: minimum assist (75% patient effort);  1 person assist;  nonverbal cues (demo/gestures);  verbal cues;  weight-bearing as tolerated   rollator, +TLSO donned   Sit-to-Stand Transfer Training Transfer Safety Analysis: decreased balance;  decreased weight-shifting ability;  decreased flexibility;  decreased ROM;  decreased strength;  impaired balance;  impaired postural control;  rollator     Toilet Transfer Training  Transfer Training Toilet Transfer: contact guard;  verbal cues;  weight-bearing as tolerated   rollator   Toilet Transfer Training Transfer Safety Analysis: decreased balance;  decreased weight-shifting ability;  decreased flexibility;  decreased ROM;  decreased strength;  impaired balance;  impaired postural control;  rollator     Therapeutic Exercise  Therapeutic Exercise Detail: Patient functionally ambulated in the hallway with rollator and CGA. Patient noted with decreased step length, weight shifting, throughout.     Upper Body Dressing Training  Upper Body Dressing Training Assistance: minimum assist (75% patient effort);  donning/doffing TLSO brace and back/front gown while seated at EOB ;  decreased flexibility;  decreased strength;  impaired balance;  impaired postural control            PM&R Impression : as above    Current disposition plan recommendation :    pending post OR  "ESOPHAGOGASTRODUODENOSCOPY WITH ANESTHESIA;  Surgeon: Femi Goddard MD;  Location: [facility];  Service:    • HERNIA REPAIR     • TONSILLECTOMY         Family History:   family history includes Cancer in her father and mother.    Social History:    reports that she has been smoking cigarettes. She has a 40.00 pack-year smoking history. She has never used smokeless tobacco. She reports that she does not drink alcohol or use drugs.    Medications:  Prior to Admission medications    Medication Sig Start Date End Date Taking? Authorizing Provider   albuterol sulfate  (90 Base) MCG/ACT inhaler Inhale 2 puffs by mouth Every 4 (Four) Hours As Needed for Wheezing. 9/7/19   Ebony Chen PA   azithromycin (ZITHROMAX) 250 MG tablet Take 1 tablet daily for 7 days 9/7/19   Ebony Chen PA   budesonide-formoterol (SYMBICORT) 160-4.5 MCG/ACT inhaler Inhale 2 puffs 2 (Two) Times a Day. 9/7/19   Ebony Chen PA   methylPREDNISolone (MEDROL, ADIEL,) 4 MG tablet Take as directed on package instructions. 9/7/19   Ebony Chen PA       Allergies:  No Known Allergies    Objective     Vital Signs:   /91   Pulse 98   Temp 98.3 °F (36.8 °C) (Oral)   Resp 18   Ht 165.1 cm (65\")   Wt 43.5 kg (96 lb)   SpO2 98%   BMI 15.98 kg/m²     Physical Exam   Constitutional: She is oriented to person, place, and time. She appears well-developed and well-nourished. She appears distressed.   HENT:   Head: Normocephalic and atraumatic.   Right Ear: External ear normal.   Left Ear: External ear normal.   Nose: Nose normal.   Mouth/Throat: Oropharynx is clear and moist.   Eyes: Pupils are equal, round, and reactive to light. Conjunctivae and EOM are normal. No scleral icterus.   Neck: Normal range of motion. Neck supple. No JVD present. No tracheal deviation present.   Cardiovascular: Normal rate, regular rhythm and normal heart sounds.   No murmur heard.  Pulmonary/Chest: Effort normal " and breath sounds normal.   Abdominal: Soft. She exhibits no distension and no mass. Bowel sounds are increased. There is generalized tenderness. There is no guarding.   Musculoskeletal: Normal range of motion. She exhibits no edema.   Neurological: She is alert and oriented to person, place, and time. She displays normal reflexes. No cranial nerve deficit. Coordination normal.   Skin: Skin is warm and dry. Capillary refill takes less than 2 seconds. No pallor.   Psychiatric: She has a normal mood and affect.         Results Reviewed:  Lab Results (last 24 hours)     Procedure Component Value Units Date/Time    Urine Drug Screen - Urine, Clean Catch [[id number]] Collected:  07/11/20 1630    Specimen:  Urine, Clean Catch Updated:  07/11/20 1807    Urinalysis With Culture If Indicated - Urine, Clean Catch [[id number]]  (Abnormal) Collected:  07/11/20 1630    Specimen:  Urine, Clean Catch Updated:  07/11/20 1652     Color, UA Yellow     Appearance, UA Clear     pH, UA 7.0     Specific Gravity, UA >1.030     Glucose, UA Negative     Ketones, UA Negative     Bilirubin, UA Negative     Blood, UA Moderate (2+)     Protein,  mg/dL (2+)     Leuk Esterase, UA Negative     Nitrite, UA Negative     Urobilinogen, UA 0.2 E.U./dL    Urinalysis, Microscopic Only - Urine, Clean Catch [[id number]]  (Abnormal) Collected:  07/11/20 1630    Specimen:  Urine, Clean Catch Updated:  07/11/20 1652     RBC, UA 31-50 /HPF      WBC, UA 0-2 /HPF      Bacteria, UA None Seen /HPF      Squamous Epithelial Cells, UA None Seen /HPF      Hyaline Casts, UA None Seen /LPF      Methodology Automated Microscopy    COVID PRE-OP / PRE-PROCEDURE SCREENING ORDER (NO ISOLATION) - Swab, Nasopharynx [[id number]] Collected:  07/11/20 1602    Specimen:  Swab from Nasopharynx Updated:  07/11/20 1648    Narrative:       The following orders were created for panel order COVID PRE-OP / PRE-PROCEDURE SCREENING ORDER (NO ISOLATION) - Swab, Nasopharynx.  Procedure                                Abnormality         Status                     ---------                               -----------         ------                     COVID-19, ABBOTT IN-HOUS...[[id number]]  Normal              Final result                 Please view results for these tests on the individual orders.    COVID-19, ABBOTT IN-HOUSE,NP Swab (NO TRANSPORT MEDIA) 2 HR TAT - Swab, Nasopharynx [[id number]]  (Normal) Collected:  07/11/20 1602    Specimen:  Swab from Nasopharynx Updated:  07/11/20 1648     COVID19 Not Detected    Narrative:       Fact sheet for providers: https://www.fda.gov/media/378332/download     Fact sheet for patients: https://www.fda.gov/media/267133/download    Troponin [[id number]]  (Normal) Collected:  07/11/20 1615    Specimen:  Blood Updated:  07/11/20 1637     Troponin T 0.022 ng/mL     Narrative:       Troponin T Reference Range:  <= 0.03 ng/mL-   Negative for AMI  >0.03 ng/mL-     Abnormal for myocardial necrosis.  Clinicians would have to utilize clinical acumen, EKG, Troponin and serial changes to determine if it is an Acute Myocardial Infarction or myocardial injury due to an underlying chronic condition.       Results may be falsely decreased if patient taking Biotin.      Troponin [[id number]]  (Normal) Collected:  07/11/20 1137    Specimen:  Blood Updated:  07/11/20 1624     Troponin T 0.022 ng/mL     Narrative:       Troponin T Reference Range:  <= 0.03 ng/mL-   Negative for AMI  >0.03 ng/mL-     Abnormal for myocardial necrosis.  Clinicians would have to utilize clinical acumen, EKG, Troponin and serial changes to determine if it is an Acute Myocardial Infarction or myocardial injury due to an underlying chronic condition.       Results may be falsely decreased if patient taking Biotin.      Lactic Acid, Reflex [[id number]]  (Normal) Collected:  07/11/20 1537    Specimen:  Blood Updated:  07/11/20 1608     Lactate 1.6 mmol/L     Lactic Acid, Reflex Timer (This will reflex a repeat  order 3-3:15 hours after ordered.) [[id number]] Collected:  07/11/20 1137    Specimen:  Blood Updated:  07/11/20 1515     Hold Tube Hold for add-ons.     Comment: Auto resulted.       Blood Culture - Blood, Arm, Left [[id number]] Collected:  07/11/20 1315    Specimen:  Blood from Arm, Left Updated:  07/11/20 1405    Lipase [[id number]]  (Normal) Collected:  07/11/20 1137    Specimen:  Blood Updated:  07/11/20 1313     Lipase 15 U/L     [place] Draw [[id number]] Collected:  07/11/20 1137    Specimen:  Blood Updated:  07/11/20 1245    Narrative:       The following orders were created for panel order [place] Draw.  Procedure                               Abnormality         Status                     ---------                               -----------         ------                     Light Blue Top[[id number]]                                   Final result               Green Top (Gel)[[id number]]                                  Final result               Lavender Top[[id number]]                                     Final result               Red Top[[id number]]                                          Final result                 Please view results for these tests on the individual orders.    Light Blue Top [[id number]] Collected:  07/11/20 1137    Specimen:  Blood Updated:  07/11/20 1245     Extra Tube hold for add-on     Comment: Auto resulted       Green Top (Gel) [[id number]] Collected:  07/11/20 1137    Specimen:  Blood Updated:  07/11/20 1245     Extra Tube Hold for add-ons.     Comment: Auto resulted.       Lavender Top [[id number]] Collected:  07/11/20 1137    Specimen:  Blood Updated:  07/11/20 1245     Extra Tube hold for add-on     Comment: Auto resulted       Red Top [[id number]] Collected:  07/11/20 1137    Specimen:  Blood Updated:  07/11/20 1245     Extra Tube Hold for add-ons.     Comment: Auto resulted.       Lactic Acid, Plasma [[id number]]  (Abnormal) Collected:  07/11/20 1137    Specimen:  Blood Updated:   07/11/20 1211     Lactate 3.0 mmol/L     Comprehensive Metabolic Panel [[id number]]  (Abnormal) Collected:  07/11/20 1137    Specimen:  Blood Updated:  07/11/20 1209     Glucose 178 mg/dL      BUN 21 mg/dL      Creatinine 0.65 mg/dL      Sodium 134 mmol/L      Potassium 3.6 mmol/L      Chloride 94 mmol/L      CO2 23.0 mmol/L      Calcium 10.3 mg/dL      Total Protein 8.4 g/dL      Albumin 4.90 g/dL      ALT (SGPT) 30 U/L      AST (SGOT) 22 U/L      Alkaline Phosphatase 132 U/L      Total Bilirubin 0.6 mg/dL      eGFR Non African Amer 92 mL/min/1.73      Globulin 3.5 gm/dL      A/G Ratio 1.4 g/dL      BUN/Creatinine Ratio 32.3     Anion Gap 17.0 mmol/L     Narrative:       GFR Normal >60  Chronic Kidney Disease <60  Kidney Failure <15      Blood Culture - Blood, Arm, Right [[id number]] Collected:  07/11/20 1137    Specimen:  Blood from Arm, Right Updated:  07/11/20 1156    CBC & Differential [[id number]] Collected:  07/11/20 1137    Specimen:  Blood Updated:  07/11/20 1154    Narrative:       The following orders were created for panel order CBC & Differential.  Procedure                               Abnormality         Status                     ---------                               -----------         ------                     CBC Auto Differential[[id number]]        Abnormal            Final result                 Please view results for these tests on the individual orders.    CBC Auto Differential [[id number]]  (Abnormal) Collected:  07/11/20 1137    Specimen:  Blood Updated:  07/11/20 1154     WBC 22.10 10*3/mm3      RBC 5.19 10*6/mm3      Hemoglobin 15.9 g/dL      Hematocrit 45.9 %      MCV 88.4 fL      MCH 30.6 pg      MCHC 34.6 g/dL      RDW 15.6 %      RDW-SD 50.2 fl      MPV 8.9 fL      Platelets 722 10*3/mm3      Neutrophil % 82.4 %      Lymphocyte % 10.3 %      Monocyte % 6.1 %      Eosinophil % 0.1 %      Basophil % 0.5 %      Immature Grans % 0.6 %      Neutrophils, Absolute 18.23 10*3/mm3       Lymphocytes, Absolute 2.27 10*3/mm3      Monocytes, Absolute 1.34 10*3/mm3      Eosinophils, Absolute 0.02 10*3/mm3      Basophils, Absolute 0.10 10*3/mm3      Immature Grans, Absolute 0.14 10*3/mm3      nRBC 0.0 /100 WBC           Ct Abdomen Pelvis With Contrast    Result Date: 7/11/2020  Narrative: EXAMINATION:   CT ABDOMEN PELVIS W CONTRAST-  7/11/2020 2:51 PM CDT  HISTORY: CT ABDOMEN AND PELVIS WITH CONTRAST 7/11/2020 2:19 PM CDT  HISTORY: Severe epigastric pain1  COMPARISON: None.  DLP: 105 mGy cm  TECHNIQUE: Following the intravenous administration of contrast, helical CT tomographic images of the abdomen and pelvis were acquired. Coronal reformatted images were also provided for review.  FINDINGS: Motion is noted limiting evaluation of the lung bases there is suggestion of a 6 mm nodule in the right lung base..  LIVER: No focal liver lesion. The hepatic vasculature is patent.  BILIARY SYSTEM: The gallbladder is unremarkable. No intrahepatic or extrahepatic ductal dilatation.  PANCREAS: No focal pancreatic lesion.  SPLEEN: Unremarkable.  KIDNEYS AND ADRENALS: Bilateral kidneys and adrenal glands are unremarkable. The ureters are decompressed and normal in appearance.  RETROPERITONEUM: No mass, lymphadenopathy or hemorrhage.  GI TRACT: No evidence of obstruction or bowel wall thickening. The appendix is visualized and unremarkable.  OTHER: There is no mesenteric mass, lymphadenopathy or fluid collection. The abdominopelvic vasculature is patent. The osseous structures and soft tissues demonstrate no worrisome lesions. Old fracture of the right greater trochanter is noted.  PELVIS: The uterus is visualized.. The urinary bladder is normal in appearance.      Impression: 1. No acute intra-abdominal or pelvic abnormality. 2. Old fracture right greater trochanter. 3. 6 mm nodule right lower lobe follow-up with CT in 3-6 months to confirm persistence is suggested according to Fleischner Society recommendations   Fleischner Society Recommendations for Management of PARTLY SOLID Pulmonary nodules:  SOLITARY NODULES: 1. For partly solid nodules measuring less than 6 mm in diameter, no follow-up is needed. 2. For partly solid nodules measuring greater than 6 mm, CT in 3 to 6 months to confirm persistence. If unchanged and a solid component is below 6 mm, CT annually for 5 years. (Persistent, partly solid nodules containing a solid component greater than 6 mm are highly suspicious).  MULTIPLE NODULES: 1. For multiple, partly solid nodules measuring less than 6 mm in diameter, CT in 3 to 6 months. If unchanged, consider CT at 2 and 4 years. 2. For multiple, partly solid nodules measuring greater than 6 mm in diameter, CT in 3 to 6 months. Then management is based on the most suspicious nodule..   This report was finalized on 07/11/2020 14:59 by Dr. Obed Villavicencio MD.     I have personally reviewed and interpreted the radiology studies and ECG obtained at time of admission.     Assessment / Plan      Assessment & Plan  Active Hospital Problems    Diagnosis   • **Dehydration   • COPD (chronic obstructive pulmonary disease) (CMS/HCC)   • Enterocolitis       PLAN:   Admit to the medical surgical floor  Normal saline 1 L bolus x1 followed by normal saline with 20 mEq KCl at 100 cc/h  Recheck BMP and CBC in a.m.  GI panel    Code Status:   Full code     I discussed the patient's findings and my recommendations with: The patient    Estimated length of stay: 1 day    Kolby Butler DO   07/11/20   18:17

## 2025-05-08 NOTE — PROGRESS NOTE ADULT - PROBLEM SELECTOR PLAN 5
Patient with hx of HTN on hydrochlorthiazide and coreg at home, has not taken home meds in several weeks since starting muscle relaxant and opioid medications. Denies symptoms of lightheadedness, dizziness, nausea or chest pain    BP trend in /78 >  205/80  s/p labetalol 10mg in ED with improvement of blood pressure   Home mediction: coreg 3.125 BID   - c/w coreg 6.25 BID post-op  - will hold thiazide for now  - goal SBP <160 #AGMA  Baseline Cr: upon admission BUN/Cr  82/5.86  Likely iso of dehydration as patient shares she has had diminished oral intake 2/2 dry mouth  s/p IL NS in ED and NaHCO3 tables;  renal ultrasound; unremarkable   FENa: 2.4 instrinsic eitology likely ATN iso NSAID use  - pending cystain C  - avoid nephrotoxic medication  - trend Cr daily  - strict I/O

## 2025-05-08 NOTE — CONSULT NOTE ADULT - ASSESSMENT
79F with PMHx of HTN, HLD,  hypothyroidism, generalized depression, ADHD rheumatic fever, pericarditis with recent L1 compression fracture 2/2 mechanical fall (pending orthopedic intervention with Dr. Barcenas later today), presents after unwitnessed syncopal event and head trauma likely 2/2 to medication side effect vs dehydration complicated by MARY LOU, with incidental finding of RUL opacity c/f malignancy for which oncology has been consulted.    Smoking history: 2 packs per day for 20 years (40py) stopped in 1982. 25 years of second hand smoking exposure.  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  She believes her dad may have passed 2/2 lung cancer +/- MS but is unsure of exact diagnoses.  No personal history of other cancers.  79F with PMHx of HTN, HLD,  hypothyroidism, generalized depression, ADHD rheumatic fever, pericarditis with recent L1 compression fracture 2/2 mechanical fall (pending orthopedic intervention with Dr. Barcenas later today), presents after unwitnessed syncopal event and head trauma likely 2/2 to medication side effect vs dehydration complicated by MARY LOU, with incidental finding of RUL opacity c/f malignancy for which oncology has been consulted.    Smoking history: 2 packs per day for 20 years (40py) stopped in 1982. 25 years of second hand smoking exposure.  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  She believes her dad may have passed 2/2 lung cancer +/- MS but is unsure of exact diagnoses.  No personal history of other cancers.       #NSCLC  --patient originally presented 5/3 s/p unwitnessed fall, found to have L1 compression fracture  --CT [facility] 5/3: 5 cm infiltrative opacity in the right upper lobe suspicious for malignancy  --MRb 5/4: No acute intracranial hemorrhage or evidence of acute ischemia  --PET CT 5/6: 1. FDG avid 3.4 x 4.9 cm groundglass opacity at the anterior right upper lobe, SUV max 4.4. A rapid increase in size since 5/3/2025 would favor an infectious process. However, exact measurement is limited in this study due to low resolution CT. Malignancy cannot be excluded.  2. Mildly FDG avid subcentimeter nodular opacities at the posterior right upper lobe and posterior left lung base.  3. Small airway disease at the peripheral left lung apex.  4. No hypermetabolic lymphadenopathy in the chest, abdomen, or pelvis.  5. Diffuse uptake throughout the gastric walls suggestive of gastritis. Correlate clinically. Increased uptake in the colon with possible wall thickening at the sigmoid colon suggestive of colitis. Correlate clinically. Diffuse uptake in the esophagus suggestive of esophagitis.  6. Severe compression deformity of vertebral body L1 with uptake suggestive of an acute or subacute process.   --atleast stage 2A pending L1 pathology results  --will send NGS today  --patient to follow up with Dr. Parra upon discharge  --will refer to CCD/navigation program for additional support   --patient pending orthopaedic intervention later today for  L1 compression fx, pathology to be sent (d/w ortho)    NOTE INCOMPLETE   79F with PMHx of HTN, HLD,  hypothyroidism, generalized depression, ADHD rheumatic fever, pericarditis with recent L1 compression fracture 2/2 mechanical fall (pending orthopedic intervention with Dr. Barcenas later today), presents after unwitnessed syncopal event and head trauma likely 2/2 to medication side effect vs dehydration complicated by MARY LOU, with incidental finding of RUL opacity c/f malignancy for which oncology has been consulted.    Smoking history: 2 packs per day for 20 years (40py) stopped in 1982. 25 years of second hand smoking exposure.  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  She believes her dad may have passed 2/2 lung cancer +/- MS but is unsure of exact diagnoses.  No personal history of other cancers.       #NSCLC  --patient originally presented 5/3 s/p unwitnessed fall, found to have L1 compression fracture   --CT [facility] 5/3: 5 cm infiltrative opacity in the right upper lobe suspicious for malignancy  --MRb 5/4: No acute intracranial hemorrhage or evidence of acute ischemia  --PET CT 5/6: 1. FDG avid 3.4 x 4.9 cm groundglass opacity at the anterior right upper lobe, SUV max 4.4. A rapid increase in size since 5/3/2025 would favor an infectious process. However, exact measurement is limited in this study due to low resolution CT. Malignancy cannot be excluded.  2. Mildly FDG avid subcentimeter nodular opacities at the posterior right upper lobe and posterior left lung base.  3. Small airway disease at the peripheral left lung apex.  4. No hypermetabolic lymphadenopathy in the chest, abdomen, or pelvis.  5. Diffuse uptake throughout the gastric walls suggestive of gastritis. Correlate clinically. Increased uptake in the colon with possible wall thickening at the sigmoid colon suggestive of colitis. Correlate clinically. Diffuse uptake in the esophagus suggestive of esophagitis.  6. Severe compression deformity of vertebral body L1 with uptake suggestive of an acute or subacute process.   --atleast stage 2A pending L1 pathology results however suspect L1 is 2/2 fall   --will send NGS today  --patient to follow up with Dr. Parra upon discharge  --will refer to CCD/navigation program for additional support   --patient pending orthopaedic intervention for  L1 compression fx, pathology to be sent (d/w ortho) pending IGA deficiency confirmation/plan, recommend further discussion with immunology and lab for results turn around time     d/w Dr. Parra    79F with PMHx of HTN, HLD,  hypothyroidism, generalized depression, ADHD rheumatic fever, pericarditis with recent L1 compression fracture 2/2 mechanical fall (pending orthopedic intervention with Dr. Barcenas later today), presents after unwitnessed syncopal event and head trauma likely 2/2 to medication side effect vs dehydration complicated by MARY LOU, with incidental finding of RUL opacity c/f malignancy for which oncology has been consulted.    Smoking history: 2 packs per day for 20 years (40py) stopped in 1982. 25 years of second hand smoking exposure.  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  She believes her dad may have passed 2/2 lung cancer +/- MS but is unsure of exact diagnoses.  No personal history of other cancers.       #NSCLC  --patient originally presented 5/3 s/p unwitnessed fall, found to have L1 compression fracture   --CT CH 5/3: 5 cm infiltrative opacity in the right upper lobe suspicious for malignancy  --MRb 5/4: No acute intracranial hemorrhage or evidence of acute ischemia  --PET CT 5/6: 1. FDG avid 3.4 x 4.9 cm groundglass opacity at the anterior right upper lobe, SUV max 4.4. A rapid increase in size since 5/3/2025 would favor an infectious process. However, exact measurement is limited in this study due to low resolution CT. Malignancy cannot be excluded.  2. Mildly FDG avid subcentimeter nodular opacities at the posterior right upper lobe and posterior left lung base.  3. Small airway disease at the peripheral left lung apex.  4. No hypermetabolic lymphadenopathy in the chest, abdomen, or pelvis.  5. Diffuse uptake throughout the gastric walls suggestive of gastritis. Correlate clinically. Increased uptake in the colon with possible wall thickening at the sigmoid colon suggestive of colitis. Correlate clinically. Diffuse uptake in the esophagus suggestive of esophagitis.  6. Severe compression deformity of vertebral body L1 with uptake suggestive of an acute or subacute process.   --atleast stage 2A pending L1 pathology results however suspect L1 is 2/2 fall   --will send NGS today  --pathology confirmed adenocarcinoma, d/w patient 5/8   --patient to follow up with Dr. Parra upon discharge  --will refer to CCD/navigation program for additional support   --patient pending orthopaedic intervention for  L1 compression fx, pathology to be sent (d/w ortho) pending IGA deficiency confirmation/plan, recommend further discussion with immunology and lab for results turn around time     d/w Dr. Parra

## 2025-05-09 DIAGNOSIS — C34.90 MALIGNANT NEOPLASM OF UNSPECIFIED PART OF UNSPECIFIED BRONCHUS OR LUNG: ICD-10-CM

## 2025-05-09 LAB
24R-OH-CALCIDIOL SERPL-MCNC: 37.3 NG/ML — SIGNIFICANT CHANGE UP
ANION GAP SERPL CALC-SCNC: 14 MMOL/L — SIGNIFICANT CHANGE UP (ref 5–17)
BUN SERPL-MCNC: 48 MG/DL — HIGH (ref 7–23)
CALCIUM SERPL-MCNC: 7.8 MG/DL — LOW (ref 8.4–10.5)
CALCIUM SERPL-MCNC: 8.1 MG/DL — LOW (ref 8.4–10.5)
CHLORIDE SERPL-SCNC: 105 MMOL/L — SIGNIFICANT CHANGE UP (ref 96–108)
CO2 SERPL-SCNC: 18 MMOL/L — LOW (ref 22–31)
CREAT SERPL-MCNC: 3.32 MG/DL — HIGH (ref 0.5–1.3)
EGFR: 14 ML/MIN/1.73M2 — LOW
EGFR: 14 ML/MIN/1.73M2 — LOW
GLUCOSE SERPL-MCNC: 97 MG/DL — SIGNIFICANT CHANGE UP (ref 70–99)
HCT VFR BLD CALC: 30.9 % — LOW (ref 34.5–45)
HGB BLD-MCNC: 9.9 G/DL — LOW (ref 11.5–15.5)
IGA FLD-MCNC: <2 MG/DL — LOW (ref 84–499)
IGA FLD-MCNC: <2 MG/DL — LOW (ref 84–499)
IGD SER-MCNC: <1 MG/DL — SIGNIFICANT CHANGE UP
IGE SERPL-ACNC: 5 KU/L — SIGNIFICANT CHANGE UP
IGG FLD-MCNC: 1136 MG/DL — SIGNIFICANT CHANGE UP (ref 610–1660)
IGG FLD-MCNC: 1136 MG/DL — SIGNIFICANT CHANGE UP (ref 610–1660)
IGG4 SER-MCNC: 55.8 MG/DL — SIGNIFICANT CHANGE UP (ref 1–123)
IGM SERPL-MCNC: 81 MG/DL — SIGNIFICANT CHANGE UP (ref 35–242)
IGM SERPL-MCNC: 82 MG/DL — SIGNIFICANT CHANGE UP (ref 35–242)
KAPPA LC SER QL IFE: 5.64 MG/DL — HIGH (ref 0.33–1.94)
KAPPA LC SER QL IFE: 5.72 MG/DL — HIGH (ref 0.33–1.94)
KAPPA/LAMBDA FREE LIGHT CHAIN RATIO, SERUM: 2.04 RATIO — HIGH (ref 0.26–1.65)
KAPPA/LAMBDA FREE LIGHT CHAIN RATIO, SERUM: 2.04 RATIO — HIGH (ref 0.26–1.65)
LAMBDA LC SER QL IFE: 2.76 MG/DL — HIGH (ref 0.57–2.63)
LAMBDA LC SER QL IFE: 2.8 MG/DL — HIGH (ref 0.57–2.63)
MAGNESIUM SERPL-MCNC: 1.7 MG/DL — SIGNIFICANT CHANGE UP (ref 1.6–2.6)
MCHC RBC-ENTMCNC: 29.7 PG — SIGNIFICANT CHANGE UP (ref 27–34)
MCHC RBC-ENTMCNC: 32 G/DL — SIGNIFICANT CHANGE UP (ref 32–36)
MCV RBC AUTO: 92.8 FL — SIGNIFICANT CHANGE UP (ref 80–100)
NRBC BLD AUTO-RTO: 0 /100 WBCS — SIGNIFICANT CHANGE UP (ref 0–0)
PHOSPHATE SERPL-MCNC: 3.3 MG/DL — SIGNIFICANT CHANGE UP (ref 2.5–4.5)
PLATELET # BLD AUTO: 254 K/UL — SIGNIFICANT CHANGE UP (ref 150–400)
POTASSIUM SERPL-MCNC: 4.2 MMOL/L — SIGNIFICANT CHANGE UP (ref 3.5–5.3)
POTASSIUM SERPL-SCNC: 4.2 MMOL/L — SIGNIFICANT CHANGE UP (ref 3.5–5.3)
PTH-INTACT FLD-MCNC: 168 PG/ML — HIGH (ref 15–65)
RBC # BLD: 3.33 M/UL — LOW (ref 3.8–5.2)
RBC # FLD: 16 % — HIGH (ref 10.3–14.5)
SODIUM SERPL-SCNC: 137 MMOL/L — SIGNIFICANT CHANGE UP (ref 135–145)
VIT D25+D1,25 OH+D1,25 PNL SERPL-MCNC: 23.6 PG/ML — SIGNIFICANT CHANGE UP (ref 19.9–79.3)
WBC # BLD: 7.14 K/UL — SIGNIFICANT CHANGE UP (ref 3.8–10.5)
WBC # FLD AUTO: 7.14 K/UL — SIGNIFICANT CHANGE UP (ref 3.8–10.5)

## 2025-05-09 PROCEDURE — 99233 SBSQ HOSP IP/OBS HIGH 50: CPT | Mod: GC

## 2025-05-09 PROCEDURE — 99232 SBSQ HOSP IP/OBS MODERATE 35: CPT

## 2025-05-09 RX ORDER — MAGNESIUM SULFATE 500 MG/ML
2 SYRINGE (ML) INJECTION ONCE
Refills: 0 | Status: COMPLETED | OUTPATIENT
Start: 2025-05-09 | End: 2025-05-09

## 2025-05-09 RX ORDER — HEPARIN SODIUM 1000 [USP'U]/ML
5000 INJECTION INTRAVENOUS; SUBCUTANEOUS EVERY 8 HOURS
Refills: 0 | Status: COMPLETED | OUTPATIENT
Start: 2025-05-09 | End: 2025-05-09

## 2025-05-09 RX ORDER — CARVEDILOL 3.12 MG/1
6.25 TABLET, FILM COATED ORAL EVERY 12 HOURS
Refills: 0 | Status: DISCONTINUED | OUTPATIENT
Start: 2025-05-10 | End: 2025-05-14

## 2025-05-09 RX ADMIN — HEPARIN SODIUM 5000 UNIT(S): 1000 INJECTION INTRAVENOUS; SUBCUTANEOUS at 21:42

## 2025-05-09 RX ADMIN — Medication 1 APPLICATION(S): at 05:44

## 2025-05-09 RX ADMIN — HEPARIN SODIUM 5000 UNIT(S): 1000 INJECTION INTRAVENOUS; SUBCUTANEOUS at 05:44

## 2025-05-09 RX ADMIN — BUPROPION HYDROBROMIDE 300 MILLIGRAM(S): 522 TABLET, EXTENDED RELEASE ORAL at 11:12

## 2025-05-09 RX ADMIN — Medication 25 GRAM(S): at 11:11

## 2025-05-09 RX ADMIN — Medication 75 MICROGRAM(S): at 07:15

## 2025-05-09 RX ADMIN — CARVEDILOL 6.25 MILLIGRAM(S): 3.12 TABLET, FILM COATED ORAL at 05:45

## 2025-05-09 RX ADMIN — HEPARIN SODIUM 5000 UNIT(S): 1000 INJECTION INTRAVENOUS; SUBCUTANEOUS at 12:18

## 2025-05-09 RX ADMIN — ATORVASTATIN CALCIUM 80 MILLIGRAM(S): 80 TABLET, FILM COATED ORAL at 21:42

## 2025-05-09 NOTE — PROGRESS NOTE ADULT - ASSESSMENT
79-year-old F with PMH of hypothyroidism,  IgA def , recent mechanical fall with L1 compression fracture, HTN (on coreg, HCTZ), CKD (stage 3) admitted with syncope and TBI, found to have MARY LOU on CKD and HTN emergency (SBP 200s with MARY LOU) .  Patient reports having used ibuprofen on a daily basis.  Nephrology consulted for MARY LOU on CKD    #Non-oliguric MARY LOU on CKD- ( multifactorial--> hemodynamic ATN vs INSAID induced AIN)  Last know HIE SCr 1.16, GFR 48 (CKD3) on 10/24  Creat peaked at 5s , now at 3.3    Recommendation:  -At present there are no renal contraindications to proceeding with planned T11-L3 decompression   -Ensure appropriate PO hydration  -Monitor BMP daily   –Strict I/O monitoring  -Recommend maintaining MAP > 65 perioperatively.   -Avoid NSAIDs for pain control perioperatively    #Mild Hypocalcemia (asymptomatic)  5/7: Ionized Ca 1.03   5/8: Ca corrected 8.2 (albumin 3.5)   5/9 serum Ca 8.1  Vit D 25 37.5  Vit D 1.25  23.6  -Will continue to monitor   79-year-old F with PMH of hypothyroidism,  IgA def , recent mechanical fall with L1 compression fracture, HTN (on coreg, HCTZ), CKD (stage 3) admitted with syncope and TBI, found to have MARY LOU on CKD and HTN emergency (SBP 200s with MARY LOU) .  Patient reports having used ibuprofen on a daily basis. Now found to have bx proven adenoCa of the lung. Nephrology consulted for MARY LOU on CKD    #Non-oliguric MARY LOU on CKD- (multifactorial--> hemodynamic ATN vs INSAID induced AIN)  Last know HIE SCr 1.16, GFR 48 (CKD3) on 10/24  Creat peaked at 5s , now at 3.3    Recommendation:  -At present there are no renal contraindications to proceeding with planned T11-L3 decompression tomorrow  -Ensure appropriate PO hydration  -Monitor BMP daily   –Strict I/O monitoring  -Recommend maintaining MAP > 65 perioperatively tomorrow  -Avoid NSAIDs for pain control perioperatively tomorrow    #Mild Hypocalcemia (asymptomatic)  5/7: Ionized Ca 1.03   5/8: Ca corrected 8.2 (albumin 3.5)   5/9 serum Ca 8.1  Vit D 25 37.5  Vit D 1.25  23.6    No need for supplementation for now.     Discussed with primary team  79-year-old F with PMH of hypothyroidism,  IgA def , recent mechanical fall with L1 compression fracture, HTN (on coreg, HCTZ), CKD (stage 3) admitted with syncope and TBI, found to have MARY LOU on CKD and HTN emergency (SBP 200s with MARY LOU) .  Patient reports having used ibuprofen on a daily basis. Now found to have bx proven adenoCa of the lung. Nephrology consulted for MARY LOU on CKD    #Non-oliguric MARY LOU on CKD- (multifactorial--> hemodynamic ATN vs INSAID induced AIN)  Last know HIE SCr 1.16, GFR 48 (CKD3) on 10/24  Creat peaked at 5s , now at 3.3    Recommendation:  -At present there are no renal contraindications to proceeding with planned T11-L3 decompression tomorrow  -Ensure appropriate PO hydration  -Monitor BMP daily   –Strict I/O monitoring  -Recommend maintaining MAP > 65 perioperatively tomorrow  -Avoid NSAIDs for pain control perioperatively tomorrow    #Mild Hypocalcemia (asymptomatic)  5/7: Ionized Ca 1.03   5/8: Ca corrected 8.2 (albumin 3.5)   5/9 serum Ca 8.1  Vit D 25 37.5  Vit D 1.25  23.6    can give po calcitriol 0.25 mcg daily  Primary team informed     Discussed with primary team

## 2025-05-09 NOTE — PROGRESS NOTE ADULT - ASSESSMENT
Ms. Pressley is a 79 year old female with HTN, HLD,  hypothyroidism, generalized depression, ADHD and L1 compression fracture who presented for mechanical fall. Pulmonary consulted for lung mass seen on CT.     Smoking history: 2 packs per day for 20 years (40py) stopped in 1982. 25 years of second hand smoking exposure.   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       Data Reviewed:  CTA 2016: report only - indeterminate nodules   CT Chest 2021 (isa[facility]): RUL nodule   CT Chest 5/3/2025: 5 cm mixed GGO and solid opacity, emphysematous changes apically, motion artifact, additional RUL peripheral nodule   PET scan: FDG avid 3.4 x 4.9 cm groundglass opacity at the anterior right upper lobe, SUV max 4.4. Mildly FDG avid subcentimeter nodular opacities at the posterior right upper lobe and posterior left lung base. No hypermetabolic lymphadenopathy in the chest, abdomen, or pelvis. Severe compression deformity of vertebral body L1 with uptake suggestive of an acute or subacute process.  Outpatient chart: PCP note and Cards note reviewed in HIE; CC was balance complaints attributed to flexeril at that time     #NSCLC  #Former Smoker   # L1 compression fracture    RUL nodule highly suspicious for primary lung malignancy. Patient underwent a PET CT scan which showed a growing right upper lobe lung mass measuring 3.4 x 4.9 cm as well as severe compression deformity in the L1 vertebrae, there is no evidence of hypermetabolic lymphadenopathy in the chest, abdomen, or pelvis. There is a possibility the L1 vertebral compression fracture could be a metastatic involvement. She underwent a bronchoscopy with EBUS on 5/6 for diagnosis and staging. Pathology resulted with NSCLC. At this time, she appears to be stage IIb. She is now pending L1 fixation with orthopedics with biopsy to rule out metastatic disease.     Recommendations:  - Recommend ortho to obtain biopsy when patient undergoes the decompression of the L1 vertebral compression fracture to rule out metastatic involvement on the spine  - Will need to establish care with pulmonary outpatient for PFTs - can follow with Dr. Peck    Discussed with Dr. Peck   Ms. Pressley is a 79 year old female with HTN, HLD,  hypothyroidism, generalized depression, ADHD and L1 compression fracture who presented for mechanical fall. Pulmonary consulted for lung mass seen on CT.     Smoking history: 2 packs per day for 20 years (40py) stopped in 1982. 25 years of second hand smoking exposure.   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       Data Reviewed:  CTA 2016: report only - indeterminate nodules   CT Chest 2021 ([facility]): RUL nodule   CT Chest 5/3/2025: 5 cm mixed GGO and solid opacity, emphysematous changes apically, motion artifact, additional RUL peripheral nodule   PET scan: FDG avid 3.4 x 4.9 cm groundglass opacity at the anterior right upper lobe, SUV max 4.4. Mildly FDG avid subcentimeter nodular opacities at the posterior right upper lobe and posterior left lung base. No hypermetabolic lymphadenopathy in the chest, abdomen, or pelvis. Severe compression deformity of vertebral body L1 with uptake suggestive of an acute or subacute process.  Outpatient chart: PCP note and Cards note reviewed in HIE; CC was balance complaints attributed to flexeril at that time     #NSCLC  #Former Smoker   # L1 compression fracture    RUL nodule highly suspicious for primary lung malignancy. Patient underwent a PET CT scan which showed a growing right upper lobe lung mass measuring 3.4 x 4.9 cm as well as severe compression deformity in the L1 vertebrae, there is no evidence of hypermetabolic lymphadenopathy in the chest, abdomen, or pelvis. There is a possibility the L1 vertebral compression fracture could be a metastatic involvement. She underwent a bronchoscopy with EBUS on 5/6 for diagnosis and staging. Pathology resulted with NSCLC. At this time, she appears to be stage IIb. She is now pending L1 fixation with orthopedics with biopsy to rule out metastatic disease.     Recommendations:  - Recommend ortho to obtain biopsy when patient undergoes the decompression of the L1 vertebral compression fracture to rule out metastatic involvement on the spine  - Will need to establish care with pulmonary outpatient for PFTs - can follow with Dr. Peck    Pulmonary to sign off. Please re-consult as needed.  Discussed with Dr. Peck

## 2025-05-09 NOTE — PROGRESS NOTE ADULT - PROBLEM SELECTOR PLAN 11
Home medication rosuvastatin 5mg   Lipid panel wnl; A1c 5.6  - initiate atorvastatin 80mg qd Home medication: Levothyroxine Sodium 75 MCG Oral; TSH 1.050  - resume home medication    #Prophylactic    F: nonw  E: Replete PRN  N: Regular   DVT ppx: heparin 5000 q8 hours (hold 5/9am for procedure)  GI ppx: None  Bowel: Not indicated   Dispo: [facility] Home medication: Levothyroxine Sodium 75 MCG Oral; TSH 1.050  - resume home medication    #Depression   - c/w home buproprion 300 mg PO q24 hours     #Prophylactic    F: none  E: Replete PRN  N: Regular   DVT ppx: heparin 5000 q8 hours (hold 5/9am for procedure)  GI ppx: None  Bowel: Not indicated   Dispo: [facility]

## 2025-05-09 NOTE — PROGRESS NOTE ADULT - PROBLEM SELECTOR PLAN 5
Patient with 20 pack year smoking history (stopped in 1984) presents after syncopal episode with incidental finding of pulmonary nodule in RUL. Endorses recent loss of appetite, but denies cough, weight loss, prior occupational exposures. c/f for primary lung malignancy   CT Chest: 5 cm infiltrative opacity in the right upper lobe suspicious for malignancy.  Pulmonary following   PET/CT: performed pending official pathology report  - outpatient pulm follow up #AGMA  Baseline Cr: upon admission BUN/Cr  82/5.86  Likely iso of dehydration as patient shares she has had diminished oral intake 2/2 dry mouth  s/p IL NS in ED and NaHCO3 tables;  renal ultrasound; unremarkable   FENa: 2.4 instrinsic eitology likely ATN iso NSAID use  Cystatin C elevated   - avoid nephrotoxic medication  - trend Cr daily  - strict I/O

## 2025-05-09 NOTE — PROGRESS NOTE ADULT - ASSESSMENT
79F with PMHx of HTN, HLD,  hypothyroidism, generalized depression, ADHD rheumatic fever, pericarditis with recent  L1 compression fracture 2/2 mechanical fall (pending  orthopedic intervention with Dr. Barcenas),  presents after unwitnessed syncopal event and head trauma likely 2/2 to medication side effect vs dehydration complicated by MARY LOU, with incidental finding of RUL opacity c/f malignancy now s/p bronchoscopy pending surgical intervention with orthopedic surgery but complicated by suspicion of history of IgA deficiency  79F with PMHx of HTN, HLD,  hypothyroidism, generalized depression, ADHD rheumatic fever, pericarditis with recent  L1 compression fracture 2/2 mechanical fall (pending  orthopedic intervention with Dr. Barcenas),  presents after unwitnessed syncopal event and head trauma likely 2/2 to medication side effect vs dehydration complicated by MARY LOU, with incidental finding of RUL opacity that resulted to NSLC, now pending L1 decompression w/ c/f metastatic spread, complicated by suspicion of history of IgA deficiency

## 2025-05-09 NOTE — PROGRESS NOTE ADULT - PROBLEM SELECTOR PLAN 1
- RUL nodule highly suspicious for primary lung malignancy.   - PET CT scan which showed a growing right upper lobe lung mass measuring 3.4 x 4.9 cm as well as severe compression deformity in the L1 vertebrae, there is no evidence of hypermetabolic lymphadenopathy in the chest, abdomen, or pelvis.   -There is a possibility the L1 vertebral compression fracture could be a metastatic involvement.   - bronchoscopy with EBUS on 5/6 for diagnosis and staging. Pathology resulted with NSCLC. At this time, she appears to be stage IIb.   - She is now pending L1 fixation with orthopedics with biopsy to rule out metastatic disease.   Plan:   - F/up ortho to obtain biopsy when pt undergoes the de-compression of the L1 vertebral compression fracture to r/out metastatic involvement of the spine  - Will need to establish care with pulmonary outpatient for PFTs - can follow with Dr. Peck

## 2025-05-09 NOTE — PROGRESS NOTE ADULT - PROBLEM SELECTOR PLAN 4
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 States she was diagnosed by an endocrinologist several decades ago however her PCP has low suspicion of this. Patient and family member deny prior history of blood transfusions or anaphylaxis.   Orthopedic surgery, blood bank and hematology aware  - Planned 5/8 procedure deferred, recheduled for 5/10 AM  - will send immunoglobulin panel today for diagnostic testing (IgA less than 7 with normal IgG and IgM levels)  - Per core lab this is a send out test that can take 1-3 days to result but will attempt to expedite  - plan to order/prepare IgA acceptable blood products for 5/10 surgery as a contingency if immunoglobulin panel does not result or returns positive

## 2025-05-09 NOTE — PROGRESS NOTE ADULT - PROBLEM SELECTOR PLAN 10
Home medication bupropion 450mg qd per last note from PCP  -c/w wellbutrin 300 given elevated qtc    #ADHD  Lisdexamfetamine Dimesylate  30 MG Oral Capsule  - hold for now Home medication rosuvastatin 5mg   Lipid panel wnl; A1c 5.6  - initiate atorvastatin 80mg qd

## 2025-05-09 NOTE — PROGRESS NOTE ADULT - SUBJECTIVE AND OBJECTIVE BOX
Internal Medicine Progress Note    Overnight:  No acute events overnight     Subjective:  Patient seen and examined at the bedside. Patient denies any acute new complaints. Endorses continual mild cough. Had questions regarding upcoming orthopedic procedure. Overall doing well, slept well, denies NV, CP, dizziness. Amenable and ready for orthopedic procedure.    OBJECTIVE:  Vital Signs Last 24 Hrs  T(C): 36.6 (09 May 2025 09:00), Max: 36.8 (08 May 2025 19:09)  T(F): 97.8 (09 May 2025 09:00), Max: 98.3 (08 May 2025 19:09)  HR: 67 (09 May 2025 09:00) (67 - 77)  BP: 166/77 (09 May 2025 09:00) (123/69 - 181/77)  BP(mean): 87 (08 May 2025 20:40) (87 - 111)  RR: 18 (09 May 2025 09:00) (16 - 18)  SpO2: 98% (09 May 2025 09:00) (97% - 98%)    Parameters below as of 09 May 2025 09:00  Patient On (Oxygen Delivery Method): room air      I&O's Detail    08 May 2025 07:01  -  09 May 2025 07:00  --------------------------------------------------------  IN:    Oral Fluid: 377 mL  Total IN: 377 mL    OUT:    Voided (mL): 650 mL  Total OUT: 650 mL    Total NET: -273 mL      09 May 2025 07:01  -  09 May 2025 12:21  --------------------------------------------------------  IN:    IV PiggyBack: 50 mL    Oral Fluid: 237 mL  Total IN: 287 mL    OUT:  Total OUT: 0 mL    Total NET: 287 mL        Physical Exam:  GENERAL: Awake, alert and interactive, conversational, sitting upright.  NEURO: A&Ox4, no focal deficits, moves all 4 extremities spontaneously  HEENT: Normocephalic, atraumatic  NECK: Supple  CARDIAC: Regular rate and rhythm, +S1/S2, no murmurs/rubs/gallops  PULM: Breathing comfortably on RA, lungs clear to auscultation  ABDOMEN: Soft, nontender, nondistended, +bs in all 4 quadrants  SKIN: Warm and dry, no rashes, lesions  VASC: no LE tenderness  Psych: Appropriate affect    Medications:  MEDICATIONS  (STANDING):  atorvastatin 80 milliGRAM(s) Oral at bedtime  buPROPion XL (24-Hour) . 300 milliGRAM(s) Oral daily  carvedilol 6.25 milliGRAM(s) Oral every 12 hours  dextrose 5%. 1000 milliLiter(s) (100 mL/Hr) IV Continuous <Continuous>  dextrose 5%. 1000 milliLiter(s) (50 mL/Hr) IV Continuous <Continuous>  dextrose 50% Injectable 25 Gram(s) IV Push once  dextrose 50% Injectable 12.5 Gram(s) IV Push once  dextrose 50% Injectable 25 Gram(s) IV Push once  glucagon  Injectable 1 milliGRAM(s) IntraMuscular once  heparin   Injectable 5000 Unit(s) SubCutaneous every 8 hours  levothyroxine 75 MICROGram(s) Oral daily    MEDICATIONS  (PRN):  acetaminophen     Tablet .. 650 milliGRAM(s) Oral every 6 hours PRN Temp greater or equal to 38C (100.4F), Mild Pain (1 - 3)  dextrose Oral Gel 15 Gram(s) Oral once PRN Blood Glucose LESS THAN 70 milliGRAM(s)/deciliter  loperamide 2 milliGRAM(s) Oral every 4 hours PRN Diarrhea  melatonin 3 milliGRAM(s) Oral at bedtime PRN Insomnia      Labs:                        9.9    7.14  )-----------( 254      ( 09 May 2025 06:51 )             30.9     05-09    137  |  105  |  48[H]  ----------------------------<  97  4.2   |  18[L]  |  3.32[H]    Ca    8.1[L]      09 May 2025 06:51  Phos  3.3     05-09  Mg     1.7     05-09        ABG - ( 07 May 2025 14:28 )  pH, Arterial: 7.35  pH, Blood: x     /  pCO2: 29    /  pO2: 93    / HCO3: 16    / Base Excess: -8.2  /  SaO2: 99.7              Urinalysis Basic - ( 09 May 2025 06:51 )    Color: x / Appearance: x / SG: x / pH: x  Gluc: 97 mg/dL / Ketone: x  / Bili: x / Urobili: x   Blood: x / Protein: x / Nitrite: x   Leuk Esterase: x / RBC: x / WBC x   Sq Epi: x / Non Sq Epi: x / Bacteria: x          Radiology: Reviewed

## 2025-05-09 NOTE — PROGRESS NOTE ADULT - PROBLEM SELECTOR PLAN 9
Patient shares prior hx of rheumatic fever when she was [age] years old but denies residual valvulopathy.   Seen by Dr. Murray in December with instruction to undergo stress test and TTE but no record of imaging. EKG normal sinus rhythm, no murmurs auscultated on physical exam   TTE w.o valvulopathy; EF 70% Home medication bupropion 450mg qd per last note from PCP  -c/w wellbutrin 300 given elevated qtc    #ADHD  Lisdexamfetamine Dimesylate  30 MG Oral Capsule  - hold for now

## 2025-05-09 NOTE — PROGRESS NOTE ADULT - PROBLEM SELECTOR PLAN 3
Patient suffered mechanical fall several weeks ago and has been following with ortho for planned procedure  Outpatient Lumbar MRI: acute/subacute severe anteriorly wedged compression deformity of the L1 vertebral body with moderate associated retropulsion and severe stenosis.   Started on cyclobenzaprine 10mg TID as need, oxycodone 5mg and percocet  At [facility]   CT Lumbar Spine: Acute-appearing burst type compression fracture at L1 with severe vertebral body height loss and moderate osseous retropulsion. There is severe associated spinal canal stenosis. Acute right transverse process fractures at L1 and L2. Severe spinal canal stenosis at L4-L5  Followed by Dr. Barcenas; pending T11-L3 Decompression and posterior instrumented fusion.   Ortho following recs appreciated;   - c/w WBAT in TSLO brace   - surgery deferred today pending workup of suspected IgA deficiency; rescheduled for 5/9  - plan to order/prepare IgA acceptable blood products for 5/9 surgery as a contingency if immunoglobulin panel does not result or returns positive   - NPO at 7am on 5/9; discussed request of biopsy to evaluate for metastasis   - will not resume muscle relaxant or opioid medications as they are likely contributing to patient's worsening instability    - monitor for signs of saddle anesthesia #Syncope   May be medication induced BEERS (recently started cyclobenzaprine, opiods  vs orthostatic iso poor oral intake less likely arrhythmogenic given normal EKG however given patient's history of rheumatic fever and pericarditis would  perform limited TTE   Stroke imaging negative, no signs of facial droop or dysarthria on repeat exam; MR Brain unremarkable  s/p scalp laceration repair in ED with three staples; UA: mod leuk esterase, nitrite neg with * WBC; monitoring of abx  as patient is asymptomatic  Cardiology team following; TTE: EF 70% without significant valvular disease   - hold off  orthostatics for now    CTM

## 2025-05-09 NOTE — PROGRESS NOTE ADULT - PROBLEM SELECTOR PLAN 12
Home medication: Levothyroxine Sodium 75 MCG Oral; TSH 1.050  - resume home medication
F: nonw  E: Replete PRN  N: Regular   DVT ppx: holding   GI ppx: None  Bowel: Not indicated   Dispo: F     FULL CODE

## 2025-05-09 NOTE — PROGRESS NOTE ADULT - PROBLEM SELECTOR PLAN 2
#Syncope   May be medication induced BEERS (recently started cyclobenzaprine, opiods  vs orthostatic iso poor oral intake less likely arrhythmogenic given normal EKG however given patient's history of rheumatic fever and pericarditis would  perform limited TTE   Stroke imaging negative, no signs of facial droop or dysarthria on repeat exam; MR Brain unremarkable  s/p scalp laceration repair in ED with three staples; UA: mod leuk esterase, nitrite neg with * WBC; monitoring of abx  as patient is asymptomatic  Cardiology team following; TTE: EF 70% without significant valvular disease   - hold off  orthostatics for now    - transfer to hospitalist tele #Syncope   May be medication induced BEERS (recently started cyclobenzaprine, opiods  vs orthostatic iso poor oral intake less likely arrhythmogenic given normal EKG however given patient's history of rheumatic fever and pericarditis would  perform limited TTE   Stroke imaging negative, no signs of facial droop or dysarthria on repeat exam; MR Brain unremarkable  s/p scalp laceration repair in ED with three staples; UA: mod leuk esterase, nitrite neg with * WBC; monitoring of abx  as patient is asymptomatic  Cardiology team following; TTE: EF 70% without significant valvular disease   - hold off  orthostatics for now    CTM Patient suffered mechanical fall several weeks ago and has been following with ortho for planned procedure  Outpatient Lumbar MRI: acute/subacute severe anteriorly wedged compression deformity of the L1 vertebral body with moderate associated retropulsion and severe stenosis.   Started on cyclobenzaprine 10mg TID as need, oxycodone 5mg and percocet  At [facility]   CT Lumbar Spine: L1 compression fracture   Followed by Dr. Barcenas; pending T11-L3 Decompression and posterior instrumented fusion.   Ortho following recs appreciated;   - c/w WBAT in TSLO brace   - surgery planned 5/10  - plan to order/prepare IgA acceptable blood products for 5/9 surgery as a contingency if immunoglobulin panel does not result or returns positive   - NPO at 7am on 5/10; discussed request of biopsy to evaluate for metastasis   - will not resume muscle relaxant or opioid medications as they are likely contributing to patient's worsening instability    - monitor for signs of saddle anesthesia  - c/f metastatic spread of lung malignancy, f/up biopsy

## 2025-05-09 NOTE — PROGRESS NOTE ADULT - PROBLEM SELECTOR PLAN 7
Patient with hx of HTN on hydrochlorthiazide and coreg at home, has not taken home meds in several weeks since starting muscle relaxant and opioid medications. Denies symptoms of lightheadedness, dizziness, nausea or chest pain    BP trend in /78 >  205/80  s/p labetalol 10mg in ED with improvement of blood pressure   Home mediction: coreg 3.125 BID   - c/w coreg 6.25 BID post-op  - will hold thiazide for now  - goal SBP <160 Likely 2/2 to demand ischemia, low concern for ACS given normal EKG and denial of anginal symptoms + hemodynamic stability   Troponin trend 89 (peak) > 85

## 2025-05-09 NOTE — PROGRESS NOTE ADULT - SUBJECTIVE AND OBJECTIVE BOX
NEPHROLOGY PROGRESS NOTE:    Interval history:  No overnight events. Patient seen and examined at bedside. States she feels fine    Vitals:  T(F): 97.8 (25 @ 09:00), Max: 98.3 (25 @ 19:09)  HR: 67 (25 @ 09:00)  BP: 166/77 (25 @ 09:00)  RR: 18 (25 @ 09:00)  SpO2: 98% (25 @ 09:00)  Wt(kg): --     07:01  -   07:00  --------------------------------------------------------  IN: 50 mL / OUT: 0 mL / NET: 50 mL    :01  -   07:00  --------------------------------------------------------  IN: 377 mL / OUT: 650 mL / NET: -273 mL          MEDICATIONS  (STANDING):  atorvastatin 80 milliGRAM(s) Oral at bedtime  buPROPion XL (24-Hour) . 300 milliGRAM(s) Oral daily  carvedilol 6.25 milliGRAM(s) Oral every 12 hours  dextrose 5%. 1000 milliLiter(s) (100 mL/Hr) IV Continuous <Continuous>  dextrose 5%. 1000 milliLiter(s) (50 mL/Hr) IV Continuous <Continuous>  dextrose 50% Injectable 25 Gram(s) IV Push once  dextrose 50% Injectable 12.5 Gram(s) IV Push once  dextrose 50% Injectable 25 Gram(s) IV Push once  glucagon  Injectable 1 milliGRAM(s) IntraMuscular once  heparin   Injectable 5000 Unit(s) SubCutaneous every 8 hours  levothyroxine 75 MICROGram(s) Oral daily  magnesium sulfate  IVPB 2 Gram(s) IV Intermittent once    MEDICATIONS  (PRN):  acetaminophen     Tablet .. 650 milliGRAM(s) Oral every 6 hours PRN Temp greater or equal to 38C (100.4F), Mild Pain (1 - 3)  dextrose Oral Gel 15 Gram(s) Oral once PRN Blood Glucose LESS THAN 70 milliGRAM(s)/deciliter  loperamide 2 milliGRAM(s) Oral every 4 hours PRN Diarrhea  melatonin 3 milliGRAM(s) Oral at bedtime PRN Insomnia      PE:  General: Not in acute distress  Chest: CTAP b/l  Heart: RRR, S1/S2 wnl, no MRG  Abdomen: Soft, nontender, nondistended  Extremities: No edema  Neuro:  Alert, no apparent focal deficits, answer questions appropriately      Pertinent labs & Imagin-09    137  |  105  |  48[H]  ----------------------------<  97  4.2   |  18[L]  |  3.32[H]    Ca    8.1[L]      09 May 2025 06:51  Phos  3.3       Mg     1.7                                 9.9    7.14  )-----------( 254      ( 09 May 2025 06:51 )             30.9       Urine Studies:  Creatinine Trend: 3.32<--, 3.73<--, 4.14<--, 4.45<--, 4.56<--, 4.86<--  Urinalysis Basic - ( 09 May 2025 06:51 )    Color:  / Appearance:  / SG:  / pH:   Gluc: 97 mg/dL / Ketone:   / Bili:  / Urobili:    Blood:  / Protein:  / Nitrite:    Leuk Esterase:  / RBC:  / WBC    Sq Epi:  / Non Sq Epi:  / Bacteria:       Sodium, Random Urine: 51 mmol/L ( @ 10:48)  Creatinine, Random Urine: 71 mg/dL ( @ 10:48)  Protein/Creatinine Ratio Calculation: 0.3 Ratio ( @ 10:48)  Osmolality, Random Urine: 393 mosm/kg ( @ 10:48)  Potassium, Random Urine: 34 mmol/L ( @ 10:48)

## 2025-05-09 NOTE — PROGRESS NOTE ADULT - PROBLEM SELECTOR PLAN 8
Likely 2/2 to demand ischemia, low concern for ACS given normal EKG and denial of anginal symptoms + hemodynamic stability   Troponin trend 89 (peak) > 85 Patient shares prior hx of rheumatic fever when she was [age] years old but denies residual valvulopathy.   Seen by Dr. Murray in December with instruction to undergo stress test and TTE but no record of imaging. EKG normal sinus rhythm, no murmurs auscultated on physical exam   TTE w.o valvulopathy; EF 70%

## 2025-05-09 NOTE — PROGRESS NOTE ADULT - PROBLEM SELECTOR PLAN 6
#AGMA  Baseline Cr: upon admission BUN/Cr  82/5.86  Likely iso of dehydration as patient shares she has had diminished oral intake 2/2 dry mouth  s/p IL NS in ED and NaHCO3 tables;  renal ultrasound; unremarkable   FENa: 2.4 instrinsic eitology likely ATN iso NSAID use  - pending cystain C  - avoid nephrotoxic medication  - trend Cr daily  - strict I/O Patient with hx of HTN on hydrochlorthiazide and coreg at home, has not taken home meds in several weeks since starting muscle relaxant and opioid medications. Denies symptoms of lightheadedness, dizziness, nausea or chest pain    BP trend in /78 >  205/80  s/p labetalol 10mg in ED with improvement of blood pressure   Home mediction: coreg 3.125 BID   - c/w coreg 6.25 BID post-op  - will hold thiazide for now  - goal SBP <160 Patient with hx of HTN on hydrochlorthiazide and coreg at home, has not taken home meds in several weeks since starting muscle relaxant and opioid medications. Denies symptoms of lightheadedness, dizziness, nausea or chest pain   Home mediction: coreg 3.125 BID   - c/w coreg 6.25 BID with strict hold parameters   - will hold thiazide for now  - goal SBP <160

## 2025-05-09 NOTE — PROGRESS NOTE ADULT - SUBJECTIVE AND OBJECTIVE BOX
PULMONARY CONSULT SERVICE FOLLOW-UP NOTE    INTERVAL HPI:  Reviewed chart. Pathology from bronchoscopy resulted with NSCLC.   Patient seen and examined. She is awake of her biopsy results. She is pending L1 fixation with orthopedics with biopsy, now planned for tomorrow.  She has no complaints this morning.    MEDICATIONS:  Pulmonary:    Antimicrobials:    Anticoagulants:  heparin   Injectable 5000 Unit(s) SubCutaneous every 8 hours    Cardiac:  carvedilol 6.25 milliGRAM(s) Oral every 12 hours      Allergies    No Known Drug Allergies  Severe IgA deficency (Other)    Intolerances        Vital Signs Last 24 Hrs  T(C): 36.6 (09 May 2025 09:00), Max: 36.8 (08 May 2025 19:09)  T(F): 97.8 (09 May 2025 09:00), Max: 98.3 (08 May 2025 19:09)  HR: 67 (09 May 2025 09:00) (67 - 77)  BP: 166/77 (09 May 2025 09:00) (123/69 - 181/77)  BP(mean): 87 (08 May 2025 20:40) (87 - 111)  RR: 18 (09 May 2025 09:00) (16 - 18)  SpO2: 98% (09 May 2025 09:00) (97% - 98%)    Parameters below as of 09 May 2025 09:00  Patient On (Oxygen Delivery Method): room air        05-08 @ 07:01  -  05-09 @ 07:00  --------------------------------------------------------  IN: 377 mL / OUT: 650 mL / NET: -273 mL          PHYSICAL EXAM:  Constitutional: well-appearing, in no apparent respiratory distress  HEENT: three staples present on her scalp, anicteric sclera; moist mucous membranes  Neck: supple, no JVD appreciated  Cardiovascular: +S1/S2, regular rate and rhythm, no murmurs appreciated   Respiratory: on RA, clear breath sounds bilaterally. No wheezes, rhonchi or rales   Gastrointestinal: soft, non-tender, non-distended  Extremities: no edema  Skin: mobile, rubbery mass on left mid back  Neurological: awake and alert; oriented x3        LABS:  ABG - ( 07 May 2025 14:28 )  pH, Arterial: 7.35  pH, Blood: x     /  pCO2: 29    /  pO2: 93    / HCO3: 16    / Base Excess: -8.2  /  SaO2: 99.7              CBC Full  -  ( 09 May 2025 06:51 )  WBC Count : 7.14 K/uL  RBC Count : 3.33 M/uL  Hemoglobin : 9.9 g/dL  Hematocrit : 30.9 %  Platelet Count - Automated : 254 K/uL  Mean Cell Volume : 92.8 fl  Mean Cell Hemoglobin : 29.7 pg  Mean Cell Hemoglobin Concentration : 32.0 g/dL  Auto Neutrophil # : x  Auto Lymphocyte # : x  Auto Monocyte # : x  Auto Eosinophil # : x  Auto Basophil # : x  Auto Neutrophil % : x  Auto Lymphocyte % : x  Auto Monocyte % : x  Auto Eosinophil % : x  Auto Basophil % : x    05-09    137  |  105  |  48[H]  ----------------------------<  97  4.2   |  18[L]  |  3.32[H]    Ca    8.1[L]      09 May 2025 06:51  Phos  3.3     05-09  Mg     1.7     05-09          RADIOLOGY & ADDITIONAL STUDIES:  < from: CT Chest No Cont (05.03.25 @ 18:07) >  ACC: [id number] EXAM:  CT CHEST   ORDERED BY: RYAN PARRA     PROCEDURE DATE:  05/03/2025          INTERPRETATION:  CLINICAL INFORMATION: Right upper lobe consolidation   seen on same day CT angiogram of the neck.    COMPARISON: 10/1/2021    CONTRAST/COMPLICATIONS:  IV Contrast: NONE  Oral Contrast: NONE  .    PROCEDURE:  CT of the Chest was performed.  Sagittal and coronal reformats were performed.    FINDINGS:    LUNGS AND LARGE AIRWAYS: Images are degraded by respiratory motion.   Emphysematous changes. Patent central airways. 5 cm irregular shaped   groundglass and more solid peribronchial opacity in the right upper lobe.   Cluster of nodular branching densities in the left lung apex, likely   related to small airway disease.  PLEURA: No pleural effusion.  VESSELS: Within normal limits.  HEART: Heart size is normal. No pericardial effusion.  MEDIASTINUM AND NCIOLÁS: No lymphadenopathy.  CHEST WALL AND LOWER NECK: Within normal limits.  VISUALIZED UPPER ABDOMEN: Left renal cyst partially visualized.  BONES: Mild degenerative changes. No acute fracture.    IMPRESSION:    5 cm infiltrative opacity in the right upper lobe suspicious for   malignancy. Consider PET/CT and tissue sampling.    --- End of Report ---    < end of copied text >

## 2025-05-09 NOTE — PROGRESS NOTE ADULT - SUBJECTIVE AND OBJECTIVE BOX
Ortho Note    Pt comfortable without complaints, pain controlled  Denies CP, SOB, N/V, numbness/tingling     Vital Signs Last 24 Hrs  T(C): 36.5 (05-09-25 @ 05:34), Max: 36.5 (05-09-25 @ 05:34)  T(F): 97.7 (05-09-25 @ 05:34), Max: 97.7 (05-09-25 @ 05:34)  HR: 67 (05-09-25 @ 05:34) (67 - 67)  BP: 166/81 (05-09-25 @ 05:34) (166/81 - 166/81)  BP(mean): --  RR: 16 (05-09-25 @ 05:34) (16 - 16)  SpO2: 98% (05-09-25 @ 05:34) (98% - 98%)  I&O's Summary    08 May 2025 07:01  -  09 May 2025 07:00  --------------------------------------------------------  IN: 377 mL / OUT: 650 mL / NET: -273 mL        General: Pt Alert and oriented, NAD  DSG C/D/I  Pulses: 2+  Sensation: SILT  Motor: 5/5 Quad/Ham/EHL/FHL/TA/GS                          9.9    7.14  )-----------( 254      ( 09 May 2025 06:51 )             30.9     05-09    137  |  105  |  x   ----------------------------<  97  4.2   |  x   |  x     Ca    7.8[L]      08 May 2025 05:30  Phos  3.3     05-09  Mg     1.7     05-09        A/P: 79yFemale with L1 compression fx, planning for OR tomorrow  - Stable  - Pain Control  - NPO tonight, OR tomorrow    Ortho Pager [phone] 29-Sep-2024 11:14:56

## 2025-05-10 ENCOUNTER — RESULT REVIEW (OUTPATIENT)
Age: 80
End: 2025-05-10

## 2025-05-10 LAB
ALBUMIN SERPL ELPH-MCNC: 3.3 G/DL — SIGNIFICANT CHANGE UP (ref 3.3–5)
ALP SERPL-CCNC: 255 U/L — HIGH (ref 40–120)
ALT FLD-CCNC: 31 U/L — SIGNIFICANT CHANGE UP (ref 10–45)
ANION GAP SERPL CALC-SCNC: 12 MMOL/L — SIGNIFICANT CHANGE UP (ref 5–17)
ANION GAP SERPL CALC-SCNC: 14 MMOL/L — SIGNIFICANT CHANGE UP (ref 5–17)
APTT BLD: 47.8 SEC — HIGH (ref 26.1–36.8)
AST SERPL-CCNC: 41 U/L — HIGH (ref 10–40)
BASOPHILS # BLD AUTO: 0.06 K/UL — SIGNIFICANT CHANGE UP (ref 0–0.2)
BASOPHILS # BLD AUTO: 0.09 K/UL — SIGNIFICANT CHANGE UP (ref 0–0.2)
BASOPHILS NFR BLD AUTO: 0.8 % — SIGNIFICANT CHANGE UP (ref 0–2)
BASOPHILS NFR BLD AUTO: 0.9 % — SIGNIFICANT CHANGE UP (ref 0–2)
BILIRUB SERPL-MCNC: 0.5 MG/DL — SIGNIFICANT CHANGE UP (ref 0.2–1.2)
BLD GP AB SCN SERPL QL: NEGATIVE — SIGNIFICANT CHANGE UP
BUN SERPL-MCNC: 46 MG/DL — HIGH (ref 7–23)
BUN SERPL-MCNC: 49 MG/DL — HIGH (ref 7–23)
CALCIUM SERPL-MCNC: 7.5 MG/DL — LOW (ref 8.4–10.5)
CALCIUM SERPL-MCNC: 8 MG/DL — LOW (ref 8.4–10.5)
CHLORIDE SERPL-SCNC: 104 MMOL/L — SIGNIFICANT CHANGE UP (ref 96–108)
CHLORIDE SERPL-SCNC: 107 MMOL/L — SIGNIFICANT CHANGE UP (ref 96–108)
CO2 SERPL-SCNC: 17 MMOL/L — LOW (ref 22–31)
CO2 SERPL-SCNC: 20 MMOL/L — LOW (ref 22–31)
CREAT SERPL-MCNC: 2.72 MG/DL — HIGH (ref 0.5–1.3)
CREAT SERPL-MCNC: 3.05 MG/DL — HIGH (ref 0.5–1.3)
EGFR: 15 ML/MIN/1.73M2 — LOW
EGFR: 15 ML/MIN/1.73M2 — LOW
EGFR: 17 ML/MIN/1.73M2 — LOW
EGFR: 17 ML/MIN/1.73M2 — LOW
EOSINOPHIL # BLD AUTO: 0.09 K/UL — SIGNIFICANT CHANGE UP (ref 0–0.5)
EOSINOPHIL # BLD AUTO: 0.3 K/UL — SIGNIFICANT CHANGE UP (ref 0–0.5)
EOSINOPHIL NFR BLD AUTO: 0.9 % — SIGNIFICANT CHANGE UP (ref 0–6)
EOSINOPHIL NFR BLD AUTO: 3.9 % — SIGNIFICANT CHANGE UP (ref 0–6)
GLUCOSE SERPL-MCNC: 104 MG/DL — HIGH (ref 70–99)
GLUCOSE SERPL-MCNC: 127 MG/DL — HIGH (ref 70–99)
HCT VFR BLD CALC: 30.5 % — LOW (ref 34.5–45)
HCT VFR BLD CALC: 32.3 % — LOW (ref 34.5–45)
HGB BLD-MCNC: 10.5 G/DL — LOW (ref 11.5–15.5)
HGB BLD-MCNC: 10.5 G/DL — LOW (ref 11.5–15.5)
IMM GRANULOCYTES NFR BLD AUTO: 0.4 % — SIGNIFICANT CHANGE UP (ref 0–0.9)
INR BLD: 1.02 — SIGNIFICANT CHANGE UP (ref 0.85–1.16)
LYMPHOCYTES # BLD AUTO: 1.08 K/UL — SIGNIFICANT CHANGE UP (ref 1–3.3)
LYMPHOCYTES # BLD AUTO: 1.54 K/UL — SIGNIFICANT CHANGE UP (ref 1–3.3)
LYMPHOCYTES # BLD AUTO: 10.5 % — LOW (ref 13–44)
LYMPHOCYTES # BLD AUTO: 19.9 % — SIGNIFICANT CHANGE UP (ref 13–44)
MAGNESIUM SERPL-MCNC: 2 MG/DL — SIGNIFICANT CHANGE UP (ref 1.6–2.6)
MCHC RBC-ENTMCNC: 29.3 PG — SIGNIFICANT CHANGE UP (ref 27–34)
MCHC RBC-ENTMCNC: 30.8 PG — SIGNIFICANT CHANGE UP (ref 27–34)
MCHC RBC-ENTMCNC: 32.5 G/DL — SIGNIFICANT CHANGE UP (ref 32–36)
MCHC RBC-ENTMCNC: 34.4 G/DL — SIGNIFICANT CHANGE UP (ref 32–36)
MCV RBC AUTO: 89.4 FL — SIGNIFICANT CHANGE UP (ref 80–100)
MCV RBC AUTO: 90.2 FL — SIGNIFICANT CHANGE UP (ref 80–100)
MONOCYTES # BLD AUTO: 0.18 K/UL — SIGNIFICANT CHANGE UP (ref 0–0.9)
MONOCYTES # BLD AUTO: 0.85 K/UL — SIGNIFICANT CHANGE UP (ref 0–0.9)
MONOCYTES NFR BLD AUTO: 1.7 % — LOW (ref 2–14)
MONOCYTES NFR BLD AUTO: 11 % — SIGNIFICANT CHANGE UP (ref 2–14)
NEUTROPHILS # BLD AUTO: 4.95 K/UL — SIGNIFICANT CHANGE UP (ref 1.8–7.4)
NEUTROPHILS # BLD AUTO: 8.88 K/UL — HIGH (ref 1.8–7.4)
NEUTROPHILS NFR BLD AUTO: 64 % — SIGNIFICANT CHANGE UP (ref 43–77)
NEUTROPHILS NFR BLD AUTO: 85.1 % — HIGH (ref 43–77)
NRBC BLD AUTO-RTO: 0 /100 WBCS — SIGNIFICANT CHANGE UP (ref 0–0)
PHOSPHATE SERPL-MCNC: 3.1 MG/DL — SIGNIFICANT CHANGE UP (ref 2.5–4.5)
PLATELET # BLD AUTO: 223 K/UL — SIGNIFICANT CHANGE UP (ref 150–400)
PLATELET # BLD AUTO: 276 K/UL — SIGNIFICANT CHANGE UP (ref 150–400)
POTASSIUM SERPL-MCNC: 4.4 MMOL/L — SIGNIFICANT CHANGE UP (ref 3.5–5.3)
POTASSIUM SERPL-MCNC: 4.8 MMOL/L — SIGNIFICANT CHANGE UP (ref 3.5–5.3)
POTASSIUM SERPL-SCNC: 4.4 MMOL/L — SIGNIFICANT CHANGE UP (ref 3.5–5.3)
POTASSIUM SERPL-SCNC: 4.8 MMOL/L — SIGNIFICANT CHANGE UP (ref 3.5–5.3)
PROT SERPL-MCNC: 6.1 G/DL — SIGNIFICANT CHANGE UP (ref 6–8.3)
PROTHROM AB SERPL-ACNC: 11.7 SEC — SIGNIFICANT CHANGE UP (ref 9.9–13.4)
RBC # BLD: 3.41 M/UL — LOW (ref 3.8–5.2)
RBC # BLD: 3.58 M/UL — LOW (ref 3.8–5.2)
RBC # FLD: 15.3 % — HIGH (ref 10.3–14.5)
RBC # FLD: 15.8 % — HIGH (ref 10.3–14.5)
RH IG SCN BLD-IMP: POSITIVE — SIGNIFICANT CHANGE UP
SODIUM SERPL-SCNC: 136 MMOL/L — SIGNIFICANT CHANGE UP (ref 135–145)
SODIUM SERPL-SCNC: 138 MMOL/L — SIGNIFICANT CHANGE UP (ref 135–145)
WBC # BLD: 10.33 K/UL — SIGNIFICANT CHANGE UP (ref 3.8–10.5)
WBC # BLD: 7.73 K/UL — SIGNIFICANT CHANGE UP (ref 3.8–10.5)
WBC # FLD AUTO: 10.33 K/UL — SIGNIFICANT CHANGE UP (ref 3.8–10.5)
WBC # FLD AUTO: 7.73 K/UL — SIGNIFICANT CHANGE UP (ref 3.8–10.5)

## 2025-05-10 PROCEDURE — 22612 ARTHRD PST TQ 1NTRSPC LUMBAR: CPT

## 2025-05-10 PROCEDURE — 99233 SBSQ HOSP IP/OBS HIGH 50: CPT

## 2025-05-10 PROCEDURE — 20930 SP BONE ALGRFT MORSEL ADD-ON: CPT

## 2025-05-10 PROCEDURE — 22614 ARTHRD PST TQ 1NTRSPC EA ADD: CPT

## 2025-05-10 PROCEDURE — 22842 INSERT SPINE FIXATION DEVICE: CPT

## 2025-05-10 PROCEDURE — 63056 DECOMPRESS SPINAL CORD LMBR: CPT | Mod: 59

## 2025-05-10 PROCEDURE — 20936 SP BONE AGRFT LOCAL ADD-ON: CPT

## 2025-05-10 PROCEDURE — 88311 DECALCIFY TISSUE: CPT | Mod: 26

## 2025-05-10 PROCEDURE — 63017 REMOVE SPINE LAMINA >2 LMBR: CPT

## 2025-05-10 PROCEDURE — 88307 TISSUE EXAM BY PATHOLOGIST: CPT | Mod: 26

## 2025-05-10 PROCEDURE — 99232 SBSQ HOSP IP/OBS MODERATE 35: CPT

## 2025-05-10 PROCEDURE — 61783 SCAN PROC SPINAL: CPT | Mod: 59

## 2025-05-10 RX ORDER — HYDROMORPHONE/SOD CHLOR,ISO/PF 2 MG/10 ML
0.5 SYRINGE (ML) INJECTION EVERY 4 HOURS
Refills: 0 | Status: DISCONTINUED | OUTPATIENT
Start: 2025-05-10 | End: 2025-05-14

## 2025-05-10 RX ORDER — ENOXAPARIN SODIUM 100 MG/ML
30 INJECTION SUBCUTANEOUS EVERY 24 HOURS
Refills: 0 | Status: DISCONTINUED | OUTPATIENT
Start: 2025-05-11 | End: 2025-05-14

## 2025-05-10 RX ORDER — CEFAZOLIN SODIUM IN 0.9 % NACL 3 G/100 ML
2000 INTRAVENOUS SOLUTION, PIGGYBACK (ML) INTRAVENOUS EVERY 8 HOURS
Refills: 0 | Status: COMPLETED | OUTPATIENT
Start: 2025-05-10 | End: 2025-05-11

## 2025-05-10 RX ORDER — OXYCODONE HYDROCHLORIDE 30 MG/1
10 TABLET ORAL EVERY 4 HOURS
Refills: 0 | Status: DISCONTINUED | OUTPATIENT
Start: 2025-05-10 | End: 2025-05-14

## 2025-05-10 RX ORDER — OXYCODONE HYDROCHLORIDE 30 MG/1
5 TABLET ORAL EVERY 4 HOURS
Refills: 0 | Status: DISCONTINUED | OUTPATIENT
Start: 2025-05-10 | End: 2025-05-14

## 2025-05-10 RX ORDER — CALCITRIOL 0.5 UG/1
0.25 CAPSULE, GELATIN COATED ORAL DAILY
Refills: 0 | Status: DISCONTINUED | OUTPATIENT
Start: 2025-05-10 | End: 2025-05-14

## 2025-05-10 RX ORDER — LIDOCAINE HYDROCHLORIDE 20 MG/ML
1 JELLY TOPICAL ONCE
Refills: 0 | Status: COMPLETED | OUTPATIENT
Start: 2025-05-10 | End: 2025-05-10

## 2025-05-10 RX ORDER — ACETAMINOPHEN 500 MG/5ML
1000 LIQUID (ML) ORAL EVERY 8 HOURS
Refills: 0 | Status: DISCONTINUED | OUTPATIENT
Start: 2025-05-10 | End: 2025-05-14

## 2025-05-10 RX ORDER — HYDROMORPHONE/SOD CHLOR,ISO/PF 2 MG/10 ML
0.5 SYRINGE (ML) INJECTION
Refills: 0 | Status: DISCONTINUED | OUTPATIENT
Start: 2025-05-10 | End: 2025-05-14

## 2025-05-10 RX ORDER — BUPIVACAINE 13.3 MG/ML
20 INJECTION, SUSPENSION, LIPOSOMAL INFILTRATION ONCE
Refills: 0 | Status: DISCONTINUED | OUTPATIENT
Start: 2025-05-10 | End: 2025-05-14

## 2025-05-10 RX ADMIN — OXYCODONE HYDROCHLORIDE 5 MILLIGRAM(S): 30 TABLET ORAL at 19:14

## 2025-05-10 RX ADMIN — BUPROPION HYDROBROMIDE 300 MILLIGRAM(S): 522 TABLET, EXTENDED RELEASE ORAL at 17:12

## 2025-05-10 RX ADMIN — CARVEDILOL 6.25 MILLIGRAM(S): 3.12 TABLET, FILM COATED ORAL at 06:20

## 2025-05-10 RX ADMIN — Medication 100 MILLIGRAM(S): at 17:13

## 2025-05-10 RX ADMIN — Medication 1000 MILLIGRAM(S): at 23:08

## 2025-05-10 RX ADMIN — CARVEDILOL 6.25 MILLIGRAM(S): 3.12 TABLET, FILM COATED ORAL at 15:48

## 2025-05-10 RX ADMIN — Medication 0.5 MILLIGRAM(S): at 14:42

## 2025-05-10 RX ADMIN — Medication 0.5 MILLIGRAM(S): at 14:23

## 2025-05-10 RX ADMIN — ATORVASTATIN CALCIUM 80 MILLIGRAM(S): 80 TABLET, FILM COATED ORAL at 23:08

## 2025-05-10 RX ADMIN — OXYCODONE HYDROCHLORIDE 5 MILLIGRAM(S): 30 TABLET ORAL at 15:36

## 2025-05-10 RX ADMIN — Medication 75 MICROGRAM(S): at 07:05

## 2025-05-10 RX ADMIN — LIDOCAINE HYDROCHLORIDE 1 PATCH: 20 JELLY TOPICAL at 23:08

## 2025-05-10 NOTE — DISCHARGE NOTE PROVIDER - NSDCCPCAREPLAN_GEN_ALL_CORE_FT
PRINCIPAL DISCHARGE DIAGNOSIS  Diagnosis: Dysarthria  Assessment and Plan of Treatment:       SECONDARY DISCHARGE DIAGNOSES  Diagnosis: MARY LOU (acute kidney injury)  Assessment and Plan of Treatment:

## 2025-05-10 NOTE — DISCHARGE NOTE PROVIDER - PROVIDER TOKENS
PROVIDER:[TOKEN:[[id number]],FOLLOWUP:[2 weeks]],PROVIDER:[TOKEN:[[id number]],FOLLOWUP:[2 weeks]],PROVIDER:[TOKEN:[[id number]],FOLLOWUP:[2 weeks]],PROVIDER:[TOKEN:[[id number]],SCHEDULEDAPPT:[05/20/2025],SCHEDULEDAPPTTIME:[02:20 PM]]

## 2025-05-10 NOTE — PROGRESS NOTE ADULT - SUBJECTIVE AND OBJECTIVE BOX
Ortho Post Op Check    Procedure: T11-L3 decompression and PSF  Surgeon: Dr. Barcenas    Pt comfortable without complaints, pain controlled  Denies CP, SOB, N/V, numbness/tingling     Vital Signs Last 24 Hrs  T(C): 36.6 (05-10-25 @ 20:32), Max: 36.6 (05-10-25 @ 20:32)  T(F): 97.8 (05-10-25 @ 20:32), Max: 97.8 (05-10-25 @ 20:32)  HR: 70 (05-10-25 @ 20:32) (70 - 70)  BP: 144/72 (05-10-25 @ 20:32) (144/72 - 144/72)  BP(mean): --  RR: 18 (05-10-25 @ 20:32) (18 - 18)  SpO2: 96% (05-10-25 @ 20:32) (96% - 96%)  I&O's Summary    09 May 2025 07:01  -  10 May 2025 07:00  --------------------------------------------------------  IN: 287 mL / OUT: 0 mL / NET: 287 mL    10 May 2025 07:01  -  10 May 2025 23:15  --------------------------------------------------------  IN: 300 mL / OUT: 600 mL / NET: -300 mL        General: Pt Alert and oriented, NAD  Aquacel DSG C/D/I  Pulses Distal pulses intact  Sensation: SILT  Motor: EHL/FHL/TA/GS 5/5                          10.5   10.33 )-----------( 223      ( 10 May 2025 14:52 )             30.5     05-10    136  |  107  |  46[H]  ----------------------------<  127[H]  4.8   |  17[L]  |  2.72[H]    Ca    7.5[L]      10 May 2025 14:52  Phos  3.1     05-10  Mg     2.0     05-10    TPro  6.1  /  Alb  3.3  /  TBili  0.5  /  DBili  x   /  AST  41[H]  /  ALT  31  /  AlkPhos  255[H]  05-10      A/P: 79yFemale POD#0 s/p T11-L3 Decompression/PSF     - Stable  - Pain Control  - DVT ppx: per primary  - Post op abx: Ancef  - PT, WBS: WBAT  - f/u PT Eval    Ortho Pager [phone]

## 2025-05-10 NOTE — PROGRESS NOTE ADULT - ASSESSMENT
79-year-old F with PMH of hypothyroidism,  IgA def , recent mechanical fall with L1 compression fracture, HTN (on coreg, HCTZ), CKD (stage 3) admitted with syncope and TBI, found to have MARY LOU on CKD and HTN emergency (SBP 200s with MARY LOU) .  Patient reports having used ibuprofen on a daily basis. Now found to have bx proven adenoCa of the lung. Nephrology consulted for MARY LOU on CKD    -Imp: Non-oliguric iANT on CKD3(multifactorial). Resolving.   Last know HIE SCr 1.16 on 10/24, now peaked @ 5.8, down to 3.0 after 1wk.     -Plan:  Recovering from iATN. For T11-L3 decompression today  Avoid hypotension/sudden bp drops. Aim for SBP>120 as possible.   BMP daily   Strict I/O monitoring  Avoid NSAIDs for pain control perioperatively   Obtain Fe panel.   Start calcitriol 0.25 mcg daily

## 2025-05-10 NOTE — PROGRESS NOTE ADULT - SUBJECTIVE AND OBJECTIVE BOX
Medicine Progress Note    Patient is a 79y old  Female who presents with a chief complaint of Fall vs syncope, MARY LOU (10 May 2025 11:37)      SUBJECTIVE / OVERNIGHT EVENTS:  plan for surg today  Denies fever, chills, chest pain, shortness of breath, abd pain, N/V.    MEDICATIONS  (STANDING):  atorvastatin 80 milliGRAM(s) Oral at bedtime  BUpivacaine liposome 1.3% Injectable (no eMAR) 20 milliLiter(s) Local Injection once  buPROPion XL (24-Hour) . 300 milliGRAM(s) Oral daily  carvedilol 6.25 milliGRAM(s) Oral every 12 hours  dextrose 5%. 1000 milliLiter(s) (50 mL/Hr) IV Continuous <Continuous>  dextrose 5%. 1000 milliLiter(s) (100 mL/Hr) IV Continuous <Continuous>  dextrose 50% Injectable 25 Gram(s) IV Push once  dextrose 50% Injectable 12.5 Gram(s) IV Push once  dextrose 50% Injectable 25 Gram(s) IV Push once  glucagon  Injectable 1 milliGRAM(s) IntraMuscular once  levothyroxine 75 MICROGram(s) Oral daily    MEDICATIONS  (PRN):  acetaminophen     Tablet .. 650 milliGRAM(s) Oral every 6 hours PRN Temp greater or equal to 38C (100.4F), Mild Pain (1 - 3)  dextrose Oral Gel 15 Gram(s) Oral once PRN Blood Glucose LESS THAN 70 milliGRAM(s)/deciliter  loperamide 2 milliGRAM(s) Oral every 4 hours PRN Diarrhea  melatonin 3 milliGRAM(s) Oral at bedtime PRN Insomnia    CAPILLARY BLOOD GLUCOSE        I&O's Summary    09 May 2025 07:01  -  10 May 2025 07:00  --------------------------------------------------------  IN: 287 mL / OUT: 0 mL / NET: 287 mL        PHYSICAL EXAM:  Vital Signs Last 24 Hrs  T(C): 36.7 (10 May 2025 07:15), Max: 36.8 (09 May 2025 15:42)  T(F): 98 (10 May 2025 05:40), Max: 98.3 (09 May 2025 15:42)  HR: 77 (10 May 2025 07:15) (75 - 79)  BP: 151/73 (10 May 2025 07:15) (151/73 - 155/84)  BP(mean): 87 (10 May 2025 07:15) (87 - 87)  RR: 18 (10 May 2025 07:15) (17 - 18)  SpO2: 98% (10 May 2025 07:15) (97% - 98%)    Parameters below as of 10 May 2025 05:40  Patient On (Oxygen Delivery Method): room air    GENERAL: Awake, alert and interactive, conversational, sitting upright.  NEURO: A&Ox4, no focal deficits, moves all 4 extremities spontaneously  HEENT: Normocephalic, atraumatic  NECK: Supple  CARDIAC: Regular rate and rhythm, +S1/S2, no murmurs/rubs/gallops  PULM: Breathing comfortably on RA, lungs clear to auscultation  ABDOMEN: Soft, nontender, nondistended, +bs in all 4 quadrants  SKIN: Warm and dry, no rashes, lesions  VASC: no LE tenderness  Psych: Appropriate affect    LABS:                        10.5   7.73  )-----------( 276      ( 10 May 2025 07:13 )             32.3     05-10    138  |  104  |  49[H]  ----------------------------<  104[H]  4.4   |  20[L]  |  3.05[H]    Ca    8.0[L]      10 May 2025 07:13  Phos  3.1     05-10  Mg     2.0     05-10      PT/INR - ( 10 May 2025 07:13 )   PT: 11.7 sec;   INR: 1.02          PTT - ( 10 May 2025 07:13 )  PTT:47.8 sec      Urinalysis Basic - ( 10 May 2025 07:13 )    Color: x / Appearance: x / SG: x / pH: x  Gluc: 104 mg/dL / Ketone: x  / Bili: x / Urobili: x   Blood: x / Protein: x / Nitrite: x   Leuk Esterase: x / RBC: x / WBC x   Sq Epi: x / Non Sq Epi: x / Bacteria: x            RADIOLOGY & ADDITIONAL TESTS:  Imaging from Last 24 Hours:    Electrocardiogram/QTc Interval:    COORDINATION OF CARE:  Care Discussed with Consultants/Other Providers:

## 2025-05-10 NOTE — DISCHARGE NOTE PROVIDER - NSDCCPTREATMENT_GEN_ALL_CORE_FT
PRINCIPAL PROCEDURE  Procedure: Laminectomy with fusion of lumbar spine by posterior or posterolateral approach  Findings and Treatment:

## 2025-05-10 NOTE — DISCHARGE NOTE PROVIDER - NSDCMRMEDTOKEN_GEN_ALL_CORE_FT
Thank you for choosing Orin Antunez MD at [facility]  To Do: [address] [facility]  1. Please take meds as directed. [facility] is located in [address]. Monday, Tuesday & Friday – 8 a.m. to 4 p.m.   Wednesday, Thursday – 7 a.m. to 3 p.m those potential risks and we strive to make you healthier and to improve your quality of life.     Referrals, and Radiology Information:    If your insurance requires a referral to a specialist, please allow 5 business days to process your referral request. will help you care for your injury at home:  · When in bed, try to find a comfortable position. A firm mattress is best. Try lying flat on your back with pillows under your knees.  You can also try lying on your side with your knees bent up toward your ches medicines. · Be careful if you are given prescription medicines, opioids, or medicine for muscle spasm. They can cause drowsiness, and affect your coordination, reflexes, and judgment.  Don't drive or operate heavy machinery when taking these types of medi by treating gout early, you can relieve pain and help prevent future problems. Gout can usually be treated with medicine and proper diet. In severe cases, surgery may be needed. Gout attacks are painful and often happen more than once.  Taking medicines ma diabetes, and cholesterol. · Drink plenty of water to help flush uric acid from your body. Misbah last reviewed this educational content on 4/1/2018  © 2300-7850 [employer]. [address] [facility] [place], [address] [place] [address]. All rights reserved. Adderall:  orally   ketorolac 10 mg oral tablet: 1 tab(s) orally every 8 hours prn pain  KlonoPIN:  orally   oxycodone-acetaminophen 5 mg-325 mg oral tablet: 1 tab(s) orally 3 times a day as needed for  severe pain MDD: 3  Wellbutrin:  orally    acetaminophen 500 mg oral tablet: 2 tab(s) orally every 8 hours  Adderall:  orally   ketorolac 10 mg oral tablet: 1 tab(s) orally every 8 hours prn pain  Lovenox 40 mg/0.4 mL injectable solution: 40 milligram(s) injectable every 24 hours for DVT prophylaxis while at rehab  oxyCODONE 10 mg oral tablet: 1 tab(s) orally every 4 hours As needed Severe Pain (7 - 10)  oxyCODONE 5 mg oral tablet: 1 tab(s) orally every 4 hours As needed Moderate Pain (4 - 6)  oxycodone-acetaminophen 5 mg-325 mg oral tablet: 1 tab(s) orally 3 times a day as needed for  severe pain MDD: 3   acetaminophen 500 mg oral tablet: 2 tab(s) orally every 8 hours  acyclovir 400 mg oral tablet: 1 tab(s) orally once a day  Adderall: orally as prescribed by psychiatrist  amLODIPine 5 mg oral tablet: 1 tab(s) orally once a day  calcitriol 0.25 mcg oral capsule: 1 cap(s) orally once a day  carvedilol 6.25 mg oral tablet: 1 tab(s) orally every 12 hours  clonazePAM:   enoxaparin 30 mg/0.3 mL injectable solution: 30 injectable once a day (at bedtime) for DVT ppx at rehab, stop when ambulatory  klonopin: as prescribed by psychiatrist  Lipitor 80 mg oral tablet: 1 tab(s) orally once a day  oxyCODONE 10 mg oral tablet: 1 tab(s) orally every 4 hours As needed Severe Pain (7 - 10)  oxyCODONE 5 mg oral tablet: 1 tab(s) orally every 4 hours As needed Moderate Pain (4 - 6)  sodium bicarbonate 650 mg oral tablet: 1 tab(s) orally 3 times a day  Synthroid 75 mcg (0.075 mg) oral tablet: 1 tab(s) orally once a day  Wellbutrin  mg/24 hours oral tablet, extended release: 3 tab(s) orally once a day (at bedtime)

## 2025-05-10 NOTE — PRE-ANESTHESIA EVALUATION ADULT - NSANTHPEFT_GEN_ALL_CORE
AAXO3 NAD  S1S2 RRR CTBAL  Soft NT ND
Alert, oriented  CN grossly intact  Extremities warm without edema  Lungs clear to auscultation  RRR

## 2025-05-10 NOTE — PROGRESS NOTE ADULT - ASSESSMENT
79F with PMHx of HTN, HLD,  hypothyroidism, generalized depression, ADHD rheumatic fever, pericarditis with recent  L1 compression fracture 2/2 mechanical fall (pending  orthopedic intervention with Dr. Barcenas),  presents after unwitnessed syncopal event and head trauma likely 2/2 to medication side effect vs dehydration complicated by MARY LOU, with incidental finding of RUL opacity that resulted to NSLC, now pending L1 decompression w/ c/f metastatic spread, complicated by suspicion of history of IgA deficiency        Problem/Plan - 1:  ·  Problem: Non-small cell carcinoma, favor adenocarcinoma (see note).  ·  Plan: - RUL nodule highly suspicious for primary lung malignancy.   - PET CT scan which showed a growing right upper lobe lung mass measuring 3.4 x 4.9 cm as well as severe compression deformity in the L1 vertebrae, there is no evidence of hypermetabolic lymphadenopathy in the chest, abdomen, or pelvis.   -There is a possibility the L1 vertebral compression fracture could be a metastatic involvement.   - bronchoscopy with EBUS on 5/6 for diagnosis and staging. Pathology resulted with NSCLC. At this time, she appears to be stage IIb.   - She is now pending L1 fixation with orthopedics with biopsy to rule out metastatic disease.   Plan:   - F/up ortho to obtain biopsy when pt undergoes the de-compression of the L1 vertebral compression fracture to r/out metastatic involvement of the spine  - Will need to establish care with pulmonary outpatient for PFTs - can follow with Dr. Peck.  - Biopsy results: Non-small cell carcinoma, favor adenocarcinoma (see note).  - Onc saw pt 5/8, has appt w/  Dr. Parra at [facility] 5/15 2:40  please include in dc instructions.     Problem/Plan - 2:  ·  Problem: Lumbar compression fracture.   ·  Plan: Patient suffered mechanical fall several weeks ago and has been following with ortho for planned procedure  Outpatient Lumbar MRI: acute/subacute severe anteriorly wedged compression deformity of the L1 vertebral body with moderate associated retropulsion and severe stenosis.   Started on cyclobenzaprine 10mg TID as need, oxycodone 5mg and percocet  At [facility]   CT Lumbar Spine: L1 compression fracture   Followed by Dr. Barcenas; pending T11-L3 Decompression and posterior instrumented fusion.   Ortho following recs appreciated;   - c/w WBAT in TSLO brace   - surgery planned 5/10  - plan to order/prepare IgA acceptable blood products for 5/9 surgery as a contingency if immunoglobulin panel does not result or returns positive   - will not resume muscle relaxant or opioid medications as they are likely contributing to patient's worsening instability    - monitor for signs of saddle anesthesia  - c/f metastatic spread of lung malignancy, f/up biopsy  - NPO - OR 7am on 5/10; discussed request of biopsy to evaluate for metastasis      Problem/Plan - 3:  ·  Problem: Unwitnessed fall.   ·  Plan: #Syncope   May be medication induced BEERS (recently started cyclobenzaprine, opiods  vs orthostatic iso poor oral intake less likely arrhythmogenic given normal EKG however given patient's history of rheumatic fever and pericarditis would  perform limited TTE   Stroke imaging negative, no signs of facial droop or dysarthria on repeat exam; MR Brain unremarkable  s/p scalp laceration repair in ED with three staples; UA: mod leuk esterase, nitrite neg with * WBC; monitoring of abx  as patient is asymptomatic  Cardiology team following; TTE: EF 70% without significant valvular disease   - unable to perform orthostatics for now    CTM.     Problem/Plan - 4:  ·  Problem: IgA deficiency.   ·  Plan: Patient noted to be wearing "severe IgA deficiency bracelet" on 5/8. States she was diagnosed by an endocrinologist several decades ago however her PCP has low suspicion of this. Patient and family member deny prior history of blood transfusions or anaphylaxis.   Orthopedic surgery, blood bank and hematology aware  - Planned 5/8 procedure deferred, recheduled for 5/10 AM  - will send immunoglobulin panel today for diagnostic testing (IgA less than 7 with normal IgG and IgM levels)  - Per core lab this is a send out test that can take 1-3 days to result but will attempt to expedite  - plan to order/prepare IgA acceptable blood products for 5/10 surgery as a contingency if immunoglobulin panel does not result or returns positive.     Problem/Plan - 5:  ·  Problem: MARY LOU (acute kidney injury).   ·  Plan: #AGMA  Baseline Cr: upon admission BUN/Cr  82/5.86  Likely iso of dehydration as patient shares she has had diminished oral intake 2/2 dry mouth  s/p IL NS in ED and NaHCO3 tables;  renal ultrasound; unremarkable   FENa: 2.4 instrinsic eitology likely ATN iso NSAID use  Cystatin C elevated   - avoid nephrotoxic medication  - trend Cr daily  - strict I/O.     Problem/Plan - 6:  ·  Problem: Hypertension.   ·  Plan: Patient with hx of HTN on hydrochlorthiazide and coreg at home, has not taken home meds in several weeks since starting muscle relaxant and opioid medications. Denies symptoms of lightheadedness, dizziness, nausea or chest pain   Home mediction: coreg 3.125 BID   - c/w coreg 6.25 BID with strict hold parameters   - will hold thiazide for now  - goal SBP <160.     Problem/Plan - 7:  ·  Problem: Elevated troponin.   ·  Plan: Likely 2/2 to demand ischemia, low concern for ACS given normal EKG and denial of anginal symptoms + hemodynamic stability   Troponin trend 89 (peak) > 85.     Problem/Plan - 8:  ·  Problem: History of rheumatic fever.   ·  Plan: Patient shares prior hx of rheumatic fever when she was [age] years old but denies residual valvulopathy.   Seen by Dr. Murray in December with instruction to undergo stress test and TTE but no record of imaging. EKG normal sinus rhythm, no murmurs auscultated on physical exam   TTE w.o valvulopathy; EF 70%.     Problem/Plan - 9:  ·  Problem: Major depression.   ·  Plan: Home medication bupropion 450mg qd per last note from PCP  -c/w wellbutrin 300 given elevated qtc    #ADHD  Lisdexamfetamine Dimesylate  30 MG Oral Capsule  - hold for now.     Problem/Plan - 10:  ·  Problem: Hyperlipidemia.   ·  Plan; Home medication rosuvastatin 5mg   Lipid panel wnl; A1c 5.6  - initiate atorvastatin 80mg qd.     Problem/Plan - 11:  ·  Problem: Hypothyroidism.   ·  Plan: Home medication: Levothyroxine Sodium 75 MCG Oral; TSH 1.050  - resume home medication    #Depression   - c/w home buproprion 300 mg PO q24 hours     #Anemia  -Iron, tibc, ferritin    #Prophylactic    F: none  E: Replete PRN  N: NPO for OR  DVT ppx: heparin 5000 q8 hours (hold 5/9am for procedure)  GI ppx: None  Bowel: Not indicated   Dispo: [facility].

## 2025-05-10 NOTE — DISCHARGE NOTE PROVIDER - NSDCFUSCHEDAPPT_GEN_ALL_CORE_FT
Karthik Barcenas  [facility] PreAdmits  Scheduled Appointment: 05/12/2025    [facility]  [facility] [address]  Scheduled Appointment: 05/15/2025     Jong Parra  [facility]  [facility] [address]  Scheduled Appointment: 05/20/2025

## 2025-05-10 NOTE — PRE-ANESTHESIA EVALUATION ADULT - NSANTHPMHFT_GEN_ALL_CORE
79yo F with HTN, HL, CKD, recent worsening falls and T1 compression fracture, who was planned for lumbar spine fusion but was admitted after having a fall at home. Upon admission, she was noted to be in MARY LOU on CKD, troponin leak, new finding of pulmonary lesion. She was cleared for stroke by imaging and has been followed by nephrology. She is now scheduled for planned lumbar spine surgery.

## 2025-05-10 NOTE — DISCHARGE NOTE PROVIDER - DETAILS OF MALNUTRITION DIAGNOSIS/DIAGNOSES
This patient has been assessed with a concern for Malnutrition and was treated during this hospitalization for the following Nutrition diagnosis/diagnoses:     -  05/05/2025: Moderate protein-calorie malnutrition

## 2025-05-10 NOTE — PRE-ANESTHESIA EVALUATION ADULT - NSANTHOSAYNRD_GEN_A_CORE
No. SHANTE screening performed.  STOP BANG Legend: 0-2 = LOW Risk; 3-4 = INTERMEDIATE Risk; 5-8 = HIGH Risk
No. SHANTE screening performed.  STOP BANG Legend: 0-2 = LOW Risk; 3-4 = INTERMEDIATE Risk; 5-8 = HIGH Risk

## 2025-05-10 NOTE — DISCHARGE NOTE PROVIDER - HOSPITAL COURSE
#Discharge: do not delete    Patient is __ yo M/F with past medical history of _____ presented with _____, found to have _____ (one liner)    Hospital course (by problem):     Patient was discharged to: (home/NICK/acute rehab/hospice, etc, and with what services – home health PT/RN? Home O2?)    New medications:   Changes to old medications:  Medications that were stopped:    Items to follow up as outpatient:             Patient is 78yo F with past medical history of IgA deficiency, HTN, HLD, Hypothyroidism, ADHD, depression, L1 compression fracture  5/3/25 Stroke code called in ER for slurry speech and left facial droop. Admitted to medicine.  Nephrology consult: Pre-Renal MARY LOU, non oliguric MARY LOU likely related to ATN vs effects from NSAIDs, improving. Recommend avoiding NSAIDs and expect gradual renal recovery back to baseline ranges.  Pt should follow up with Nephrology for a non-urgent appointment as an outpatient.   Chest CT showed a opacity to the right upper lobe  PET CT revealed growing right upper lobe lung mass   5/6/25 Pulmonology consult: robotic bronchoscopy with biopsy, results showing positive adenocarcinoma of nodule  Ortho consulted for L1 compression fracture, pt failed conservative measures and underwent surgery  5/10/25 Spine Surgery T11-L3 decompression/PSF with Dr. Barcenas   L1 compression fracture bone biopsy negative for metastatic disease   5/12/25 Heme/Onc consulted: likely stage 2A, pt to follow up with Dr Parra upon discharge, has follow up appt made for 5/20 at 2:20pm  5/14/25 Thoracic Surgery consulted: for evaluation and plan of lung nodule. Pt met with Dr. Iqbal and recommended for outpatient follow up after discharge to plan for likely neoadjuvant therapy and chemo prior to thoracic surgery. Plan for pt to call Dr. Iqbal's office at [phone]    Periop antibiotics  DVT prophylaxis: lovenox, can continue while at rehab, SCDs  WBAT, PT, OOB      Follow Up Appts:  PMD within 2 weeks of discharge, call to schedule   Heme Onc Dr Parra appt made for 5/20 2:20pm  Thoracic Surgery Dr Iqbal, call to schedule  Nephrology non-urgent appt, call to schedule   Dr Barcenas within 2 weeks of discharge, call to schedule     Patient is 78yo F with past medical history of IgA deficiency, HTN, HLD, Hypothyroidism, ADHD, depression, L1 compression fracture  5/3/25 Stroke code called in ER for slurry speech and left facial droop. Admitted to medicine.  Nephrology consult: Pre-Renal MARY LOU, non oliguric MARY LOU likely related to ATN vs effects from NSAIDs, improving. Recommend avoiding NSAIDs and expect gradual renal recovery back to baseline ranges.  Pt should follow up with Nephrology for a non-urgent appointment as an outpatient.   Chest CT showed a opacity to the right upper lobe  PET CT revealed growing right upper lobe lung mass   5/6/25 Pulmonology consult: robotic bronchoscopy with biopsy, results showing positive adenocarcinoma of nodule  Ortho consulted for L1 compression fracture, pt failed conservative measures and underwent surgery  5/10/25 Spine Surgery T11-L3 decompression/PSF with Dr. Barcenas   L1 compression fracture bone biopsy negative for metastatic disease   5/12/25 Heme/Onc consulted: likely stage 2A, pt to follow up with Dr Parra upon discharge, has follow up appt made for 5/20 at 2:20pm  5/14/25 Thoracic Surgery consulted: for evaluation and plan of lung nodule. Pt met with Dr. Iqbal and recommended for outpatient follow up after discharge to plan for likely neoadjuvant therapy and chemo prior to thoracic surgery. Plan for pt to call Dr. Iqbal's office at [phone]    Periop antibiotics  DVT prophylaxis: lovenox, can continue while at rehab, SCDs  WBAT, PT, OOB      Follow Up Appts:  PMD within 2 weeks of discharge, call to schedule   Heme Onc Dr Parra appt made for 5/20 2:20pm  Thoracic Surgery Dr Iqbal, call to schedule  Nephrology non-urgent appt, call to schedule   Dr Barcenas within 2 weeks of discharge, call to schedule    Please take sodium bicarb 650 TID and calcitriol, repeat BMP in 2 weeks   Please confirm doses for Adderall and Klonopin with Pyschiatrist if restarting at rehab

## 2025-05-10 NOTE — DISCHARGE NOTE PROVIDER - NSDCFUADDAPPT_GEN_ALL_CORE_FT
Please see your PCP within 2 weeks of discharge, call to schedule an appoint      Follow Up Appts:   PMD within 2 weeks of discharge, call to schedule   Heme Onc Dr Parra appt made for 5/20 2:20pm  Thoracic Surgery Dr Iqbal, call to schedule  Nephrology non-urgent appt, call to schedule   Dr Barcenas within 2 weeks of discharge, call to schedule

## 2025-05-10 NOTE — DISCHARGE NOTE PROVIDER - NSDCFUADDINST_GEN_ALL_CORE_FT
ACTIVITY:   - Weight bear as tolerated with assistive device. No strenuous activity, heavy lifting, driving or returning to work until cleared by MD.   - Apply a cold compress to the surgical site several times daily to reduce pain and swelling. For icing, twenty-minute sessions followed by an hour off is recommended. You should ice as frequently as possible. Ice should NEVER be placed directly on the skin. Wearing compression stockings during the first week after surgery can help reduce swelling in your knee, calf and foot, but is not required.      DRESING/SHOWERING:   (AQUACEL – brwn gel dressing)   - You may shower, your dressing is water-resistant. Do not soak in bathtubs. Remove dressing after postop day 7, then leave incision open to air. You may do this yourself (simply peel it off), or you may ask for assistance from your visiting nurse. Keep your incision clean and dry. Do not pick at your incision. Do not apply creams, ointments or oils to your incision until cleared by your surgeon. Do not soak your incision in sitting water (ie tubs, pools, lakes, etc.) until cleared by your surgeon. Do not scrub the incision – instead, allow soap and water to flow over the incision and then pat it dry with a clean towel.        MEDICATION/ANTICOAGULATION:   -You have been prescribed Lovenox, as a preventative to help prevent postoperative blood clots. Please take this medication as prescribed while you are at rehab until more ambulatory.    - You have been prescribed medications for pain:     - Tylenol for mild to moderate pain. Do not exceed 3,000mg daily.     - For more severe pain, take Tylenol with the addition of narcotic pain medication. Take this medication as prescribed. This medication may cause drowsiness or dizziness. Do not operate machinery. If you take a benzodiazepine at home, take caution when taking opioid pain medication. These medications may cause oversedation and respiratory depression (slow your breathing). It is [facility] policy to have the reversal agent Narcan/naloxone available if you are taking an opioid while on a benzodiazepine. If this pertains to you, Narcan/nalaxone has been prescribed to you in addition to your other medications.   -Try to have regular bowel movements. Take stool softener or laxative if necessary. You may wish to take Miralax daily until you have regular bowel movements.    - If you have been prescribed Aspirin or an anti-inflammatory, please take pantoprazole or famotidine once a day, until no longer taking Aspirin or anti-inflammatory. This will help protect your stomach.   - If you have a pain management physician, please follow-up with them postoperatively.    - If you experience any negative side effects of your medications, please call your surgeon's office to discuss.      Please see prior page for recommendations from Heme-Onc and Thoracic Surgery teams.   FOLLOW-UP:  Follow Up Appts:  PMD within 2 weeks of discharge, call to schedule   Heme-Onc Dr Parra appt made for 5/20 2:20pm, see above  Thoracic Surgery Dr Iqbal, call to schedule, Phone: [phone]  Nephrology non-urgent appt, call to schedule   Dr Barcenas within 2 weeks of discharge, call to schedule    - Please follow-up with your primary care physician or any other specialist you see postoperatively, if needed.    - Contact your doctor or go to the emergency room if you experience: fever greater than 101.5, chills, chest pain, difficulty breathing, redness or excessive drainage around the incision, other concerns.  ACTIVITY:   - Weight bear as tolerated with assistive device. No strenuous activity, heavy lifting, driving or returning to work until cleared by MD.   - Apply a cold compress to the surgical site several times daily to reduce pain and swelling. For icing, twenty-minute sessions followed by an hour off is recommended. You should ice as frequently as possible. Ice should NEVER be placed directly on the skin. Wearing compression stockings during the first week after surgery can help reduce swelling in your knee, calf and foot, but is not required.      DRESING/SHOWERING:   (AQUACEL – brwn gel dressing)   - You may shower, your dressing is water-resistant. Do not soak in bathtubs. Remove dressing after postop day 7, then leave incision open to air. You may do this yourself (simply peel it off), or you may ask for assistance from your visiting nurse. Keep your incision clean and dry. Do not pick at your incision. Do not apply creams, ointments or oils to your incision until cleared by your surgeon. Do not soak your incision in sitting water (ie tubs, pools, lakes, etc.) until cleared by your surgeon. Do not scrub the incision – instead, allow soap and water to flow over the incision and then pat it dry with a clean towel.        MEDICATION/ANTICOAGULATION:   -You have been prescribed Lovenox, as a preventative to help prevent postoperative blood clots. Please take this medication as prescribed while you are at rehab until more ambulatory.    - You have been prescribed medications for pain:     - Tylenol for mild to moderate pain. Do not exceed 3,000mg daily.     - For more severe pain, take Tylenol with the addition of narcotic pain medication. Take this medication as prescribed. This medication may cause drowsiness or dizziness. Do not operate machinery. If you take a benzodiazepine at home, take caution when taking opioid pain medication. These medications may cause oversedation and respiratory depression (slow your breathing). It is [facility] policy to have the reversal agent Narcan/naloxone available if you are taking an opioid while on a benzodiazepine. If this pertains to you, Narcan/nalaxone has been prescribed to you in addition to your other medications.   -Try to have regular bowel movements. Take stool softener or laxative if necessary. You may wish to take Miralax daily until you have regular bowel movements.    - If you have been prescribed Aspirin or an anti-inflammatory, please take pantoprazole or famotidine once a day, until no longer taking Aspirin or anti-inflammatory. This will help protect your stomach.   - If you have a pain management physician, please follow-up with them postoperatively.    - If you experience any negative side effects of your medications, please call your surgeon's office to discuss.      Please see prior page for recommendations from Heme-Onc and Thoracic Surgery teams.   FOLLOW-UP:  Follow Up Appts:  PMD within 2 weeks of discharge, call to schedule   Heme-Onc Dr Parra appt made for 5/20 2:20pm, see above  Thoracic Surgery Dr Iqbal, call to schedule, Phone: [phone]  Nephrology non-urgent appt, call to schedule   Dr Barcenas within 2 weeks of discharge, call to schedule    - Please follow-up with your primary care physician or any other specialist you see postoperatively, if needed.    - Contact your doctor or go to the emergency room if you experience: fever greater than 101.5, chills, chest pain, difficulty breathing, redness or excessive drainage around the incision, other concerns.           Patient is 78yo F with past medical history of IgA deficiency, HTN, HLD, Hypothyroidism, ADHD, depression, L1 compression fracture  5/3/25 Stroke code called in ER for slurry speech and left facial droop. Admitted to medicine.  Nephrology consult: Pre-Renal MARY LOU, non oliguric MARY LOU likely related to ATN vs effects from NSAIDs, improving. Recommend avoiding NSAIDs and expect gradual renal recovery back to baseline ranges.  Pt should follow up with Nephrology for a non-urgent appointment as an outpatient.   Chest CT showed a opacity to the right upper lobe  PET CT revealed growing right upper lobe lung mass   5/6/25 Pulmonology consult: robotic bronchoscopy with biopsy, results showing positive adenocarcinoma of nodule  Ortho consulted for L1 compression fracture, pt failed conservative measures and underwent surgery  5/10/25 Spine Surgery T11-L3 decompression/PSF with Dr. Barcenas   L1 compression fracture bone biopsy negative for metastatic disease   5/12/25 Heme/Onc consulted: likely stage 2A, pt to follow up with Dr Parra upon discharge, has follow up appt made for 5/20 at 2:20pm  5/14/25 Thoracic Surgery consulted: for evaluation and plan of lung nodule. Pt met with Dr. Iqbal and recommended for outpatient follow up after discharge to plan for likely neoadjuvant therapy and chemo prior to thoracic surgery. Plan for pt to call Dr. Iqbal's office at [phone]    Periop antibiotics  DVT prophylaxis: lovenox, can continue while at rehab, SCDs  WBAT, PT, OOB      Follow Up Appts:  PMD within 2 weeks of discharge, call to schedule   Heme Onc Dr Parra appt made for 5/20 2:20pm  Thoracic Surgery Dr Iqbal, call to schedule  Nephrology non-urgent appt, call to schedule   Dr Barcenas within 2 weeks of discharge, call to schedule    Please take sodium bicarb 650 TID and calcitriol, repeat BMP in 2 weeks   Please confirm doses for Adderall and Klonopin with Pyschiatrist if restarting at rehab

## 2025-05-10 NOTE — BRIEF OPERATIVE NOTE - NSICDXBRIEFPROCEDURE_GEN_ALL_CORE_FT
PROCEDURES:  Laminectomy with fusion of lumbar spine by posterior or posterolateral approach 10-May-2025 14:05:07  Isidoro Lu

## 2025-05-10 NOTE — DISCHARGE NOTE PROVIDER - CARE PROVIDERS DIRECT ADDRESSES
[email or web address]

## 2025-05-10 NOTE — DISCHARGE NOTE PROVIDER - PROVIDER RX CONTACT NUMBER
Treatment Time (Optional): 1:30 Price (Use Numbers Only, No Special Characters Or $): 50.00 Post Peel Care: After the procedure, a post-peel cream was applied to the treated areas. Sun protection and post-care instructions were reviewed with the patient. Treatment Number: 0 Chemical Peel: Lactic Acid 10% / TCA 20% [phone] Prep: The treated area was degreased with pre-peel cleanser, and vaseline was applied for protection of mucous membranes. Post-Care Instructions: I reviewed with the patient in detail post-care instructions. Patient should avoid sun exposure and wear sun protection. Detail Level: Zone Consent: Prior to the procedure, written consent was obtained and risks were reviewed, including but not limited to: redness, peeling, blistering, pigmentary change, scarring, infection, and pain. [phone]

## 2025-05-10 NOTE — PROGRESS NOTE ADULT - SUBJECTIVE AND OBJECTIVE BOX
Stable. No overnight events. Improving renal function.     PE:  General: Not in acute distress  Chest: CTAP b/l  Heart: RRR, S1/S2 wnl, no MRG  Abdomen: Soft, nontender, nondistended  Extremities: No edema  Neuro:  Alert, no apparent focal deficits, answer questions appropriately    VITALS:  T(F): 98 (05-10-25 @ 05:40), Max: 98.3 (05-09-25 @ 15:42)  HR: 77 (05-10-25 @ 07:15)  BP: 151/73 (05-10-25 @ 07:15)  RR: 18 (05-10-25 @ 07:15)  SpO2: 98% (05-10-25 @ 07:15)  Wt(kg): --    05-08 @ 07:01  -  05-09 @ 07:00  --------------------------------------------------------  IN: 377 mL / OUT: 650 mL / NET: -273 mL    05-09 @ 07:01  -  05-10 @ 07:00  --------------------------------------------------------  IN: 287 mL / OUT: 0 mL / NET: 287 mL      Height (cm): 160 (05-10 @ 07:15)  Weight (kg): 68 (05-10 @ 07:15)  BMI (kg/m2): 26.6 (05-10 @ 07:15)  BSA (m2): 1.71 (05-10 @ 07:15)    LABS:  05-10    138  |  104  |  49[H]  ----------------------------<  104[H]  4.4   |  20[L]  |  3.05[H]    Ca    8.0[L]      10 May 2025 07:13  Phos  3.1     05-10  Mg     2.0     05-10                            10.5   7.73  )-----------( 276      ( 10 May 2025 07:13 )             32.3         acetaminophen     Tablet .. 650 milliGRAM(s) Oral every 6 hours PRN  atorvastatin 80 milliGRAM(s) Oral at bedtime  BUpivacaine liposome 1.3% Injectable (no eMAR) 20 milliLiter(s) Local Injection once  buPROPion XL (24-Hour) . 300 milliGRAM(s) Oral daily  carvedilol 6.25 milliGRAM(s) Oral every 12 hours  dextrose 5%. 1000 milliLiter(s) IV Continuous <Continuous>  dextrose 5%. 1000 milliLiter(s) IV Continuous <Continuous>  dextrose 50% Injectable 25 Gram(s) IV Push once  dextrose 50% Injectable 12.5 Gram(s) IV Push once  dextrose 50% Injectable 25 Gram(s) IV Push once  dextrose Oral Gel 15 Gram(s) Oral once PRN  glucagon  Injectable 1 milliGRAM(s) IntraMuscular once  levothyroxine 75 MICROGram(s) Oral daily  loperamide 2 milliGRAM(s) Oral every 4 hours PRN  melatonin 3 milliGRAM(s) Oral at bedtime PRN

## 2025-05-10 NOTE — DISCHARGE NOTE PROVIDER - CARE PROVIDER_API CALL
Karthik Barcenas  Spine Surgery  [address] [facility], [address]  [place] [address]  Phone: [phone]  Fax: [phone]  Follow Up Time: 2 weeks    Swetha Arellano  Nephrology  [address], [address]  [place] [address]  Phone: [phone]  Fax: [phone]  Follow Up Time: 2 weeks    Mak Iqbal  Thoracic Surgery  [address], [address]  [place] [address]  Phone: [phone]  Fax: [phone]  Follow Up Time: 2 weeks    Jong Parra  Hematology/Oncology  [address], [address]  [place] [address]  Phone: [phone]  Fax: [phone]  Scheduled Appointment: 05/20/2025 02:20 PM

## 2025-05-11 LAB
ALBUMIN SERPL ELPH-MCNC: 3.5 G/DL — SIGNIFICANT CHANGE UP (ref 3.3–5)
ALP SERPL-CCNC: 250 U/L — HIGH (ref 40–120)
ALT FLD-CCNC: 27 U/L — SIGNIFICANT CHANGE UP (ref 10–45)
ANION GAP SERPL CALC-SCNC: 14 MMOL/L — SIGNIFICANT CHANGE UP (ref 5–17)
AST SERPL-CCNC: 41 U/L — HIGH (ref 10–40)
BASOPHILS # BLD AUTO: 0.02 K/UL — SIGNIFICANT CHANGE UP (ref 0–0.2)
BASOPHILS NFR BLD AUTO: 0.1 % — SIGNIFICANT CHANGE UP (ref 0–2)
BILIRUB SERPL-MCNC: 0.3 MG/DL — SIGNIFICANT CHANGE UP (ref 0.2–1.2)
BUN SERPL-MCNC: 47 MG/DL — HIGH (ref 7–23)
CALCIUM SERPL-MCNC: 7.8 MG/DL — LOW (ref 8.4–10.5)
CHLORIDE SERPL-SCNC: 104 MMOL/L — SIGNIFICANT CHANGE UP (ref 96–108)
CO2 SERPL-SCNC: 16 MMOL/L — LOW (ref 22–31)
CREAT SERPL-MCNC: 2.67 MG/DL — HIGH (ref 0.5–1.3)
EGFR: 18 ML/MIN/1.73M2 — LOW
EGFR: 18 ML/MIN/1.73M2 — LOW
EOSINOPHIL # BLD AUTO: 0 K/UL — SIGNIFICANT CHANGE UP (ref 0–0.5)
EOSINOPHIL NFR BLD AUTO: 0 % — SIGNIFICANT CHANGE UP (ref 0–6)
FERRITIN SERPL-MCNC: 2810 NG/ML — HIGH (ref 13–330)
GLUCOSE SERPL-MCNC: 151 MG/DL — HIGH (ref 70–99)
HCT VFR BLD CALC: 30.2 % — LOW (ref 34.5–45)
HGB BLD-MCNC: 10 G/DL — LOW (ref 11.5–15.5)
IMM GRANULOCYTES NFR BLD AUTO: 0.9 % — SIGNIFICANT CHANGE UP (ref 0–0.9)
IRON SATN MFR SERPL: 24 % — SIGNIFICANT CHANGE UP (ref 14–50)
IRON SATN MFR SERPL: 40 UG/DL — SIGNIFICANT CHANGE UP (ref 30–160)
LYMPHOCYTES # BLD AUTO: 0.74 K/UL — LOW (ref 1–3.3)
LYMPHOCYTES # BLD AUTO: 4.5 % — LOW (ref 13–44)
MAGNESIUM SERPL-MCNC: 1.6 MG/DL — SIGNIFICANT CHANGE UP (ref 1.6–2.6)
MCHC RBC-ENTMCNC: 30.7 PG — SIGNIFICANT CHANGE UP (ref 27–34)
MCHC RBC-ENTMCNC: 33.1 G/DL — SIGNIFICANT CHANGE UP (ref 32–36)
MCV RBC AUTO: 92.6 FL — SIGNIFICANT CHANGE UP (ref 80–100)
MONOCYTES # BLD AUTO: 0.73 K/UL — SIGNIFICANT CHANGE UP (ref 0–0.9)
MONOCYTES NFR BLD AUTO: 4.5 % — SIGNIFICANT CHANGE UP (ref 2–14)
NEUTROPHILS # BLD AUTO: 14.69 K/UL — HIGH (ref 1.8–7.4)
NEUTROPHILS NFR BLD AUTO: 90 % — HIGH (ref 43–77)
NRBC BLD AUTO-RTO: 0 /100 WBCS — SIGNIFICANT CHANGE UP (ref 0–0)
PHOSPHATE SERPL-MCNC: 3.8 MG/DL — SIGNIFICANT CHANGE UP (ref 2.5–4.5)
PLATELET # BLD AUTO: 253 K/UL — SIGNIFICANT CHANGE UP (ref 150–400)
POTASSIUM SERPL-MCNC: 4.3 MMOL/L — SIGNIFICANT CHANGE UP (ref 3.5–5.3)
POTASSIUM SERPL-SCNC: 4.3 MMOL/L — SIGNIFICANT CHANGE UP (ref 3.5–5.3)
PROT SERPL-MCNC: 6.1 G/DL — SIGNIFICANT CHANGE UP (ref 6–8.3)
RBC # BLD: 3.26 M/UL — LOW (ref 3.8–5.2)
RBC # FLD: 15.2 % — HIGH (ref 10.3–14.5)
SODIUM SERPL-SCNC: 134 MMOL/L — LOW (ref 135–145)
TIBC SERPL-MCNC: 170 UG/DL — LOW (ref 220–430)
TRANSFERRIN SERPL-MCNC: 130 MG/DL — LOW (ref 200–360)
UIBC SERPL-MCNC: 130 UG/DL — SIGNIFICANT CHANGE UP (ref 110–370)
WBC # BLD: 16.32 K/UL — HIGH (ref 3.8–10.5)
WBC # FLD AUTO: 16.32 K/UL — HIGH (ref 3.8–10.5)

## 2025-05-11 PROCEDURE — 99233 SBSQ HOSP IP/OBS HIGH 50: CPT | Mod: GC

## 2025-05-11 RX ORDER — METHOCARBAMOL 500 MG/1
500 TABLET, FILM COATED ORAL THREE TIMES A DAY
Refills: 0 | Status: DISCONTINUED | OUTPATIENT
Start: 2025-05-11 | End: 2025-05-12

## 2025-05-11 RX ADMIN — LIDOCAINE HYDROCHLORIDE 1 PATCH: 20 JELLY TOPICAL at 10:47

## 2025-05-11 RX ADMIN — Medication 100 MILLIGRAM(S): at 01:29

## 2025-05-11 RX ADMIN — OXYCODONE HYDROCHLORIDE 10 MILLIGRAM(S): 30 TABLET ORAL at 15:12

## 2025-05-11 RX ADMIN — OXYCODONE HYDROCHLORIDE 10 MILLIGRAM(S): 30 TABLET ORAL at 16:39

## 2025-05-11 RX ADMIN — Medication 1000 MILLIGRAM(S): at 21:04

## 2025-05-11 RX ADMIN — CARVEDILOL 6.25 MILLIGRAM(S): 3.12 TABLET, FILM COATED ORAL at 06:28

## 2025-05-11 RX ADMIN — ATORVASTATIN CALCIUM 80 MILLIGRAM(S): 80 TABLET, FILM COATED ORAL at 21:04

## 2025-05-11 RX ADMIN — Medication 75 MICROGRAM(S): at 07:03

## 2025-05-11 RX ADMIN — ENOXAPARIN SODIUM 30 MILLIGRAM(S): 100 INJECTION SUBCUTANEOUS at 06:28

## 2025-05-11 RX ADMIN — OXYCODONE HYDROCHLORIDE 5 MILLIGRAM(S): 30 TABLET ORAL at 03:41

## 2025-05-11 RX ADMIN — OXYCODONE HYDROCHLORIDE 10 MILLIGRAM(S): 30 TABLET ORAL at 22:05

## 2025-05-11 RX ADMIN — CALCITRIOL 0.25 MICROGRAM(S): 0.5 CAPSULE, GELATIN COATED ORAL at 12:31

## 2025-05-11 RX ADMIN — BUPROPION HYDROBROMIDE 300 MILLIGRAM(S): 522 TABLET, EXTENDED RELEASE ORAL at 12:31

## 2025-05-11 RX ADMIN — OXYCODONE HYDROCHLORIDE 10 MILLIGRAM(S): 30 TABLET ORAL at 09:19

## 2025-05-11 RX ADMIN — OXYCODONE HYDROCHLORIDE 10 MILLIGRAM(S): 30 TABLET ORAL at 10:47

## 2025-05-11 RX ADMIN — OXYCODONE HYDROCHLORIDE 10 MILLIGRAM(S): 30 TABLET ORAL at 21:05

## 2025-05-11 RX ADMIN — Medication 1000 MILLIGRAM(S): at 16:39

## 2025-05-11 RX ADMIN — CARVEDILOL 6.25 MILLIGRAM(S): 3.12 TABLET, FILM COATED ORAL at 17:48

## 2025-05-11 RX ADMIN — METHOCARBAMOL 500 MILLIGRAM(S): 500 TABLET, FILM COATED ORAL at 21:04

## 2025-05-11 RX ADMIN — Medication 1000 MILLIGRAM(S): at 06:28

## 2025-05-11 RX ADMIN — Medication 1000 MILLIGRAM(S): at 15:32

## 2025-05-11 NOTE — PROGRESS NOTE ADULT - PROBLEM SELECTOR PLAN 7
Likely 2/2 to demand ischemia, low concern for ACS given normal EKG and denial of anginal symptoms + hemodynamic stability   Troponin trend 89 (peak) > 85

## 2025-05-11 NOTE — PROGRESS NOTE ADULT - ASSESSMENT
79F with PMHx of HTN, HLD,  hypothyroidism, generalized depression, ADHD rheumatic fever, pericarditis with recent  L1 compression fracture 2/2 mechanical fall (pending  orthopedic intervention with Dr. Barcenas),  presents after unwitnessed syncopal event and head trauma likely 2/2 to medication side effect vs dehydration complicated by MARY LOU, with incidental finding of RUL opacity that resulted to NSLC, now pending L1 decompression w/ c/f metastatic spread, complicated by suspicion of history of IgA deficiency

## 2025-05-11 NOTE — PROGRESS NOTE ADULT - PROBLEM SELECTOR PLAN 4
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 States she was diagnosed by an endocrinologist several decades ago however her PCP has low suspicion of this. Patient and family member deny prior history of blood transfusions or anaphylaxis.   Orthopedic surgery, blood bank and hematology aware  - Planned 5/8 procedure deferred, recheduled for 5/10 AM  - will send immunoglobulin panel today for diagnostic testing (IgA less than 7 with normal IgG and IgM levels)  - Per core lab this is a send out test that can take 1-3 days to result but will attempt to expedite  - plan to order/prepare IgA acceptable blood products for 5/10 surgery as a contingency if immunoglobulin panel does not result or returns positive Patient noted to be wearing "severe IgA deficiency bracelet" on 5/8. States she was diagnosed by an endocrinologist several decades ago however her PCP has low suspicion of this. Patient and family member deny prior history of blood transfusions or anaphylaxis.   Orthopedic surgery s/p 5/10, blood bank and hematology aware  IgA level <2  - f/u outpatient

## 2025-05-11 NOTE — PROGRESS NOTE ADULT - PROBLEM SELECTOR PLAN 6
Patient with hx of HTN on hydrochlorthiazide and coreg at home, has not taken home meds in several weeks since starting muscle relaxant and opioid medications. Denies symptoms of lightheadedness, dizziness, nausea or chest pain   Home mediction: coreg 3.125 BID   - c/w coreg 6.25 BID with strict hold parameters   - will hold thiazide for now  - goal SBP <160

## 2025-05-11 NOTE — PROGRESS NOTE ADULT - PROBLEM SELECTOR PLAN 8
Patient shares prior hx of rheumatic fever when she was [age] years old but denies residual valvulopathy.   Seen by Dr. [place] in December with instruction to undergo stress test and TTE but no record of imaging. EKG normal sinus rhythm, no murmurs auscultated on physical exam   TTE w.o valvulopathy; EF 70%

## 2025-05-11 NOTE — PROGRESS NOTE ADULT - SUBJECTIVE AND OBJECTIVE BOX
Internal Medicine Progress Note    Overnight:  Pulled out QUINCY drain    Subjective:  Patient seen and examined at the bedside. Patient reports    OBJECTIVE:    Vital Signs Last 24 Hrs  T(C): 36.4 (11 May 2025 06:04), Max: 36.6 (10 May 2025 20:32)  T(F): 97.6 (11 May 2025 06:04), Max: 97.8 (10 May 2025 20:32)  HR: 90 (11 May 2025 06:04) (62 - 90)  BP: 174/83 (11 May 2025 06:04) (144/72 - 174/83)  BP(mean): 113 (11 May 2025 06:04) (97 - 114)  RR: 18 (11 May 2025 06:04) (10 - 19)  SpO2: 96% (11 May 2025 06:04) (96% - 100%)    Parameters below as of 11 May 2025 06:04  Patient On (Oxygen Delivery Method): room air      Physical Exam:  GENERAL: Awake, alert and interactive, conversational, sitting upright.  NEURO: A&Ox4, no focal deficits, moves all 4 extremities spontaneously  HEENT: Normocephalic, atraumatic  NECK: Supple  CARDIAC: Regular rate and rhythm, +S1/S2, no murmurs/rubs/gallops  PULM: Breathing comfortably on RA, lungs clear to auscultation  ABDOMEN: Soft, nontender, nondistended, +bs in all 4 quadrants  SKIN: Warm and dry, no rashes, lesions  VASC: no LE tenderness  Psych: Appropriate affect    Medications:  MEDICATIONS  (STANDING):  atorvastatin 80 milliGRAM(s) Oral at bedtime  buPROPion XL (24-Hour) . 300 milliGRAM(s) Oral daily  carvedilol 6.25 milliGRAM(s) Oral every 12 hours  dextrose 5%. 1000 milliLiter(s) (100 mL/Hr) IV Continuous <Continuous>  dextrose 5%. 1000 milliLiter(s) (50 mL/Hr) IV Continuous <Continuous>  dextrose 50% Injectable 25 Gram(s) IV Push once  dextrose 50% Injectable 12.5 Gram(s) IV Push once  dextrose 50% Injectable 25 Gram(s) IV Push once  glucagon  Injectable 1 milliGRAM(s) IntraMuscular once  heparin   Injectable 5000 Unit(s) SubCutaneous every 8 hours  levothyroxine 75 MICROGram(s) Oral daily    MEDICATIONS  (PRN):  acetaminophen     Tablet .. 650 milliGRAM(s) Oral every 6 hours PRN Temp greater or equal to 38C (100.4F), Mild Pain (1 - 3)  dextrose Oral Gel 15 Gram(s) Oral once PRN Blood Glucose LESS THAN 70 milliGRAM(s)/deciliter  loperamide 2 milliGRAM(s) Oral every 4 hours PRN Diarrhea  melatonin 3 milliGRAM(s) Oral at bedtime PRN Insomnia      .  LABS:                         10.5   10.33 )-----------( 223      ( 10 May 2025 14:52 )             30.5     05-10    136  |  107  |  46[H]  ----------------------------<  127[H]  4.8   |  17[L]  |  2.72[H]    Ca    7.5[L]      10 May 2025 14:52  Phos  3.1     05-10  Mg     2.0     05-10    TPro  6.1  /  Alb  3.3  /  TBili  0.5  /  DBili  x   /  AST  41[H]  /  ALT  31  /  AlkPhos  255[H]  05-10    PT/INR - ( 10 May 2025 07:13 )   PT: 11.7 sec;   INR: 1.02          PTT - ( 10 May 2025 07:13 )  PTT:47.8 sec  Urinalysis Basic - ( 10 May 2025 14:52 )    Color: x / Appearance: x / SG: x / pH: x  Gluc: 127 mg/dL / Ketone: x  / Bili: x / Urobili: x   Blood: x / Protein: x / Nitrite: x   Leuk Esterase: x / RBC: x / WBC x   Sq Epi: x / Non Sq Epi: x / Bacteria: x                RADIOLOGY, EKG & ADDITIONAL TESTS: Reviewed.  Internal Medicine Progress Note    Overnight:  Pulled out QUINCY drain    Subjective:  Patient seen and examined at the bedside. Patient reports 9/10 back pain, denies leg pain, numbness/tingling, fever/chills.     OBJECTIVE:    Vital Signs Last 24 Hrs  T(C): 36.4 (11 May 2025 06:04), Max: 36.6 (10 May 2025 20:32)  T(F): 97.6 (11 May 2025 06:04), Max: 97.8 (10 May 2025 20:32)  HR: 90 (11 May 2025 06:04) (62 - 90)  BP: 174/83 (11 May 2025 06:04) (144/72 - 174/83)  BP(mean): 113 (11 May 2025 06:04) (97 - 114)  RR: 18 (11 May 2025 06:04) (10 - 19)  SpO2: 96% (11 May 2025 06:04) (96% - 100%)    Parameters below as of 11 May 2025 06:04  Patient On (Oxygen Delivery Method): room air      Physical Exam:  GENERAL: Awake, alert and interactive, conversational, sitting upright.  NEURO: A&Ox4, no focal deficits, moves all 4 extremities spontaneously  HEENT: Normocephalic, atraumatic  NECK: Supple  CARDIAC: Regular rate and rhythm, +S1/S2, no murmurs/rubs/gallops  PULM: Breathing comfortably on RA, lungs clear to auscultation  ABDOMEN: Soft, nontender, nondistended, +bs in all 4 quadrants  MSK: back TTP around QUINCY drain, stitches slightly pulled out and drain not attached to pump  SKIN: Warm and dry, no rashes, lesions  VASC: no LE tenderness  Psych: Appropriate affect    Medications:  MEDICATIONS  (STANDING):  atorvastatin 80 milliGRAM(s) Oral at bedtime  buPROPion XL (24-Hour) . 300 milliGRAM(s) Oral daily  carvedilol 6.25 milliGRAM(s) Oral every 12 hours  dextrose 5%. 1000 milliLiter(s) (100 mL/Hr) IV Continuous <Continuous>  dextrose 5%. 1000 milliLiter(s) (50 mL/Hr) IV Continuous <Continuous>  dextrose 50% Injectable 25 Gram(s) IV Push once  dextrose 50% Injectable 12.5 Gram(s) IV Push once  dextrose 50% Injectable 25 Gram(s) IV Push once  glucagon  Injectable 1 milliGRAM(s) IntraMuscular once  heparin   Injectable 5000 Unit(s) SubCutaneous every 8 hours  levothyroxine 75 MICROGram(s) Oral daily    MEDICATIONS  (PRN):  acetaminophen     Tablet .. 650 milliGRAM(s) Oral every 6 hours PRN Temp greater or equal to 38C (100.4F), Mild Pain (1 - 3)  dextrose Oral Gel 15 Gram(s) Oral once PRN Blood Glucose LESS THAN 70 milliGRAM(s)/deciliter  loperamide 2 milliGRAM(s) Oral every 4 hours PRN Diarrhea  melatonin 3 milliGRAM(s) Oral at bedtime PRN Insomnia      .  LABS:                         10.5   10.33 )-----------( 223      ( 10 May 2025 14:52 )             30.5     05-10    136  |  107  |  46[H]  ----------------------------<  127[H]  4.8   |  17[L]  |  2.72[H]    Ca    7.5[L]      10 May 2025 14:52  Phos  3.1     05-10  Mg     2.0     05-10    TPro  6.1  /  Alb  3.3  /  TBili  0.5  /  DBili  x   /  AST  41[H]  /  ALT  31  /  AlkPhos  255[H]  05-10    PT/INR - ( 10 May 2025 07:13 )   PT: 11.7 sec;   INR: 1.02          PTT - ( 10 May 2025 07:13 )  PTT:47.8 sec  Urinalysis Basic - ( 10 May 2025 14:52 )    Color: x / Appearance: x / SG: x / pH: x  Gluc: 127 mg/dL / Ketone: x  / Bili: x / Urobili: x   Blood: x / Protein: x / Nitrite: x   Leuk Esterase: x / RBC: x / WBC x   Sq Epi: x / Non Sq Epi: x / Bacteria: x                RADIOLOGY, EKG & ADDITIONAL TESTS: Reviewed.  -----------------------TRANSFER FROM MEDICINE TO ORTHO-----------------------------    Hospital course: 79F with PMHx of HTN, HLD,  hypothyroidism, generalized depression, ADHD rheumatic fever, pericarditis with recent  L1 compression fracture 2/2 mechanical fall,  presents after unwitnessed syncopal event and head trauma likely 2/2 to medication side effect vs dehydration complicated by MARY LOU, with incidental finding of RUL opacity. 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 Now s/p L1 decompression w/ biopsy to assess for metastasis. 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     Overnight:  Pulled out QUINCY drain    Subjective:  Patient seen and examined at the bedside. Patient reports 9/10 back pain, denies leg pain, numbness/tingling, fever/chills.     OBJECTIVE:    Vital Signs Last 24 Hrs  T(C): 36.4 (11 May 2025 06:04), Max: 36.6 (10 May 2025 20:32)  T(F): 97.6 (11 May 2025 06:04), Max: 97.8 (10 May 2025 20:32)  HR: 90 (11 May 2025 06:04) (62 - 90)  BP: 174/83 (11 May 2025 06:04) (144/72 - 174/83)  BP(mean): 113 (11 May 2025 06:04) (97 - 114)  RR: 18 (11 May 2025 06:04) (10 - 19)  SpO2: 96% (11 May 2025 06:04) (96% - 100%)    Parameters below as of 11 May 2025 06:04  Patient On (Oxygen Delivery Method): room air      Physical Exam:  GENERAL: Awake, alert and interactive, conversational, sitting upright.  NEURO: A&Ox4, no focal deficits, moves all 4 extremities spontaneously  HEENT: Normocephalic, atraumatic  NECK: Supple  CARDIAC: Regular rate and rhythm, +S1/S2, no murmurs/rubs/gallops  PULM: Breathing comfortably on RA, lungs clear to auscultation  ABDOMEN: Soft, nontender, nondistended, +bs in all 4 quadrants  MSK: back TTP around QUINCY drain, stitches slightly pulled out and drain not attached to pump  SKIN: Warm and dry, no rashes, lesions  VASC: no LE tenderness  Psych: Appropriate affect    Medications:  MEDICATIONS  (STANDING):  atorvastatin 80 milliGRAM(s) Oral at bedtime  buPROPion XL (24-Hour) . 300 milliGRAM(s) Oral daily  carvedilol 6.25 milliGRAM(s) Oral every 12 hours  dextrose 5%. 1000 milliLiter(s) (100 mL/Hr) IV Continuous <Continuous>  dextrose 5%. 1000 milliLiter(s) (50 mL/Hr) IV Continuous <Continuous>  dextrose 50% Injectable 25 Gram(s) IV Push once  dextrose 50% Injectable 12.5 Gram(s) IV Push once  dextrose 50% Injectable 25 Gram(s) IV Push once  glucagon  Injectable 1 milliGRAM(s) IntraMuscular once  heparin   Injectable 5000 Unit(s) SubCutaneous every 8 hours  levothyroxine 75 MICROGram(s) Oral daily    MEDICATIONS  (PRN):  acetaminophen     Tablet .. 650 milliGRAM(s) Oral every 6 hours PRN Temp greater or equal to 38C (100.4F), Mild Pain (1 - 3)  dextrose Oral Gel 15 Gram(s) Oral once PRN Blood Glucose LESS THAN 70 milliGRAM(s)/deciliter  loperamide 2 milliGRAM(s) Oral every 4 hours PRN Diarrhea  melatonin 3 milliGRAM(s) Oral at bedtime PRN Insomnia      .  LABS:                         10.5   10.33 )-----------( 223      ( 10 May 2025 14:52 )             30.5     05-10    136  |  107  |  46[H]  ----------------------------<  127[H]  4.8   |  17[L]  |  2.72[H]    Ca    7.5[L]      10 May 2025 14:52  Phos  3.1     05-10  Mg     2.0     05-10    TPro  6.1  /  Alb  3.3  /  TBili  0.5  /  DBili  x   /  AST  41[H]  /  ALT  31  /  AlkPhos  255[H]  05-10    PT/INR - ( 10 May 2025 07:13 )   PT: 11.7 sec;   INR: 1.02          PTT - ( 10 May 2025 07:13 )  PTT:47.8 sec  Urinalysis Basic - ( 10 May 2025 14:52 )    Color: x / Appearance: x / SG: x / pH: x  Gluc: 127 mg/dL / Ketone: x  / Bili: x / Urobili: x   Blood: x / Protein: x / Nitrite: x   Leuk Esterase: x / RBC: x / WBC x   Sq Epi: x / Non Sq Epi: x / Bacteria: x                RADIOLOGY, EKG & ADDITIONAL TESTS: Reviewed.

## 2025-05-11 NOTE — PROGRESS NOTE ADULT - PROBLEM SELECTOR PLAN 2
Patient suffered mechanical fall several weeks ago and has been following with ortho for planned procedure  Outpatient Lumbar MRI: acute/subacute severe anteriorly wedged compression deformity of the L1 vertebral body with moderate associated retropulsion and severe stenosis.   Started on cyclobenzaprine 10mg TID as need, oxycodone 5mg and percocet  At [facility]   CT Lumbar Spine: L1 compression fracture   Followed by Dr. Barcenas; pending T11-L3 Decompression and posterior instrumented fusion.   Ortho following recs appreciated;   - c/w WBAT in TSLO brace   - surgery planned 5/10  - plan to order/prepare IgA acceptable blood products for 5/9 surgery as a contingency if immunoglobulin panel does not result or returns positive   - NPO at 7am on 5/10; discussed request of biopsy to evaluate for metastasis   - will not resume muscle relaxant or opioid medications as they are likely contributing to patient's worsening instability    - monitor for signs of saddle anesthesia  - c/f metastatic spread of lung malignancy, f/up biopsy Patient suffered mechanical fall several weeks ago and has been following with ortho for planned procedure  Outpatient Lumbar MRI: acute/subacute severe anteriorly wedged compression deformity of the L1 vertebral body with moderate associated retropulsion and severe stenosis.   Started on cyclobenzaprine 10mg TID as need, oxycodone 5mg and percocet  At [facility]   CT Lumbar Spine: L1 compression fracture   Followed by Dr. Barcenas; pending T11-L3 Decompression and posterior instrumented fusion.   Ortho following recs appreciated; s/p surgery 5/10  - c/w WBAT   - will not resume muscle relaxant or opioid medications as they are likely contributing to patient's worsening instability    - monitor for signs of saddle anesthesia  - c/f metastatic spread of lung malignancy, f/up biopsy

## 2025-05-11 NOTE — PROGRESS NOTE ADULT - PROBLEM SELECTOR PLAN 5
#AGMA  Baseline Cr: upon admission BUN/Cr  82/5.86  Likely iso of dehydration as patient shares she has had diminished oral intake 2/2 dry mouth  s/p IL NS in ED and NaHCO3 tables;  renal ultrasound; unremarkable   FENa: 2.4 instrinsic eitology likely ATN iso NSAID use  Cystatin C elevated   - avoid nephrotoxic medication  - trend Cr daily  - strict I/O

## 2025-05-11 NOTE — PROGRESS NOTE ADULT - PROBLEM SELECTOR PLAN 11
Home medication: Levothyroxine Sodium 75 MCG Oral; TSH 1.050  - resume home medication    #Depression   - c/w home buproprion 300 mg PO q24 hours     #Prophylactic    F: none  E: Replete PRN  N: Regular   DVT ppx: heparin 5000 q8 hours (hold 5/9am for procedure)  GI ppx: None  Bowel: Not indicated   Dispo: [facility]

## 2025-05-11 NOTE — PROGRESS NOTE ADULT - PROBLEM SELECTOR PLAN 1
- RUL nodule highly suspicious for primary lung malignancy.   - PET CT scan which showed a growing right upper lobe lung mass measuring 3.4 x 4.9 cm as well as severe compression deformity in the L1 vertebrae, there is no evidence of hypermetabolic lymphadenopathy in the chest, abdomen, or pelvis.   -There is a possibility the L1 vertebral compression fracture could be a metastatic involvement.   - bronchoscopy with EBUS on 5/6 for diagnosis and staging. Pathology resulted with NSCLC. At this time, she appears to be stage IIb.   - She is now pending L1 fixation with orthopedics with biopsy to rule out metastatic disease.   Plan:   - F/up ortho to obtain biopsy when pt undergoes the de-compression of the L1 vertebral compression fracture to r/out metastatic involvement of the spine  - Will need to establish care with pulmonary outpatient for PFTs - can follow with Dr. Peck - RUL nodule highly suspicious for primary lung malignancy.   - PET CT scan which showed a growing right upper lobe lung mass measuring 3.4 x 4.9 cm as well as severe compression deformity in the L1 vertebrae, there is no evidence of hypermetabolic lymphadenopathy in the chest, abdomen, or pelvis.   -There is a possibility the L1 vertebral compression fracture could be a metastatic involvement.   - bronchoscopy with EBUS on 5/6 for diagnosis and staging. Pathology resulted with NSCLC. At this time, she appears to be stage IIb.   - She is now s/p L1 fixation with orthopedics with biopsy to rule out metastatic disease.   Plan:   - F/up outpatient with pulm and ortho  - Will need to establish care with pulmonary outpatient for PFTs - can follow with Dr. Peck

## 2025-05-11 NOTE — PROGRESS NOTE ADULT - PROBLEM SELECTOR PLAN 3
#Syncope   May be medication induced BEERS (recently started cyclobenzaprine, opiods  vs orthostatic iso poor oral intake less likely arrhythmogenic given normal EKG however given patient's history of rheumatic fever and pericarditis would  perform limited TTE   Stroke imaging negative, no signs of facial droop or dysarthria on repeat exam; MR Brain unremarkable  s/p scalp laceration repair in ED with three staples; UA: mod leuk esterase, nitrite neg with * WBC; monitoring of abx  as patient is asymptomatic  Cardiology team following; TTE: EF 70% without significant valvular disease   - hold off  orthostatics for now    CTM

## 2025-05-11 NOTE — PROGRESS NOTE ADULT - SUBJECTIVE AND OBJECTIVE BOX
Ortho Floor Note    Procedure: T11-L3 decompression and PSF  Surgeon: Dr. Barcenas    Pt comfortable without complaints, pain controlled  Denies CP, SOB, N/V, numbness/tingling     Vital Signs Last 24 Hrs  T(C): 36.3 (11 May 2025 08:42), Max: 36.6 (10 May 2025 20:32)  T(F): 97.4 (11 May 2025 08:42), Max: 97.8 (10 May 2025 20:32)  HR: 74 (11 May 2025 08:42) (62 - 90)  BP: 156/74 (11 May 2025 08:42) (144/72 - 174/83)  BP(mean): 113 (11 May 2025 06:04) (97 - 114)  RR: 16 (11 May 2025 08:42) (10 - 19)  SpO2: 96% (11 May 2025 08:42) (96% - 100%)    Parameters below as of 11 May 2025 08:42  Patient On (Oxygen Delivery Method): room air        General: Pt Alert and oriented, NAD  Aquacel DSG C/D/I  Pulses Distal pulses intact  Sensation: SILT  Motor: EHL/FHL/TA/GS 5/5                 A/P: 79yFemale s/p T11-L3 Decompression/PSF on 5/10.    - Stable  - Pain Control  - DVT ppx: per primary  - Post op abx: Ancef  - PT, WBS: WBAT  - f/u PT Eval    Ortho Pager [phone]

## 2025-05-12 ENCOUNTER — TRANSCRIPTION ENCOUNTER (OUTPATIENT)
Age: 80
End: 2025-05-12

## 2025-05-12 LAB
ANION GAP SERPL CALC-SCNC: 13 MMOL/L — SIGNIFICANT CHANGE UP (ref 5–17)
ANION GAP SERPL CALC-SCNC: 15 MMOL/L — SIGNIFICANT CHANGE UP (ref 5–17)
BASOPHILS # BLD AUTO: 0.01 K/UL — SIGNIFICANT CHANGE UP (ref 0–0.2)
BASOPHILS NFR BLD AUTO: 0.1 % — SIGNIFICANT CHANGE UP (ref 0–2)
BUN SERPL-MCNC: 49 MG/DL — HIGH (ref 7–23)
BUN SERPL-MCNC: 50 MG/DL — HIGH (ref 7–23)
CALCIUM SERPL-MCNC: 7 MG/DL — LOW (ref 8.4–10.5)
CALCIUM SERPL-MCNC: 7.6 MG/DL — LOW (ref 8.4–10.5)
CHLORIDE SERPL-SCNC: 102 MMOL/L — SIGNIFICANT CHANGE UP (ref 96–108)
CHLORIDE SERPL-SCNC: 104 MMOL/L — SIGNIFICANT CHANGE UP (ref 96–108)
CO2 SERPL-SCNC: 16 MMOL/L — LOW (ref 22–31)
CO2 SERPL-SCNC: 19 MMOL/L — LOW (ref 22–31)
CREAT SERPL-MCNC: 2.38 MG/DL — HIGH (ref 0.5–1.3)
CREAT SERPL-MCNC: 2.5 MG/DL — HIGH (ref 0.5–1.3)
EGFR: 19 ML/MIN/1.73M2 — LOW
EGFR: 19 ML/MIN/1.73M2 — LOW
EGFR: 20 ML/MIN/1.73M2 — LOW
EGFR: 20 ML/MIN/1.73M2 — LOW
EOSINOPHIL # BLD AUTO: 0.04 K/UL — SIGNIFICANT CHANGE UP (ref 0–0.5)
EOSINOPHIL NFR BLD AUTO: 0.3 % — SIGNIFICANT CHANGE UP (ref 0–6)
GLUCOSE SERPL-MCNC: 121 MG/DL — HIGH (ref 70–99)
GLUCOSE SERPL-MCNC: 160 MG/DL — HIGH (ref 70–99)
HCT VFR BLD CALC: 29.5 % — LOW (ref 34.5–45)
HGB BLD-MCNC: 9.7 G/DL — LOW (ref 11.5–15.5)
IMM GRANULOCYTES NFR BLD AUTO: 1.1 % — HIGH (ref 0–0.9)
LYMPHOCYTES # BLD AUTO: 1.05 K/UL — SIGNIFICANT CHANGE UP (ref 1–3.3)
LYMPHOCYTES # BLD AUTO: 7.1 % — LOW (ref 13–44)
MCHC RBC-ENTMCNC: 31 PG — SIGNIFICANT CHANGE UP (ref 27–34)
MCHC RBC-ENTMCNC: 32.9 G/DL — SIGNIFICANT CHANGE UP (ref 32–36)
MCV RBC AUTO: 94.2 FL — SIGNIFICANT CHANGE UP (ref 80–100)
MONOCYTES # BLD AUTO: 1.15 K/UL — HIGH (ref 0–0.9)
MONOCYTES NFR BLD AUTO: 7.8 % — SIGNIFICANT CHANGE UP (ref 2–14)
NEUTROPHILS # BLD AUTO: 12.36 K/UL — HIGH (ref 1.8–7.4)
NEUTROPHILS NFR BLD AUTO: 83.6 % — HIGH (ref 43–77)
NRBC BLD AUTO-RTO: 0 /100 WBCS — SIGNIFICANT CHANGE UP (ref 0–0)
PLATELET # BLD AUTO: 258 K/UL — SIGNIFICANT CHANGE UP (ref 150–400)
POTASSIUM SERPL-MCNC: 3.8 MMOL/L — SIGNIFICANT CHANGE UP (ref 3.5–5.3)
POTASSIUM SERPL-MCNC: SIGNIFICANT CHANGE UP (ref 3.5–5.3)
POTASSIUM SERPL-SCNC: 3.8 MMOL/L — SIGNIFICANT CHANGE UP (ref 3.5–5.3)
POTASSIUM SERPL-SCNC: SIGNIFICANT CHANGE UP (ref 3.5–5.3)
RBC # BLD: 3.13 M/UL — LOW (ref 3.8–5.2)
RBC # FLD: 15.9 % — HIGH (ref 10.3–14.5)
SODIUM SERPL-SCNC: 133 MMOL/L — LOW (ref 135–145)
SODIUM SERPL-SCNC: 136 MMOL/L — SIGNIFICANT CHANGE UP (ref 135–145)
WBC # BLD: 14.77 K/UL — HIGH (ref 3.8–10.5)
WBC # FLD AUTO: 14.77 K/UL — HIGH (ref 3.8–10.5)

## 2025-05-12 PROCEDURE — 99233 SBSQ HOSP IP/OBS HIGH 50: CPT

## 2025-05-12 PROCEDURE — 99232 SBSQ HOSP IP/OBS MODERATE 35: CPT

## 2025-05-12 RX ORDER — SODIUM BICARBONATE 1 MEQ/ML
650 SYRINGE (ML) INTRAVENOUS
Refills: 0 | Status: DISCONTINUED | OUTPATIENT
Start: 2025-05-12 | End: 2025-05-13

## 2025-05-12 RX ORDER — CYCLOBENZAPRINE HYDROCHLORIDE 15 MG/1
5 CAPSULE, EXTENDED RELEASE ORAL THREE TIMES A DAY
Refills: 0 | Status: DISCONTINUED | OUTPATIENT
Start: 2025-05-12 | End: 2025-05-14

## 2025-05-12 RX ORDER — AMLODIPINE BESYLATE 10 MG/1
2.5 TABLET ORAL DAILY
Refills: 0 | Status: DISCONTINUED | OUTPATIENT
Start: 2025-05-12 | End: 2025-05-14

## 2025-05-12 RX ADMIN — CARVEDILOL 6.25 MILLIGRAM(S): 3.12 TABLET, FILM COATED ORAL at 06:49

## 2025-05-12 RX ADMIN — ATORVASTATIN CALCIUM 80 MILLIGRAM(S): 80 TABLET, FILM COATED ORAL at 21:24

## 2025-05-12 RX ADMIN — Medication 1000 MILLIGRAM(S): at 21:23

## 2025-05-12 RX ADMIN — CARVEDILOL 6.25 MILLIGRAM(S): 3.12 TABLET, FILM COATED ORAL at 17:28

## 2025-05-12 RX ADMIN — Medication 1000 MILLIGRAM(S): at 06:48

## 2025-05-12 RX ADMIN — OXYCODONE HYDROCHLORIDE 10 MILLIGRAM(S): 30 TABLET ORAL at 15:31

## 2025-05-12 RX ADMIN — OXYCODONE HYDROCHLORIDE 5 MILLIGRAM(S): 30 TABLET ORAL at 21:24

## 2025-05-12 RX ADMIN — CALCITRIOL 0.25 MICROGRAM(S): 0.5 CAPSULE, GELATIN COATED ORAL at 12:00

## 2025-05-12 RX ADMIN — ENOXAPARIN SODIUM 30 MILLIGRAM(S): 100 INJECTION SUBCUTANEOUS at 06:49

## 2025-05-12 RX ADMIN — CYCLOBENZAPRINE HYDROCHLORIDE 5 MILLIGRAM(S): 15 CAPSULE, EXTENDED RELEASE ORAL at 21:24

## 2025-05-12 RX ADMIN — OXYCODONE HYDROCHLORIDE 5 MILLIGRAM(S): 30 TABLET ORAL at 22:24

## 2025-05-12 RX ADMIN — Medication 1000 MILLIGRAM(S): at 14:15

## 2025-05-12 RX ADMIN — METHOCARBAMOL 500 MILLIGRAM(S): 500 TABLET, FILM COATED ORAL at 13:31

## 2025-05-12 RX ADMIN — Medication 1000 MILLIGRAM(S): at 13:31

## 2025-05-12 RX ADMIN — OXYCODONE HYDROCHLORIDE 5 MILLIGRAM(S): 30 TABLET ORAL at 07:48

## 2025-05-12 RX ADMIN — Medication 75 MICROGRAM(S): at 07:39

## 2025-05-12 RX ADMIN — BUPROPION HYDROBROMIDE 300 MILLIGRAM(S): 522 TABLET, EXTENDED RELEASE ORAL at 11:59

## 2025-05-12 RX ADMIN — AMLODIPINE BESYLATE 2.5 MILLIGRAM(S): 10 TABLET ORAL at 17:29

## 2025-05-12 RX ADMIN — OXYCODONE HYDROCHLORIDE 5 MILLIGRAM(S): 30 TABLET ORAL at 06:48

## 2025-05-12 RX ADMIN — Medication 650 MILLIGRAM(S): at 17:28

## 2025-05-12 RX ADMIN — METHOCARBAMOL 500 MILLIGRAM(S): 500 TABLET, FILM COATED ORAL at 06:48

## 2025-05-12 RX ADMIN — OXYCODONE HYDROCHLORIDE 10 MILLIGRAM(S): 30 TABLET ORAL at 16:30

## 2025-05-12 NOTE — PROGRESS NOTE ADULT - SUBJECTIVE AND OBJECTIVE BOX
INTERVAL EVENTS:  -Complains of significant back pain s/p surgery  -No CP, SOB    Cardiac medications administered in the last 48h:  carvedilol: 6.25 milliGRAM(s) Oral (05-12-25 @ 06:49)  carvedilol: 6.25 milliGRAM(s) Oral (05-11-25 @ 17:48)  carvedilol: 6.25 milliGRAM(s) Oral (05-11-25 @ 06:28)      MEDICATIONS  (STANDING):  acetaminophen     Tablet .. 1000 milliGRAM(s) Oral every 8 hours  amLODIPine   Tablet 2.5 milliGRAM(s) Oral daily  atorvastatin 80 milliGRAM(s) Oral at bedtime  BUpivacaine liposome 1.3% Injectable (no eMAR) 20 milliLiter(s) Local Injection once  buPROPion XL (24-Hour) . 300 milliGRAM(s) Oral daily  calcitriol   Capsule 0.25 MICROGram(s) Oral daily  carvedilol 6.25 milliGRAM(s) Oral every 12 hours  cyclobenzaprine 5 milliGRAM(s) Oral three times a day  dextrose 5%. 1000 milliLiter(s) (100 mL/Hr) IV Continuous <Continuous>  dextrose 5%. 1000 milliLiter(s) (50 mL/Hr) IV Continuous <Continuous>  dextrose 50% Injectable 25 Gram(s) IV Push once  dextrose 50% Injectable 12.5 Gram(s) IV Push once  dextrose 50% Injectable 25 Gram(s) IV Push once  enoxaparin Injectable 30 milliGRAM(s) SubCutaneous every 24 hours  glucagon  Injectable 1 milliGRAM(s) IntraMuscular once  levothyroxine 75 MICROGram(s) Oral daily  sodium bicarbonate 650 milliGRAM(s) Oral two times a day    MEDICATIONS  (PRN):  dextrose Oral Gel 15 Gram(s) Oral once PRN Blood Glucose LESS THAN 70 milliGRAM(s)/deciliter  HYDROmorphone  Injectable 0.5 milliGRAM(s) IV Push every 15 minutes PRN pacu  HYDROmorphone  Injectable 0.5 milliGRAM(s) IV Push every 4 hours PRN breakthrough  loperamide 2 milliGRAM(s) Oral every 4 hours PRN Diarrhea  melatonin 3 milliGRAM(s) Oral at bedtime PRN Insomnia  oxyCODONE    IR 5 milliGRAM(s) Oral every 4 hours PRN Moderate Pain (4 - 6)  oxyCODONE    IR 10 milliGRAM(s) Oral every 4 hours PRN Severe Pain (7 - 10)      VITALS 24H  T(F): 97.6 (05-12-25 @ 05:08), Max: 97.6 (05-12-25 @ 05:08)  HR: 91 (05-12-25 @ 05:08) (78 - 98)  BP: 164/73 (05-12-25 @ 05:08) (143/81 - 164/73)  BP(mean): --  ABP: --  ABP(mean): --  RR: 17 (05-12-25 @ 05:08) (17 - 17)  SpO2: 98% (05-12-25 @ 05:08) (95% - 98%)  CVP(mm Hg): --    I/O Detail 24H    11 May 2025 07:01  -  12 May 2025 07:00  --------------------------------------------------------  IN:  Total IN: 0 mL    OUT:    Accordian (mL): 305 mL    Indwelling Catheter - Urethral (mL): 800 mL  Total OUT: 1105 mL    Total NET: -1105 mL          PHYSICAL EXAM:  GEN: NAD  HEENT: EOMI   RESP: CTA b/l  CV: RRR. Normal S1/S2. No m/r/g.  ABD: soft, non-distended  EXT: No edema   NEURO: alert and attentive    LABS:  CBC 05-12-25 @ 07:03                        9.7    14.77 )-----------( 258                   29.5     Hgb trend: 9.7 <-- , 10.0 <-- , 10.5 <-- , 10.5 <--   WBC trend: 14.77 <-- , 16.32 <-- , 10.33 <-- , 7.73 <--       CMP 05-12-25 @ 10:00    133[L]  |  104  |  50[H]  ----------------------------<  160[H]  See Note   |  16[L]  |  2.38[H]    Ca    7.0[L]      05-12-25 @ 10:00  Phos  3.1     05-12  Mg     1.6     05-12    TPro  6.1  /  Alb  3.5  /  TBili  0.3  /  DBili  x   /  AST  41[H]  /  ALT  27  /  AlkPhos  250[H]     05-11    Serum Cr (eGFR) trend: 2.38 (20) <-- , 2.67 (18) <-- , 2.72 (17) <-- , 3.05 (15) <--           Cardiac Markers

## 2025-05-12 NOTE — PROGRESS NOTE ADULT - SUBJECTIVE AND OBJECTIVE BOX
**INCOMPLETE NOTE    OVERNIGHT EVENTS:    SUBJECTIVE:  Patient seen and examined at bedside.    Vital Signs Last 12 Hrs  T(F): 97.6 (05-12-25 @ 05:08), Max: 97.6 (05-12-25 @ 05:08)  HR: 91 (05-12-25 @ 05:08) (91 - 91)  BP: 164/73 (05-12-25 @ 05:08) (164/73 - 164/73)  BP(mean): --  RR: 17 (05-12-25 @ 05:08) (17 - 17)  SpO2: 98% (05-12-25 @ 05:08) (98% - 98%)  I&O's Summary    11 May 2025 07:01  -  12 May 2025 07:00  --------------------------------------------------------  IN: 0 mL / OUT: 1105 mL / NET: -1105 mL        PHYSICAL EXAM:  Constitutional: NAD, comfortable in bed.  HEENT: NC/AT, PERRLA, EOMI, no conjunctival pallor or scleral icterus, MMM  Neck: Supple, no JVD  Respiratory: CTA B/L. No w/r/r.   Cardiovascular: RRR, normal S1 and S2, no m/r/g.   Gastrointestinal: +BS, soft NTND, no guarding or rebound tenderness, no palpable masses   Extremities: wwp; no cyanosis, clubbing or edema.   Vascular: Pulses equal and strong throughout.   Neurological: AAOx3, no CN deficits, strength and sensation intact throughout.   Skin: No gross skin abnormalities or rashes        LABS:                        9.7    14.77 )-----------( 258      ( 12 May 2025 07:03 )             29.5     05-12    133[L]  |  104  |  50[H]  ----------------------------<  160[H]  See Note   |  16[L]  |  2.38[H]    Ca    7.0[L]      12 May 2025 10:00  Phos  3.1     05-12  Mg     1.6     05-12    TPro  6.1  /  Alb  3.5  /  TBili  0.3  /  DBili  x   /  AST  41[H]  /  ALT  27  /  AlkPhos  250[H]  05-11      Urinalysis Basic - ( 12 May 2025 10:00 )    Color: x / Appearance: x / SG: x / pH: x  Gluc: 160 mg/dL / Ketone: x  / Bili: x / Urobili: x   Blood: x / Protein: x / Nitrite: x   Leuk Esterase: x / RBC: x / WBC x   Sq Epi: x / Non Sq Epi: x / Bacteria: x          RADIOLOGY & ADDITIONAL TESTS:    MEDICATIONS  (STANDING):  acetaminophen     Tablet .. 1000 milliGRAM(s) Oral every 8 hours  atorvastatin 80 milliGRAM(s) Oral at bedtime  BUpivacaine liposome 1.3% Injectable (no eMAR) 20 milliLiter(s) Local Injection once  buPROPion XL (24-Hour) . 300 milliGRAM(s) Oral daily  calcitriol   Capsule 0.25 MICROGram(s) Oral daily  carvedilol 6.25 milliGRAM(s) Oral every 12 hours  dextrose 5%. 1000 milliLiter(s) (100 mL/Hr) IV Continuous <Continuous>  dextrose 5%. 1000 milliLiter(s) (50 mL/Hr) IV Continuous <Continuous>  dextrose 50% Injectable 25 Gram(s) IV Push once  dextrose 50% Injectable 12.5 Gram(s) IV Push once  dextrose 50% Injectable 25 Gram(s) IV Push once  enoxaparin Injectable 30 milliGRAM(s) SubCutaneous every 24 hours  glucagon  Injectable 1 milliGRAM(s) IntraMuscular once  levothyroxine 75 MICROGram(s) Oral daily  methocarbamol 500 milliGRAM(s) Oral three times a day  sodium bicarbonate 650 milliGRAM(s) Oral two times a day    MEDICATIONS  (PRN):  dextrose Oral Gel 15 Gram(s) Oral once PRN Blood Glucose LESS THAN 70 milliGRAM(s)/deciliter  HYDROmorphone  Injectable 0.5 milliGRAM(s) IV Push every 15 minutes PRN pacu  HYDROmorphone  Injectable 0.5 milliGRAM(s) IV Push every 4 hours PRN breakthrough  loperamide 2 milliGRAM(s) Oral every 4 hours PRN Diarrhea  melatonin 3 milliGRAM(s) Oral at bedtime PRN Insomnia  oxyCODONE    IR 5 milliGRAM(s) Oral every 4 hours PRN Moderate Pain (4 - 6)  oxyCODONE    IR 10 milliGRAM(s) Oral every 4 hours PRN Severe Pain (7 - 10)   OVERNIGHT EVENTS: GINA    SUBJECTIVE:  Patient seen and examined at bedside. She reports she feels well, she is working with PT after surgery. Denies nausea, vomting, diarrhea, abdominal pain. Reports has been eating well.     Vital Signs Last 12 Hrs  T(F): 97.6 (05-12-25 @ 05:08), Max: 97.6 (05-12-25 @ 05:08)  HR: 91 (05-12-25 @ 05:08) (91 - 91)  BP: 164/73 (05-12-25 @ 05:08) (164/73 - 164/73)  BP(mean): --  RR: 17 (05-12-25 @ 05:08) (17 - 17)  SpO2: 98% (05-12-25 @ 05:08) (98% - 98%)  I&O's Summary    11 May 2025 07:01  -  12 May 2025 07:00  --------------------------------------------------------  IN: 0 mL / OUT: 1105 mL / NET: -1105 mL      PHYSICAL EXAM:  Constitutional: NAD, comfortable in bed.  HEENT: NC/AT, PERRLA, EOMI, no conjunctival pallor or scleral icterus, MMM  Neck: Supple, no JVD  Respiratory: CTA B/L. No w/r/r.   Cardiovascular: RRR, normal S1 and S2, no m/r/g.   Gastrointestinal: +BS, soft NTND, no guarding or rebound tenderness, no palpable masses   Extremities: wwp; no cyanosis, clubbing or edema.    Neurological: AAOx3, strength 4/5 in UE and LE makenzie. no asterixis, + noted dysarthria. -[place]ek sign  Skin: No gross skin abnormalities or rashes            LABS:                        9.7    14.77 )-----------( 258      ( 12 May 2025 07:03 )             29.5     05-12    133[L]  |  104  |  50[H]  ----------------------------<  160[H]  See Note   |  16[L]  |  2.38[H]    Ca    7.0[L]      12 May 2025 10:00  Phos  3.1     05-12  Mg     1.6     05-12    TPro  6.1  /  Alb  3.5  /  TBili  0.3  /  DBili  x   /  AST  41[H]  /  ALT  27  /  AlkPhos  250[H]  05-11      Urinalysis Basic - ( 12 May 2025 10:00 )    Color: x / Appearance: x / SG: x / pH: x  Gluc: 160 mg/dL / Ketone: x  / Bili: x / Urobili: x   Blood: x / Protein: x / Nitrite: x   Leuk Esterase: x / RBC: x / WBC x   Sq Epi: x / Non Sq Epi: x / Bacteria: x          RADIOLOGY & ADDITIONAL TESTS:    MEDICATIONS  (STANDING):  acetaminophen     Tablet .. 1000 milliGRAM(s) Oral every 8 hours  atorvastatin 80 milliGRAM(s) Oral at bedtime  BUpivacaine liposome 1.3% Injectable (no eMAR) 20 milliLiter(s) Local Injection once  buPROPion XL (24-Hour) . 300 milliGRAM(s) Oral daily  calcitriol   Capsule 0.25 MICROGram(s) Oral daily  carvedilol 6.25 milliGRAM(s) Oral every 12 hours  dextrose 5%. 1000 milliLiter(s) (100 mL/Hr) IV Continuous <Continuous>  dextrose 5%. 1000 milliLiter(s) (50 mL/Hr) IV Continuous <Continuous>  dextrose 50% Injectable 25 Gram(s) IV Push once  dextrose 50% Injectable 12.5 Gram(s) IV Push once  dextrose 50% Injectable 25 Gram(s) IV Push once  enoxaparin Injectable 30 milliGRAM(s) SubCutaneous every 24 hours  glucagon  Injectable 1 milliGRAM(s) IntraMuscular once  levothyroxine 75 MICROGram(s) Oral daily  methocarbamol 500 milliGRAM(s) Oral three times a day  sodium bicarbonate 650 milliGRAM(s) Oral two times a day    MEDICATIONS  (PRN):  dextrose Oral Gel 15 Gram(s) Oral once PRN Blood Glucose LESS THAN 70 milliGRAM(s)/deciliter  HYDROmorphone  Injectable 0.5 milliGRAM(s) IV Push every 15 minutes PRN pacu  HYDROmorphone  Injectable 0.5 milliGRAM(s) IV Push every 4 hours PRN breakthrough  loperamide 2 milliGRAM(s) Oral every 4 hours PRN Diarrhea  melatonin 3 milliGRAM(s) Oral at bedtime PRN Insomnia  oxyCODONE    IR 5 milliGRAM(s) Oral every 4 hours PRN Moderate Pain (4 - 6)  oxyCODONE    IR 10 milliGRAM(s) Oral every 4 hours PRN Severe Pain (7 - 10)

## 2025-05-12 NOTE — PROGRESS NOTE ADULT - ASSESSMENT
79F w IgA deficiency, HTN, HLD, Hypothyroidism, ADHD, Depression, recent fall w L1 compression fx, p/w syncopal event, found to have MARY LOU due to pre-renal, being on thiazide, and urinary retention, found to have CKD4?, RUL opacity, bx showing Cancer – s/p L1 decompression w Dr. Barcenas 5/10 - bone bx pending     #Post-op state - pain moderately controlled. PPx: SQL. On incentive spirometer  #L1 compression fracture   - tyleonl 1g q8, flexeril 5 q8   - PRN: Oxycodone 5/10 q4 for mod/severe pain  #Osteoporosis   - 25,OH-D 37. PTH elevated    #Non small cell lung CA - NSCLC - in RUL biopsy   - heme-onc following   - PET scan: GGO 3.4x4.9 at RUL; Diffuse uptake – gastritis, colitis     Severe compression deformity L1 body   #MARY LOU - Cr 2.5 today. Was 5 on admission  #Metabolic acidosis - CO2 16 today    #Constipation - pt denies BM since 5/6. Initially had diarrhea - GI PCR neg though overflow diarrhea is also positive d/t recent immobility    #HTN - amlodipine 2.5mg daily, coreg 6.25 BID  #HLD - aotrva 80  #Hypothyroidism - c/w home synthroid 75mcg daily  #ADHD  #Depression - buproprion 300mg daily  #IgA deficiency    Plan  Discussed w Charles - to change robaxin to flexeril  TOV and remove paul today. Obtain q6h bladder scan  OOB to chair -- start bowel regimen (senna, miralax)  Start NaHCO3 650 BID    - f/u nephrology recs  CBC w diff, BMP w Mg/Phos in AM  f/u Bone Bx pathology    [ ] Need to screen risk factors, obtain history, and enroll in [facility]    DISPO: HPT pending TOV, improvement in pain. Pt has outpatient/ onc f/u w Dr. Parra 5/15  Above d/w ortho NP Charles

## 2025-05-12 NOTE — PROGRESS NOTE ADULT - ASSESSMENT
79-year-old F with PMH of hypothyroidism,  IgA def , recent mechanical fall with L1 compression fracture, HTN (on coreg, HCTZ), CKD (stage 3) admitted with syncope and TBI, found to have MARY LOU on CKD and HTN emergency (SBP 200s with MARY LOU) .  Patient reports having used ibuprofen on a daily basis. Now found to have bx proven adenoCa of the lung. Nephrology consulted for MARY LOU on CKD    -Imp: Non-oliguric iANT on CKD3(multifactorial). Resolving    Recommendations;    79-year-old F with PMH of recent mechanical fall with L1 compression fracture, HTN (on coreg, HCTZ), CKD (stage 3) admitted with syncope and TBI, found to have MARY LOU on CKD and HTN emergency (SBP 200s with MARY LOU) .  Patient reports having used ibuprofen on a daily basis.  Nephrology consulted for MARY LOU on CKD    #Non-oliguric MARY LOU on CKD- Improving   Last know HIE SCr 1.16, GFR 48 (CKD3) on 10/24  On admission (5/3): SCr 5.9, BUN 82, CO2 18, CK 29, no eosinophilia. She received 1L of NS on admission and started on NS maintenance fluids at 100cc/hr, continued on 5/4 and 5/5 in AM   Pickett placed: ?for I/O, no documentation of retention. Renal US on 5/4 with no hydronephrosis normal size kidneys, no echogenicity   5/3: creatinine 5.86-> 5.25 (10pm), 5/4: SCrr 5.05 (in AM), 4.79 (at 8pm) , 5/5: SCr 4.86 in AM  -Urine studies on 5/4: with 8 WBC, but also with bacteria, 32 RBC, no cast. FeNa 2.7 (Intrinsic), FeUrea 46 (Intrinsic), spec grav 1024  -5/6: creatinine 4.45. GFR 10. By Cystatin GFR 15  s/p Bronchoscopy on 5/6, plan for OR with ortho on 5/8   -5/10: s/p laminectomy with fusion of Lumbar Spine   -5/12 SCr 2.38    DDx: iso HTN emergency (SBP in 200s in ED required labetalol IV), vs  AIN from NSAID use (less likely) vs prerenal (unlikely since Urine studies consistent with Intrinsic etiology, has been on fluids continuously with no drastic change)     Recommendations:   -Repeat BMP today (sample hemolyzed   -Ensure appropriate PO hydration  -Monitor BMP daily   –Strict I/O monitoring  -Recommend maintaining MAP > 65 perioperatively.   -Avoid NSAIDs for pain control perioperatively    #Mild Hypocalcemia (asymptomatic)  -No known History of Hypocalcemia or Vit D deficiency. In Oct 2024 Ca 9.4  -Had hx of osteoporosis treated with Infusions (does not know name), last dose >15 years ago, was told osteoporosis had resolved    On admission, Ca 8.8 wnl, s/p NS administration Ca dropped. On 5/4: Ca 8.2->7.8 (corrected for albumin), while on NS ar 100cc/hr, Ca was 7.7 (lowest value) on 5/5. Since stopping the NS fluids Ca uptrended and stabilized to 8-8.2. Initially with elevated Phosphorus (5.6->5) now normalized   5/7: Ionized Ca 1.03, Vitamin D 25: 37.5, Vit D 1.25:  23.6.    5/12: Ca corrected 7.4 (albumin 3.5)     Etiology: Given History of severe IgA deficiency suspect Vit D deficiency iso malabsorption. Low concern for CKD as primary etiology (given GFR>15 at baseline)     Plan:   -continue with calcitriol 0.25 mcg PO qd   -BMP daily     Recommendations are not considered final until attending attestation

## 2025-05-12 NOTE — PROGRESS NOTE ADULT - ASSESSMENT
79F with PMHx of HTN, HLD,  hypothyroidism, generalized depression, ADHD rheumatic fever, pericarditis with recent L1 compression fracture 2/2 mechanical fall (pending orthopedic intervention with Dr. Barcenas later today), presents after unwitnessed syncopal event and head trauma likely 2/2 to medication side effect vs dehydration complicated by MARY LOU, with incidental finding of RUL opacity c/f malignancy for which oncology has been consulted.    Smoking history: 2 packs per day for 20 years (40py) stopped in 1982. 25 years of second hand smoking exposure.  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  She believes her dad may have passed 2/2 lung cancer +/- MS but is unsure of exact diagnoses.  No personal history of other cancers.       #NSCLC  --patient originally presented 5/3 s/p unwitnessed fall, found to have L1 compression fracture   --CT CH 5/3: 5 cm infiltrative opacity in the right upper lobe suspicious for malignancy  --MRb 5/4: No acute intracranial hemorrhage or evidence of acute ischemia  --PET CT 5/6: 1. FDG avid 3.4 x 4.9 cm groundglass opacity at the anterior right upper lobe, SUV max 4.4. A rapid increase in size since 5/3/2025 would favor an infectious process. However, exact measurement is limited in this study due to low resolution CT. Malignancy cannot be excluded.  2. Mildly FDG avid subcentimeter nodular opacities at the posterior right upper lobe and posterior left lung base.  3. Small airway disease at the peripheral left lung apex.  4. No hypermetabolic lymphadenopathy in the chest, abdomen, or pelvis.  5. Diffuse uptake throughout the gastric walls suggestive of gastritis. Correlate clinically. Increased uptake in the colon with possible wall thickening at the sigmoid colon suggestive of colitis. Correlate clinically. Diffuse uptake in the esophagus suggestive of esophagitis.  6. Severe compression deformity of vertebral body L1 with uptake suggestive of an acute or subacute process.   --atleast stage 2A pending L1 pathology results however suspect L1 is 2/2 fall   --NGS sent   --pathology confirmed adenocarcinoma, d/w patient 5/8   --patient to follow up with Dr. Parra upon discharge: has follow up  5/15 2:40 (will likely need to be re-scheduled if remains admitted)   --referred to CCD/navigation program for additional support   --s/p L1 compression fx decompression 5/10, pathology pending   --discussed with patient 5/12, pending L1 pathology results and NGS can discuss next steps/treatment plan  --IGA deficiency confirmed, kappa/lambda ratio 2.04, recommend sending spep/upep & immunofixation     will continue to follow

## 2025-05-12 NOTE — PROGRESS NOTE ADULT - SUBJECTIVE AND OBJECTIVE BOX
INTERVAL HPI/OVERNIGHT EVENTS: sylwia o/n    SUBJECTIVE: Patient seen and examined at bedside.   CC: f/u lung mass; pain  Pt's sister at bedside  Was able to transfer from chair to bed - reports 8/10 in low back, band distribution, when she did this. Denies any LH/dizziness, chest pain, dyspnea.  Eating wo N/V/abd pain. Last BM was 2nd day of admission - approx 6d ago. +flatus but no BM  No fever, chills, sweats  Pt states since her first fall - she has had feelings of incomplete emptying of her bladder. Denies numbness or radiating pain.     Notably: meds: buproprion 300mg AM. Pramipexol.  SH: Smoked 20y - quit in 1984. EtOH - 2 drinks/month    OBJECTIVE:    VITAL SIGNS:  ICU Vital Signs Last 24 Hrs  T(C): 36.6 (12 May 2025 09:10), Max: 36.6 (12 May 2025 09:10)  T(F): 97.8 (12 May 2025 09:10), Max: 97.8 (12 May 2025 09:10)  HR: 81 (12 May 2025 17:20) (81 - 98)  BP: 171/79 (12 May 2025 17:20) (139/76 - 171/79)  BP(mean): 97 (12 May 2025 09:10) (97 - 97)  ABP: --  ABP(mean): --  RR: 18 (12 May 2025 09:10) (17 - 18)  SpO2: 95% (12 May 2025 09:10) (95% - 98%)    O2 Parameters below as of 12 May 2025 09:10  Patient On (Oxygen Delivery Method): room air              05-11 @ 07:01  -  05-12 @ 07:00  --------------------------------------------------------  IN: 0 mL / OUT: 1105 mL / NET: -1105 mL      CAPILLARY BLOOD GLUCOSE          PHYSICAL EXAM:  GEN: female in NAD on RA  HEENT: NC/AT, MMM  CV: RRR, nml S1S2, no murmurs  PULM: nml effort, CTAB  ABD: Soft, non-distended, NABS, non-tender  : Pickett in place  NEURO  A/O x3, moving all extremities, Sensation intact  5/5 in plantarflex/ext b/l.   Lumbar dressing c/d/i  PSYCH: Appropriate      MEDICATIONS:  MEDICATIONS  (STANDING):  acetaminophen     Tablet .. 1000 milliGRAM(s) Oral every 8 hours  amLODIPine   Tablet 2.5 milliGRAM(s) Oral daily  atorvastatin 80 milliGRAM(s) Oral at bedtime  BUpivacaine liposome 1.3% Injectable (no eMAR) 20 milliLiter(s) Local Injection once  buPROPion XL (24-Hour) . 300 milliGRAM(s) Oral daily  calcitriol   Capsule 0.25 MICROGram(s) Oral daily  carvedilol 6.25 milliGRAM(s) Oral every 12 hours  cyclobenzaprine 5 milliGRAM(s) Oral three times a day  dextrose 5%. 1000 milliLiter(s) (100 mL/Hr) IV Continuous <Continuous>  dextrose 5%. 1000 milliLiter(s) (50 mL/Hr) IV Continuous <Continuous>  dextrose 50% Injectable 25 Gram(s) IV Push once  dextrose 50% Injectable 12.5 Gram(s) IV Push once  dextrose 50% Injectable 25 Gram(s) IV Push once  enoxaparin Injectable 30 milliGRAM(s) SubCutaneous every 24 hours  glucagon  Injectable 1 milliGRAM(s) IntraMuscular once  levothyroxine 75 MICROGram(s) Oral daily  sodium bicarbonate 650 milliGRAM(s) Oral two times a day    MEDICATIONS  (PRN):  dextrose Oral Gel 15 Gram(s) Oral once PRN Blood Glucose LESS THAN 70 milliGRAM(s)/deciliter  HYDROmorphone  Injectable 0.5 milliGRAM(s) IV Push every 15 minutes PRN pacu  HYDROmorphone  Injectable 0.5 milliGRAM(s) IV Push every 4 hours PRN breakthrough  loperamide 2 milliGRAM(s) Oral every 4 hours PRN Diarrhea  melatonin 3 milliGRAM(s) Oral at bedtime PRN Insomnia  oxyCODONE    IR 5 milliGRAM(s) Oral every 4 hours PRN Moderate Pain (4 - 6)  oxyCODONE    IR 10 milliGRAM(s) Oral every 4 hours PRN Severe Pain (7 - 10)      ALLERGIES:  Allergies    No Known Drug Allergies  Severe IgA deficency (Other)    Intolerances        LABS:                        9.7    14.77 )-----------( 258      ( 12 May 2025 07:03 )             29.5     05-12    136  |  102  |  49[H]  ----------------------------<  121[H]  3.8   |  19[L]  |  2.50[H]    Ca    7.6[L]      12 May 2025 16:20  Phos  3.1     05-12  Mg     1.6     05-12    TPro  6.1  /  Alb  3.5  /  TBili  0.3  /  DBili  x   /  AST  41[H]  /  ALT  27  /  AlkPhos  250[H]  05-11      Urinalysis Basic - ( 12 May 2025 16:20 )    Color: x / Appearance: x / SG: x / pH: x  Gluc: 121 mg/dL / Ketone: x  / Bili: x / Urobili: x   Blood: x / Protein: x / Nitrite: x   Leuk Esterase: x / RBC: x / WBC x   Sq Epi: x / Non Sq Epi: x / Bacteria: x        RADIOLOGY & ADDITIONAL TESTS: Reviewed.

## 2025-05-12 NOTE — PROGRESS NOTE ADULT - SUBJECTIVE AND OBJECTIVE BOX
Ortho Note    Subjective:  Pt seen and examined today at bedside with medicine  Reviewed patients medical history   Patient expresses following Dr. Cook as her PCP outpatient  patient reporting surgical site pain with transferring and activity  Reports better pain control with Flexeril Po which she took outpatient    Denies CP, SOB, N/V, numbness/tingling   Reviewed plan of care with patient at bedside    Vital Signs Last 24 Hrs  T(C): --  T(F): --  HR: --  BP: --  BP(mean): --  RR: --  SpO2: --  AVSS    Objective:    General: Pt Alert and oriented, NAD  Aquacel DSG C/D/I- Hv x1  Pulses Distal pulses intact  B/l LE:  Sensation: SILT L2-S1  Motor:   L2 (hip flexion)  5/5   L3 (knee extension)  5/5   L4 (ankle dorsiflexion)  5/5   L5 (long toe extension) 5/5   S1 (ankle plantar flexion) 5/5         Plan of Care:  A/P: 79yFemale with L1 compression fx (April), admitted s/p mechanical fall. S/p T11-L3 Decompression/PSF with cement augmentation on 5/10.  - afebrile, wbcs 14.77  - Pain Control- DC methocarbamol 500mg PO TID- switch to flexeril 5mg PO TID, tylenol 1000mg PO Q8h, Oxycodone 5-10mg PO Q4h prn moderate to severe pain, Dilaudid 0.5mg Q4h prn breakthrough pain   - DVT ppx: Lovenox 30mg Subq Q24h   - PT, WBS: WBAT  - appreciate nephrology recs- Stat BMP,   - appreciate Heme/ONC recs-   - appreciate medicine recs- added sodium bicarbonate 650mg PO BID  - dc paul- TOV   - continue HV x1  - appreciate cardiology recs - - At this time, to maintain better BP control, Cont Coreg to 6.25mg BID and consider adding low dose CCB with Norvasc 2.5 mg po Qd to maintain sustained SBP of 140/90  - collateral from outpatient PCP - Dr. Cook  - bowel regimen, IS use, PPI - suppository today   - Dispo- pending pt progression, medical clearance    Ortho Pager [phone]      ISTOP- For patient Huan Jay     PDI	My Rx	Current Rx	Drug Type	Rx Written	Rx Dispensed	Drug	Quantity	Days Supply	Prescriber Name	Prescriber BASHIR #	Payment Method	Dispenser  A	N	Y	S	04/30/2025	04/30/2025	lisdexamfetamine 30 mg capsule	30	30	Raul Goyal	[id number]	Medicare	[facility]  A	N	Y	B	04/30/2025	04/30/2025	clonazepam 0.5 mg tablet	90	30	OcchinilamRaul friedman	[id number]	Medicare	[facility]  B	N	N	O	04/02/2025	04/06/2025	oxycodone hcl (ir) 5 mg tablet	42	7	Karthik Barcenas R, MD	[id number]	Medicare	[facility]  B	N	N	O	03/26/2025	03/26/2025	oxycodone hcl (ir) 5 mg tablet	2	1	[facility]	[id number]	Medicare	[facility]  B	N	N	S	12/09/2024	12/16/2024	lisdexamfetamine 30 mg capsule	30	30	OccnaRaul friedman	[id number]	Medicare	[facility]  B	N	N	S	11/11/2024	11/17/2024	vyvanse 30 mg capsule	30	30	OcchiogrcarlosoRaul	[id number]	Medicare	[facility]  B	N	N	S	10/14/2024	10/17/2024	vyvanse 30 mg capsule	30	30	OcchiogrcarlosoRaul	[id number]	Medicare	[facility]  B	N	N	B	10/04/2024	10/07/2024	clonazepam 0.5 mg tablet	90	30	OcchinilamRaul firedman	[id number]	Medicare	[facility]  B	N	N	S	09/16/2024	09/18/2024	lisdexamfetamine 30 mg capsule	30	30	OcchiogrcarlosoRaul	[id number]	Medicare	[facility]  B	N	N	S	08/11/2024	08/13/2024	lisdexamfetamine 30 mg capsule	30	30	OcchiogrRaul friedman	[id number]	Medicare	[facility]  B	N	N	S	07/15/2024	07/16/2024	lisdexamfetamine 30 mg capsule	30	30	OcchiogrRaul friedman	[id number]	Medicare	[facility]  B	N	N	B	07/16/2024	07/16/2024	clonazepam 0.5 mg tablet	90	30	OcchiogrRaul friedman	[id number]	Medicare	[facility]  B	N	N	S	06/12/2024	06/16/2024	lisdexamfetamine 30 mg capsule	30	30	FoxDick de la torre MD	[id number]	Medicare	[facility]  B	N	N	S	05/10/2024	05/19/2024	lisdexamfetamine 30 mg capsule	30	30	FoxDick de la torre MD	[id number]	Medicare	[facility]  C	N	N	S	03/26/2025	03/26/2025	lisdexamfetamine 30 mg capsule	30	30	OcchiogrossoRaul	[id number]	Medicare	[facility]  C	N	N	S	02/11/2025	02/14/2025	lisdexamfetamine 30 mg capsule	30	30	OcchiogrRaul friedman	[id number]	Medicare	[facility]  C	N	N	B	01/17/2025	01/17/2025	clonazepam 0.5 mg tablet	90	30	OcchiogrossRaul chaudhry	[id number]	Medicare	[facility]  C	N	N	S	01/17/2025	01/17/2025	lisdexamfetamine 30 mg capsule	30	30	OcchiogrcarlosoRaul	[id number]	Medicare	[facility]  D	N	N	O	04/01/2025	04/01/2025	oxycodone-acetaminophen 5-325 mg tablet	10	5	Michael Fields	[id number]	Medicare	[id number]  D	N	N	O	03/27/2025	03/27/2025	oxycodone-acetaminophen 5-325 mg tablet	12	4	Aaron Walls	[id number]	Medicare	[id number]

## 2025-05-12 NOTE — PROGRESS NOTE ADULT - SUBJECTIVE AND OBJECTIVE BOX
Physical Medicine and Rehabilitation Progress Note :       Patient is a 79y old  Female who presents with a chief complaint of Fall vs syncope, MARY LOU (12 May 2025 07:25)      HPI:  Patient is 79F with PMHx of HTN, HLD,  hypothyroidism, generalized depression, ADHD and L1 compression fracture 2/2 to recent  fall  (April 2025, wears TLSO brace, scheduled for surgery with Dr. Barcenas this month)  rheumatic fever and pericarditis presents after unwitnessed fall. Patient states she was standing up brushing her teeth this morning and suddenly felt lightheaded and woke up on the floor  hitting her head on the bathtub +LOC +HT with noticeable blood from the scalp. Patient does not fully recall the event but denies lightheadedness dizziness, paresthesia blurred vision nausea or aura. She states she arose from the floor and called her  to bring her to the hospital. Upon arrival to ED; Code stoke due to concern of dysarthria and left sided facial droop.     Of note, patient shares she has been experiencing several episodes on instability due to weakness in her legs resulting in recurrent falls, most recently in April where she was in bed and missed her footing, falling to the ground. At that time she was sent for imaging of her lumbar spine and found to have acute/subacute severe anteriorly wedged compression deformity of the L1 vertebral body. Instructed to wear TLSO brace and prescribed muscle relaxants and oxycodone for pain. Recently seen by Dr. Barcenas on 5/1 pending T11-L3 Decompression and posterior instrumented fusion.     Patient also recently seen by PCP Dr. Michael Fields  4/28 with complaints of dry mouth and instability, instructed to discontinue use of cyclobenzaprine     In the ED,  VS: T   97.5, HR 98  , /78 >  205/80 , RR   20 , SpO2    99 % RA  Labs: leukocytosis to 12.6, trop 89>85; sodium 134, potassium 3.4 AG 21 with bicarb of 18 BUN 82/Cr 5.86  EKG: normal sinus rhythm with rate of 88; qtc 496    Imaging:  CTA HEAD: No large vessel occlusion or hemodynamically significant stenosis. 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  CTA NECK: No occlusion, dissection, aneurysm, or hemodynamically significant stenosis. 4.1 x 2.5 cm right upper lobe opacity as detailed above. Further evaluation with dedicated CT of the chest without contrast is recommended.  CT BRAIN PERFUSION:  Normal.  CT Lumbar Spine: Acute-appearing burst type compression fracture at L1 with severe vertebral body height loss and moderate osseous retropulsion. There is severe associated spinal canal stenosis. Acute right transverse process fractures at L1 and L2. Severe spinal canal stenosis at L4-L5.  CT Chest: 5 cm infiltrative opacity in the right upper lobe suspicious for malignancy. Consider PET/CT and tissue sampling.    Interventions; code stroke called, s/p scalp laceration repair, labetolol 10mg IVP   (03 May 2025 18:57)                            9.7    14.77 )-----------( 258      ( 12 May 2025 07:03 )             29.5       05-11    134[L]  |  104  |  47[H]  ----------------------------<  151[H]  4.3   |  16[L]  |  2.67[H]    Ca    7.8[L]      11 May 2025 05:30  Phos  3.8     05-11  Mg     1.6     05-11    TPro  6.1  /  Alb  3.5  /  TBili  0.3  /  DBili  x   /  AST  41[H]  /  ALT  27  /  AlkPhos  250[H]  05-11    Vital Signs Last 24 Hrs  T(C): 36.4 (12 May 2025 05:08), Max: 36.5 (11 May 2025 15:37)  T(F): 97.6 (12 May 2025 05:08), Max: 97.7 (11 May 2025 15:37)  HR: 91 (12 May 2025 05:08) (78 - 98)  BP: 164/73 (12 May 2025 05:08) (143/81 - 164/73)  BP(mean): --  RR: 17 (12 May 2025 05:08) (17 - 18)  SpO2: 98% (12 May 2025 05:08) (95% - 98%)    Parameters below as of 12 May 2025 05:08  Patient On (Oxygen Delivery Method): room air        MEDICATIONS  (STANDING):  acetaminophen     Tablet .. 1000 milliGRAM(s) Oral every 8 hours  atorvastatin 80 milliGRAM(s) Oral at bedtime  BUpivacaine liposome 1.3% Injectable (no eMAR) 20 milliLiter(s) Local Injection once  buPROPion XL (24-Hour) . 300 milliGRAM(s) Oral daily  calcitriol   Capsule 0.25 MICROGram(s) Oral daily  carvedilol 6.25 milliGRAM(s) Oral every 12 hours  dextrose 5%. 1000 milliLiter(s) (100 mL/Hr) IV Continuous <Continuous>  dextrose 5%. 1000 milliLiter(s) (50 mL/Hr) IV Continuous <Continuous>  dextrose 50% Injectable 25 Gram(s) IV Push once  dextrose 50% Injectable 12.5 Gram(s) IV Push once  dextrose 50% Injectable 25 Gram(s) IV Push once  enoxaparin Injectable 30 milliGRAM(s) SubCutaneous every 24 hours  glucagon  Injectable 1 milliGRAM(s) IntraMuscular once  levothyroxine 75 MICROGram(s) Oral daily  methocarbamol 500 milliGRAM(s) Oral three times a day    MEDICATIONS  (PRN):  dextrose Oral Gel 15 Gram(s) Oral once PRN Blood Glucose LESS THAN 70 milliGRAM(s)/deciliter  HYDROmorphone  Injectable 0.5 milliGRAM(s) IV Push every 15 minutes PRN pacu  HYDROmorphone  Injectable 0.5 milliGRAM(s) IV Push every 4 hours PRN breakthrough  loperamide 2 milliGRAM(s) Oral every 4 hours PRN Diarrhea  melatonin 3 milliGRAM(s) Oral at bedtime PRN Insomnia  oxyCODONE    IR 5 milliGRAM(s) Oral every 4 hours PRN Moderate Pain (4 - 6)  oxyCODONE    IR 10 milliGRAM(s) Oral every 4 hours PRN Severe Pain (7 - 10)      T(C): 36.4 (05-12-25 @ 05:08), Max: 36.5 (05-11-25 @ 15:37)  HR: 91 (05-12-25 @ 05:08) (78 - 98)  BP: 164/73 (05-12-25 @ 05:08) (143/81 - 164/73)  RR: 17 (05-12-25 @ 05:08) (17 - 18)  SpO2: 98% (05-12-25 @ 05:08) (95% - 98%)    Physical Exam:    79 y o woman lying comfortably in semi Butcher's position , awake , alert , no acute complaints     Head: normocephalic , atraumatic    Eyes: PERRLA , EOMI , no nystagmus , sclera anicteric    ENT / FACE: neg nasal discharge , uvula midline , no oropharyngeal erythema / exudate    Neck: supple , negative JVD , negative carotid bruits , no thyromegaly    Chest: CTA bilaterally      Cardiovascular: regular rate and rhythm , neg murmurs / rubs / gallops    Abdomen: soft , non distended , no tenderness to palpation in all 4 quadrants ,  normal bowel sounds     Extremities: WWP , neg cyanosis /clubbing / edema      Neurologic Exam:     Alert and oriented to person , place , date/year , speech fluent w/o dysarthria , follows commands      Cranial Nerves:           II:                         pupils equal , round and reactive to light , visual fields intact         III/ IV/VI:             extraocular movements intact , neg nystagmus , neg ptosis        V:                        facial sensation intact , V1-3 normal        VII:                      face symmetric , no droop , normal eye closure and smile        VIII:                     hearing intact to finger rub bilaterally        IX and X:             no hoarseness , gag intact , palate/ uvula rise symmetrically        XI:                       SCM / trapezius strength intact bilateral        XII:                      no tongue deviation    Motor Exam:        > 3+/5 x 4 extremities , without drift     Sensation:         intact to light touch x 4 extremities                            no neglect or extinction on double simultaneous testing    DTR:           biceps/brachioradialis: equal                            patella/ankle: equal          neg Babinski     Coordination:            Finger to Nose:  neg dysmetria bilaterally      5/11/2025 Functional Status Assessment :       Pain Assessment/Number Scale (0-10) Adult  Presence of Pain: complains of pain/discomfort  Body Location: back  Pain Rating (0-10): Rest: 2 (mild pain)  Pain Rating (0-10): Activity: 2 (mild pain)    Safety      AM-PAC Functional Assessment: Basic Mobility  Type of Assessment: Daily assessment  Turning from your back to your side while in a flat bed without using bedrails?: 3 = A little assistance  Moving from lying on your back to sitting on the flat side of a flat bed without using bedrails?: 3 = A little assistance  Moving to and from a bed to a chair (including a wheelchair)?: 3 = A little assistance  Standing up from a chair using your arms (e.g. wheelchair or bedside chair)?: 3 = A little assistance  Walking in hospital room?: 3 = A little assistance  Climbing 3-5 steps with a railing?:    3-calculated by average   Score: 18   Row Comment: Ask the patient "How much help from another person do you currently need? (If the patient hasn't done an activity recently, how much help from another person do you think he/she needs if he/she tried?)    Cognitive/Neuro      Cognitive/Neuro/Behavioral  Cognitive/Neuro/Behavioral [WDL Definition: Alert; opens eyes spontaneously; arouses to voice or touch; oriented x 4; follows commands; speech spontaneous, logical; purposeful motor response; behavior appropriate to situation]: WDL    Language Assistance  Preferred Language to Address Healthcare Preferred Language to Address Healthcare: English    Therapeutic Interventions      Bed Mobility  Bed Mobility Training Sit-to-Supine: minimum assist (75% patient effort);  1 person assist;  verbal cues  Bed Mobility Training Supine-to-Sit: minimum assist (75% patient effort);  1 person assist;  verbal cues  Bed Mobility Training Limitations: impaired ability to control trunk for mobility;  decreased ability to use legs for bridging/pushing;  decreased ability to use arms for pushing/pulling;  impaired balance;  decreased strength;  impaired postural control    Sit-Stand Transfer Training  Transfer Training Sit-to-Stand Transfer: contact guard;  1 person assist;  verbal cues;  weight-bearing as tolerated   rollator  Transfer Training Stand-to-Sit Transfer: contact guard;  1 person assist;  verbal cues;  rollator  Sit-to-Stand Transfer Training Transfer Safety Analysis: decreased weight-shifting ability;  impaired balance;  decreased strength;  impaired postural control    Gait Training  Gait Training: contact guard;  1 person assist;  verbal cues;  rollator;  60' x 2  Gait Analysis: decreased emelyn;  decreased velocity of limb motion;  decreased hip/knee flexion;  decreased stride length;  decreased step length;  decreased weight-shifting ability;  impaired balance;  decreased strength;  impaired postural control    Therapeutic Exercise  Therapeutic Exercise Detail: b/l ankle DF/PF 5/5 BLE sensation intact to light touch           PM&R Impression : as above    Current disposition plan recommendation :    d/c home with home physical therapy

## 2025-05-12 NOTE — PROGRESS NOTE ADULT - ASSESSMENT
79F with PMHx of HTN, HLD, hypothyroidism, generalized depression, ADHD and L1 compression fracture 2/2 to recent  fall  (April 2025, wears TLSO brace, scheduled for surgery with Dr. Barcenas this month), rheumatic fever and pericarditis presents after syncope and fall c/b head trauma, found to have lumbar compression fracture, severe spinal stenosis, and incidentally found lung mass, now pending biopsy.     REVIEW OF STUDIES  TTE 5/6/25: Normal biventricular function  ECG 5/3/25: NSR, QTc 496.   TTE 2013: LVEF 61%. Normal RV size/function. LA mildly dilated. +PFO. Mild MR.     #Preoperative Cardiovascular Assessment  EKG: As above, nonischemic  Functional status: Prior to falls, patient with excellent functional capacity, METs > 4  Patient without evidence of active ischemia, decompensated HF, or unstable arrhythmia  -Patient is intermediate risk for intermediate risk procedure (spinal fusion/decompression)  -No further pre-operative cardiovascular workup  -Continue Lipitor 80mg and carvedilol perioperatively      #Syncope  No clear prodrome  No structural heart disease  EKG nonischemic, no clear etiology  -No tele events to explain syncope  -May benefit from outpatient rhythm monitoring    #HTN  -C/w coreg 6.25mg BID post operatively  -Add amlodipine 2.5mg PO QD  -Control pain    F/u with her cardiologist, Dr. Murray, within 1-2 weeks of discharge    D/w Dr. Hess

## 2025-05-12 NOTE — PROGRESS NOTE ADULT - SUBJECTIVE AND OBJECTIVE BOX
Ortho Floor Note    Procedure: T11-L3 decompression and PSF  Surgeon: Dr. Barcenas    Resting comfortably. Pain controlled.  Denies CP, SOB, N/V, numbness/tingling     Vital Signs Last 24 Hrs  T(C): 36.4 (12 May 2025 05:08), Max: 36.5 (11 May 2025 15:37)  T(F): 97.6 (12 May 2025 05:08), Max: 97.7 (11 May 2025 15:37)  HR: 91 (12 May 2025 05:08) (74 - 98)  BP: 164/73 (12 May 2025 05:08) (143/81 - 164/73)  BP(mean): --  RR: 17 (12 May 2025 05:08) (16 - 18)  SpO2: 98% (12 May 2025 05:08) (95% - 98%)    Parameters below as of 12 May 2025 05:08  Patient On (Oxygen Delivery Method): room air      General: Pt Alert and oriented, NAD  Aquacel DSG C/D/I  Pulses Distal pulses intact  B/l LE:  Sensation: SILT L2-S1  Motor:   L2 (hip flexion)  5/5   L3 (knee extension)  5/5   L4 (ankle dorsiflexion)  5/5   L5 (long toe extension) 5/5   S1 (ankle plantar flexion) 5/5                  A/P: 79yFemale s/p T11-L3 Decompression/PSF on 5/10.  - Stable  - Pain Control  - DVT ppx: per primary  - Post op abx: Ancef  - PT, WBS: WBAT  - HPT    Ortho Pager [phone]

## 2025-05-12 NOTE — PROGRESS NOTE ADULT - ASSESSMENT
Ortho    79yFemale s/p T11-L3 Decompression/PSF on 5/10.  - Stable  - Pain Control  - DVT ppx: per primary  - Post op abx: Ancef  - PT, WBS: WBAT  - HPT      I M    79F with PMHx of HTN, HLD,  hypothyroidism, generalized depression, ADHD rheumatic fever, pericarditis with recent  L1 compression fracture 2/2 mechanical fall (pending  orthopedic intervention with Dr. Barcenas),  presents after unwitnessed syncopal event and head trauma likely 2/2 to medication side effect vs dehydration complicated by MARY LOU, with incidental finding of RUL opacity that resulted to NSLC, now pending L1 decompression w/ c/f metastatic spread, complicated by suspicion of history of IgA deficiency       Nutritional Assessment:  · Nutritional Assessment  This patient has been assessed with a concern for Malnutrition and has been determined to have a diagnosis/diagnoses of Moderate protein-calorie malnutrition.    This patient is being managed with:   Diet Regular-  Entered: May  8 2025  6:09AM    Problem/Plan - 1:  ·  Problem: Carcinoma, lung.   ·  Plan: - RUL nodule highly suspicious for primary lung malignancy.   - PET CT scan which showed a growing right upper lobe lung mass measuring 3.4 x 4.9 cm as well as severe compression deformity in the L1 vertebrae, there is no evidence of hypermetabolic lymphadenopathy in the chest, abdomen, or pelvis.   -There is a possibility the L1 vertebral compression fracture could be a metastatic involvement.   - bronchoscopy with EBUS on 5/6 for diagnosis and staging. Pathology resulted with NSCLC. At this time, she appears to be stage IIb.   - She is now s/p L1 fixation with orthopedics with biopsy to rule out metastatic disease.   Plan:   - F/up outpatient with pulm and ortho  - Will need to establish care with pulmonary outpatient for PFTs - can follow with Dr. Peck.    Problem/Plan - 2:  ·  Problem: Lumbar compression fracture.   ·  Plan: Patient suffered mechanical fall several weeks ago and has been following with ortho for planned procedure  Outpatient Lumbar MRI: acute/subacute severe anteriorly wedged compression deformity of the L1 vertebral body with moderate associated retropulsion and severe stenosis.   Started on cyclobenzaprine 10mg TID as need, oxycodone 5mg and percocet  At [facility]   CT Lumbar Spine: L1 compression fracture   Followed by Dr. Barcenas; pending T11-L3 Decompression and posterior instrumented fusion.   Ortho following recs appreciated; s/p surgery 5/10  - c/w WBAT   - will not resume muscle relaxant or opioid medications as they are likely contributing to patient's worsening instability    - monitor for signs of saddle anesthesia  - c/f metastatic spread of lung malignancy, f/up biopsy.    Problem/Plan - 3:  ·  Problem: Unwitnessed fall.   ·  Plan: #Syncope   May be medication induced BEERS (recently started cyclobenzaprine, opiods  vs orthostatic iso poor oral intake less likely arrhythmogenic given normal EKG however given patient's history of rheumatic fever and pericarditis would  perform limited TTE   Stroke imaging negative, no signs of facial droop or dysarthria on repeat exam; MR Brain unremarkable  s/p scalp laceration repair in ED with three staples; UA: mod leuk esterase, nitrite neg with * WBC; monitoring of abx  as patient is asymptomatic  Cardiology team following; TTE: EF 70% without significant valvular disease   - hold off  orthostatics for now    CTM.    Problem/Plan - 4:  ·  Problem: IgA deficiency.   ·  Plan: Patient noted to be wearing "severe IgA deficiency bracelet" on 5/8. States she was diagnosed by an endocrinologist several decades ago however her PCP has low suspicion of this. Patient and family member deny prior history of blood transfusions or anaphylaxis.   Orthopedic surgery s/p 5/10, blood bank and hematology aware  IgA level <2  - f/u outpatient.    Problem/Plan - 5:  ·  Problem: MARY LOU (acute kidney injury).   ·  Plan: #AGMA  Baseline Cr: upon admission BUN/Cr  82/5.86  Likely iso of dehydration as patient shares she has had diminished oral intake 2/2 dry mouth  s/p IL NS in ED and NaHCO3 tables;  renal ultrasound; unremarkable   FENa: 2.4 instrinsic eitology likely ATN iso NSAID use  Cystatin C elevated   - avoid nephrotoxic medication  - trend Cr daily  - strict I/O.    Problem/Plan - 6:  ·  Problem: Hypertension.   ·  Plan: Patient with hx of HTN on hydrochlorthiazide and coreg at home, has not taken home meds in several weeks since starting muscle relaxant and opioid medications. Denies symptoms of lightheadedness, dizziness, nausea or chest pain   Home mediction: coreg 3.125 BID   - c/w coreg 6.25 BID with strict hold parameters   - will hold thiazide for now  - goal SBP <160.    Problem/Plan - 7:  ·  Problem: Elevated troponin.   ·  Plan: Likely 2/2 to demand ischemia, low concern for ACS given normal EKG and denial of anginal symptoms + hemodynamic stability   Troponin trend 89 (peak) > 85.    Problem/Plan - 8:  ·  Problem: History of rheumatic fever.   ·  Plan: Patient shares prior hx of rheumatic fever when she was [age] years old but denies residual valvulopathy.   Seen by Dr. Murray in December with instruction to undergo stress test and TTE but no record of imaging. EKG normal sinus rhythm, no murmurs auscultated on physical exam   TTE w.o valvulopathy; EF 70%.    Problem/Plan - 9:  ·  Problem: Major depression.   ·  Plan: Home medication bupropion 450mg qd per last note from PCP  -c/w wellbutrin 300 given elevated qtc    #ADHD  Lisdexamfetamine Dimesylate  30 MG Oral Capsule  - hold for now.    Problem/Plan - 10:  ·  Problem: Hyperlipidemia.   ·  Plan; Home medication rosuvastatin 5mg   Lipid panel wnl; A1c 5.6  - initiate atorvastatin 80mg qd.    Problem/Plan - 11:  ·  Problem: Hypothyroidism.   ·  Plan: Home medication: Levothyroxine Sodium 75 MCG Oral; TSH 1.050  - resume home medication    #Depression   - c/w home buproprion 300 mg PO q24 hours     #Prophylactic    F: none  E: Replete PRN  N: Regular   DVT ppx: heparin 5000 q8 hours (hold 5/9am for procedure)  GI ppx: None  Bowel: Not indicated   Dispo: [facility].

## 2025-05-13 ENCOUNTER — NON-APPOINTMENT (OUTPATIENT)
Age: 80
End: 2025-05-13

## 2025-05-13 LAB
ANION GAP SERPL CALC-SCNC: 14 MMOL/L — SIGNIFICANT CHANGE UP (ref 5–17)
BUN SERPL-MCNC: 43 MG/DL — HIGH (ref 7–23)
CALCIUM SERPL-MCNC: 7.7 MG/DL — LOW (ref 8.4–10.5)
CHLORIDE SERPL-SCNC: 104 MMOL/L — SIGNIFICANT CHANGE UP (ref 96–108)
CO2 SERPL-SCNC: 16 MMOL/L — LOW (ref 22–31)
CREAT SERPL-MCNC: 2.11 MG/DL — HIGH (ref 0.5–1.3)
EGFR: 23 ML/MIN/1.73M2 — LOW
EGFR: 23 ML/MIN/1.73M2 — LOW
GLUCOSE SERPL-MCNC: 86 MG/DL — SIGNIFICANT CHANGE UP (ref 70–99)
HCT VFR BLD CALC: 32.8 % — LOW (ref 34.5–45)
HGB BLD-MCNC: 10.2 G/DL — LOW (ref 11.5–15.5)
MCHC RBC-ENTMCNC: 30.4 PG — SIGNIFICANT CHANGE UP (ref 27–34)
MCHC RBC-ENTMCNC: 31.1 G/DL — LOW (ref 32–36)
MCV RBC AUTO: 97.9 FL — SIGNIFICANT CHANGE UP (ref 80–100)
NRBC BLD AUTO-RTO: 0 /100 WBCS — SIGNIFICANT CHANGE UP (ref 0–0)
PLATELET # BLD AUTO: 241 K/UL — SIGNIFICANT CHANGE UP (ref 150–400)
POTASSIUM SERPL-MCNC: 4.2 MMOL/L — SIGNIFICANT CHANGE UP (ref 3.5–5.3)
POTASSIUM SERPL-SCNC: 4.2 MMOL/L — SIGNIFICANT CHANGE UP (ref 3.5–5.3)
RBC # BLD: 3.35 M/UL — LOW (ref 3.8–5.2)
RBC # FLD: 16.1 % — HIGH (ref 10.3–14.5)
SODIUM SERPL-SCNC: 134 MMOL/L — LOW (ref 135–145)
WBC # BLD: 10.91 K/UL — HIGH (ref 3.8–10.5)
WBC # FLD AUTO: 10.91 K/UL — HIGH (ref 3.8–10.5)

## 2025-05-13 PROCEDURE — 99233 SBSQ HOSP IP/OBS HIGH 50: CPT

## 2025-05-13 PROCEDURE — 72100 X-RAY EXAM L-S SPINE 2/3 VWS: CPT | Mod: 26

## 2025-05-13 PROCEDURE — 99232 SBSQ HOSP IP/OBS MODERATE 35: CPT

## 2025-05-13 RX ORDER — SODIUM BICARBONATE 1 MEQ/ML
650 SYRINGE (ML) INTRAVENOUS THREE TIMES A DAY
Refills: 0 | Status: DISCONTINUED | OUTPATIENT
Start: 2025-05-13 | End: 2025-05-14

## 2025-05-13 RX ADMIN — ENOXAPARIN SODIUM 30 MILLIGRAM(S): 100 INJECTION SUBCUTANEOUS at 06:29

## 2025-05-13 RX ADMIN — OXYCODONE HYDROCHLORIDE 5 MILLIGRAM(S): 30 TABLET ORAL at 10:45

## 2025-05-13 RX ADMIN — CYCLOBENZAPRINE HYDROCHLORIDE 5 MILLIGRAM(S): 15 CAPSULE, EXTENDED RELEASE ORAL at 06:29

## 2025-05-13 RX ADMIN — ATORVASTATIN CALCIUM 80 MILLIGRAM(S): 80 TABLET, FILM COATED ORAL at 22:21

## 2025-05-13 RX ADMIN — Medication 1000 MILLIGRAM(S): at 15:00

## 2025-05-13 RX ADMIN — OXYCODONE HYDROCHLORIDE 5 MILLIGRAM(S): 30 TABLET ORAL at 17:42

## 2025-05-13 RX ADMIN — BUPROPION HYDROBROMIDE 300 MILLIGRAM(S): 522 TABLET, EXTENDED RELEASE ORAL at 10:16

## 2025-05-13 RX ADMIN — CARVEDILOL 6.25 MILLIGRAM(S): 3.12 TABLET, FILM COATED ORAL at 06:29

## 2025-05-13 RX ADMIN — Medication 0.5 MILLIGRAM(S): at 07:07

## 2025-05-13 RX ADMIN — OXYCODONE HYDROCHLORIDE 5 MILLIGRAM(S): 30 TABLET ORAL at 18:10

## 2025-05-13 RX ADMIN — CYCLOBENZAPRINE HYDROCHLORIDE 5 MILLIGRAM(S): 15 CAPSULE, EXTENDED RELEASE ORAL at 22:21

## 2025-05-13 RX ADMIN — Medication 1000 MILLIGRAM(S): at 14:29

## 2025-05-13 RX ADMIN — Medication 75 MICROGRAM(S): at 06:29

## 2025-05-13 RX ADMIN — Medication 1000 MILLIGRAM(S): at 06:29

## 2025-05-13 RX ADMIN — CALCITRIOL 0.25 MICROGRAM(S): 0.5 CAPSULE, GELATIN COATED ORAL at 14:32

## 2025-05-13 RX ADMIN — CYCLOBENZAPRINE HYDROCHLORIDE 5 MILLIGRAM(S): 15 CAPSULE, EXTENDED RELEASE ORAL at 14:29

## 2025-05-13 RX ADMIN — CARVEDILOL 6.25 MILLIGRAM(S): 3.12 TABLET, FILM COATED ORAL at 17:42

## 2025-05-13 RX ADMIN — AMLODIPINE BESYLATE 2.5 MILLIGRAM(S): 10 TABLET ORAL at 06:29

## 2025-05-13 RX ADMIN — OXYCODONE HYDROCHLORIDE 10 MILLIGRAM(S): 30 TABLET ORAL at 23:46

## 2025-05-13 RX ADMIN — Medication 650 MILLIGRAM(S): at 06:29

## 2025-05-13 RX ADMIN — Medication 650 MILLIGRAM(S): at 14:29

## 2025-05-13 RX ADMIN — Medication 650 MILLIGRAM(S): at 22:21

## 2025-05-13 RX ADMIN — OXYCODONE HYDROCHLORIDE 5 MILLIGRAM(S): 30 TABLET ORAL at 10:16

## 2025-05-13 RX ADMIN — Medication 1000 MILLIGRAM(S): at 22:21

## 2025-05-13 NOTE — PROGRESS NOTE ADULT - ASSESSMENT
79F with PMHx of HTN, HLD,  hypothyroidism, generalized depression, ADHD rheumatic fever, pericarditis with recent L1 compression fracture 2/2 mechanical fall (pending orthopedic intervention with Dr. Barcenas later today), presents after unwitnessed syncopal event and head trauma likely 2/2 to medication side effect vs dehydration complicated by MARY LOU, with incidental finding of RUL opacity c/f malignancy for which oncology has been consulted.    Smoking history: 2 packs per day for 20 years (40py) stopped in 1982. 25 years of second hand smoking exposure.  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  She believes her dad may have passed 2/2 lung cancer +/- MS but is unsure of exact diagnoses.  No personal history of other cancers.       #NSCLC  --patient originally presented 5/3 s/p unwitnessed fall, found to have L1 compression fracture   --CT [facility] 5/3: 5 cm infiltrative opacity in the right upper lobe suspicious for malignancy  --MRb 5/4: No acute intracranial hemorrhage or evidence of acute ischemia  --PET CT 5/6: 1. FDG avid 3.4 x 4.9 cm groundglass opacity at the anterior right upper lobe, SUV max 4.4. A rapid increase in size since 5/3/2025 would favor an infectious process. However, exact measurement is limited in this study due to low resolution CT. Malignancy cannot be excluded.  2. Mildly FDG avid subcentimeter nodular opacities at the posterior right upper lobe and posterior left lung base.  3. Small airway disease at the peripheral left lung apex.  4. No hypermetabolic lymphadenopathy in the chest, abdomen, or pelvis.  5. Diffuse uptake throughout the gastric walls suggestive of gastritis. Correlate clinically. Increased uptake in the colon with possible wall thickening at the sigmoid colon suggestive of colitis. Correlate clinically. Diffuse uptake in the esophagus suggestive of esophagitis.  6. Severe compression deformity of vertebral body L1 with uptake suggestive of an acute or subacute process.   --atleast stage 2A pending L1 pathology results however suspect L1 is 2/2 fall   --NGS sent   --pathology confirmed adenocarcinoma, d/w patient 5/8   --patient to follow up with Dr. Parra upon discharge: has follow up 5/20 AT 2:20PM  --referred to CCD/navigation program for additional support   --s/p L1 compression fx decompression 5/10, pathology negative, recommend thoracic surgery evaluation for resection  --discussed with patient 5/12, pending L1 pathology results and NGS can discuss next steps/treatment plan  --IGA deficiency confirmed, kappa/lambda ratio 2.04, recommend sending spep/upep & immunofixation       will continue to follow

## 2025-05-13 NOTE — PROGRESS NOTE ADULT - SUBJECTIVE AND OBJECTIVE BOX
patient seen and examined at bedside  discussed L1 pathology results  discussed recommendation for surgical evaluation   patient otherwise stable without new complaints    ANTIBIOTICS/RELEVANT:    MEDICATIONS  (STANDING):  acetaminophen     Tablet .. 1000 milliGRAM(s) Oral every 8 hours  amLODIPine   Tablet 2.5 milliGRAM(s) Oral daily  atorvastatin 80 milliGRAM(s) Oral at bedtime  BUpivacaine liposome 1.3% Injectable (no eMAR) 20 milliLiter(s) Local Injection once  buPROPion XL (24-Hour) . 300 milliGRAM(s) Oral daily  calcitriol   Capsule 0.25 MICROGram(s) Oral daily  carvedilol 6.25 milliGRAM(s) Oral every 12 hours  cyclobenzaprine 5 milliGRAM(s) Oral three times a day  dextrose 5%. 1000 milliLiter(s) (100 mL/Hr) IV Continuous <Continuous>  dextrose 5%. 1000 milliLiter(s) (50 mL/Hr) IV Continuous <Continuous>  dextrose 50% Injectable 25 Gram(s) IV Push once  dextrose 50% Injectable 12.5 Gram(s) IV Push once  dextrose 50% Injectable 25 Gram(s) IV Push once  enoxaparin Injectable 30 milliGRAM(s) SubCutaneous every 24 hours  glucagon  Injectable 1 milliGRAM(s) IntraMuscular once  levothyroxine 75 MICROGram(s) Oral daily  sodium bicarbonate 650 milliGRAM(s) Oral three times a day    MEDICATIONS  (PRN):  dextrose Oral Gel 15 Gram(s) Oral once PRN Blood Glucose LESS THAN 70 milliGRAM(s)/deciliter  HYDROmorphone  Injectable 0.5 milliGRAM(s) IV Push every 15 minutes PRN pacu  HYDROmorphone  Injectable 0.5 milliGRAM(s) IV Push every 4 hours PRN breakthrough  loperamide 2 milliGRAM(s) Oral every 4 hours PRN Diarrhea  melatonin 3 milliGRAM(s) Oral at bedtime PRN Insomnia  oxyCODONE    IR 5 milliGRAM(s) Oral every 4 hours PRN Moderate Pain (4 - 6)  oxyCODONE    IR 10 milliGRAM(s) Oral every 4 hours PRN Severe Pain (7 - 10)      Vital Signs Last 24 Hrs  T(C): 36.2 (13 May 2025 10:00), Max: 36.6 (13 May 2025 05:07)  T(F): 97.2 (13 May 2025 10:00), Max: 97.8 (13 May 2025 05:07)  HR: 98 (13 May 2025 10:00) (78 - 98)  BP: 144/79 (13 May 2025 07:42) (144/79 - 184/80)  BP(mean): --  RR: 17 (13 May 2025 10:00) (17 - 17)  SpO2: 95% (13 May 2025 10:00) (95% - 96%)    Parameters below as of 13 May 2025 10:00  Patient On (Oxygen Delivery Method): room air    PHYSICAL EXAM:  General: in no acute distress  Eyes: EOMI intact bilaterally. Anicteric sclerae, moist conjunctivae  HENT: Moist mucous membranes  Neck: Trachea midline, supple  Lungs: CTA B/L. No wheezes, rales, or rhonchi  Cardiovascular: RRR. No murmurs, rubs, or gallops  Abdomen: Soft, non-tender non-distended; No rebound or guarding  Extremities: WWP, No clubbing, cyanosis or edema  LABS:                        10.2   10.91 )-----------( 241      ( 13 May 2025 06:42 )             32.8     05-13    134[L]  |  104  |  43[H]  ----------------------------<  86  4.2   |  16[L]  |  2.11[H]    Ca    7.7[L]      13 May 2025 07:08  Phos  3.1     05-12  Mg     1.6     05-12        Urinalysis Basic - ( 13 May 2025 07:08 )    Color: x / Appearance: x / SG: x / pH: x  Gluc: 86 mg/dL / Ketone: x  / Bili: x / Urobili: x   Blood: x / Protein: x / Nitrite: x   Leuk Esterase: x / RBC: x / WBC x   Sq Epi: x / Non Sq Epi: x / Bacteria: x        MICROBIOLOGY:    RADIOLOGY & ADDITIONAL STUDIES:

## 2025-05-13 NOTE — PROGRESS NOTE ADULT - ATTENDING SUPERVISION STATEMENT
Fellow
Fellow
Resident
Fellow
Resident
Resident
Fellow
Resident
Fellow
Resident

## 2025-05-13 NOTE — PROGRESS NOTE ADULT - SUBJECTIVE AND OBJECTIVE BOX
INTERVAL HPI/OVERNIGHT EVENTS: sylwia o/n    SUBJECTIVE: Patient seen and examined at bedside.   CC: f/u constipation, TOV  Pt reports pain is about the same - able to walk down the de la o today. No LH/dizziness.   Voiding wo feeling of retained urine. +Flatus but no BM yet. Eating wo N/V/abd pain. Pain is still localized in back      OBJECTIVE:    VITAL SIGNS:  ICU Vital Signs Last 24 Hrs  T(C): 36.2 (13 May 2025 10:00), Max: 36.6 (13 May 2025 05:07)  T(F): 97.2 (13 May 2025 10:00), Max: 97.8 (13 May 2025 05:07)  HR: 98 (13 May 2025 10:00) (78 - 98)  BP: 144/79 (13 May 2025 07:42) (144/79 - 184/80)  BP(mean): --  ABP: --  ABP(mean): --  RR: 17 (13 May 2025 10:00) (17 - 17)  SpO2: 95% (13 May 2025 10:00) (95% - 96%)    O2 Parameters below as of 13 May 2025 10:00  Patient On (Oxygen Delivery Method): room air              05-12 @ 07:01  -  05-13 @ 07:00  --------------------------------------------------------  IN: 0 mL / OUT: 341 mL / NET: -341 mL      CAPILLARY BLOOD GLUCOSE          PHYSICAL EXAM:  GEN: female in NAD on RA  HEENT: NC/AT, MMM  CV: RRR, nml S1S2, no murmurs  PULM: nml effort, CTAB  ABD: Soft, non-distended, NABS, non-tender  : Pickett in place  NEURO  A/O x3, moving all extremities, Sensation intact  5/5 in plantarflex/ext b/l.   Lumbar dressing c/d/i  PSYCH: Appropriate    MEDICATIONS:  MEDICATIONS  (STANDING):  acetaminophen     Tablet .. 1000 milliGRAM(s) Oral every 8 hours  amLODIPine   Tablet 2.5 milliGRAM(s) Oral daily  atorvastatin 80 milliGRAM(s) Oral at bedtime  BUpivacaine liposome 1.3% Injectable (no eMAR) 20 milliLiter(s) Local Injection once  buPROPion XL (24-Hour) . 300 milliGRAM(s) Oral daily  calcitriol   Capsule 0.25 MICROGram(s) Oral daily  carvedilol 6.25 milliGRAM(s) Oral every 12 hours  cyclobenzaprine 5 milliGRAM(s) Oral three times a day  dextrose 5%. 1000 milliLiter(s) (100 mL/Hr) IV Continuous <Continuous>  dextrose 5%. 1000 milliLiter(s) (50 mL/Hr) IV Continuous <Continuous>  dextrose 50% Injectable 25 Gram(s) IV Push once  dextrose 50% Injectable 12.5 Gram(s) IV Push once  dextrose 50% Injectable 25 Gram(s) IV Push once  enoxaparin Injectable 30 milliGRAM(s) SubCutaneous every 24 hours  glucagon  Injectable 1 milliGRAM(s) IntraMuscular once  levothyroxine 75 MICROGram(s) Oral daily  sodium bicarbonate 650 milliGRAM(s) Oral three times a day    MEDICATIONS  (PRN):  dextrose Oral Gel 15 Gram(s) Oral once PRN Blood Glucose LESS THAN 70 milliGRAM(s)/deciliter  HYDROmorphone  Injectable 0.5 milliGRAM(s) IV Push every 15 minutes PRN pacu  HYDROmorphone  Injectable 0.5 milliGRAM(s) IV Push every 4 hours PRN breakthrough  loperamide 2 milliGRAM(s) Oral every 4 hours PRN Diarrhea  melatonin 3 milliGRAM(s) Oral at bedtime PRN Insomnia  oxyCODONE    IR 5 milliGRAM(s) Oral every 4 hours PRN Moderate Pain (4 - 6)  oxyCODONE    IR 10 milliGRAM(s) Oral every 4 hours PRN Severe Pain (7 - 10)      ALLERGIES:  Allergies    No Known Drug Allergies  Severe IgA deficency (Other)    Intolerances        LABS:                        10.2   10.91 )-----------( 241      ( 13 May 2025 06:42 )             32.8     05-13    134[L]  |  104  |  43[H]  ----------------------------<  86  4.2   |  16[L]  |  2.11[H]    Ca    7.7[L]      13 May 2025 07:08  Phos  3.1     05-12  Mg     1.6     05-12        Urinalysis Basic - ( 13 May 2025 07:08 )    Color: x / Appearance: x / SG: x / pH: x  Gluc: 86 mg/dL / Ketone: x  / Bili: x / Urobili: x   Blood: x / Protein: x / Nitrite: x   Leuk Esterase: x / RBC: x / WBC x   Sq Epi: x / Non Sq Epi: x / Bacteria: x        RADIOLOGY & ADDITIONAL TESTS: Reviewed.

## 2025-05-13 NOTE — PROGRESS NOTE ADULT - SUBJECTIVE AND OBJECTIVE BOX
Seen and evaluated at bedside, no overnight events reported by primary team.  Continues to show clinical recovery       REVIEW OF SYSTEMS  --------------------------------------------------------------------------------  Gen: No fevers/chills, weakness  Skin: No rashes  Head/Eyes/Ears/Mouth: No headache; No hearing changes, mucous discharge, vision changes  Respiratory: No dyspnea, cough, wheezing  CV: No chest pain, palpitations  GI: No abdominal pain, diarrhea, constipation, nausea, vomiting, melena, hematochezia  : No increased frequency, dysuria, hematuria  MSK: No joint pain/swelling; no back pain; no edema  Neuro: No dizziness/lightheadedness, weakness, seizures, numbness  Heme: No easy bruising or bleeding    All other systems were reviewed and are negative, except as noted.    PAST HISTORY  --------------------------------------------------------------------------------  No significant changes to PMH, PSH, FHx, SHx, unless otherwise noted    ALLERGIES & MEDICATIONS  --------------------------------------------------------------------------------  Allergies    No Known Drug Allergies  Severe IgA deficency (Other)    Intolerances      Standing Inpatient Medications  acetaminophen     Tablet .. 1000 milliGRAM(s) Oral every 8 hours  amLODIPine   Tablet 2.5 milliGRAM(s) Oral daily  atorvastatin 80 milliGRAM(s) Oral at bedtime  BUpivacaine liposome 1.3% Injectable (no eMAR) 20 milliLiter(s) Local Injection once  buPROPion XL (24-Hour) . 300 milliGRAM(s) Oral daily  calcitriol   Capsule 0.25 MICROGram(s) Oral daily  carvedilol 6.25 milliGRAM(s) Oral every 12 hours  cyclobenzaprine 5 milliGRAM(s) Oral three times a day  dextrose 5%. 1000 milliLiter(s) IV Continuous <Continuous>  dextrose 5%. 1000 milliLiter(s) IV Continuous <Continuous>  dextrose 50% Injectable 25 Gram(s) IV Push once  dextrose 50% Injectable 12.5 Gram(s) IV Push once  dextrose 50% Injectable 25 Gram(s) IV Push once  enoxaparin Injectable 30 milliGRAM(s) SubCutaneous every 24 hours  glucagon  Injectable 1 milliGRAM(s) IntraMuscular once  levothyroxine 75 MICROGram(s) Oral daily  sodium bicarbonate 650 milliGRAM(s) Oral three times a day    PRN Inpatient Medications  dextrose Oral Gel 15 Gram(s) Oral once PRN  HYDROmorphone  Injectable 0.5 milliGRAM(s) IV Push every 15 minutes PRN  HYDROmorphone  Injectable 0.5 milliGRAM(s) IV Push every 4 hours PRN  loperamide 2 milliGRAM(s) Oral every 4 hours PRN  melatonin 3 milliGRAM(s) Oral at bedtime PRN  oxyCODONE    IR 5 milliGRAM(s) Oral every 4 hours PRN  oxyCODONE    IR 10 milliGRAM(s) Oral every 4 hours PRN        VITALS/PHYSICAL EXAM  --------------------------------------------------------------------------------  T(C): 36.2 (05-13-25 @ 10:00), Max: 36.6 (05-13-25 @ 05:07)  HR: 98 (05-13-25 @ 10:00) (78 - 98)  BP: 144/79 (05-13-25 @ 07:42) (144/79 - 184/80)  RR: 17 (05-13-25 @ 10:00) (17 - 17)  SpO2: 95% (05-13-25 @ 10:00) (95% - 96%)  Wt(kg): --        05-12-25 @ 07:01  -  05-13-25 @ 07:00  --------------------------------------------------------  IN: 0 mL / OUT: 341 mL / NET: -341 mL      PHYSICAL EXAM:  Constitutional: NAD, comfortable in bed.  HEENT: NC/AT, PERRLA, EOMI, no conjunctival pallor or scleral icterus, MMM  Neck: Supple, no JVD  Respiratory: CTA B/L. No w/r/r.   Cardiovascular: RRR, normal S1 and S2, no m/r/g.   Gastrointestinal: +BS, soft NTND, no guarding or rebound tenderness, no palpable masses   Extremities: wwp; no cyanosis, clubbing or edema.    Neurological: AAOx3, strength 4/5 in UE and LE makenzie. no asterixis, + noted dysarthria. -[place]vostek sign  Skin: No gross skin abnormalities or rashes    LABS/STUDIES  --------------------------------------------------------------------------------              10.2   10.91 >-----------<  241      [05-13-25 @ 06:42]              32.8     134  |  104  |  43  ----------------------------<  86      [05-13-25 @ 07:08]  4.2   |  16  |  2.11        Ca     7.7     [05-13-25 @ 07:08]      Mg     1.6     [05-12-25 @ 10:00]      Phos  3.1     [05-12-25 @ 10:00]            Creatinine Trend:  SCr 2.11 [05-13 @ 07:08]  SCr 2.50 [05-12 @ 16:20]  SCr 2.38 [05-12 @ 10:00]  SCr 2.67 [05-11 @ 05:30]  SCr 2.72 [05-10 @ 14:52]    Urinalysis - [05-13-25 @ 07:08]      Color  / Appearance  / SG  / pH       Gluc 86 / Ketone   / Bili  / Urobili        Blood  / Protein  / Leuk Est  / Nitrite       RBC  / WBC  / Hyaline  / Gran  / Sq Epi  / Non Sq Epi  / Bacteria       Iron 40, TIBC 170, %sat 24      [05-11-25 @ 05:30]  Ferritin 2810      [05-11-25 @ 05:30]  PTH -- (Ca 7.8)      [05-08-25 @ 15:37]   168  Vitamin D (25OH) 37.3      [05-08-25 @ 15:37]  TSH 1.050      [05-03-25 @ 22:00]  Lipid: chol 144, , HDL 37, LDL --      [05-05-25 @ 07:33]      Free Light Chains: kappa 5.64, lambda 2.76, ratio = 2.04      [05-08 @ 15:37]

## 2025-05-13 NOTE — PROGRESS NOTE ADULT - ASSESSMENT
79F w IgA deficiency, HTN, HLD, Hypothyroidism, ADHD, Depression, recent fall w L1 compression fx, p/w syncopal event, found to have MARY LOU due to pre-renal, being on thiazide, and urinary retention, found to have CKD4?, RUL opacity, bx showing Cancer – s/p L1 decompression w Dr. Barcenas 5/10 - bone bx pending     #Post-op state - pain moderately controlled. PPx: SQL. On incentive spirometer  #L1 compression fracture   - tylenol 1g q8, flexeril 5 q8   - PRN: Oxycodone 5/10 q4 for mod/severe pain  #Osteoporosis   - 25,OH-D 37. PTH elevated    #Non small cell lung CA - NSCLC - in RUL biopsy   - heme-onc following   - PET scan: GGO 3.4x4.9 at RUL; Diffuse uptake – gastritis, colitis     Severe compression deformity L1 body    - pathology shows reactive bone only    #MARY LOU - Cr 2.11 today. Was 5 on admission  #Metabolic acidosis - CO2 16 today    #Constipation - pt denies BM since 5/6. Initially had diarrhea - GI PCR neg though overflow diarrhea is also positive d/t recent immobility    #HTN - amlodipine 2.5mg daily, coreg 6.25 BID  #HLD - atorva 80  #Hypothyroidism - c/w home synthroid 75mcg daily  #ADHD  #Depression - buproprion 300mg daily  #IgA deficiency    Plan  Passed TOV  Continue bowel regimen, suppository for constipation  Change NaHCO3 to 650 q8h   - f/u nephrology recs  CBC w diff, BMP w Mg/Phos in AM    [ ] Need to screen risk factors, obtain history, and enroll in [facility]    DISPO: NICK pending improvement in pain. Pt has outpatient/ onc f/u w Dr. Parra 5/20 @1420h  Above d/w ortho NP Charles

## 2025-05-13 NOTE — PROGRESS NOTE ADULT - ATTENDING COMMENTS
This is a follow-up on the 79-year-old woman with newly diagnosed lung cancer and an L1 compression fracture. Biopsy of the L1 lesion has returned and does not show malignancy, confirming that the fracture is not metastatic. This supports a diagnosis of early-stage lung cancer. We recommend surgical consultation as the next step in management. We will remain involved to assist with treatment planning, as systemic therapy will be indicated at some point in her overall care.
I agree with the fellow's findings and plans as written above with the following additions/amendments:    Seen and examined at bedside, no complaints, continuing supportive care, monitoring closely for post op complications. Further recs as above
Patient is a 78 yo Female with PMHx of Rheumatic Fever as a child without known Valvular heart disease, Remote Hx of Pericarditis, IgA deficiency HTN, HLD, Hypothyroidism, generalized depression, ADHD and L1 compression fracture 2/2 to recent  fall  (April 2025, wears TLSO brace, scheduled for surgery with Dr. Barcenas this month), admitted after Syncopal episode resulting in traumatic Fall with head trauma, found to have lumbar compression fracture, severe spinal stenosis, Oliguric MARY LOU and incidental lung mass, now pending biopsy. Cardiology is consulted for Pre-Operative CV Risk Assessment and Optimization    REVIEW OF STUDIES  - TTE 05/06/2025:Left ventricular cavity is normal in size. Left ventricular wall thickness is normal. Left ventricular systolic function is normal with an   ejection fraction of 70 % by Cano's method of disks. There are no regional wall motion abnormalities seen. Normal right ventricular cavity size and normal right ventricular systolic function.. Left atrium is mildly dilated.. No significant valvular disease.  - ECG 5/3/25: NSR, QTc 496.     # Pre-Operative CV Risk Assessment and Optimization  # Syncope  # H/o Rheumatic Fever    - Patient has no known ASCVD. She reports Remote Hx of Rheumatic Heart Disease as a child and Pericarditis 10 years ago  - She denies prior MI or CVA. She has never undergone ischemic evaluation.   - She was seen by Dr Murray in 12/2024 and Referred for Echo and Stress Echo but has not undergone them yet  - Echo this hospitalization reviewed showing Normal BIV function without RWMA and no significant Valvular Heart Disease  - Prior to recent falls she reported good functional status, reportedly able to walk over 10-12 blocks and go up 2 flights of stairs without exertional symptoms. Pt reported SOTO after 2 flights of stairs but no angina  - ECG reviewed showing NSR without ischemic changes  - At this time there are no active CV contraindication to proceeding with lung biopsy. Patient is considered at intermediate risk for low risk procedure  - Cardiology will continue to follow with you, please call with any questions.
Patient is a 80 yo Female with PMHx of Rheumatic Fever as a child without known Valvular heart disease, Remote Hx of Pericarditis, IgA deficiency HTN, HLD, Hypothyroidism, generalized depression, ADHD and L1 compression fracture 2/2 to recent  fall  (April 2025, wears TLSO brace, scheduled for surgery with Dr. Barcenas this month), admitted after Syncopal episode resulting in traumatic Fall with head trauma, found to have lumbar compression fracture, severe spinal stenosis, Oliguric MARY LOU and incidental lung mass, s/p bronchoscopy with biopsy on 5/6, s/p T11-L3 Decompression/PSF on 5/10. Cardiology was originally consulted for Pre-Operative CV Risk Assessment and Optimization    REVIEW OF STUDIES  - TTE 05/06/2025:Left ventricular cavity is normal in size. Left ventricular wall thickness is normal. Left ventricular systolic function is normal with an   ejection fraction of 70 % by Cano's method of disks. There are no regional wall motion abnormalities seen. Normal right ventricular cavity size and normal right ventricular systolic function.. Left atrium is mildly dilated.. No significant valvular disease.  - ECG 5/3/25: NSR, QTc 496.     # Post-Operative CV Risk Assessment and Optimization  # Syncope  # H/o Rheumatic Fever  # HTN  # HLD    - Patient has no known ASCVD. She reports Remote Hx of Rheumatic Heart Disease as a child and Pericarditis 10 years ago  - She denies prior MI or CVA. She has never undergone ischemic evaluation.   - She was seen by Dr Murray in 12/2024 and Referred for Echo and Stress Echo for valvular heart disease assessment but has not undergone them yet  - Echo this hospitalization reviewed showing Normal BIV function without RWMA and no significant Valvular Heart Disease  - Prior to recent falls she reported good functional status, reportedly able to walk over 10-12 blocks and go up 2 flights of stairs without exertional symptoms. Pt reported SOTO after 2 flights of stairs but no angina  - ECG reviewed showing NSR without ischemic changes  - Telemetry has not shown any episodes of Tachy or Huy Arrhythmias  - Patient underwent succesful bronchoscopy with biopsy on 5/6 and T11-L3 Decompression/PSF on 5/10  - At this time, to maintain better BP control, Cont Coreg to 6.25mg BID and consider adding low dose CCB with Norvasc 2.5 mg po Qd to maintain sustained SBP of 140/90  - Patient should have outpatient follow  up with Dr Murray upon DC  - Please reconsult Cardiology as needed
79y F w/ baseline CKD 3 and HTN admitted for syncope. Hospital course c/b Non oliguric MARY LOU multifactorial (hemodynamic ATN vs pre renal vs NSAID related) gradually improving   Recommend maintaining MAP > 65   Avoid NSAIDs for pain control  Further recs as above    Discussed with primary team
79y F w/ baseline CKD 3 and HTN admitted for syncope. Hospital course c/b Non oliguric MARY LOU multifactorial (hemodynamic ATN vs pre renal vs NSAID related) gradually improving   Recommend maintaining MAP > 65 during perioperative period tomorrow  Avoid NSAIDs for pain control during post op period    Further recs as above    Discussed with primary team .
79y F w/ baseline CKD 3 and HTN admitted for syncope. Hospital course c/b Non oliguric MARY LOU multifactorial (hemodynamic ATN vs pre renal vs NSAID related) gradually improving   SCr peaked at 5.86 now at 4.14  No contraindications for planned surgical procedure  Recommend maintaining MAP > 65 perioperatively.   Avoid NSAIDs for pain control perioperatively  Further recs as above    Discussed with primary team .
biopsy showing adenocarcinoma with concern that this fracture is malignant. Will ask ortho to take bone biopsy to rule out metastatic disease. PET negative and if biopsy negative will put her at stage 2 which can be cured with ultimate surgery. Will plan to see in office with PFT in workup for this. Discussion had with patient and imaging reviewed as well as all labs.
79y F w/ baseline CKD 3 and HTN admitted for syncope. Hospital course c/b Non oliguric MARY LOU multifactorial (hemodynamic ATN vs pre renal vs NSAID related) - peaked SCr at 5.86 now gradually improving   No contraindications for planned surgical procedure  Recommend maintaining MAP > 65 perioperatively.   Avoid NSAIDs for pain control perioperatively    Further recs as above    Discussed with primary team .
Patient doing well post biopsy. Results have not returned but has completed stagging for malignancy if that is final results. Will continue current course with all other labs and imaging reviewed.
Patient is a 78 yo Female with PMHx of Rheumatic Fever as a child without known Valvular heart disease, Remote Hx of Pericarditis, IgA deficiency HTN, HLD, Hypothyroidism, generalized depression, ADHD and L1 compression fracture 2/2 to recent  fall  (April 2025, wears TLSO brace, scheduled for surgery with Dr. Barcenas this month), admitted after Syncopal episode resulting in traumatic Fall with head trauma, found to have lumbar compression fracture, severe spinal stenosis, Oliguric MARY LOU and incidental lung mass, s/p bronchoscopy with biopsy on 5/6, now pending T11-L3 decompression and instrumented fusion. Cardiology is consulted for Pre-Operative CV Risk Assessment and Optimization    REVIEW OF STUDIES  - TTE 05/06/2025:Left ventricular cavity is normal in size. Left ventricular wall thickness is normal. Left ventricular systolic function is normal with an   ejection fraction of 70 % by Cano's method of disks. There are no regional wall motion abnormalities seen. Normal right ventricular cavity size and normal right ventricular systolic function.. Left atrium is mildly dilated.. No significant valvular disease.  - ECG 5/3/25: NSR, QTc 496.     # Pre-Operative CV Risk Assessment and Optimization  # Syncope  # H/o Rheumatic Fever  # HTN  # HLD    - Patient has no known ASCVD. She reports Remote Hx of Rheumatic Heart Disease as a child and Pericarditis 10 years ago  - She denies prior MI or CVA. She has never undergone ischemic evaluation.   - She was seen by Dr Murray in 12/2024 and Referred for Echo and Stress Echo for valvular heart disease assessment but has not undergone them yet  - Echo this hospitalization reviewed showing Normal BIV function without RWMA and no significant Valvular Heart Disease  - Prior to recent falls she reported good functional status, reportedly able to walk over 10-12 blocks and go up 2 flights of stairs without exertional symptoms. Pt reported SOTO after 2 flights of stairs but no angina  - ECG reviewed showing NSR without ischemic changes  - Telemetry has not shown any episodes of Tachy or Huy Arrhythmias  - At this time there are no active CV contraindication to proceeding with T11-L3 decompression and instrumented fusion. Patient is considered at intermediate risk for intermediate risk procedure  - To Further optimize patient would increase Coreg to 6.25mg BID and uptitrate as tolerated to maintain SBP <140/90  - Cardiology will continue to follow with you, please call with any questions.
79y F w/ baseline CKD 3 and HTN admitted for syncope. Hospital course c/b Non oliguric MARY LOU multifactorial (hemodynamic ATN vs pre renal vs NSAID related) gradually improving   SCr peaked at 5.86 now at 4.86 with robust UOP ~ 1L approx  Can hold further IVF.  Check bladder scans  Avoid nephrotoxins as much as possible  Expect gradual renal recovery  Strict BP control recommended   check cystatin C levels    Discussed with primary team
MRI brain negative and after review further of CT scans concern for malignancy Is high. WIll plan for bronchoscopy with biopsy tomorrow and evaluation of the mediastinum. Patient is agreeable to proceed and will need cardiology clearance for this. All other imaging reviewed and case discussed with IP team.
Patient status post bronchoscopy with robot and ebus. Mediastinum with minimal nodes and biopsy results pending of the RUL. Will ask that ortho biopsy her spine during fixation procedure to confirm no malignancy as PET does show uptake but could be related to acute fracture. MRI negative and seems to have completed stagging workup. All other labs and imaging reviewed.
80 yo F with PMHx HTN, HLD, hypothyroidism, depression, ADHD, frequent falls, recent L1 compression fx (pending OR 5/12) px from home s/p unwitnessed fall with ?head strike, found to have labs concerning for MARY LOU with Cr 5.86, admitted for further evaluation and management of frequent falls and MARY LOU.      Impression and Plan  # Oliguric MARY LOU   - last creatinine from 2024 was 1   - Check renal Ultrasound   - Start IV Fluid  ,  Po Bicarbonate   - Nephrology Consultation   - Strict I and O     # Syncope most likely orthostatic hypotension as patient was having poor po intake , was taking hydrochlorthiazide and Skeletal muscle relaxants at the same time , EKG with normal sinus rhythm , at this time minimal concern for arrhythmia     # Lumbar Compression Fracture   - discussed with Ortho spine surgery - Dr. Barcenas   - planned for OR on 5/8 or 5/9   - PET - CT to r/o Pathological L Spine Fracture   - f/u cardiology eval for pre op risk     # RUL mass   - PET - CT to help with Staging and Planning for bronchoscopy     # HTN   - hold HCTZ     # Type II Nstemi , due to MARY LOU     # Hx of Rheumatic Fever , no current fever , no new murmurs on exam , no hematuria at this time
80 yo F with PMHx HTN, HLD, hypothyroidism, depression, ADHD, frequent falls, recent L1 compression fx (pending OR 5/12) px from home s/p unwitnessed fall with ?head strike, found to have labs concerning for MARY LOU with Cr 5.86, admitted for further evaluation and management of frequent falls and MARY LOU.      Impression and Plan  # Oliguric MARY LOU   - last creatinine from 2024 was 1   - Check renal Ultrasound   - Start IV Fluid  ,  Po Bicarbonate   - Nephrology Consultation   - Strict I and O     # Syncope most likely orthostatic hypotension as patient was having poor po intake , was taking hydrochlorthiazide and Skeletal muscle relaxants at the same time , EKG with normal sinus rhythm , at this time minimal concern for arrhythmia     # Lumbar Compression Fracture   - discussed with Ortho spine surgery - Dr. Barcenas   - planned for OR on 5/8 or 5/9   - PET - CT to r/o Pathological L Spine Fracture   - f/u cardiology eval for pre op risk     # RUL mass   - PET - CT to help with Staging and Planning for bronchoscopy     # HTN   - hold HCTZ     # Type II Nstemi , due to MARY LOU
79F with PMHx of HTN, HLD,  hypothyroidism, generalized depression, ADHD rheumatic fever, pericarditis with recent  L1 compression fracture 2/2 mechanical fall (pending  orthopedic intervention with Dr. Barcenas),  presents after unwitnessed syncopal event and head trauma likely 2/2 to medication side effect vs dehydration complicated by MARY LOU, with incidental finding of RUL opacity that resulted to NSLC vs adeno, now pending L1 decompression w/ c/f metastatic spread.      S/p lumbar  compression fracture t/w L1-S1 Fusion  s/p lung Biopsy results: Non-small cell carcinoma, favor adenocarcinoma  Onc saw pt 5/8, has appt w/  Dr. Parra at [facility] 5/15 2:40  please include in dc instructions.  MARY LOU improving   HTN: recommend against restarting thiazides  Leukocytosis: suspect reactive - continue to monitor for post op infections   Anemia: AKD    Dispo: medically patient is stable without active non surgical medical problems. Given recent laminectomy and fusion yesterday, recommend discussion with Ortho for possible transfer to ortho service. Medicine would continue to help comanage. Primary reason for switch is for patient to move to [facility] given the nursing there is more acquainted with post op patient with spine surgeries and the PT/OT there are used to working with these patients to help aid in recovery and mobility.
80 yo F with PMHx HTN, HLD, hypothyroidism, depression, ADHD, frequent falls, recent L1 compression fx (pending OR 5/12) px from home s/p unwitnessed fall with ?head strike, found to have labs concerning for MARY LOU with Cr 5.86, admitted for further evaluation and management of frequent falls and MARY LOU. Patient today feels well, denies chest pain, SOB, palpations.     Labs reviewed and VSS today. Stable hgb, Cr improving. Notable for Transaminitis today.   Exam w/ lungs ctabl, s1/s2. rrr, no m/r/g, abdomen soft NT/ND.   TTE done today - normal EF, no significant valvular disease   PET scan completed today - No hypermetabolic lymphadenopathy in the chest, abdomen, or pelvis. Infectious uptake favored in lungs but malignancy cannot be r/o.   Pending Bronch today    #Frequent falls  #Syncope   #L1 compression fx   #RUL mass   #transaminitis    Plan:  - s/p PET, pending bronch results   - can send hep panel and RUQ US   - Gennaro Song - improving  - Will monitor on hospitalist tele for approx 24 hrs to review tele     Rest of plan as documented above
80 yo F with PMHx HTN, HLD, hypothyroidism, depression, ADHD, frequent falls, recent L1 compression fx (pending OR 5/12) px from home s/p unwitnessed fall with ?head strike, found to have labs concerning for MARY LOU with Cr 5.86, admitted for further evaluation and management of frequent falls and MARY LOU.      Impression and Plan  # Oliguric MARY LOU   - Renal function continues to improve   - F.u Nephrology recommendations   - last creatinine from 2024 was 1     # Syncope most likely orthostatic hypotension as patient was having poor po intake , was taking hydrochlorthiazide and Skeletal muscle relaxants at the same time , EKG with normal sinus rhythm ,Telemetry for 24 hours - no episodes of arrythmia     # Lumbar Compression Fracture   - discussed with Ortho spine surgery - Dr. Barcenas   - planned for OR on 5/10     # RUL mass   - S/p bronch     # HTN   - hold HCTZ   - Continue Coreg 6.25mg po bid     # Type II Nstemi , due to MARY LOU .     # IgA deficiency on patients chart and She has a band with the same, IgA levels sent , Blood Bank Arranging for Blood compatible with IgA Deficiency for Surgery on 5/10       Dispo :  OR on 5/10 , Transfer to Ortho post surgery       Time-based billing (NON-critical care).     51 minutes spent on total encounter. The necessity of the time spent during the encounter on this date of service was due to:     Bedside exam and interview    Review of hospital course, labs, vitals, imaging ,  medical records.   Reviewing outside records   Discharge planning on Interdisciplinary rounds   Discussion with consultants and Primary team   Documenting the encounter.
80 yo F with PMHx HTN, HLD, hypothyroidism, depression, ADHD, frequent falls, recent L1 compression fx (pending OR 5/12) px from home s/p unwitnessed fall with ?head strike, found to have labs concerning for MARY LOU with Cr 5.86, admitted for further evaluation and management of frequent falls and MARY LOU.      Impression and Plan  # Oliguric MARY LOU   - Renal function continues to improve   - F.u Nephrology recommendations   - last creatinine from 2024 was 1     # Syncope most likely orthostatic hypotension as patient was having poor po intake , was taking hydrochlorthiazide and Skeletal muscle relaxants at the same time , EKG with normal sinus rhythm ,Telemetry for 24 hours - no episodes of arrythmia     # Lumbar Compression Fracture   - discussed with Ortho spine surgery - Dr. Barcenas   - planned for OR on 5/10     # RUL mass   - S/p bronch     # HTN   - hold HCTZ   - Continue Coreg 6.25mg po bid     # Type II Nstemi , due to MARY LOU .     # IgA deficiency on patients chart and She has a band with the same, IgA levels sent , Blood Bank Arranging for Blood compatible with IgA Deficiency for Surgery on 5/10       Dispo :  OR on 5/10 , Transfer to Ortho post surgery       Time-based billing (NON-critical care).     51 minutes spent on total encounter. The necessity of the time spent during the encounter on this date of service was due to:     Bedside exam and interview    Review of hospital course, labs, vitals, imaging ,  medical records.   Reviewing outside records   Discharge planning on Interdisciplinary rounds   Discussion with consultants and Primary team   Documenting the encounter.

## 2025-05-13 NOTE — PROGRESS NOTE ADULT - NUTRITIONAL ASSESSMENT
This patient has been assessed with a concern for Malnutrition and has been determined to have a diagnosis/diagnoses of Moderate protein-calorie malnutrition.    This patient is being managed with:   Diet Regular-  Entered: May  8 2025  6:09AM  
This patient has been assessed with a concern for Malnutrition and has been determined to have a diagnosis/diagnoses of Moderate protein-calorie malnutrition.    This patient is being managed with:   Diet Regular-  Entered: May  8 2025  6:09AM  
This patient has been assessed with a concern for Malnutrition and has been determined to have a diagnosis/diagnoses of Moderate protein-calorie malnutrition.    This patient is being managed with:   Diet Regular-  Entered: May  3 2025  9:46PM  
This patient has been assessed with a concern for Malnutrition and has been determined to have a diagnosis/diagnoses of Moderate protein-calorie malnutrition.    This patient is being managed with:   Diet NPO-  NPO for Procedure/Test     NPO Start Date: 01-May-2025   NPO Start Time: 07:00  Entered: May  8 2025  6:07AM  
This patient has been assessed with a concern for Malnutrition and has been determined to have a diagnosis/diagnoses of Moderate protein-calorie malnutrition.    This patient is being managed with:   Diet Regular-  Entered: May  8 2025  6:09AM  
This patient has been assessed with a concern for Malnutrition and has been determined to have a diagnosis/diagnoses of Moderate protein-calorie malnutrition.    This patient is being managed with:   Diet Regular-  Entered: May  8 2025  6:09AM  
This patient has been assessed with a concern for Malnutrition and has been determined to have a diagnosis/diagnoses of Moderate protein-calorie malnutrition.    This patient is being managed with:   Diet NPO after Midnight-     NPO Start Date: 09-May-2025   NPO Start Time: 23:59  Entered: May  9 2025  9:54AM    Diet Regular-  Entered: May  8 2025  6:09AM  
This patient has been assessed with a concern for Malnutrition and has been determined to have a diagnosis/diagnoses of Moderate protein-calorie malnutrition.    This patient is being managed with:   Diet NPO after Midnight-     NPO Start Date: 05-May-2025   NPO Start Time: 23:59  Entered: May  5 2025  2:26PM    Diet Regular-  Entered: May  3 2025  9:46PM  
This patient has been assessed with a concern for Malnutrition and has been determined to have a diagnosis/diagnoses of Moderate protein-calorie malnutrition.    This patient is being managed with:   Diet Regular-  Entered: May  3 2025  9:46PM  
This patient has been assessed with a concern for Malnutrition and has been determined to have a diagnosis/diagnoses of Moderate protein-calorie malnutrition.    This patient is being managed with:   Diet Regular-  Entered: May  8 2025  6:09AM  
This patient has been assessed with a concern for Malnutrition and has been determined to have a diagnosis/diagnoses of Moderate protein-calorie malnutrition.    This patient is being managed with:   Diet NPO-  NPO for Procedure/Test     NPO Start Date: 01-May-2025   NPO Start Time: 07:00  Entered: May  8 2025  6:07AM

## 2025-05-13 NOTE — PROGRESS NOTE ADULT - SUBJECTIVE AND OBJECTIVE BOX
Ortho Note    Subjective:  Pt seen and examined today with sister at bedside  patient continues to report pain when transferring and with active with  Patient reports passing TOV but denies Bm  Denies CP, SOB, N/V, numbness/tingling   Discussed plan of care with patient at bedside- patient lives alone with a HHA but may require additional support from discharge and may benefit from NICK.     Vital Signs Last 24 Hrs  T(C): 36.2 (05-13-25 @ 10:00), Max: 36.2 (05-13-25 @ 10:00)  T(F): 97.2 (05-13-25 @ 10:00), Max: 97.2 (05-13-25 @ 10:00)  HR: 98 (05-13-25 @ 10:00) (98 - 98)  BP: 144/79 (05-13-25 @ 07:42) (144/79 - 144/79)  BP(mean): --  RR: 17 (05-13-25 @ 10:00) (17 - 17)  SpO2: 95% (05-13-25 @ 10:00) (95% - 95%)  AVSS    Objective:    Physical Exam:  General: Pt Alert and oriented, NAD  Aquacel DSG C/D/I- Hv x1  Pulses Distal pulses intact  B/l LE:  Sensation: SILT L2-S1  Motor:   L2 (hip flexion)  5/5   L3 (knee extension)  5/5   L4 (ankle dorsiflexion)  5/5   L5 (long toe extension) 5/5   S1 (ankle plantar flexion) 5/5         Plan of Care:  A/P: 79yFemale with L1 compression fx (April), admitted s/p mechanical fall. S/p T11-L3 Decompression/PSF with cement augmentation on 5/10.  - afebrile, wbcs 10.91  - Pain Control- flexeril 5mg PO TID, tyleneol 1000mg PO Q8h, Dilaudid 0.5mg Q4h prn breakthrough pain, Oxycodone 5-10mg PO Q4h prn moderate to severe pain   - DVT ppx: Lovenox 30mg Subq Q24h   - PT, WBS: WBAT in TLSO   - continue HV  - Lumbar standing xray if patient can tolerate today   - bowel regimen, IS use, PPI - additional suppository today   - appreciate cardiology recs  - appreciate Heme/ONC recs-   - appreciate nephrology recs-  - pulmonary recs-   - Consult Heme/ONC regarding OR findings   - Dispo- pending pt progression- patient lives alone and may benefit from NICK to improve strength and ADLS    Ortho Pager [phone] Ortho Note    Subjective:  Pt seen and examined today with sister at bedside  patient continues to report pain when transferring and with active with  Patient reports passing TOV but denies Bm  Denies CP, SOB, N/V, numbness/tingling   Discussed plan of care with patient at bedside- patient lives alone with a HHA but may require additional support from discharge and may benefit from NICK.     Vital Signs Last 24 Hrs  T(C): 36.2 (05-13-25 @ 10:00), Max: 36.2 (05-13-25 @ 10:00)  T(F): 97.2 (05-13-25 @ 10:00), Max: 97.2 (05-13-25 @ 10:00)  HR: 98 (05-13-25 @ 10:00) (98 - 98)  BP: 144/79 (05-13-25 @ 07:42) (144/79 - 144/79)  BP(mean): --  RR: 17 (05-13-25 @ 10:00) (17 - 17)  SpO2: 95% (05-13-25 @ 10:00) (95% - 95%)  AVSS    Objective:    Physical Exam:  General: Pt Alert and oriented, NAD  Aquacel DSG C/D/I- Hv x1  Pulses Distal pulses intact  B/l LE:  Sensation: SILT L2-S1  Motor:   L2 (hip flexion)  5/5   L3 (knee extension)  5/5   L4 (ankle dorsiflexion)  5/5   L5 (long toe extension) 5/5   S1 (ankle plantar flexion) 5/5         Plan of Care:  A/P: 79yFemale with L1 compression fx (April), admitted s/p mechanical fall. S/p T11-L3 Decompression/PSF with cement augmentation on 5/10.  - afebrile, wbcs 10.91  - Pain Control- flexeril 5mg PO TID, tyleneol 1000mg PO Q8h, Dilaudid 0.5mg Q4h prn breakthrough pain, Oxycodone 5-10mg PO Q4h prn moderate to severe pain   - DVT ppx: Lovenox 30mg Subq Q24h   - PT, WBS: WBAT in TLSO   - continue HV  - Lumbar standing xray if patient can tolerate today     - bowel regimen, IS use, PPI - additional suppository today     - appreciate cardiology recs - F/u with her cardiologist, Dr. Murray, within 1-2 weeks of discharge  -added amlodipine 2.5mg Daily, continue lipitor 80mg daily,     - appreciate Heme/ONC recs-   patient has follow up with Dr. Parra 5/20 AT 2:20PM      - appreciate nephrology recs-    - pulmonary recs- 5-9- will followup Or biopsy with Dr. Peck outpatient     - Dispo- pending pt progression- patient lives alone and may benefit from NICK to improve strength and ADLS    Ortho Pager [phone] Eye Shield Used: No

## 2025-05-13 NOTE — PROGRESS NOTE ADULT - ASSESSMENT
79y F L1 compression fracture sec to mechanical fall, HTN (on coreg, HCTZ), CKD (stage 3) admitted with syncope and TBI, found to have MARY LOU on CKD and HTN emergency (SBP 200s with MARY LOU)      1. Non oliguric MARY LOU likely related to ATN vs residual effects from NSAIDs- improving  Scr peaked at 5.9 now continues to show renal recovery with most recent SCr at 2.11  c/w strict I/o   Avoid NSAIDs and other nephrotoxins  Trend BMP for electrolyte and renal function monitoring  Expect gradual renal recovery back to baseline ranges    2. Metabolic acidosis - c/w bicarb 1300mg PO TID    3. Hyponatremia - 134 today   Check serum osm, Uosm, Apple  encourage solute intake     4. Hypocalcemia - c/w calcitriol 0.25mcg daily     Discussed with primary team

## 2025-05-14 ENCOUNTER — TRANSCRIPTION ENCOUNTER (OUTPATIENT)
Age: 80
End: 2025-05-14

## 2025-05-14 VITALS
DIASTOLIC BLOOD PRESSURE: 73 MMHG | TEMPERATURE: 98 F | RESPIRATION RATE: 18 BRPM | HEART RATE: 68 BPM | OXYGEN SATURATION: 98 % | SYSTOLIC BLOOD PRESSURE: 140 MMHG

## 2025-05-14 LAB
ANION GAP SERPL CALC-SCNC: 11 MMOL/L — SIGNIFICANT CHANGE UP (ref 5–17)
BUN SERPL-MCNC: 49 MG/DL — HIGH (ref 7–23)
CALCIUM SERPL-MCNC: 7.7 MG/DL — LOW (ref 8.4–10.5)
CHLORIDE SERPL-SCNC: 104 MMOL/L — SIGNIFICANT CHANGE UP (ref 96–108)
CO2 SERPL-SCNC: 22 MMOL/L — SIGNIFICANT CHANGE UP (ref 22–31)
CREAT SERPL-MCNC: 2.12 MG/DL — HIGH (ref 0.5–1.3)
EGFR: 23 ML/MIN/1.73M2 — LOW
EGFR: 23 ML/MIN/1.73M2 — LOW
GLUCOSE SERPL-MCNC: 91 MG/DL — SIGNIFICANT CHANGE UP (ref 70–99)
HCT VFR BLD CALC: 28.7 % — LOW (ref 34.5–45)
HGB BLD-MCNC: 9.4 G/DL — LOW (ref 11.5–15.5)
MAGNESIUM SERPL-MCNC: 1.7 MG/DL — SIGNIFICANT CHANGE UP (ref 1.6–2.6)
MCHC RBC-ENTMCNC: 30 PG — SIGNIFICANT CHANGE UP (ref 27–34)
MCHC RBC-ENTMCNC: 32.8 G/DL — SIGNIFICANT CHANGE UP (ref 32–36)
MCV RBC AUTO: 91.7 FL — SIGNIFICANT CHANGE UP (ref 80–100)
NRBC BLD AUTO-RTO: 0 /100 WBCS — SIGNIFICANT CHANGE UP (ref 0–0)
PHOSPHATE SERPL-MCNC: 3.8 MG/DL — SIGNIFICANT CHANGE UP (ref 2.5–4.5)
PLATELET # BLD AUTO: 266 K/UL — SIGNIFICANT CHANGE UP (ref 150–400)
POTASSIUM SERPL-MCNC: 4 MMOL/L — SIGNIFICANT CHANGE UP (ref 3.5–5.3)
POTASSIUM SERPL-SCNC: 4 MMOL/L — SIGNIFICANT CHANGE UP (ref 3.5–5.3)
RBC # BLD: 3.13 M/UL — LOW (ref 3.8–5.2)
RBC # FLD: 15.8 % — HIGH (ref 10.3–14.5)
SODIUM SERPL-SCNC: 137 MMOL/L — SIGNIFICANT CHANGE UP (ref 135–145)
WBC # BLD: 11.07 K/UL — HIGH (ref 3.8–10.5)
WBC # FLD AUTO: 11.07 K/UL — HIGH (ref 3.8–10.5)

## 2025-05-14 PROCEDURE — 99233 SBSQ HOSP IP/OBS HIGH 50: CPT

## 2025-05-14 RX ORDER — AMLODIPINE BESYLATE 10 MG/1
5 TABLET ORAL DAILY
Refills: 0 | Status: DISCONTINUED | OUTPATIENT
Start: 2025-05-14 | End: 2025-05-14

## 2025-05-14 RX ORDER — AMLODIPINE BESYLATE 10 MG/1
1 TABLET ORAL
Refills: 0 | DISCHARGE

## 2025-05-14 RX ORDER — SODIUM BICARBONATE 1 MEQ/ML
1 SYRINGE (ML) INTRAVENOUS
Qty: 0 | Refills: 0 | DISCHARGE
Start: 2025-05-14

## 2025-05-14 RX ORDER — OXYCODONE HYDROCHLORIDE 30 MG/1
1 TABLET ORAL
Qty: 0 | Refills: 0 | DISCHARGE
Start: 2025-05-14

## 2025-05-14 RX ORDER — ENOXAPARIN SODIUM 100 MG/ML
30 INJECTION SUBCUTANEOUS
Refills: 0 | DISCHARGE
Start: 2025-05-14

## 2025-05-14 RX ORDER — CALCITRIOL 0.5 UG/1
1 CAPSULE, GELATIN COATED ORAL
Qty: 0 | Refills: 0 | DISCHARGE
Start: 2025-05-14

## 2025-05-14 RX ORDER — ATORVASTATIN CALCIUM 80 MG/1
1 TABLET, FILM COATED ORAL
Refills: 0 | DISCHARGE

## 2025-05-14 RX ORDER — BUPROPION HYDROBROMIDE 522 MG/1
3 TABLET, EXTENDED RELEASE ORAL
Refills: 0 | DISCHARGE

## 2025-05-14 RX ORDER — CARVEDILOL 3.12 MG/1
1 TABLET, FILM COATED ORAL
Qty: 0 | Refills: 0 | DISCHARGE
Start: 2025-05-14

## 2025-05-14 RX ORDER — ACETAMINOPHEN 500 MG/5ML
2 LIQUID (ML) ORAL
Qty: 0 | Refills: 0 | DISCHARGE
Start: 2025-05-14

## 2025-05-14 RX ORDER — CLONAZEPAM 0.5 MG/1
TABLET ORAL
Refills: 0 | DISCHARGE

## 2025-05-14 RX ORDER — AMLODIPINE BESYLATE 10 MG/1
1 TABLET ORAL
Qty: 0 | Refills: 0 | DISCHARGE
Start: 2025-05-14

## 2025-05-14 RX ORDER — LEVOTHYROXINE SODIUM 300 MCG
1 TABLET ORAL
Refills: 0 | DISCHARGE

## 2025-05-14 RX ORDER — CARVEDILOL 3.12 MG/1
1 TABLET, FILM COATED ORAL
Refills: 0 | DISCHARGE

## 2025-05-14 RX ORDER — CLONAZEPAM 0.5 MG/1
0 TABLET ORAL
Refills: 0 | DISCHARGE

## 2025-05-14 RX ORDER — ENOXAPARIN SODIUM 100 MG/ML
30 INJECTION SUBCUTANEOUS
Qty: 0 | Refills: 0 | DISCHARGE
Start: 2025-05-14

## 2025-05-14 RX ADMIN — Medication 1000 MILLIGRAM(S): at 13:05

## 2025-05-14 RX ADMIN — OXYCODONE HYDROCHLORIDE 5 MILLIGRAM(S): 30 TABLET ORAL at 12:38

## 2025-05-14 RX ADMIN — Medication 75 MICROGRAM(S): at 06:55

## 2025-05-14 RX ADMIN — CARVEDILOL 6.25 MILLIGRAM(S): 3.12 TABLET, FILM COATED ORAL at 05:42

## 2025-05-14 RX ADMIN — AMLODIPINE BESYLATE 2.5 MILLIGRAM(S): 10 TABLET ORAL at 05:43

## 2025-05-14 RX ADMIN — Medication 650 MILLIGRAM(S): at 16:27

## 2025-05-14 RX ADMIN — OXYCODONE HYDROCHLORIDE 10 MILLIGRAM(S): 30 TABLET ORAL at 05:43

## 2025-05-14 RX ADMIN — Medication 650 MILLIGRAM(S): at 05:43

## 2025-05-14 RX ADMIN — CYCLOBENZAPRINE HYDROCHLORIDE 5 MILLIGRAM(S): 15 CAPSULE, EXTENDED RELEASE ORAL at 13:05

## 2025-05-14 RX ADMIN — BUPROPION HYDROBROMIDE 300 MILLIGRAM(S): 522 TABLET, EXTENDED RELEASE ORAL at 11:38

## 2025-05-14 RX ADMIN — CYCLOBENZAPRINE HYDROCHLORIDE 5 MILLIGRAM(S): 15 CAPSULE, EXTENDED RELEASE ORAL at 05:43

## 2025-05-14 RX ADMIN — OXYCODONE HYDROCHLORIDE 10 MILLIGRAM(S): 30 TABLET ORAL at 06:43

## 2025-05-14 RX ADMIN — CALCITRIOL 0.25 MICROGRAM(S): 0.5 CAPSULE, GELATIN COATED ORAL at 11:38

## 2025-05-14 RX ADMIN — Medication 1000 MILLIGRAM(S): at 05:42

## 2025-05-14 RX ADMIN — OXYCODONE HYDROCHLORIDE 10 MILLIGRAM(S): 30 TABLET ORAL at 00:46

## 2025-05-14 RX ADMIN — OXYCODONE HYDROCHLORIDE 5 MILLIGRAM(S): 30 TABLET ORAL at 11:38

## 2025-05-14 RX ADMIN — ENOXAPARIN SODIUM 30 MILLIGRAM(S): 100 INJECTION SUBCUTANEOUS at 05:43

## 2025-05-14 NOTE — PROGRESS NOTE ADULT - PROVIDER SPECIALTY LIST ADULT
Hospitalist
Internal Medicine
Nephrology
Nephrology
Orthopedics
Orthopedics
Pulmonology
Pulmonology
Rehab Medicine
Rehab Medicine
Cardiology
Cardiology
Heme/Onc
Internal Medicine
Neurology
Orthopedics
Pulmonology
Cardiology
Heme/Onc
Nephrology
Orthopedics
Pulmonology
Nephrology
Nephrology
Neurology
Orthopedics
Rehab Medicine
Hospitalist
Hospitalist
Internal Medicine
Hospitalist
Hospitalist
Internal Medicine

## 2025-05-14 NOTE — PROGRESS NOTE ADULT - ASSESSMENT
Ortho    79yFemale s/p T11-L3 Decompression/PSF on 5/10.  - Stable  - Pain Control  - DVT ppx: per primary  - Post op abx: Ancef  - PT, WBS: WBAT

## 2025-05-14 NOTE — PROGRESS NOTE ADULT - REASON FOR ADMISSION
Fall vs syncope, MARY LOU

## 2025-05-14 NOTE — DISCHARGE NOTE NURSING/CASE MANAGEMENT/SOCIAL WORK - NSDCVIVACCINE_GEN_ALL_CORE_FT
Tdap; 03-May-2025 15:38; Sowmya Adame); [employer]; [id number] (Exp. Date: 31-Jan-2027); IntraMuscular; Deltoid Left.; 0.5 milliLiter(s); VIS (VIS Published: 09-May-2013, VIS Presented: 03-May-2025);

## 2025-05-14 NOTE — PROGRESS NOTE ADULT - SUBJECTIVE AND OBJECTIVE BOX
Ortho Floor Note    Procedure: T11-L3 decompression and PSF  Surgeon: Dr. Barcenas    Resting comfortably. Pain controlled.  Denies CP, SOB, N/V, numbness/tingling     Vital Signs Last 24 Hrs  T(C): 36.7 (14 May 2025 05:15), Max: 36.8 (13 May 2025 15:34)  T(F): 98 (14 May 2025 05:15), Max: 98.2 (13 May 2025 15:34)  HR: 73 (14 May 2025 06:58) (73 - 98)  BP: 146/74 (14 May 2025 06:58) (146/74 - 175/72)  BP(mean): --  RR: 16 (14 May 2025 05:15) (15 - 18)  SpO2: 98% (14 May 2025 05:15) (95% - 98%)    Parameters below as of 14 May 2025 05:15  Patient On (Oxygen Delivery Method): room air      General: Pt Alert and oriented, NAD  Aquacel DSG C/D/I  Pulses Distal pulses intact  B/l LE:  Sensation: SILT L2-S1  Motor:   L2 (hip flexion)  5/5   L3 (knee extension)  5/5   L4 (ankle dorsiflexion)  5/5   L5 (long toe extension) 5/5   S1 (ankle plantar flexion) 5/5                  A/P: 79yFemale s/p T11-L3 Decompression/PSF on 5/10.  - Stable  - Pain Control  - DVT ppx: per primary  - Post op abx: Ancef  - PT, WBS: WBAT  - HPT    Ortho Pager [phone]

## 2025-05-14 NOTE — PROGRESS NOTE ADULT - SUBJECTIVE AND OBJECTIVE BOX
Physical Medicine and Rehabilitation Progress Note :       Patient is a 79y old  Female who presents with a chief complaint of Fall vs syncope, MARY LOU (14 May 2025 11:15)      HPI:  Patient is 79F with PMHx of HTN, HLD,  hypothyroidism, generalized depression, ADHD and L1 compression fracture 2/2 to recent  fall  (April 2025, wears TLSO brace, scheduled for surgery with Dr. Barcenas this month)  rheumatic fever and pericarditis presents after unwitnessed fall. Patient states she was standing up brushing her teeth this morning and suddenly felt lightheaded and woke up on the floor  hitting her head on the bathtub +LOC +HT with noticeable blood from the scalp. Patient does not fully recall the event but denies lightheadedness dizziness, paresthesia blurred vision nausea or aura. She states she arose from the floor and called her  to bring her to the hospital. Upon arrival to ED; Code stoke due to concern of dysarthria and left sided facial droop.     Of note, patient shares she has been experiencing several episodes on instability due to weakness in her legs resulting in recurrent falls, most recently in April where she was in bed and missed her footing, falling to the ground. At that time she was sent for imaging of her lumbar spine and found to have acute/subacute severe anteriorly wedged compression deformity of the L1 vertebral body. Instructed to wear TLSO brace and prescribed muscle relaxants and oxycodone for pain. Recently seen by Dr. Barcenas on 5/1 pending T11-L3 Decompression and posterior instrumented fusion.     Patient also recently seen by PCP Dr. Michael Fields  4/28 with complaints of dry mouth and instability, instructed to discontinue use of cyclobenzaprine     In the ED,  VS: T   97.5, HR 98  , /78 >  205/80 , RR   20 , SpO2    99 % RA  Labs: leukocytosis to 12.6, trop 89>85; sodium 134, potassium 3.4 AG 21 with bicarb of 18 BUN 82/Cr 5.86  EKG: normal sinus rhythm with rate of 88; qtc 496    Imaging:  CTA HEAD: No large vessel occlusion or hemodynamically significant stenosis. 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  CTA NECK: No occlusion, dissection, aneurysm, or hemodynamically significant stenosis. 4.1 x 2.5 cm right upper lobe opacity as detailed above. Further evaluation with dedicated CT of the chest without contrast is recommended.  CT BRAIN PERFUSION:  Normal.  CT Lumbar Spine: Acute-appearing burst type compression fracture at L1 with severe vertebral body height loss and moderate osseous retropulsion. There is severe associated spinal canal stenosis. Acute right transverse process fractures at L1 and L2. Severe spinal canal stenosis at L4-L5.  CT Chest: 5 cm infiltrative opacity in the right upper lobe suspicious for malignancy. Consider PET/CT and tissue sampling.    Interventions; code stroke called, s/p scalp laceration repair, labetolol 10mg IVP   (03 May 2025 18:57)                            9.4    11.07 )-----------( 266      ( 14 May 2025 07:34 )             28.7       05-14    137  |  104  |  49[H]  ----------------------------<  91  4.0   |  22  |  2.12[H]    Ca    7.7[L]      14 May 2025 07:34  Phos  3.8     05-14  Mg     1.7     05-14      Vital Signs Last 24 Hrs  T(C): 36.4 (14 May 2025 09:19), Max: 36.8 (13 May 2025 15:34)  T(F): 97.5 (14 May 2025 09:19), Max: 98.2 (13 May 2025 15:34)  HR: 68 (14 May 2025 09:19) (68 - 90)  BP: 140/73 (14 May 2025 09:19) (140/73 - 175/72)  BP(mean): --  RR: 18 (14 May 2025 09:19) (15 - 18)  SpO2: 98% (14 May 2025 09:19) (95% - 98%)    Parameters below as of 14 May 2025 09:19  Patient On (Oxygen Delivery Method): room air        MEDICATIONS  (STANDING):  acetaminophen     Tablet .. 1000 milliGRAM(s) Oral every 8 hours  amLODIPine   Tablet 5 milliGRAM(s) Oral daily  atorvastatin 80 milliGRAM(s) Oral at bedtime  BUpivacaine liposome 1.3% Injectable (no eMAR) 20 milliLiter(s) Local Injection once  buPROPion XL (24-Hour) . 300 milliGRAM(s) Oral daily  calcitriol   Capsule 0.25 MICROGram(s) Oral daily  carvedilol 6.25 milliGRAM(s) Oral every 12 hours  cyclobenzaprine 5 milliGRAM(s) Oral three times a day  dextrose 5%. 1000 milliLiter(s) (100 mL/Hr) IV Continuous <Continuous>  dextrose 5%. 1000 milliLiter(s) (50 mL/Hr) IV Continuous <Continuous>  dextrose 50% Injectable 25 Gram(s) IV Push once  dextrose 50% Injectable 12.5 Gram(s) IV Push once  dextrose 50% Injectable 25 Gram(s) IV Push once  enoxaparin Injectable 30 milliGRAM(s) SubCutaneous every 24 hours  glucagon  Injectable 1 milliGRAM(s) IntraMuscular once  levothyroxine 75 MICROGram(s) Oral daily  sodium bicarbonate 650 milliGRAM(s) Oral three times a day    MEDICATIONS  (PRN):  dextrose Oral Gel 15 Gram(s) Oral once PRN Blood Glucose LESS THAN 70 milliGRAM(s)/deciliter  HYDROmorphone  Injectable 0.5 milliGRAM(s) IV Push every 15 minutes PRN pacu  HYDROmorphone  Injectable 0.5 milliGRAM(s) IV Push every 4 hours PRN breakthrough  loperamide 2 milliGRAM(s) Oral every 4 hours PRN Diarrhea  melatonin 3 milliGRAM(s) Oral at bedtime PRN Insomnia  oxyCODONE    IR 5 milliGRAM(s) Oral every 4 hours PRN Moderate Pain (4 - 6)  oxyCODONE    IR 10 milliGRAM(s) Oral every 4 hours PRN Severe Pain (7 - 10)      T(C): 36.4 (05-14-25 @ 09:19), Max: 36.8 (05-13-25 @ 15:34)  HR: 68 (05-14-25 @ 09:19) (68 - 90)  BP: 140/73 (05-14-25 @ 09:19) (140/73 - 175/72)  RR: 18 (05-14-25 @ 09:19) (15 - 18)  SpO2: 98% (05-14-25 @ 09:19) (95% - 98%)    Physical Exam:    79 y o woman lying comfortably in semi Butcher's position , awake , alert , no acute complaints     Head: normocephalic , atraumatic    Eyes: PERRLA , EOMI , no nystagmus , sclera anicteric    ENT / FACE: neg nasal discharge , uvula midline , no oropharyngeal erythema / exudate    Neck: supple , negative JVD , negative carotid bruits , no thyromegaly    Chest: CTA bilaterally      Cardiovascular: regular rate and rhythm , neg murmurs / rubs / gallops    Abdomen: soft , non distended , no tenderness to palpation in all 4 quadrants ,  normal bowel sounds     Extremities: WWP , neg cyanosis /clubbing / edema      Neurologic Exam:     Alert and oriented to person , place , date/year , speech fluent w/o dysarthria , follows commands      Cranial Nerves:           II:                         pupils equal , round and reactive to light , visual fields intact         III/ IV/VI:             extraocular movements intact , neg nystagmus , neg ptosis        V:                        facial sensation intact , V1-3 normal        VII:                      face symmetric , no droop , normal eye closure and smile        VIII:                     hearing intact to finger rub bilaterally        IX and X:             no hoarseness , gag intact , palate/ uvula rise symmetrically        XI:                       SCM / trapezius strength intact bilateral        XII:                      no tongue deviation    Motor Exam:        > 3+/5 x 4 extremities , without drift     Sensation:         intact to light touch x 4 extremities                            no neglect or extinction on double simultaneous testing    DTR:           biceps/brachioradialis: equal                            patella/ankle: equal          neg Babinski     Coordination:            Finger to Nose:  neg dysmetria bilaterally      5/13/2025 Functional Status Assessment :         Pain Assessment/Number Scale (0-10) Adult  Presence of Pain: complains of pain/discomfort  Body Location: back pain  Pain Rating (0-10): Rest: 5 (moderate pain)  Pain Rating (0-10): Activity: 6 (moderate pain)    Safety      [facility] Functional Assessment: Basic Mobility  Type of Assessment: Daily assessment  Turning from your back to your side while in a flat bed without using bedrails?: 3 = A little assistance  Moving from lying on your back to sitting on the flat side of a flat bed without using bedrails?: 3 = A little assistance  Moving to and from a bed to a chair (including a wheelchair)?: 3 = A little assistance  Standing up from a chair using your arms (e.g. wheelchair or bedside chair)?: 3 = A little assistance  Walking in hospital room?: 3 = A little assistance  Climbing 3-5 steps with a railing?:    3-calculated by average   Score: 18   Row Comment: Ask the patient "How much help from another person do you currently need? (If the patient hasn't done an activity recently, how much help from another person do you think he/she needs if he/she tried?)    Cognitive/Neuro      Cognitive/Neuro/Behavioral  Cognitive/Neuro/Behavioral [WDL Definition: Alert; opens eyes spontaneously; arouses to voice or touch; oriented x 4; follows commands; speech spontaneous, logical; purposeful motor response; behavior appropriate to situation]: WDL    Language Assistance  Preferred Language to Address Healthcare Preferred Language to Address Healthcare: English    Therapeutic Interventions      Bed Mobility  Bed Mobility Training Rehab Potential: good, to achieve stated therapy goals  Bed Mobility Training Sit-to-Supine: pt left OOBTC  Bed Mobility Training Supine-to-Sit: minimum assist (75% patient effort);  1 person assist;  nonverbal cues (demo/gestures);  verbal cues;  bed rails  Bed Mobility Training Limitations: impaired ability to control trunk for mobility;  decreased ability to use legs for bridging/pushing;  decreased ability to use arms for pushing/pulling;  decreased strength;  impaired balance;  impaired postural control;  pain    Sit-Stand Transfer Training  Sit-to-Stand Transfer Training Rehab Potential: good, to achieve stated therapy goals  Transfer Training Sit-to-Stand Transfer: minimum assist (75% patient effort);  1 person assist;  nonverbal cues (demo/gestures);  verbal cues;  weight-bearing as tolerated   rolling walker  Transfer Training Stand-to-Sit Transfer: minimum assist (75% patient effort);  1 person assist;  nonverbal cues (demo/gestures);  verbal cues;  weight-bearing as tolerated   rolling walker  Sit-to-Stand Transfer Training Transfer Safety Analysis: decreased weight-shifting ability;  decreased strength;  impaired balance;  impaired postural control;  pain;  rolling walker    Gait Training  Gait Training Rehab Potential: good, to achieve stated therapy goals  Gait Training: contact guard;  1 person assist;  nonverbal cues (demo/gestures);  verbal cues;  weight-bearing as tolerated   rolling walker;  35'x2  Gait Analysis: 3-point gait   decreased emelyn;  unsteady gait ;  shuffling;  decreased step length;  decreased strength;  impaired balance;  pain;  impaired postural control;  cognitive, decreased safety awareness;  requires cues for RW safety and environment negotiation;  35'x2;  rolling walker  Brace/Orthotics Brace/Orthotics: LSO  Type of Rest Type of Rest: standing    Therapeutic Exercise  Therapeutic Exercise Detail: pt performed supine ankle pumps x10 reps         PM&R Impression : as above    Current disposition plan recommendation :    subacute rehab placement

## 2025-05-14 NOTE — PROGRESS NOTE ADULT - ASSESSMENT
79F w IgA deficiency, HTN, HLD, Hypothyroidism, ADHD, Depression, recent fall w L1 compression fx, p/w syncopal event, found to have MARY LOU due to pre-renal, being on thiazide, and urinary retention, found to have CKD4?, RUL opacity, bx showing Cancer – s/p L1 decompression w Dr. Barcenas 5/10 - bone bx negative for malignancy, for NICK    #Post-op state - pain moderately controlled. PPx: SQL. On incentive spirometer  #L1 compression fracture   - tylenol 1g q8, flexeril 5 q8   - PRN: Oxycodone 5/10 q4 for mod/severe pain   - Pathology returned - reactive bone; negative for malignancy  #Osteoporosis - clinical diagnosis due to L1 compression due to fall   - 25,OH-D 37. PTH elevated   - Need to enroll in [facility]   - Cr 2.1 -- CrCl would preclude from bisphosphonates -- thus would recommend f/u for anti-resorptive therapies and secondary fracture prophylaxis in conjunction with NEPHROLOGY    #Non small cell lung CA - NSCLC - in RUL biopsy   - heme-onc following   - PET scan: GGO 3.4x4.9 at RUL; Diffuse uptake – gastritis, colitis     Severe compression deformity L1 body    - pathology shows reactive bone only    #MARY LOU - Cr 2.11 today. Was 5 on admission  #Metabolic acidosis - CO2 16 today    #Constipation - pt denies BM since 5/6. Initially had diarrhea - GI PCR neg though overflow diarrhea is also positive d/t recent immobility    #HTN - amlodipine 2.5mg daily, coreg 6.25 BID  #HLD - atorva 80  #Hypothyroidism - c/w home synthroid 75mcg daily  #ADHD  #Depression - buproprion 300mg daily  #IgA deficiency    Plan  Continue bowel regimen, suppository for constipation  Can discuss w thoracic surgery --> if plan for surgery inpatient or f/u after rehab    Change NaHCO3 to 650 q8h   - f/u nephrology recs --> need to discuss if pt would benefit from anti-resorptive therapy outpatient for osteoporosis fracture prevention  CBC w diff, BMP w Mg/Phos in AM    [ ] Need to screen risk factors, obtain history, and enroll in [facility]    DISPO: NICK.  Pt has outpatient/ onc f/u w Dr. Parra 5/20 @1420h  Above d/w jean marie Sotelo

## 2025-05-14 NOTE — CONSULT NOTE ADULT - CONSULT REASON
MARY LOU
malignancy
stroke code
Known L1 compression fx s/p mechanical fall
Lung Mass
Pre op
PM&R
Lung nodule

## 2025-05-14 NOTE — CONSULT NOTE ADULT - ASSESSMENT
Assessment:  80 YO Female w/ PMHx of IgA deficiency, HTN, HLD, Hypothyroidism, ADHD, depression and L1 compression fracture (April 2025, wears TLSO brace) who originally presented to [facility] ED on 5/3/25 s/p fall associated with slurry speech and L facial droop. 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 Admitted to medicine team, nephrology consulted for pre-renal MARY LOU. Ortho consulted for L1 compression fracture, now s/p T11-L3 Decompression/PSF on 5/10/25, biopsy negative for mets. Incidental finding on CT Chest of 5cm infiltrative opacity in the right upper lobe suspicious for malignancy. PET CT scan revealed growing RUL mass measuring 3.4 x 4.9 cm as well as severe compression deformity in the L1 vertebrae. Pulm team consulted, s/p robotic bronchoscopy with EBUS w/ biopsy on 5/6 that revealed RUL nodule + for adenocarcinoma. Heme/onc following for cancer work-up, recommended thoracic surgery consult for lung nodule.     Plan:  Problem 1: RUL Lung nodule  -CT Chest 5/3/25: 5cm infiltrative opacity in the right upper lobe suspicious for malignancy  -PET/CT 5/6/25: growing RUL mass measuring 3.4 x 4.9 cm as well as severe compression deformity in the L1 vertebrae  -Pulm team following, s/p robotic bronchoscopy with EBUS w/ biopsy on 5/6/25 that revealed RUL nodule + for adenocarcinoma  -Heme/onc team following, recs appreciated  -Thoracic surgery team will continue to follow    Problem 2: L1 compression fracture s/p fall  -S/p T11-L3 Decompression/PSF on 5/10/25  -Bone biopsy negative for mets   -Pain control  -Care per primary team    Problem 3: HTN  -Cont Norvasc and Coreg  -Monitor HR/BP  -Care per primary team    Problem 4: Hypothyroidism  -Cont Synthroid  -TSH 1.05  -Care per primary team    I have reviewed clinical labs tests and reports, radiology tests and reports, as well as old patient medical records, and discussed with the refering physician.     Assessment:  80 YO Female w/ PMHx of IgA deficiency, HTN, HLD, Hypothyroidism, ADHD, depression and L1 compression fracture (April 2025, wears TLSO brace) who originally presented to [facility] ED on 5/3/25 s/p fall associated with slurry speech and L facial droop. 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 Admitted to medicine team, nephrology consulted for pre-renal MARY LOU. Ortho consulted for L1 compression fracture, now s/p T11-L3 Decompression/PSF on 5/10/25, biopsy negative for mets. Incidental finding on CT Chest of 5cm infiltrative opacity in the right upper lobe suspicious for malignancy. PET CT scan revealed growing RUL mass measuring 3.4 x 4.9 cm as well as severe compression deformity in the L1 vertebrae. Pulm team consulted, s/p robotic bronchoscopy with EBUS w/ biopsy on 5/6 that revealed RUL nodule + for adenocarcinoma. Heme/onc following for cancer work-up, recommended thoracic surgery consult for lung nodule.     Plan:  Problem 1: RUL Lung nodule  -Discussed with Dr. Iqbal  -CT Chest 5/3/25: 5cm infiltrative opacity in the right upper lobe suspicious for malignancy  -PET/CT 5/6/25: growing RUL mass measuring 3.4 x 4.9 cm as well as severe compression deformity in the L1 vertebrae  -Pulm team following, s/p robotic bronchoscopy with EBUS w/ biopsy on 5/6/25 that revealed RUL nodule + for adenocarcinoma; stage IIb  -Heme/onc team following, recs appreciated  -NGS sent, will follow-up results   -No indication for urgent surgical intervention at this time; Patient will likely require neoadjuvant therapy and chemo prior to surgery   -Patient can follow-up with Dr. Iqbal after discharge ([phone])  -Thoracic surgery team will continue to follow    Problem 2: L1 compression fracture s/p fall  -S/p T11-L3 Decompression/PSF on 5/10/25  -Bone biopsy negative for mets   -Pain control  -Care per primary team    Problem 3: HTN  -Cont Norvasc and Coreg  -Monitor HR/BP  -Care per primary team    Problem 4: Hypothyroidism  -Cont Synthroid  -TSH 1.05  -Care per primary team    I have reviewed clinical labs tests and reports, radiology tests and reports, as well as old patient medical records, and discussed with the refering physician.

## 2025-05-14 NOTE — CONSULT NOTE ADULT - NS ATTEND AMEND GEN_ALL_CORE FT
79 year old female with L1 burst fracture and severe spinal canal stenosis admitted after fall at home.  She has continued low back pain. She is neurologically intact.  She has been having symptoms of leg weakness, sciatica and severe back pain when trying to mobilize despite treatment in TLSO.  Plan surgical decompression and stabilization: T11-L3 Decompression and Instrumented Fusion. Plan for surgery on Thursday May 8 pending medical optimization.  The risks and benefits have been discussed with the patient and she elects to proceed.
EMR reviewed. Patient was discharged prior to evaluation. She has a right upper lobe lung mass. PET/CT does not show mediastinal disease or distant metastases. Spine biopsy negative. She operability work up pending. Cancer is resectable. Would recommend neoadjuvant treatment prior to resection given the size of the tumor. We will see her as an outpatient to discuss.     Mak Iqbal MD, FACS  Thoracic Surgery

## 2025-05-14 NOTE — DISCHARGE NOTE NURSING/CASE MANAGEMENT/SOCIAL WORK - FINANCIAL ASSISTANCE
[facility] provides services at a reduced cost to those who are determined to be eligible through [facility]’s financial assistance program. Information regarding [facility]’s financial assistance program can be found by going to [email or web address] or by calling [phone].

## 2025-05-14 NOTE — CONSULT NOTE ADULT - REASON FOR ADMISSION
Fall vs syncope, MARY LOU

## 2025-05-14 NOTE — CONSULT NOTE ADULT - SUBJECTIVE AND OBJECTIVE BOX
Surgeon: Dr. Iqbal    Requesting Physician: Dr. Barcenas    HISTORY OF PRESENT ILLNESS:  78 YO Female w/ PMHx of IgA deficiency, HTN, HLD, Hypothyroidism, ADHD, depression and L1 compression fracture (april 2025, wears TLSO brace) who originally presented to [facility] ED on 5/3/25 s/p fall associated with slurry speech and L facial droop. 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 Admitted to medicine team, nephrology consulted for pre-renal MARY LOU. Ortho consulted for L1 compression fracture, now s/p s/p T11-L3 Decompression/PSF on 5/10/25, biopsy negative for mets. Incidental finding on CT Chest of 5cm infiltrative opacity in the right upper lobe suspicious for malignancy. PET CT scan revealed growing RUL mass measuring 3.4 x 4.9 cm as well as severe compression deformity in the L1 vertebrae. Pulm team consulted, s/p robotic bronchoscopy with EBUS w/ biopsy on 5/6 that revealed RUL nodule + for adenocarcinoma. Heme/onc following for cancer work-up, recommended thoracic surgery consult for lung nodule.     PAST MEDICAL & SURGICAL HISTORY:  Osteoarthritis    Depression    HTN (hypertension)    HLD (hyperlipidemia)    Hypothyroidism    No significant past surgical history    MEDICATIONS  (STANDING):  acetaminophen     Tablet .. 1000 milliGRAM(s) Oral every 8 hours  amLODIPine   Tablet 5 milliGRAM(s) Oral daily  atorvastatin 80 milliGRAM(s) Oral at bedtime  BUpivacaine liposome 1.3% Injectable (no eMAR) 20 milliLiter(s) Local Injection once  buPROPion XL (24-Hour) . 300 milliGRAM(s) Oral daily  calcitriol   Capsule 0.25 MICROGram(s) Oral daily  carvedilol 6.25 milliGRAM(s) Oral every 12 hours  cyclobenzaprine 5 milliGRAM(s) Oral three times a day  dextrose 5%. 1000 milliLiter(s) (100 mL/Hr) IV Continuous <Continuous>  dextrose 5%. 1000 milliLiter(s) (50 mL/Hr) IV Continuous <Continuous>  dextrose 50% Injectable 25 Gram(s) IV Push once  dextrose 50% Injectable 12.5 Gram(s) IV Push once  dextrose 50% Injectable 25 Gram(s) IV Push once  enoxaparin Injectable 30 milliGRAM(s) SubCutaneous every 24 hours  glucagon  Injectable 1 milliGRAM(s) IntraMuscular once  levothyroxine 75 MICROGram(s) Oral daily  sodium bicarbonate 650 milliGRAM(s) Oral three times a day    MEDICATIONS  (PRN):  dextrose Oral Gel 15 Gram(s) Oral once PRN Blood Glucose LESS THAN 70 milliGRAM(s)/deciliter  HYDROmorphone  Injectable 0.5 milliGRAM(s) IV Push every 15 minutes PRN pacu  HYDROmorphone  Injectable 0.5 milliGRAM(s) IV Push every 4 hours PRN breakthrough  loperamide 2 milliGRAM(s) Oral every 4 hours PRN Diarrhea  melatonin 3 milliGRAM(s) Oral at bedtime PRN Insomnia  oxyCODONE    IR 5 milliGRAM(s) Oral every 4 hours PRN Moderate Pain (4 - 6)  oxyCODONE    IR 10 milliGRAM(s) Oral every 4 hours PRN Severe Pain (7 - 10)    Allergies    No Known Drug Allergies  Severe IgA deficency (Other)    Intolerances    SOCIAL HISTORY:  Lives alone with A   Patient endorses 20 pack year history with quiting in 1980s  drinks 1,2 times weekly, denies recreational drug use    FAMILY HISTORY:  No pertinent family history in first degree relatives    Review of Systems:  CONSTITUTIONAL: Denies fevers / chills, sweats, fatigue, weight loss, weight gain                                       NEURO:  Denies parathesias, seizures, syncope, confusion                                                                                  EYES:  Denies blurry vision, discharge, pain, loss of vision                                                                                    ENMT:  Denies difficulty hearing, vertigo, dysphagia, epistaxis, recent dental work                                       CV:  Denies chest pain, palpitations, SOTO, orthopnea                                                                                           RESPIRATORY:  Denies wWheezing, SOB, cough / sputum, hemoptysis                                                               GI:  Denies nausea, vomiting, diarrhea, constipation, melena                                                                          : Denies hematuria, dysuria, urgency, incontinence                                                                                          MUSKULOSKELETAL:  Denies arthritis, joint swelling, muscle weakness                                                             SKIN/BREAST:  Denies rash, itching, hair loss, masses                                                                                              PSYCH:  Denies depression, anxiety, suicidal ideation                                                                                                HEME/LYMPH:  Denies bruises easily, enlarged lymph nodes, tender lymph nodes                                          ENDOCRINE:  Denies cold intolerance, heat intolerance, polydipsia                                                                      Vital Signs Last 24 Hrs  T(C): 36.4 (14 May 2025 09:19), Max: 36.8 (13 May 2025 15:34)  T(F): 97.5 (14 May 2025 09:19), Max: 98.2 (13 May 2025 15:34)  HR: 68 (14 May 2025 09:19) (68 - 90)  BP: 140/73 (14 May 2025 09:19) (140/73 - 175/72)  BP(mean): --  RR: 18 (14 May 2025 09:19) (15 - 18)  SpO2: 98% (14 May 2025 09:19) (95% - 98%)    Parameters below as of 14 May 2025 09:19  Patient On (Oxygen Delivery Method): room air    PHYSICAL EXAM:  GENERAL: NAD, lying in bed comfortably  HEAD:  Atraumatic, Normocephalic  EYES: EOMI, PERRLA, conjunctiva and sclera clear  ENT: Moist mucous membranes  NECK: Supple, No JVD  CHEST/LUNG: Clear to auscultation bilaterally; No rales, rhonchi, wheezing, or rubs. Unlabored respirations  HEART: Regular rate and rhythm; No murmurs, rubs, or gallops  ABDOMEN: Bowel sounds present; Soft, Nontender, Nondistended. No hepatomegally  EXTREMITIES:  2+ Peripheral Pulses, brisk capillary refill. No clubbing, cyanosis, or edema  NERVOUS SYSTEM:  Alert & Oriented X3, speech clear. No deficits                                                          LABS:                        9.4    11.07 )-----------( 266      ( 14 May 2025 07:34 )             28.7     05-14    137  |  104  |  49[H]  ----------------------------<  91  4.0   |  22  |  2.12[H]    Ca    7.7[L]      14 May 2025 07:34  Phos  3.8     05-14  Mg     1.7     05-14    Urinalysis Basic - ( 14 May 2025 07:34 )    Color: x / Appearance: x / SG: x / pH: x  Gluc: 91 mg/dL / Ketone: x  / Bili: x / Urobili: x   Blood: x / Protein: x / Nitrite: x   Leuk Esterase: x / RBC: x / WBC x   Sq Epi: x / Non Sq Epi: x / Bacteria: x    RADIOLOGY & ADDITIONAL STUDIES:  PET/CT:  < from: NM PET/CT Onc FDG Skull to Thigh, Inital (05.06.25 @ 11:07) >  IMPRESSION:  1. FDG avid 3.4 x 4.9 cm groundglass opacity at the anterior right upper   lobe, SUV max 4.4. A rapid increase in size since 5/3/2025 would favor an   infectious process. However, exact measurement is limited in this study   due to low resolution CT. Malignancy cannot be excluded.  2. Mildly FDG avid subcentimeter nodular opacities at the posterior right   upper lobe and posterior left lung base.  3. Small airway disease at the peripheral left lung apex.  4. No hypermetabolic lymphadenopathy in the chest, abdomen, or pelvis.  5. Diffuse uptake throughout the gastric walls suggestive of gastritis.   Correlate clinically. Increased uptake in the colon with possible wall   thickening at the sigmoid colon suggestive of colitis. Correlate   clinically. Diffuse uptake in the esophagus suggestive of esophagitis.  6. Severe compression deformity of vertebral body L1 with uptake   suggestive of an acute or subacute process. Correlate clinically.    CXR:  < from: Xray Chest 1 View-PORTABLE IMMEDIATE (Xray Chest 1 View-PORTABLE IMMEDIATE .) (05.06.25 @ 15:54) >  FINDINGS/  IMPRESSION:  No pneumothorax.  New linear opacity over the left lower lung zone compatible with probable   developing subsegmental atelectasis. The cardiomediastinal silhouette and   bones are unremarkable.    CT Scan:  < from: CT Chest No Cont (05.03.25 @ 18:07) >  FINDINGS:    LUNGS AND LARGE AIRWAYS: Images are degraded by respiratory motion.   Emphysematous changes. Patent central airways. 5 cm irregular shaped   groundglass and more solid peribronchial opacity in the right upper lobe.   Cluster of nodular branching densities in the left lung apex, likely   related to small airway disease.  PLEURA: No pleural effusion.  VESSELS: Within normal limits.  HEART: Heart size is normal. No pericardial effusion.  MEDIASTINUM AND NICOLÁS: No lymphadenopathy.  CHEST WALL AND LOWER NECK: Within normal limits.  VISUALIZED UPPER ABDOMEN: Left renal cyst partially visualized.  BONES: Mild degenerative changes. No acute fracture.    IMPRESSION:    5 cm infiltrative opacity in the right upper lobe suspicious for   malignancy. Consider PET/CT and tissue sampling.    TTE:  < from: TTE Echo Complete w/o Contrast w/ Doppler (05.06.25 @ 08:23) >  CONCLUSIONS:     1. Left ventricular cavity is normal in size. Left ventricular wall   thickness is normal. Left ventricular systolic function is normal with an   ejection fraction of 70 % by Cano's method of disks. There are no   regional wall motion abnormalities seen.   2. Normal right ventricular cavity size and normal right ventricular   systolic function.   3. Left atrium is mildly dilated.   4. No significant valvular disease.   5. No pericardial effusion seen.   6. No prior echocardiogram is available for comparison.    MR Head:  < from: MR Head No Cont (05.04.25 @ 16:42) >  FINDINGS: Multiple patchy confluent nonspecific foci of T2/FLAIR   hyperintensity are noted throughout the deep and periventricular white   matter of the cerebral hemispheres. There is no associated mass effect.   There is no evidence of acute ischemia on the diffusion-weighted images.    There is mild diffuse cerebral volume loss with prominence of the sulci,   fissures, and cisternal spaces which is normal for the patient's age.   Ventricular size and configuration is unremarkable. Flow-voids are noted   throughout the major intracranial vessels, on the T2 weighted images,   consistent with their patency. The sellar region and posterior fossa   appear unremarkable.    The paranasal sinuses and tympanomastoid cavities are clear. The   calvarium is intact. There is evidence of bilateral cataract removal.    IMPRESSION: No acute intracranial hemorrhage or evidence of acute   ischemia.    Multiple patchy confluent nonspecific abnormal white matter foci of   T2/FLAIR prolongation statistically favoring microvascular type changes.   Surgeon: Dr. Iqbal    Requesting Physician: Dr. Barcenas    HISTORY OF PRESENT ILLNESS:  80 YO Female w/ PMHx of IgA deficiency, HTN, HLD, Hypothyroidism, ADHD, depression and L1 compression fracture (April 2025, wears TLSO brace) who originally presented to [facility] ED on 5/3/25 s/p fall associated with slurry speech and L facial droop. 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 Admitted to medicine team, nephrology consulted for pre-renal MARY LOU. Ortho consulted for L1 compression fracture, now s/p T11-L3 Decompression/PSF on 5/10/25, biopsy negative for mets. Incidental finding on CT Chest of 5cm infiltrative opacity in the right upper lobe suspicious for malignancy. PET CT scan revealed growing RUL mass measuring 3.4 x 4.9 cm as well as severe compression deformity in the L1 vertebrae. Pulm team consulted, s/p robotic bronchoscopy with EBUS w/ biopsy on 5/6 that revealed RUL nodule + for adenocarcinoma. Heme/onc following for cancer work-up, recommended thoracic surgery consult for lung nodule.     Patient seen and examined at bedside. Patient states that she     PAST MEDICAL & SURGICAL HISTORY:  Osteoarthritis    Depression    HTN (hypertension)    HLD (hyperlipidemia)    Hypothyroidism    No significant past surgical history    MEDICATIONS  (STANDING):  acetaminophen     Tablet .. 1000 milliGRAM(s) Oral every 8 hours  amLODIPine   Tablet 5 milliGRAM(s) Oral daily  atorvastatin 80 milliGRAM(s) Oral at bedtime  BUpivacaine liposome 1.3% Injectable (no eMAR) 20 milliLiter(s) Local Injection once  buPROPion XL (24-Hour) . 300 milliGRAM(s) Oral daily  calcitriol   Capsule 0.25 MICROGram(s) Oral daily  carvedilol 6.25 milliGRAM(s) Oral every 12 hours  cyclobenzaprine 5 milliGRAM(s) Oral three times a day  dextrose 5%. 1000 milliLiter(s) (100 mL/Hr) IV Continuous <Continuous>  dextrose 5%. 1000 milliLiter(s) (50 mL/Hr) IV Continuous <Continuous>  dextrose 50% Injectable 25 Gram(s) IV Push once  dextrose 50% Injectable 12.5 Gram(s) IV Push once  dextrose 50% Injectable 25 Gram(s) IV Push once  enoxaparin Injectable 30 milliGRAM(s) SubCutaneous every 24 hours  glucagon  Injectable 1 milliGRAM(s) IntraMuscular once  levothyroxine 75 MICROGram(s) Oral daily  sodium bicarbonate 650 milliGRAM(s) Oral three times a day    MEDICATIONS  (PRN):  dextrose Oral Gel 15 Gram(s) Oral once PRN Blood Glucose LESS THAN 70 milliGRAM(s)/deciliter  HYDROmorphone  Injectable 0.5 milliGRAM(s) IV Push every 15 minutes PRN pacu  HYDROmorphone  Injectable 0.5 milliGRAM(s) IV Push every 4 hours PRN breakthrough  loperamide 2 milliGRAM(s) Oral every 4 hours PRN Diarrhea  melatonin 3 milliGRAM(s) Oral at bedtime PRN Insomnia  oxyCODONE    IR 5 milliGRAM(s) Oral every 4 hours PRN Moderate Pain (4 - 6)  oxyCODONE    IR 10 milliGRAM(s) Oral every 4 hours PRN Severe Pain (7 - 10)    Allergies    No Known Drug Allergies  Severe IgA deficency (Other)    Intolerances    SOCIAL HISTORY:  Lives alone with A   Patient endorses 20 pack year history with quiting in 1980s  drinks 1,2 times weekly, denies recreational drug use    FAMILY HISTORY:  No pertinent family history in first degree relatives    Review of Systems:  CONSTITUTIONAL: Denies fevers / chills, sweats, fatigue, weight loss, weight gain                                       NEURO:  Denies parathesias, seizures, syncope, confusion                                                                                  EYES:  Denies blurry vision, discharge, pain, loss of vision                                                                                    ENMT:  Denies difficulty hearing, vertigo, dysphagia, epistaxis, recent dental work                                       CV:  Denies chest pain, palpitations, SOTO, orthopnea                                                                                           RESPIRATORY:  Denies wWheezing, SOB, cough / sputum, hemoptysis                                                               GI:  Denies nausea, vomiting, diarrhea, constipation, melena                                                                          : Denies hematuria, dysuria, urgency, incontinence                                                                                          MUSKULOSKELETAL:  Denies arthritis, joint swelling, muscle weakness                                                             SKIN/BREAST:  Denies rash, itching, hair loss, masses                                                                                              PSYCH:  Denies depression, anxiety, suicidal ideation                                                                                                HEME/LYMPH:  Denies bruises easily, enlarged lymph nodes, tender lymph nodes                                          ENDOCRINE:  Denies cold intolerance, heat intolerance, polydipsia                                                                      Vital Signs Last 24 Hrs  T(C): 36.4 (14 May 2025 09:19), Max: 36.8 (13 May 2025 15:34)  T(F): 97.5 (14 May 2025 09:19), Max: 98.2 (13 May 2025 15:34)  HR: 68 (14 May 2025 09:19) (68 - 90)  BP: 140/73 (14 May 2025 09:19) (140/73 - 175/72)  BP(mean): --  RR: 18 (14 May 2025 09:19) (15 - 18)  SpO2: 98% (14 May 2025 09:19) (95% - 98%)    Parameters below as of 14 May 2025 09:19  Patient On (Oxygen Delivery Method): room air    PHYSICAL EXAM:  GENERAL: NAD, lying in bed comfortably  HEAD:  Atraumatic, Normocephalic  EYES: EOMI, PERRLA, conjunctiva and sclera clear  ENT: Moist mucous membranes  NECK: Supple, No JVD  CHEST/LUNG: Clear to auscultation bilaterally; No rales, rhonchi, wheezing, or rubs. Unlabored respirations  HEART: Regular rate and rhythm; No murmurs, rubs, or gallops  ABDOMEN: Bowel sounds present; Soft, Nontender, Nondistended. No hepatomegally  EXTREMITIES:  2+ Peripheral Pulses, brisk capillary refill. No clubbing, cyanosis, or edema  NERVOUS SYSTEM:  Alert & Oriented X3, speech clear. No deficits                                                          LABS:                        9.4    11.07 )-----------( 266      ( 14 May 2025 07:34 )             28.7     05-14    137  |  104  |  49[H]  ----------------------------<  91  4.0   |  22  |  2.12[H]    Ca    7.7[L]      14 May 2025 07:34  Phos  3.8     05-14  Mg     1.7     05-14    Urinalysis Basic - ( 14 May 2025 07:34 )    Color: x / Appearance: x / SG: x / pH: x  Gluc: 91 mg/dL / Ketone: x  / Bili: x / Urobili: x   Blood: x / Protein: x / Nitrite: x   Leuk Esterase: x / RBC: x / WBC x   Sq Epi: x / Non Sq Epi: x / Bacteria: x    RADIOLOGY & ADDITIONAL STUDIES:  PET/CT:  < from: NM PET/CT Onc FDG Skull to Thigh, Inital (05.06.25 @ 11:07) >  IMPRESSION:  1. FDG avid 3.4 x 4.9 cm groundglass opacity at the anterior right upper   lobe, SUV max 4.4. A rapid increase in size since 5/3/2025 would favor an   infectious process. However, exact measurement is limited in this study   due to low resolution CT. Malignancy cannot be excluded.  2. Mildly FDG avid subcentimeter nodular opacities at the posterior right   upper lobe and posterior left lung base.  3. Small airway disease at the peripheral left lung apex.  4. No hypermetabolic lymphadenopathy in the chest, abdomen, or pelvis.  5. Diffuse uptake throughout the gastric walls suggestive of gastritis.   Correlate clinically. Increased uptake in the colon with possible wall   thickening at the sigmoid colon suggestive of colitis. Correlate   clinically. Diffuse uptake in the esophagus suggestive of esophagitis.  6. Severe compression deformity of vertebral body L1 with uptake   suggestive of an acute or subacute process. Correlate clinically.    CXR:  < from: Xray Chest 1 View-PORTABLE IMMEDIATE (Xray Chest 1 View-PORTABLE IMMEDIATE .) (05.06.25 @ 15:54) >  FINDINGS/  IMPRESSION:  No pneumothorax.  New linear opacity over the left lower lung zone compatible with probable   developing subsegmental atelectasis. The cardiomediastinal silhouette and   bones are unremarkable.    CT Scan:  < from: CT Chest No Cont (05.03.25 @ 18:07) >  FINDINGS:    LUNGS AND LARGE AIRWAYS: Images are degraded by respiratory motion.   Emphysematous changes. Patent central airways. 5 cm irregular shaped   groundglass and more solid peribronchial opacity in the right upper lobe.   Cluster of nodular branching densities in the left lung apex, likely   related to small airway disease.  PLEURA: No pleural effusion.  VESSELS: Within normal limits.  HEART: Heart size is normal. No pericardial effusion.  MEDIASTINUM AND NICOLÁS: No lymphadenopathy.  CHEST WALL AND LOWER NECK: Within normal limits.  VISUALIZED UPPER ABDOMEN: Left renal cyst partially visualized.  BONES: Mild degenerative changes. No acute fracture.    IMPRESSION:    5 cm infiltrative opacity in the right upper lobe suspicious for   malignancy. Consider PET/CT and tissue sampling.    TTE:  < from: TTE Echo Complete w/o Contrast w/ Doppler (05.06.25 @ 08:23) >  CONCLUSIONS:     1. Left ventricular cavity is normal in size. Left ventricular wall   thickness is normal. Left ventricular systolic function is normal with an   ejection fraction of 70 % by Cano's method of disks. There are no   regional wall motion abnormalities seen.   2. Normal right ventricular cavity size and normal right ventricular   systolic function.   3. Left atrium is mildly dilated.   4. No significant valvular disease.   5. No pericardial effusion seen.   6. No prior echocardiogram is available for comparison.    MR Head:  < from: MR Head No Cont (05.04.25 @ 16:42) >  FINDINGS: Multiple patchy confluent nonspecific foci of T2/FLAIR   hyperintensity are noted throughout the deep and periventricular white   matter of the cerebral hemispheres. There is no associated mass effect.   There is no evidence of acute ischemia on the diffusion-weighted images.    There is mild diffuse cerebral volume loss with prominence of the sulci,   fissures, and cisternal spaces which is normal for the patient's age.   Ventricular size and configuration is unremarkable. Flow-voids are noted   throughout the major intracranial vessels, on the T2 weighted images,   consistent with their patency. The sellar region and posterior fossa   appear unremarkable.    The paranasal sinuses and tympanomastoid cavities are clear. The   calvarium is intact. There is evidence of bilateral cataract removal.    IMPRESSION: No acute intracranial hemorrhage or evidence of acute   ischemia.    Multiple patchy confluent nonspecific abnormal white matter foci of   T2/FLAIR prolongation statistically favoring microvascular type changes.   Surgeon: Dr. Iqbal    Requesting Physician: Dr. Barcenas    HISTORY OF PRESENT ILLNESS:  78 YO Female w/ PMHx of IgA deficiency, HTN, HLD, Hypothyroidism, ADHD, depression and L1 compression fracture (April 2025, wears TLSO brace) who originally presented to [facility] ED on 5/3/25 s/p fall associated with slurry speech and L facial droop. 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 Admitted to medicine team, nephrology consulted for pre-renal MARY LOU. Ortho consulted for L1 compression fracture, now s/p T11-L3 Decompression/PSF on 5/10/25, biopsy negative for mets. Incidental finding on CT Chest of 5cm infiltrative opacity in the right upper lobe suspicious for malignancy. PET CT scan revealed growing RUL mass measuring 3.4 x 4.9 cm as well as severe compression deformity in the L1 vertebrae. Pulm team consulted, s/p robotic bronchoscopy with EBUS w/ biopsy on 5/6 that revealed RUL nodule + for adenocarcinoma. Heme/onc following for cancer work-up, recommended thoracic surgery consult for lung nodule.     Patient seen and examined at bedside. Patient states that she quit smoking in 1984, was not aware of the lung nodule until the incidental finding on CT scan this admission. Patient states that she is planned to go to rehab and would like to have intervention soon if it is indicated. At present, pt admits to pain s/p spinal surgery, otherwise feels okay.     PAST MEDICAL & SURGICAL HISTORY:  Osteoarthritis    Depression    HTN (hypertension)    HLD (hyperlipidemia)    Hypothyroidism    No significant past surgical history    MEDICATIONS  (STANDING):  acetaminophen     Tablet .. 1000 milliGRAM(s) Oral every 8 hours  amLODIPine   Tablet 5 milliGRAM(s) Oral daily  atorvastatin 80 milliGRAM(s) Oral at bedtime  BUpivacaine liposome 1.3% Injectable (no eMAR) 20 milliLiter(s) Local Injection once  buPROPion XL (24-Hour) . 300 milliGRAM(s) Oral daily  calcitriol   Capsule 0.25 MICROGram(s) Oral daily  carvedilol 6.25 milliGRAM(s) Oral every 12 hours  cyclobenzaprine 5 milliGRAM(s) Oral three times a day  dextrose 5%. 1000 milliLiter(s) (100 mL/Hr) IV Continuous <Continuous>  dextrose 5%. 1000 milliLiter(s) (50 mL/Hr) IV Continuous <Continuous>  dextrose 50% Injectable 25 Gram(s) IV Push once  dextrose 50% Injectable 12.5 Gram(s) IV Push once  dextrose 50% Injectable 25 Gram(s) IV Push once  enoxaparin Injectable 30 milliGRAM(s) SubCutaneous every 24 hours  glucagon  Injectable 1 milliGRAM(s) IntraMuscular once  levothyroxine 75 MICROGram(s) Oral daily  sodium bicarbonate 650 milliGRAM(s) Oral three times a day    MEDICATIONS  (PRN):  dextrose Oral Gel 15 Gram(s) Oral once PRN Blood Glucose LESS THAN 70 milliGRAM(s)/deciliter  HYDROmorphone  Injectable 0.5 milliGRAM(s) IV Push every 15 minutes PRN pacu  HYDROmorphone  Injectable 0.5 milliGRAM(s) IV Push every 4 hours PRN breakthrough  loperamide 2 milliGRAM(s) Oral every 4 hours PRN Diarrhea  melatonin 3 milliGRAM(s) Oral at bedtime PRN Insomnia  oxyCODONE    IR 5 milliGRAM(s) Oral every 4 hours PRN Moderate Pain (4 - 6)  oxyCODONE    IR 10 milliGRAM(s) Oral every 4 hours PRN Severe Pain (7 - 10)    Allergies    No Known Drug Allergies  Severe IgA deficency (Other)    Intolerances    SOCIAL HISTORY:  Lives alone with A   Patient endorses 20 pack year history with quiting in 1980s  drinks 1,2 times weekly, denies recreational drug use    FAMILY HISTORY:  No pertinent family history in first degree relatives    Review of Systems:  CONSTITUTIONAL: Denies fevers / chills, sweats, fatigue, weight loss, weight gain                                       NEURO:  Denies parathesias, seizures, syncope, confusion                                                                                  EYES:  Denies blurry vision, discharge, pain, loss of vision                                                                                    ENMT:  Denies difficulty hearing, vertigo, dysphagia, epistaxis, recent dental work                                       CV:  Denies chest pain, palpitations, SOTO, orthopnea                                                                                           RESPIRATORY:  Denies wWheezing, SOB, cough / sputum, hemoptysis                                                               GI:  Denies nausea, vomiting, diarrhea, constipation, melena                                                                          : Denies hematuria, dysuria, urgency, incontinence                                                                                          MUSKULOSKELETAL:  Denies arthritis, joint swelling, muscle weakness                                                             SKIN/BREAST:  Denies rash, itching, hair loss, masses                                                                                              PSYCH:  Denies depression, anxiety, suicidal ideation                                                                                                HEME/LYMPH:  Denies bruises easily, enlarged lymph nodes, tender lymph nodes                                          ENDOCRINE:  Denies cold intolerance, heat intolerance, polydipsia                                                                      Vital Signs Last 24 Hrs  T(C): 36.4 (14 May 2025 09:19), Max: 36.8 (13 May 2025 15:34)  T(F): 97.5 (14 May 2025 09:19), Max: 98.2 (13 May 2025 15:34)  HR: 68 (14 May 2025 09:19) (68 - 90)  BP: 140/73 (14 May 2025 09:19) (140/73 - 175/72)  BP(mean): --  RR: 18 (14 May 2025 09:19) (15 - 18)  SpO2: 98% (14 May 2025 09:19) (95% - 98%)    Parameters below as of 14 May 2025 09:19  Patient On (Oxygen Delivery Method): room air    PHYSICAL EXAM:  GENERAL: NAD, lying in bed comfortably  HEAD:  Atraumatic, Normocephalic  EYES: EOMI, PERRLA, conjunctiva and sclera clear  ENT: Moist mucous membranes  NECK: Supple, No JVD  CHEST/LUNG: CTAB  HEART: RRR  ABDOMEN: Soft, Nontender, Nondistended. No hepatomegally  EXTREMITIES:  2+ Peripheral Pulses, brisk capillary refill. No clubbing, cyanosis, or edema  NERVOUS SYSTEM:  Alert & Oriented X3, speech clear. No deficits                                                          LABS:                        9.4    11.07 )-----------( 266      ( 14 May 2025 07:34 )             28.7     05-14    137  |  104  |  49[H]  ----------------------------<  91  4.0   |  22  |  2.12[H]    Ca    7.7[L]      14 May 2025 07:34  Phos  3.8     05-14  Mg     1.7     05-14    Urinalysis Basic - ( 14 May 2025 07:34 )    Color: x / Appearance: x / SG: x / pH: x  Gluc: 91 mg/dL / Ketone: x  / Bili: x / Urobili: x   Blood: x / Protein: x / Nitrite: x   Leuk Esterase: x / RBC: x / WBC x   Sq Epi: x / Non Sq Epi: x / Bacteria: x    RADIOLOGY & ADDITIONAL STUDIES:  PET/CT:  < from: NM PET/CT Onc FDG Skull to Thigh, Inital (05.06.25 @ 11:07) >  IMPRESSION:  1. FDG avid 3.4 x 4.9 cm groundglass opacity at the anterior right upper   lobe, SUV max 4.4. A rapid increase in size since 5/3/2025 would favor an   infectious process. However, exact measurement is limited in this study   due to low resolution CT. Malignancy cannot be excluded.  2. Mildly FDG avid subcentimeter nodular opacities at the posterior right   upper lobe and posterior left lung base.  3. Small airway disease at the peripheral left lung apex.  4. No hypermetabolic lymphadenopathy in the chest, abdomen, or pelvis.  5. Diffuse uptake throughout the gastric walls suggestive of gastritis.   Correlate clinically. Increased uptake in the colon with possible wall   thickening at the sigmoid colon suggestive of colitis. Correlate   clinically. Diffuse uptake in the esophagus suggestive of esophagitis.  6. Severe compression deformity of vertebral body L1 with uptake   suggestive of an acute or subacute process. Correlate clinically.    CXR:  < from: Xray Chest 1 View-PORTABLE IMMEDIATE (Xray Chest 1 View-PORTABLE IMMEDIATE .) (05.06.25 @ 15:54) >  FINDINGS/  IMPRESSION:  No pneumothorax.  New linear opacity over the left lower lung zone compatible with probable   developing subsegmental atelectasis. The cardiomediastinal silhouette and   bones are unremarkable.    CT Scan:  < from: CT Chest No Cont (05.03.25 @ 18:07) >  FINDINGS:    LUNGS AND LARGE AIRWAYS: Images are degraded by respiratory motion.   Emphysematous changes. Patent central airways. 5 cm irregular shaped   groundglass and more solid peribronchial opacity in the right upper lobe.   Cluster of nodular branching densities in the left lung apex, likely   related to small airway disease.  PLEURA: No pleural effusion.  VESSELS: Within normal limits.  HEART: Heart size is normal. No pericardial effusion.  MEDIASTINUM AND NICOLÁS: No lymphadenopathy.  CHEST WALL AND LOWER NECK: Within normal limits.  VISUALIZED UPPER ABDOMEN: Left renal cyst partially visualized.  BONES: Mild degenerative changes. No acute fracture.    IMPRESSION:    5 cm infiltrative opacity in the right upper lobe suspicious for   malignancy. Consider PET/CT and tissue sampling.    TTE:  < from: TTE Echo Complete w/o Contrast w/ Doppler (05.06.25 @ 08:23) >  CONCLUSIONS:     1. Left ventricular cavity is normal in size. Left ventricular wall   thickness is normal. Left ventricular systolic function is normal with an   ejection fraction of 70 % by Cano's method of disks. There are no   regional wall motion abnormalities seen.   2. Normal right ventricular cavity size and normal right ventricular   systolic function.   3. Left atrium is mildly dilated.   4. No significant valvular disease.   5. No pericardial effusion seen.   6. No prior echocardiogram is available for comparison.    MR Head:  < from: MR Head No Cont (05.04.25 @ 16:42) >  FINDINGS: Multiple patchy confluent nonspecific foci of T2/FLAIR   hyperintensity are noted throughout the deep and periventricular white   matter of the cerebral hemispheres. There is no associated mass effect.   There is no evidence of acute ischemia on the diffusion-weighted images.    There is mild diffuse cerebral volume loss with prominence of the sulci,   fissures, and cisternal spaces which is normal for the patient's age.   Ventricular size and configuration is unremarkable. Flow-voids are noted   throughout the major intracranial vessels, on the T2 weighted images,   consistent with their patency. The sellar region and posterior fossa   appear unremarkable.    The paranasal sinuses and tympanomastoid cavities are clear. The   calvarium is intact. There is evidence of bilateral cataract removal.    IMPRESSION: No acute intracranial hemorrhage or evidence of acute   ischemia.    Multiple patchy confluent nonspecific abnormal white matter foci of   T2/FLAIR prolongation statistically favoring microvascular type changes.

## 2025-05-14 NOTE — DISCHARGE NOTE NURSING/CASE MANAGEMENT/SOCIAL WORK - PATIENT PORTAL LINK FT
You can access the FollowMyHealth Patient Portal offered by [facility] by registering at the following website: [email or web address]. By joining [facility]’s FollowMyHealth portal, you will also be able to view your health information using other applications (apps) compatible with our system.

## 2025-05-14 NOTE — CHART NOTE - NSCHARTNOTEFT_GEN_A_CORE
Admitting Diagnosis:   Patient is a 79y old  Female who presents with a chief complaint of Fall vs syncope, MARY LOU (08 May 2025 11:57)      PAST MEDICAL & SURGICAL HISTORY:  Osteoarthritis  Depression  HTN (hypertension)  HLD (hyperlipidemia)  Hypothyroidism  No significant past surgical history    Current Nutrition Order:  Diet, Regular (05-08-25 @ 12:18) [Active]    PO Intake: Good (%) [   ]  Fair (50-75%) [   ] Poor (<25%) [ X ]    GI Issues: No issues with nausea/vomiting/constipation per pt. Per flow sheets noted with diarrhea. Last BM noted 5/7 per flow sheets.     Pain: 0/10 pain per flow sheets     Skin Integrity: No Pressure Injuries per flow sheets. Jim Score: 17   Edema: No Edema per flow sheets.     05-07-25 @ 07:01  -  05-08-25 @ 07:00  --------------------------------------------------------  IN: 50 mL / OUT: 0 mL / NET: 50 mL    05-08-25 @ 07:01  -  05-08-25 @ 12:56  --------------------------------------------------------  IN: 0 mL / OUT: 0 mL / NET: 0 mL      Labs:   05-08    137  |  105  |  52[H]  ----------------------------<  86  3.6   |  17[L]  |  3.73[H]    Ca    7.8[L]      08 May 2025 05:30  Phos  3.3     05-08  Mg     1.8     05-08    TPro  6.2  /  Alb  3.5  /  TBili  0.4  /  DBili  x   /  AST  59[H]  /  ALT  51[H]  /  AlkPhos  380[H]  05-07    CAPILLARY BLOOD GLUCOSE      POCT Blood Glucose.: 117 mg/dL (07 May 2025 19:46)      Medications:  MEDICATIONS  (STANDING):  atorvastatin 80 milliGRAM(s) Oral at bedtime  buPROPion XL (24-Hour) . 300 milliGRAM(s) Oral daily  carvedilol 6.25 milliGRAM(s) Oral every 12 hours  chlorhexidine 2% Cloths 1 Application(s) Topical two times a day  dextrose 5%. 1000 milliLiter(s) (100 mL/Hr) IV Continuous <Continuous>  dextrose 5%. 1000 milliLiter(s) (50 mL/Hr) IV Continuous <Continuous>  dextrose 50% Injectable 25 Gram(s) IV Push once  dextrose 50% Injectable 12.5 Gram(s) IV Push once  dextrose 50% Injectable 25 Gram(s) IV Push once  glucagon  Injectable 1 milliGRAM(s) IntraMuscular once  levothyroxine 75 MICROGram(s) Oral daily    MEDICATIONS  (PRN):  acetaminophen     Tablet .. 650 milliGRAM(s) Oral every 6 hours PRN Temp greater or equal to 38C (100.4F), Mild Pain (1 - 3)  dextrose Oral Gel 15 Gram(s) Oral once PRN Blood Glucose LESS THAN 70 milliGRAM(s)/deciliter  loperamide 2 milliGRAM(s) Oral every 4 hours PRN Diarrhea  melatonin 3 milliGRAM(s) Oral at bedtime PRN Insomnia      Weight:  5/8 149 pounds (dosing weight)   IBW: 115 pounds, %IBW: 129%     Recommend obtaining updated weights when medical feasible. RD to continue to monitor weights as available.     Weight Change:     [Moderate  Protein Calorie Malnutrition: Risk Assessment Completed 5/5]  - PO intake: < 75% for >/= 1 month  - Wt Loss: > 5% in 1 month    Estimated energy needs:  Kcal: 25-30 kcal/kg = 9638-4706 kcal  Pro: 1.25-1.5 g pro/kg = 65-78 g pro   Fluid: 30-35 ml/kg = 8154-2813 ml     *Using ideal body weight as pt is >120% ideal body weight. Needs adjusted for malnutrition, advanced age.    Subjective:   "Patient is 79F with PMHx of HTN, HLD,  hypothyroidism, generalized depression, ADHD rheumatic fever, pericarditis with recent  L1 compression fracture 2/2 mechanical fall (pending  orthopedic intervention with Dr. Barcenas),  presents after unwitnessed syncopal event and head trauma likely 2/2 to medication side effect vs dehydration complicated by MARY LOU, with incidental finding of RUL opacity c/f malignancy now s/p bronchoscopy pending surgical intervention with orthopedic surgery but complicated by suspicion of history of IgA deficiency "    Visited pt at bedside on 5LA for follow up assessment. Current diet: NPO, plan for possible OR today. Since then diet has been readvanced to regular. States that she has not consumed anything this morning. States that her appetite and intake has been low while inpatient, and post fall. States that she has only consumes ~25% of lunch/dinner. Educated/Discussed the importance to prioritize protein at meal times. Reviewed good sources of protein on the menu. Pt receptive and verbalizes understanding. Meds reviewed. imodium, synthroid. Nutritionally Pertinent Labs  5/8 POCT ranges  past 24 hours. BUN: 52 (High), Creat: 3.73 (High). See Nutrition recommendations below.     Previous Nutrition Diagnosis: Malnutrition acute moderate protein-kcal malnutrition related to inadequate energy intake as evidenced by <75% est energy intake x 1 month; >5% wt loss x 1 month    Active [ X ]  Resolved [   ]    Goal: Pt to meet >75% estimated energy and protein needs throughout hospitalization    Recommendations:  1. Continue with current diet as tolerated: Regular  >>> Continue with Strawberry Vanilla Protein Smoothie BID (Provides 230 kcal, 17 g pro per serving)  2. Recommend oral nutrition supplement: multivitamin  for general nutrient coverage  3. Encourage PO intake and honor food preferences as able unless otherwise contraindicated.   4. Consider thiamin 100 mg/d in setting of malnutrition  5. RDN will continue to monitor, reassess, and intervene as appropriate.     Education: Discussed with pt about NPO status for possible procedure. Encouraged PO intake when diet is advanced post procedure. Educated/Discussed the importance to prioritize protein at meal times. Reviewed good sources of protein on the menu. Pt receptive and verbalizes understanding.
Admitting Diagnosis:   Patient is a 79y old  Female who presents with a chief complaint of Fall vs syncope, MARY LOU (12 May 2025 14:16)      PAST MEDICAL & SURGICAL HISTORY:  Osteoarthritis  Depression  HTN (hypertension)  HLD (hyperlipidemia)  Hypothyroidism  No significant past surgical history    Current Nutrition Order: regular       PO Intake: Good (%) [   ]  Fair (50-75%) [ x  ] Poor (<25%) [   ]    GI Issues: last BM 5/9 per chart    Pain: documented with moderate/severe pain     Skin Integrity: no pressure injuries/edema documented    05-11-25 @ 07:01  -  05-12-25 @ 07:00  --------------------------------------------------------  IN: 0 mL / OUT: 1105 mL / NET: -1105 mL    Labs:   05-12    133[L]  |  104  |  50[H]  ----------------------------<  160[H]  See Note   |  16[L]  |  2.38[H]    Ca    7.0[L]      12 May 2025 10:00  Phos  3.1     05-12  Mg     1.6     05-12    TPro  6.1  /  Alb  3.5  /  TBili  0.3  /  DBili  x   /  AST  41[H]  /  ALT  27  /  AlkPhos  250[H]  05-11    CAPILLARY BLOOD GLUCOSE    Medications:  MEDICATIONS  (STANDING):  acetaminophen     Tablet .. 1000 milliGRAM(s) Oral every 8 hours  atorvastatin 80 milliGRAM(s) Oral at bedtime  BUpivacaine liposome 1.3% Injectable (no eMAR) 20 milliLiter(s) Local Injection once  buPROPion XL (24-Hour) . 300 milliGRAM(s) Oral daily  calcitriol   Capsule 0.25 MICROGram(s) Oral daily  carvedilol 6.25 milliGRAM(s) Oral every 12 hours  dextrose 5%. 1000 milliLiter(s) (100 mL/Hr) IV Continuous <Continuous>  dextrose 5%. 1000 milliLiter(s) (50 mL/Hr) IV Continuous <Continuous>  dextrose 50% Injectable 25 Gram(s) IV Push once  dextrose 50% Injectable 12.5 Gram(s) IV Push once  dextrose 50% Injectable 25 Gram(s) IV Push once  enoxaparin Injectable 30 milliGRAM(s) SubCutaneous every 24 hours  glucagon  Injectable 1 milliGRAM(s) IntraMuscular once  levothyroxine 75 MICROGram(s) Oral daily  methocarbamol 500 milliGRAM(s) Oral three times a day  sodium bicarbonate 650 milliGRAM(s) Oral two times a day    MEDICATIONS  (PRN):  dextrose Oral Gel 15 Gram(s) Oral once PRN Blood Glucose LESS THAN 70 milliGRAM(s)/deciliter  HYDROmorphone  Injectable 0.5 milliGRAM(s) IV Push every 15 minutes PRN pacu  HYDROmorphone  Injectable 0.5 milliGRAM(s) IV Push every 4 hours PRN breakthrough  loperamide 2 milliGRAM(s) Oral every 4 hours PRN Diarrhea  melatonin 3 milliGRAM(s) Oral at bedtime PRN Insomnia  oxyCODONE    IR 5 milliGRAM(s) Oral every 4 hours PRN Moderate Pain (4 - 6)  oxyCODONE    IR 10 milliGRAM(s) Oral every 4 hours PRN Severe Pain (7 - 10)      Weight:  5/8 149 pounds (dosing weight)   Height: 5'3"  IBW: 115 pounds, %IBW: 129%     Recommend obtaining updated weights when medical feasible. RD to continue to monitor weights as available.     [Moderate  Protein Calorie Malnutrition: Risk Assessment Completed 5/5]  - PO intake: < 75% for >/= 1 month  - Wt Loss: > 5% in 1 month    Estimated energy needs:  Kcal: 25-30 kcal/kg = 6549-0284 kcal  Pro: 1.25-1.5 g pro/kg = 65-78 g pro   Fluid: 30-35 ml/kg = 4455-1495 ml     *Using ideal body weight as pt is >120% ideal body weight. Needs adjusted for malnutrition, advanced age.    Subjective: Per H&P: "Patient is 79F with PMHx of HTN, HLD,  hypothyroidism, generalized depression, ADHD rheumatic fever, pericarditis with recent  L1 compression fracture 2/2 mechanical fall (pending  orthopedic intervention with Dr. Barcenas),  presents after unwitnessed syncopal event and head trauma likely 2/2 to medication side effect vs dehydration complicated by MARY LOU, with incidental finding of RUL opacity c/f malignancy now s/p bronchoscopy pending surgical intervention with orthopedic surgery but complicated by suspicion of history of IgA deficiency "    Patient seen at bedside for follow up assessment. Current diet order: regular. Patient reports she is eating ~50% of her meals. Says she does not like the strawberry vanilla smoothie - discussed adding fortified oatmeal instead. Says she likes ice cream. Denies any GI issues. Provided nutrition education - see below. Labs: sodium 133, BUN 50, Cr 2.38, GFR 20. Meds: calcitriol, imodium, levothyroxine. RD to f/u prn.    Previous Nutrition Diagnosis: Malnutrition acute moderate protein-kcal malnutrition related to inadequate energy intake as evidenced by <75% est energy intake x 1 month; >5% wt loss x 1 month    Active [ x  ]  Resolved [   ]    Goal: Patient to meet at least 75% of nutritional needs consistently     Recommendations:  1. Continue with regular diet. RD to remove strawberry vanilla smoothie and add fortified oatmeal  (245kcal, 19g protein per serving)   2. Encourage pt to meet nutritional needs as able   3. Monitor labs: electrolytes, cbc  4. Monitor weights   5. Pain and bowel regimen per team   6. Will continue to assess/honor food preferences as able  7. Monitor adherence to diet education     Education: Provided encouragement for PO intake with focus on protein. Patient verbalized understanding.
patient has follow up with Dr. Parra 5/20 AT 2:20PM  please include in her discharge instructions thank you
patient has follow up with Dr. Parra at [facility] 5/15 2:40  please include in dc instructions
Following for MARY LOU on CKD   At present continues to show renal recovery   Expect slow and gradual renal recovery close to baseline ranges    Will sign off ~ at the time of discharge, please give a non urgent appointment with nephrology outpatient

## 2025-05-14 NOTE — CONSULT NOTE ADULT - CONSULT REQUESTED DATE/TIME
05-May-2025 07:55
05-May-2025 17:20
03-May-2025 14:33
05-May-2025 11:57
14-May-2025 11:43
04-May-2025 15:44
04-May-2025 16:33
08-May-2025 14:05

## 2025-05-14 NOTE — PROGRESS NOTE ADULT - TIME BILLING
Bedside exam and interview    Review of hospital course, labs, vitals, imaging ,  medical records.   Reviewing outside records   Discharge planning on Interdisciplinary rounds   Discussion with consultants and Primary team   Documenting the encounter.
Face to face visit. Review of meds, labs, orders. Coordination of care and documentation
Reviewing patient chart and history, Review of patient labs and imaging, obtaining and reviewing obtained history, performing medical examination and evaluation at bedside, counseling and educting patient, family, or caregiver, ordering additional tests, therapies, treatments or communicating with other healthcare professionals, documenting, and coordination of care.    Jose A Martin DO, PhD  Internal Medicine, Hospitalist  [email or web address]
As above
Face to face visit. Review of meds, imaging, labs, orders. Coordination of care and documentation
Face to face visit. Review of meds, labs, orders. Coordination of care and documentation
As above
As above
Bedside exam and interview    Review of hospital course, labs, vitals, imaging ,  medical records.   Reviewing outside records   Discharge planning on Interdisciplinary rounds   Discussion with consultants and Primary team   Documenting the encounter.
Bedside exam and interview    Review of hospital course, labs, vitals, imaging ,  medical records.   Reviewing outside records   Discharge planning on Interdisciplinary rounds   Discussion with consultants and Primary team   Documenting the encounter.
Review of hospital course, labs, vitals, medical records.   Bedside exam and interview   Discussed plan of care with primary team ACP and housestaff   Documenting the encounter  Excludes teaching time and/or separately reported services
Reviewing patient chart and history, Review of patient labs and imaging, obtaining and reviewing obtained history, performing medical examination and evaluation at bedside, counseling and educting patient, family, or caregiver, ordering additional tests, therapies, treatments or communicating with other healthcare professionals, documenting, and coordination of care.    Jose A Martin DO, PhD  Internal Medicine, Hospitalist  [email or web address]

## 2025-05-14 NOTE — PROGRESS NOTE ADULT - SUBJECTIVE AND OBJECTIVE BOX
INTERVAL HPI/OVERNIGHT EVENTS: sylwia o/n    SUBJECTIVE: Patient seen and examined at bedside.   CC: f/u compression fx  Pt informed her back biopsy returned negative for malignancy - only showing reactive bone.  Reports back pain is controlled. Has pain over L lateral thigh - bruising present but pt not able to identify any trauma to area  Eating wo N/V/abd pain. Voiding wo dysuria. No fever. No chest pain, dyspnea  Passing flatus - no BM yet    OBJECTIVE:    VITAL SIGNS:  ICU Vital Signs Last 24 Hrs  T(C): 36.4 (14 May 2025 09:19), Max: 36.8 (13 May 2025 15:34)  T(F): 97.5 (14 May 2025 09:19), Max: 98.2 (13 May 2025 15:34)  HR: 68 (14 May 2025 09:19) (68 - 90)  BP: 140/73 (14 May 2025 09:19) (140/73 - 175/72)  BP(mean): --  ABP: --  ABP(mean): --  RR: 18 (14 May 2025 09:19) (15 - 18)  SpO2: 98% (14 May 2025 09:19) (95% - 98%)    O2 Parameters below as of 14 May 2025 09:19  Patient On (Oxygen Delivery Method): room air              05-13 @ 07:01  -  05-14 @ 07:00  --------------------------------------------------------  IN: 0 mL / OUT: 320 mL / NET: -320 mL      CAPILLARY BLOOD GLUCOSE          PHYSICAL EXAM:  GEN: female in NAD on RA  HEENT: NC/AT, MMM  CV: RRR, nml S1S2, no murmurs  PULM: nml effort, CTAB  ABD: Soft, non-distended, NABS, non-tender  : Pickett in place  NEURO  A/O x3, moving all extremities, Sensation intact  5/5 in plantarflex/ext b/l.   Lumbar dressing c/d/i  Bruising in band like distribution L lateral thigh  PSYCH: Appropriate    MEDICATIONS:  MEDICATIONS  (STANDING):  acetaminophen     Tablet .. 1000 milliGRAM(s) Oral every 8 hours  amLODIPine   Tablet 5 milliGRAM(s) Oral daily  atorvastatin 80 milliGRAM(s) Oral at bedtime  BUpivacaine liposome 1.3% Injectable (no eMAR) 20 milliLiter(s) Local Injection once  buPROPion XL (24-Hour) . 300 milliGRAM(s) Oral daily  calcitriol   Capsule 0.25 MICROGram(s) Oral daily  carvedilol 6.25 milliGRAM(s) Oral every 12 hours  cyclobenzaprine 5 milliGRAM(s) Oral three times a day  dextrose 5%. 1000 milliLiter(s) (100 mL/Hr) IV Continuous <Continuous>  dextrose 5%. 1000 milliLiter(s) (50 mL/Hr) IV Continuous <Continuous>  dextrose 50% Injectable 25 Gram(s) IV Push once  dextrose 50% Injectable 12.5 Gram(s) IV Push once  dextrose 50% Injectable 25 Gram(s) IV Push once  enoxaparin Injectable 30 milliGRAM(s) SubCutaneous every 24 hours  glucagon  Injectable 1 milliGRAM(s) IntraMuscular once  levothyroxine 75 MICROGram(s) Oral daily  sodium bicarbonate 650 milliGRAM(s) Oral three times a day    MEDICATIONS  (PRN):  dextrose Oral Gel 15 Gram(s) Oral once PRN Blood Glucose LESS THAN 70 milliGRAM(s)/deciliter  HYDROmorphone  Injectable 0.5 milliGRAM(s) IV Push every 15 minutes PRN pacu  HYDROmorphone  Injectable 0.5 milliGRAM(s) IV Push every 4 hours PRN breakthrough  loperamide 2 milliGRAM(s) Oral every 4 hours PRN Diarrhea  melatonin 3 milliGRAM(s) Oral at bedtime PRN Insomnia  oxyCODONE    IR 5 milliGRAM(s) Oral every 4 hours PRN Moderate Pain (4 - 6)  oxyCODONE    IR 10 milliGRAM(s) Oral every 4 hours PRN Severe Pain (7 - 10)      ALLERGIES:  Allergies    No Known Drug Allergies  Severe IgA deficency (Other)    Intolerances        LABS:                        9.4    11.07 )-----------( 266      ( 14 May 2025 07:34 )             28.7     05-14    137  |  104  |  49[H]  ----------------------------<  91  4.0   |  22  |  2.12[H]    Ca    7.7[L]      14 May 2025 07:34  Phos  3.8     05-14  Mg     1.7     05-14        Urinalysis Basic - ( 14 May 2025 07:34 )    Color: x / Appearance: x / SG: x / pH: x  Gluc: 91 mg/dL / Ketone: x  / Bili: x / Urobili: x   Blood: x / Protein: x / Nitrite: x   Leuk Esterase: x / RBC: x / WBC x   Sq Epi: x / Non Sq Epi: x / Bacteria: x        RADIOLOGY & ADDITIONAL TESTS: Reviewed.

## 2025-05-14 NOTE — DISCHARGE NOTE NURSING/CASE MANAGEMENT/SOCIAL WORK - NSDPFAC_GEN_ALL_CORE
[facility], [address] [address], P: [phone]

## 2025-05-15 ENCOUNTER — APPOINTMENT (OUTPATIENT)
Dept: HEMATOLOGY ONCOLOGY | Facility: CLINIC | Age: 80
End: 2025-05-15

## 2025-05-15 DIAGNOSIS — C34.10 MALIGNANT NEOPLASM OF UPPER LOBE, UNSPECIFIED BRONCHUS OR LUNG: ICD-10-CM

## 2025-05-15 LAB
CREATININE, URINE RESULT: 80 MG/DL — SIGNIFICANT CHANGE UP
PROT ?TM UR-MCNC: 73 MG/DL — HIGH (ref 0–12)
PROT SERPL-MCNC: 5.3 G/DL — LOW (ref 6–8.3)

## 2025-05-16 ENCOUNTER — NON-APPOINTMENT (OUTPATIENT)
Age: 80
End: 2025-05-16

## 2025-05-16 LAB
% ALBUMIN: 48.9 % — SIGNIFICANT CHANGE UP
% ALPHA 1: 7.5 % — SIGNIFICANT CHANGE UP
% ALPHA 2: 17.9 % — SIGNIFICANT CHANGE UP
% BETA: 9.6 % — SIGNIFICANT CHANGE UP
% GAMMA: 16.1 % — SIGNIFICANT CHANGE UP
ALBUMIN SERPL ELPH-MCNC: 2.6 G/DL — LOW (ref 3.6–5.5)
ALBUMIN/GLOB SERPL ELPH: 1 RATIO — SIGNIFICANT CHANGE UP
ALPHA1 GLOB SERPL ELPH-MCNC: 0.4 G/DL — SIGNIFICANT CHANGE UP (ref 0.1–0.4)
ALPHA2 GLOB SERPL ELPH-MCNC: 0.9 G/DL — SIGNIFICANT CHANGE UP (ref 0.5–1)
B-GLOBULIN SERPL ELPH-MCNC: 0.5 G/DL — SIGNIFICANT CHANGE UP (ref 0.5–1)
GAMMA GLOBULIN: 0.9 G/DL — SIGNIFICANT CHANGE UP (ref 0.6–1.6)
PROT PATTERN SERPL ELPH-IMP: SIGNIFICANT CHANGE UP
PROT SERPL-MCNC: 5.3 G/DL — LOW (ref 6–8.3)

## 2025-05-17 LAB
M PROTEIN 24H UR ELPH-MRATE: SIGNIFICANT CHANGE UP MG/DL
PROT ?TM UR-MCNC: SIGNIFICANT CHANGE UP MG/DL (ref 0–12)
PROT PATTERN 24H UR ELPH-IMP: SIGNIFICANT CHANGE UP
URINE CREATININE CALCULATION: SIGNIFICANT CHANGE UP G/24 H (ref 0.8–1.8)

## 2025-05-19 ENCOUNTER — NON-APPOINTMENT (OUTPATIENT)
Age: 80
End: 2025-05-19

## 2025-05-20 ENCOUNTER — APPOINTMENT (OUTPATIENT)
Dept: THORACIC SURGERY | Facility: CLINIC | Age: 80
End: 2025-05-20

## 2025-05-20 ENCOUNTER — APPOINTMENT (OUTPATIENT)
Dept: HEMATOLOGY ONCOLOGY | Facility: CLINIC | Age: 80
End: 2025-05-20

## 2025-05-20 ENCOUNTER — APPOINTMENT (OUTPATIENT)
Dept: MRI IMAGING | Facility: CLINIC | Age: 80
End: 2025-05-20

## 2025-05-21 ENCOUNTER — NON-APPOINTMENT (OUTPATIENT)
Age: 80
End: 2025-05-21

## 2025-05-22 ENCOUNTER — NON-APPOINTMENT (OUTPATIENT)
Age: 80
End: 2025-05-22

## 2025-05-27 ENCOUNTER — TRANSCRIPTION ENCOUNTER (OUTPATIENT)
Age: 80
End: 2025-05-27

## 2025-05-28 DIAGNOSIS — F32.9 MAJOR DEPRESSIVE DISORDER, SINGLE EPISODE, UNSPECIFIED: ICD-10-CM

## 2025-05-28 DIAGNOSIS — E83.51 HYPOCALCEMIA: ICD-10-CM

## 2025-05-28 DIAGNOSIS — F90.9 ATTENTION-DEFICIT HYPERACTIVITY DISORDER, UNSPECIFIED TYPE: ICD-10-CM

## 2025-05-28 DIAGNOSIS — E44.0 MODERATE PROTEIN-CALORIE MALNUTRITION: ICD-10-CM

## 2025-05-28 DIAGNOSIS — I21.A1 MYOCARDIAL INFARCTION TYPE 2: ICD-10-CM

## 2025-05-28 DIAGNOSIS — E78.5 HYPERLIPIDEMIA, UNSPECIFIED: ICD-10-CM
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
## 2025-05-28 DIAGNOSIS — I95.1 ORTHOSTATIC HYPOTENSION: ICD-10-CM

## 2025-05-28 DIAGNOSIS — E87.20 ACIDOSIS, UNSPECIFIED: ICD-10-CM
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
## 2025-05-28 DIAGNOSIS — R29.6 REPEATED FALLS: ICD-10-CM

## 2025-05-28 DIAGNOSIS — E87.1 HYPO-OSMOLALITY AND HYPONATREMIA: ICD-10-CM

## 2025-05-28 DIAGNOSIS — N17.0 ACUTE KIDNEY FAILURE WITH TUBULAR NECROSIS: ICD-10-CM

## 2025-05-28 DIAGNOSIS — R47.1 DYSARTHRIA AND ANARTHRIA: ICD-10-CM

## 2025-05-28 DIAGNOSIS — D63.1 ANEMIA IN CHRONIC KIDNEY DISEASE: ICD-10-CM

## 2025-05-28 DIAGNOSIS — M48.062 SPINAL STENOSIS, LUMBAR REGION WITH NEUROGENIC CLAUDICATION: ICD-10-CM

## 2025-05-28 DIAGNOSIS — M54.16 RADICULOPATHY, LUMBAR REGION: ICD-10-CM

## 2025-05-28 DIAGNOSIS — I12.9 HYPERTENSIVE CHRONIC KIDNEY DISEASE WITH STAGE 1 THROUGH STAGE 4 CHRONIC KIDNEY DISEASE, OR UNSPECIFIED CHRONIC KIDNEY DISEASE: ICD-10-CM

## 2025-05-28 DIAGNOSIS — Z87.891 PERSONAL HISTORY OF NICOTINE DEPENDENCE: ICD-10-CM

## 2025-05-28 DIAGNOSIS — N18.30 CHRONIC KIDNEY DISEASE, STAGE 3 UNSPECIFIED: ICD-10-CM

## 2025-05-28 DIAGNOSIS — D80.2 SELECTIVE DEFICIENCY OF IMMUNOGLOBULIN A [IGA]: ICD-10-CM

## 2025-05-28 DIAGNOSIS — S32.011A STABLE BURST FRACTURE OF FIRST LUMBAR VERTEBRA, INITIAL ENCOUNTER FOR CLOSED FRACTURE: ICD-10-CM

## 2025-05-28 DIAGNOSIS — E03.9 HYPOTHYROIDISM, UNSPECIFIED: ICD-10-CM

## 2025-05-28 PROCEDURE — G0452: CPT | Mod: 26

## 2025-05-30 ENCOUNTER — APPOINTMENT (OUTPATIENT)
Dept: INTERNAL MEDICINE | Facility: CLINIC | Age: 80
End: 2025-05-30
Payer: MEDICARE

## 2025-05-30 ENCOUNTER — APPOINTMENT (OUTPATIENT)
Dept: ORTHOPEDIC SURGERY | Facility: CLINIC | Age: 80
End: 2025-05-30

## 2025-05-30 PROCEDURE — G2211 COMPLEX E/M VISIT ADD ON: CPT | Mod: 93

## 2025-05-30 PROCEDURE — 99213 OFFICE O/P EST LOW 20 MIN: CPT | Mod: 93

## 2025-06-02 ENCOUNTER — OUTPATIENT (OUTPATIENT)
Dept: OUTPATIENT SERVICES | Facility: HOSPITAL | Age: 80
LOS: 1 days | End: 2025-06-02

## 2025-06-02 ENCOUNTER — APPOINTMENT (OUTPATIENT)
Dept: MRI IMAGING | Facility: CLINIC | Age: 80
End: 2025-06-02
Payer: MEDICARE

## 2025-06-02 DIAGNOSIS — Z98.890 OTHER SPECIFIED POSTPROCEDURAL STATES: Chronic | ICD-10-CM

## 2025-06-02 PROCEDURE — 82977 ASSAY OF GGT: CPT

## 2025-06-02 PROCEDURE — 76705 ECHO EXAM OF ABDOMEN: CPT

## 2025-06-02 PROCEDURE — 84156 ASSAY OF PROTEIN URINE: CPT

## 2025-06-02 PROCEDURE — 84300 ASSAY OF URINE SODIUM: CPT

## 2025-06-02 PROCEDURE — 82652 VIT D 1 25-DIHYDROXY: CPT

## 2025-06-02 PROCEDURE — 97535 SELF CARE MNGMENT TRAINING: CPT

## 2025-06-02 PROCEDURE — 88381 MICRODISSECTION MANUAL: CPT

## 2025-06-02 PROCEDURE — 87507 IADNA-DNA/RNA PROBE TQ 12-25: CPT

## 2025-06-02 PROCEDURE — 85730 THROMBOPLASTIN TIME PARTIAL: CPT

## 2025-06-02 PROCEDURE — 82785 ASSAY OF IGE: CPT

## 2025-06-02 PROCEDURE — 80061 LIPID PANEL: CPT

## 2025-06-02 PROCEDURE — 82610 CYSTATIN C: CPT

## 2025-06-02 PROCEDURE — 86901 BLOOD TYPING SEROLOGIC RH(D): CPT

## 2025-06-02 PROCEDURE — 97164 PT RE-EVAL EST PLAN CARE: CPT

## 2025-06-02 PROCEDURE — 88311 DECALCIFY TISSUE: CPT

## 2025-06-02 PROCEDURE — 82550 ASSAY OF CK (CPK): CPT

## 2025-06-02 PROCEDURE — 12001 RPR S/N/AX/GEN/TRNK 2.5CM/<: CPT

## 2025-06-02 PROCEDURE — 84466 ASSAY OF TRANSFERRIN: CPT

## 2025-06-02 PROCEDURE — S2900: CPT

## 2025-06-02 PROCEDURE — 88342 IMHCHEM/IMCYTCHM 1ST ANTB: CPT

## 2025-06-02 PROCEDURE — 84165 PROTEIN E-PHORESIS SERUM: CPT

## 2025-06-02 PROCEDURE — 82962 GLUCOSE BLOOD TEST: CPT

## 2025-06-02 PROCEDURE — 83735 ASSAY OF MAGNESIUM: CPT

## 2025-06-02 PROCEDURE — 82570 ASSAY OF URINE CREATININE: CPT

## 2025-06-02 PROCEDURE — 83550 IRON BINDING TEST: CPT

## 2025-06-02 PROCEDURE — 83605 ASSAY OF LACTIC ACID: CPT

## 2025-06-02 PROCEDURE — 85610 PROTHROMBIN TIME: CPT

## 2025-06-02 PROCEDURE — 97116 GAIT TRAINING THERAPY: CPT

## 2025-06-02 PROCEDURE — 82787 IGG 1 2 3 OR 4 EACH: CPT

## 2025-06-02 PROCEDURE — 86900 BLOOD TYPING SEROLOGIC ABO: CPT

## 2025-06-02 PROCEDURE — 80048 BASIC METABOLIC PNL TOTAL CA: CPT

## 2025-06-02 PROCEDURE — 86923 COMPATIBILITY TEST ELECTRIC: CPT

## 2025-06-02 PROCEDURE — 88341 IMHCHEM/IMCYTCHM EA ADD ANTB: CPT

## 2025-06-02 PROCEDURE — 84484 ASSAY OF TROPONIN QUANT: CPT

## 2025-06-02 PROCEDURE — C1889: CPT

## 2025-06-02 PROCEDURE — 84540 ASSAY OF URINE/UREA-N: CPT

## 2025-06-02 PROCEDURE — 72100 X-RAY EXAM L-S SPINE 2/3 VWS: CPT

## 2025-06-02 PROCEDURE — 82010 KETONE BODYS QUAN: CPT

## 2025-06-02 PROCEDURE — 88173 CYTOPATH EVAL FNA REPORT: CPT

## 2025-06-02 PROCEDURE — 90471 IMMUNIZATION ADMIN: CPT

## 2025-06-02 PROCEDURE — 86850 RBC ANTIBODY SCREEN: CPT

## 2025-06-02 PROCEDURE — 97530 THERAPEUTIC ACTIVITIES: CPT

## 2025-06-02 PROCEDURE — C9399: CPT

## 2025-06-02 PROCEDURE — 83036 HEMOGLOBIN GLYCOSYLATED A1C: CPT

## 2025-06-02 PROCEDURE — 82728 ASSAY OF FERRITIN: CPT

## 2025-06-02 PROCEDURE — 86334 IMMUNOFIX E-PHORESIS SERUM: CPT

## 2025-06-02 PROCEDURE — 82784 ASSAY IGA/IGD/IGG/IGM EACH: CPT

## 2025-06-02 PROCEDURE — 36415 COLL VENOUS BLD VENIPUNCTURE: CPT

## 2025-06-02 PROCEDURE — 84166 PROTEIN E-PHORESIS/URINE/CSF: CPT

## 2025-06-02 PROCEDURE — 82310 ASSAY OF CALCIUM: CPT

## 2025-06-02 PROCEDURE — 97161 PT EVAL LOW COMPLEX 20 MIN: CPT

## 2025-06-02 PROCEDURE — 85025 COMPLETE CBC W/AUTO DIFF WBC: CPT

## 2025-06-02 PROCEDURE — 93005 ELECTROCARDIOGRAM TRACING: CPT

## 2025-06-02 PROCEDURE — 70496 CT ANGIOGRAPHY HEAD: CPT | Mod: MC

## 2025-06-02 PROCEDURE — 83970 ASSAY OF PARATHORMONE: CPT

## 2025-06-02 PROCEDURE — 72128 CT CHEST SPINE W/O DYE: CPT | Mod: MC

## 2025-06-02 PROCEDURE — 84443 ASSAY THYROID STIM HORMONE: CPT

## 2025-06-02 PROCEDURE — 84100 ASSAY OF PHOSPHORUS: CPT

## 2025-06-02 PROCEDURE — 36430 TRANSFUSION BLD/BLD COMPNT: CPT

## 2025-06-02 PROCEDURE — 70498 CT ANGIOGRAPHY NECK: CPT | Mod: MC

## 2025-06-02 PROCEDURE — 76000 FLUOROSCOPY <1 HR PHYS/QHP: CPT

## 2025-06-02 PROCEDURE — 84295 ASSAY OF SERUM SODIUM: CPT

## 2025-06-02 PROCEDURE — 0042T: CPT | Mod: MC

## 2025-06-02 PROCEDURE — 96360 HYDRATION IV INFUSION INIT: CPT

## 2025-06-02 PROCEDURE — 85027 COMPLETE CBC AUTOMATED: CPT

## 2025-06-02 PROCEDURE — 88305 TISSUE EXAM BY PATHOLOGIST: CPT

## 2025-06-02 PROCEDURE — P9022: CPT

## 2025-06-02 PROCEDURE — 82306 VITAMIN D 25 HYDROXY: CPT

## 2025-06-02 PROCEDURE — 81455 SO/HL 51/>GSAP DNA/DNA&RNA: CPT

## 2025-06-02 PROCEDURE — 88307 TISSUE EXAM BY PATHOLOGIST: CPT

## 2025-06-02 PROCEDURE — 83935 ASSAY OF URINE OSMOLALITY: CPT

## 2025-06-02 PROCEDURE — 82803 BLOOD GASES ANY COMBINATION: CPT

## 2025-06-02 PROCEDURE — 87449 NOS EACH ORGANISM AG IA: CPT

## 2025-06-02 PROCEDURE — 71045 X-RAY EXAM CHEST 1 VIEW: CPT

## 2025-06-02 PROCEDURE — 76775 US EXAM ABDO BACK WALL LIM: CPT

## 2025-06-02 PROCEDURE — 82746 ASSAY OF FOLIC ACID SERUM: CPT

## 2025-06-02 PROCEDURE — 97165 OT EVAL LOW COMPLEX 30 MIN: CPT

## 2025-06-02 PROCEDURE — 71250 CT THORAX DX C-: CPT | Mod: MC

## 2025-06-02 PROCEDURE — 86335 IMMUNFIX E-PHORSIS/URINE/CSF: CPT

## 2025-06-02 PROCEDURE — 70553 MRI BRAIN STEM W/O & W/DYE: CPT | Mod: 26

## 2025-06-02 PROCEDURE — C1713: CPT

## 2025-06-02 PROCEDURE — 97168 OT RE-EVAL EST PLAN CARE: CPT

## 2025-06-02 PROCEDURE — 93306 TTE W/DOPPLER COMPLETE: CPT

## 2025-06-02 PROCEDURE — 80053 COMPREHEN METABOLIC PANEL: CPT

## 2025-06-02 PROCEDURE — 70450 CT HEAD/BRAIN W/O DYE: CPT | Mod: MC

## 2025-06-02 PROCEDURE — 72125 CT NECK SPINE W/O DYE: CPT | Mod: MC

## 2025-06-02 PROCEDURE — 72131 CT LUMBAR SPINE W/O DYE: CPT | Mod: MC

## 2025-06-02 PROCEDURE — 78815 PET IMAGE W/CT SKULL-THIGH: CPT | Mod: MC

## 2025-06-02 PROCEDURE — 90715 TDAP VACCINE 7 YRS/> IM: CPT

## 2025-06-02 PROCEDURE — A9552: CPT

## 2025-06-02 PROCEDURE — 83521 IG LIGHT CHAINS FREE EACH: CPT

## 2025-06-02 PROCEDURE — 87324 CLOSTRIDIUM AG IA: CPT

## 2025-06-02 PROCEDURE — 82607 VITAMIN B-12: CPT

## 2025-06-02 PROCEDURE — 84155 ASSAY OF PROTEIN SERUM: CPT

## 2025-06-02 PROCEDURE — 84133 ASSAY OF URINE POTASSIUM: CPT

## 2025-06-02 PROCEDURE — 70551 MRI BRAIN STEM W/O DYE: CPT | Mod: MC

## 2025-06-02 PROCEDURE — 83540 ASSAY OF IRON: CPT

## 2025-06-02 PROCEDURE — 99285 EMERGENCY DEPT VISIT HI MDM: CPT | Mod: 25

## 2025-06-02 PROCEDURE — 81001 URINALYSIS AUTO W/SCOPE: CPT

## 2025-06-02 PROCEDURE — 82330 ASSAY OF CALCIUM: CPT

## 2025-06-02 PROCEDURE — 84132 ASSAY OF SERUM POTASSIUM: CPT

## 2025-06-04 RX ORDER — ATORVASTATIN CALCIUM 80 MG/1
80 TABLET, FILM COATED ORAL
Qty: 1 | Refills: 0 | Status: ACTIVE | COMMUNITY
Start: 2025-06-04 | End: 1900-01-01

## 2025-06-04 RX ORDER — AMLODIPINE BESYLATE 5 MG/1
5 TABLET ORAL
Qty: 90 | Refills: 0 | Status: ACTIVE | COMMUNITY
Start: 2025-06-04 | End: 1900-01-01

## 2025-06-05 ENCOUNTER — APPOINTMENT (OUTPATIENT)
Dept: ORTHOPEDIC SURGERY | Facility: CLINIC | Age: 80
End: 2025-06-05
Payer: MEDICARE

## 2025-06-05 DIAGNOSIS — S32.010A WEDGE COMPRESSION FRACTURE OF FIRST LUMBAR VERTEBRA, INITIAL ENCOUNTER FOR CLOSED FRACTURE: ICD-10-CM

## 2025-06-05 DIAGNOSIS — R26.89 OTHER ABNORMALITIES OF GAIT AND MOBILITY: ICD-10-CM

## 2025-06-05 DIAGNOSIS — M54.50 LOW BACK PAIN, UNSPECIFIED: ICD-10-CM

## 2025-06-05 PROCEDURE — 72070 X-RAY EXAM THORAC SPINE 2VWS: CPT

## 2025-06-05 PROCEDURE — 72100 X-RAY EXAM L-S SPINE 2/3 VWS: CPT

## 2025-06-05 PROCEDURE — 99024 POSTOP FOLLOW-UP VISIT: CPT

## 2025-06-10 ENCOUNTER — APPOINTMENT (OUTPATIENT)
Dept: HEMATOLOGY ONCOLOGY | Facility: CLINIC | Age: 80
End: 2025-06-10

## 2025-06-10 ENCOUNTER — INPATIENT (INPATIENT)
Facility: HOSPITAL | Age: 80
LOS: 0 days | Discharge: HOME HEALTH | End: 2025-06-11
Attending: STUDENT IN AN ORGANIZED HEALTH CARE EDUCATION/TRAINING PROGRAM | Admitting: STUDENT IN AN ORGANIZED HEALTH CARE EDUCATION/TRAINING PROGRAM
Payer: MEDICARE

## 2025-06-10 VITALS
OXYGEN SATURATION: 94 % | RESPIRATION RATE: 18 BRPM | HEIGHT: 63 IN | DIASTOLIC BLOOD PRESSURE: 55 MMHG | TEMPERATURE: 98 F | SYSTOLIC BLOOD PRESSURE: 90 MMHG | HEART RATE: 83 BPM

## 2025-06-10 LAB
ALBUMIN SERPL ELPH-MCNC: 3.2 G/DL — LOW (ref 3.3–5)
ALP SERPL-CCNC: 230 U/L — HIGH (ref 40–120)
ALT FLD-CCNC: 11 U/L — SIGNIFICANT CHANGE UP (ref 10–45)
ANION GAP SERPL CALC-SCNC: 12 MMOL/L — SIGNIFICANT CHANGE UP (ref 5–17)
ANION GAP SERPL CALC-SCNC: 16 MMOL/L — SIGNIFICANT CHANGE UP (ref 5–17)
APPEARANCE UR: CLEAR — SIGNIFICANT CHANGE UP
APTT BLD: 27.3 SEC — SIGNIFICANT CHANGE UP (ref 26.1–36.8)
AST SERPL-CCNC: 26 U/L — SIGNIFICANT CHANGE UP (ref 10–40)
BASOPHILS # BLD AUTO: 0.05 K/UL — SIGNIFICANT CHANGE UP (ref 0–0.2)
BASOPHILS NFR BLD AUTO: 0.5 % — SIGNIFICANT CHANGE UP (ref 0–2)
BILIRUB SERPL-MCNC: 0.6 MG/DL — SIGNIFICANT CHANGE UP (ref 0.2–1.2)
BILIRUB UR-MCNC: NEGATIVE — SIGNIFICANT CHANGE UP
BUN SERPL-MCNC: 14 MG/DL — SIGNIFICANT CHANGE UP (ref 7–23)
BUN SERPL-MCNC: 17 MG/DL — SIGNIFICANT CHANGE UP (ref 7–23)
CALCIUM SERPL-MCNC: 7.4 MG/DL — LOW (ref 8.4–10.5)
CALCIUM SERPL-MCNC: 8 MG/DL — LOW (ref 8.4–10.5)
CHLORIDE SERPL-SCNC: 100 MMOL/L — SIGNIFICANT CHANGE UP (ref 96–108)
CHLORIDE SERPL-SCNC: 94 MMOL/L — LOW (ref 96–108)
CO2 SERPL-SCNC: 23 MMOL/L — SIGNIFICANT CHANGE UP (ref 22–31)
CO2 SERPL-SCNC: 25 MMOL/L — SIGNIFICANT CHANGE UP (ref 22–31)
COLOR SPEC: YELLOW — SIGNIFICANT CHANGE UP
CREAT SERPL-MCNC: 1.3 MG/DL — SIGNIFICANT CHANGE UP (ref 0.5–1.3)
CREAT SERPL-MCNC: 1.33 MG/DL — HIGH (ref 0.5–1.3)
DIFF PNL FLD: NEGATIVE — SIGNIFICANT CHANGE UP
EGFR: 41 ML/MIN/1.73M2 — LOW
EGFR: 41 ML/MIN/1.73M2 — LOW
EGFR: 42 ML/MIN/1.73M2 — LOW
EGFR: 42 ML/MIN/1.73M2 — LOW
EOSINOPHIL # BLD AUTO: 0.15 K/UL — SIGNIFICANT CHANGE UP (ref 0–0.5)
EOSINOPHIL NFR BLD AUTO: 1.6 % — SIGNIFICANT CHANGE UP (ref 0–6)
GLUCOSE SERPL-MCNC: 103 MG/DL — HIGH (ref 70–99)
GLUCOSE SERPL-MCNC: 106 MG/DL — HIGH (ref 70–99)
GLUCOSE UR QL: NEGATIVE MG/DL — SIGNIFICANT CHANGE UP
HCT VFR BLD CALC: 27.5 % — LOW (ref 34.5–45)
HGB BLD-MCNC: 9.3 G/DL — LOW (ref 11.5–15.5)
IMM GRANULOCYTES NFR BLD AUTO: 0.9 % — SIGNIFICANT CHANGE UP (ref 0–0.9)
INR BLD: 1.16 — SIGNIFICANT CHANGE UP (ref 0.85–1.16)
KETONES UR QL: NEGATIVE MG/DL — SIGNIFICANT CHANGE UP
LACTATE SERPL-SCNC: 2.2 MMOL/L — HIGH (ref 0.5–2)
LEUKOCYTE ESTERASE UR-ACNC: NEGATIVE — SIGNIFICANT CHANGE UP
LYMPHOCYTES # BLD AUTO: 1.01 K/UL — SIGNIFICANT CHANGE UP (ref 1–3.3)
LYMPHOCYTES # BLD AUTO: 10.9 % — LOW (ref 13–44)
MAGNESIUM SERPL-MCNC: 1.2 MG/DL — LOW (ref 1.6–2.6)
MCHC RBC-ENTMCNC: 30.8 PG — SIGNIFICANT CHANGE UP (ref 27–34)
MCHC RBC-ENTMCNC: 33.8 G/DL — SIGNIFICANT CHANGE UP (ref 32–36)
MCV RBC AUTO: 91.1 FL — SIGNIFICANT CHANGE UP (ref 80–100)
MONOCYTES # BLD AUTO: 0.92 K/UL — HIGH (ref 0–0.9)
MONOCYTES NFR BLD AUTO: 9.9 % — SIGNIFICANT CHANGE UP (ref 2–14)
NEUTROPHILS # BLD AUTO: 7.06 K/UL — SIGNIFICANT CHANGE UP (ref 1.8–7.4)
NEUTROPHILS NFR BLD AUTO: 76.2 % — SIGNIFICANT CHANGE UP (ref 43–77)
NITRITE UR-MCNC: NEGATIVE — SIGNIFICANT CHANGE UP
NRBC BLD AUTO-RTO: 0 /100 WBCS — SIGNIFICANT CHANGE UP (ref 0–0)
PH UR: 6.5 — SIGNIFICANT CHANGE UP (ref 5–8)
PHOSPHATE SERPL-MCNC: 2.4 MG/DL — LOW (ref 2.5–4.5)
PLATELET # BLD AUTO: 374 K/UL — SIGNIFICANT CHANGE UP (ref 150–400)
POTASSIUM SERPL-MCNC: 2.7 MMOL/L — CRITICAL LOW (ref 3.5–5.3)
POTASSIUM SERPL-MCNC: 3.4 MMOL/L — LOW (ref 3.5–5.3)
POTASSIUM SERPL-SCNC: 2.7 MMOL/L — CRITICAL LOW (ref 3.5–5.3)
POTASSIUM SERPL-SCNC: 3.4 MMOL/L — LOW (ref 3.5–5.3)
PROT SERPL-MCNC: 6.5 G/DL — SIGNIFICANT CHANGE UP (ref 6–8.3)
PROT UR-MCNC: NEGATIVE MG/DL — SIGNIFICANT CHANGE UP
PROTHROM AB SERPL-ACNC: 13.3 SEC — SIGNIFICANT CHANGE UP (ref 9.9–13.4)
RBC # BLD: 3.02 M/UL — LOW (ref 3.8–5.2)
RBC # FLD: 16.2 % — HIGH (ref 10.3–14.5)
SODIUM SERPL-SCNC: 135 MMOL/L — SIGNIFICANT CHANGE UP (ref 135–145)
SODIUM SERPL-SCNC: 135 MMOL/L — SIGNIFICANT CHANGE UP (ref 135–145)
SP GR SPEC: >1.03 — HIGH (ref 1–1.03)
TROPONIN T, HIGH SENSITIVITY RESULT: 47 NG/L — SIGNIFICANT CHANGE UP (ref 0–51)
TROPONIN T, HIGH SENSITIVITY RESULT: 52 NG/L — CRITICAL HIGH (ref 0–51)
UROBILINOGEN FLD QL: 1 MG/DL — SIGNIFICANT CHANGE UP (ref 0.2–1)
WBC # BLD: 9.27 K/UL — SIGNIFICANT CHANGE UP (ref 3.8–10.5)
WBC # FLD AUTO: 9.27 K/UL — SIGNIFICANT CHANGE UP (ref 3.8–10.5)

## 2025-06-10 PROCEDURE — 70450 CT HEAD/BRAIN W/O DYE: CPT | Mod: 26,XU

## 2025-06-10 PROCEDURE — 99285 EMERGENCY DEPT VISIT HI MDM: CPT

## 2025-06-10 PROCEDURE — 70496 CT ANGIOGRAPHY HEAD: CPT | Mod: 26

## 2025-06-10 PROCEDURE — 99222 1ST HOSP IP/OBS MODERATE 55: CPT | Mod: 25

## 2025-06-10 PROCEDURE — 71045 X-RAY EXAM CHEST 1 VIEW: CPT | Mod: 26

## 2025-06-10 PROCEDURE — 71275 CT ANGIOGRAPHY CHEST: CPT | Mod: 26

## 2025-06-10 PROCEDURE — 95816 EEG AWAKE AND DROWSY: CPT | Mod: 26

## 2025-06-10 PROCEDURE — 93308 TTE F-UP OR LMTD: CPT | Mod: 26

## 2025-06-10 PROCEDURE — 0042T: CPT

## 2025-06-10 PROCEDURE — 93010 ELECTROCARDIOGRAM REPORT: CPT

## 2025-06-10 PROCEDURE — 99222 1ST HOSP IP/OBS MODERATE 55: CPT

## 2025-06-10 PROCEDURE — 70498 CT ANGIOGRAPHY NECK: CPT | Mod: 26

## 2025-06-10 RX ORDER — MAGNESIUM SULFATE 500 MG/ML
2 SYRINGE (ML) INJECTION ONCE
Refills: 0 | Status: DISCONTINUED | OUTPATIENT
Start: 2025-06-10 | End: 2025-06-10

## 2025-06-10 RX ORDER — MAGNESIUM SULFATE 500 MG/ML
4 SYRINGE (ML) INJECTION ONCE
Refills: 0 | Status: COMPLETED | OUTPATIENT
Start: 2025-06-10 | End: 2025-06-10

## 2025-06-10 RX ORDER — ACETAMINOPHEN 500 MG/5ML
650 LIQUID (ML) ORAL ONCE
Refills: 0 | Status: COMPLETED | OUTPATIENT
Start: 2025-06-10 | End: 2025-06-10

## 2025-06-10 RX ORDER — SODIUM CHLORIDE 9 G/1000ML
1000 INJECTION, SOLUTION INTRAVENOUS ONCE
Refills: 0 | Status: COMPLETED | OUTPATIENT
Start: 2025-06-10 | End: 2025-06-10

## 2025-06-10 RX ADMIN — Medication 40 MILLIEQUIVALENT(S): at 09:36

## 2025-06-10 RX ADMIN — SODIUM CHLORIDE 1000 MILLILITER(S): 9 INJECTION, SOLUTION INTRAVENOUS at 18:15

## 2025-06-10 RX ADMIN — Medication 100 MILLIEQUIVALENT(S): at 09:47

## 2025-06-10 RX ADMIN — Medication 650 MILLIGRAM(S): at 19:01

## 2025-06-10 NOTE — CONSULT NOTE ADULT - ASSESSMENT
79F with PMHx of HTN, HLD,  hypothyroidism, generalized depression, ADHD rheumatic fever, pericarditis.  Patient was recently admitted (5/2025) and found to have a L1 compression fracture 2/2 mechanical fall presented after unwitnessed syncopal event and head trauma likely 2/2 to medication side effect vs dehydration complicated by MARY LOU, with incidental finding of RUL opacity which ultimately resulted as adenocarcinoma. Patient was scheduled for outpatient oncology follow up and thoracic surgery evaluation this week however was brought to the ER by her aid with recent slurry speech and L facial droop as an outpatient w/ neg stroke work up & normal MRI 8 days ago, now with episode of ?syncope while in chair as noted by HHA a little after 7am today after patient woke up normal, followed by slurred speech and L sided facial droop again.  Patient denies the slurred speech.  BP in field normal.  Normal BGL and EKG.  Pt back to normal per EMS who noted the slurred speech.    #NSCLC  --patient originally presented 5/3 s/p unwitnessed fall, found to have L1 compression fracture   --CT CH 5/3: 5 cm infiltrative opacity in the right upper lobe suspicious for malignancy  --MRb 5/4: No acute intracranial hemorrhage or evidence of acute ischemia  --PET CT 5/6: 1. FDG avid 3.4 x 4.9 cm groundglass opacity at the anterior right upper lobe, SUV max 4.4. A rapid increase in size since 5/3/2025 would favor an infectious process. However, exact measurement is limited in this study due to low resolution CT. Malignancy cannot be excluded.  2. Mildly FDG avid subcentimeter nodular opacities at the posterior right upper lobe and posterior left lung base.  3. Small airway disease at the peripheral left lung apex.  4. No hypermetabolic lymphadenopathy in the chest, abdomen, or pelvis.  5. Diffuse uptake throughout the gastric walls suggestive of gastritis. Correlate clinically. Increased uptake in the colon with possible wall thickening at the sigmoid colon suggestive of colitis. Correlate clinically. Diffuse uptake in the esophagus suggestive of esophagitis.  6. Severe compression deformity of vertebral body L1 with uptake suggestive of an acute or subacute process.   --pathology confirmed adenocarcinoma, d/w patient 5/8   --s/p L1 compression fx decompression 5/10, pathology negative  --patient now re-presents to ER 6/10 w/ again slurred speech and L facial droop noted by her HHA and EMS which now has resolved, stroke workup negative and head imagine without new mets, admission for cardiac monitoring and neuro evaluation   --recent MRb as an outpatient 6/2 w/ No abnormal intracranial enhancement to suggest intracranial metastasis. No acute intracranial hemorrhage, infarct, or mass effect.  --patient was scheduled to see Dr. Parra in the outpatient setting today and has evaluation with [facility] (Dr. Iqbal) this thursday, both made aware of patients ER presentation today  --will reschedule patients outpatient oncology appointment pending clinical course  --no oncology related interventions warranted at this time, rest of work up per neuro/primary team  --will continue to follow while admitted    to be d/w Dr. Parra

## 2025-06-10 NOTE — ED ADULT TRIAGE NOTE - CHIEF COMPLAINT QUOTE
79yoF BIBEMS c/o syncopal episode. Pt states she woke up feeling normal at 0700 today, Shortly after pt had syncopal episode + LOC - blood thinner use, + unknown head strike. Upon EMS arrival pt experienced a near syncopal event. EMS noticed L sided facial droop and slurring of words. Stroke code activated. Pt upgraded to MD Black

## 2025-06-10 NOTE — CONSULT NOTE ADULT - ATTENDING COMMENTS
The patient is a 79-year-old female with a recent diagnosis of lung adenocarcinoma who was scheduled for outpatient follow-up but presented with slurred speech and was admitted for further evaluation. No focal neurologic deficits are currently apparent, and the team is pursuing additional workup including EEG. Her cancer management had previously been discussed at tumor board, where it was decided she should proceed with upfront surgical resection due to insufficient tissue for molecular testing to guide neoadjuvant therapy. The thoracic surgery team has been contacted and will initiate appropriate preoperative workup. We will continue to follow and assist with coordination of oncologic care.    Medical decision-making level: This encounter involves a newly diagnosed malignancy with uncertain prognosis, coordination with a surgical team for definitive treatment, and moderate risk related to the management plan. Billing level 23823 is appropriate.
syncope, hypokalemia  telemetry monitoring, TTE  replete K  rest as above
none

## 2025-06-10 NOTE — ED ADULT NURSE NOTE - NSSUHOSCREENINGYN_ED_ALL_ED
Indication: rate change     As per MD Fenton, device changed from DDD 80bpm to [id number] Yes - the patient is able to be screened

## 2025-06-10 NOTE — CONSULT NOTE ADULT - SUBJECTIVE AND OBJECTIVE BOX
==========ICU Consult ==========    Consult reason: syncope and hypokalemia    79 y.o F w/ PMHx of         PAST MEDICAL & SURGICAL HISTORY:  Osteoarthritis      Depression      HTN (hypertension)      HLD (hyperlipidemia)      Hypothyroidism      No significant past surgical history          PAST MEDICAL & SURGICAL HISTORY:  Osteoarthritis    Depression    HTN (hypertension)    HLD (hyperlipidemia)    Hypothyroidism    No significant past surgical history          Allergies    No Known Drug Allergies  Severe IgA deficency (Other)    Intolerances      Home Medications:  acetaminophen 500 mg oral tablet: 2 tab(s) orally every 8 hours (14 May 2025 15:51)  acyclovir 400 mg oral tablet: 1 tab(s) orally once a day (14 May 2025 16:15)  Adderall: orally as prescribed by psychiatrist (14 May 2025 16:19)  amLODIPine 5 mg oral tablet: 1 tab(s) orally once a day (14 May 2025 16:24)  calcitriol 0.25 mcg oral capsule: 1 cap(s) orally once a day (14 May 2025 16:24)  carvedilol 6.25 mg oral tablet: 1 tab(s) orally every 12 hours (14 May 2025 16:24)  clonazePAM:  (14 May 2025 16:19)  enoxaparin 30 mg/0.3 mL injectable solution: 30 injectable once a day (at bedtime) for DVT ppx at rehab, stop when ambulatory (14 May 2025 16:24)  klonopin: as prescribed by psychiatrist (14 May 2025 16:19)  Lipitor 80 mg oral tablet: 1 tab(s) orally once a day (14 May 2025 16:21)  oxyCODONE 10 mg oral tablet: 1 tab(s) orally every 4 hours As needed Severe Pain (7 - 10) (14 May 2025 15:51)  oxyCODONE 5 mg oral tablet: 1 tab(s) orally every 4 hours As needed Moderate Pain (4 - 6) (14 May 2025 15:51)  sodium bicarbonate 650 mg oral tablet: 1 tab(s) orally 3 times a day (14 May 2025 16:24)  Synthroid 75 mcg (0.075 mg) oral tablet: 1 tab(s) orally once a day (14 May 2025 16:15)  Wellbutrin  mg/24 hours oral tablet, extended release: 3 tab(s) orally once a day (at bedtime) (14 May 2025 16:14)        PAST MEDICAL & SURGICAL HISTORY:  Osteoarthritis      Depression      HTN (hypertension)      HLD (hyperlipidemia)      Hypothyroidism      No significant past surgical history          FAMILY HISTORY:  No pertinent family history in first degree relatives    :      ROS:  See HPI     ICU Vital Signs Last 24 Hrs  T(C): 36.6 (10 Danny 2025 10:19), Max: 36.6 (10 Danny 2025 08:36)  T(F): 97.8 (10 Danny 2025 10:19), Max: 97.9 (10 Danny 2025 08:36)  HR: 105 (10 Danny 2025 13:36) (78 - 105)  BP: 111/56 (10 Danny 2025 13:36) (90/55 - 149/66)  BP(mean): --  ABP: --  ABP(mean): --  RR: 18 (10 Danny 2025 13:36) (18 - 18)  SpO2: 94% (10 Danny 2025 13:36) (94% - 97%)    O2 Parameters below as of 10 Danny 2025 13:36  Patient On (Oxygen Delivery Method): room air            PHYSICAL EXAM  General: NAD  Head: NC/AT; MMM; PERRL; EOMI;  Neck: Supple; no JVD  Respiratory: CTAB; no wheezes/rales/rhonchi  Cardiovascular: Regular rhythm/rate; S1/S2+, no murmurs, rubs gallops   Gastrointestinal: Soft; NTND; bowel sounds normal and present  Extremities: WWP; no edema/cyanosis  Neurological: A&Ox3, CNII-XII grossly intact; no obvious focal deficits  Skin: Clean and intact. Good skin turgor. Without open wounds and sores          LABS:                          9.3    9.27  )-----------( 374      ( 10 Danny 2025 08:49 )             27.5                                               06-10    135  |  94[L]  |  14  ----------------------------<  106[H]  2.7[LL]   |  25  |  1.33[H]    Ca    8.0[L]      10 Danny 2025 08:49    TPro  6.5  /  Alb  3.2[L]  /  TBili  0.6  /  DBili  x   /  AST  26  /  ALT  11  /  AlkPhos  230[H]  06-10      PT/INR - ( 10 Danny 2025 08:49 )   PT: 13.3 sec;   INR: 1.16          PTT - ( 10 Danny 2025 08:49 )  PTT:27.3 sec                                       Urinalysis Basic - ( 10 Danny 2025 13:25 )    Color: Yellow / Appearance: Clear / SG: >1.030 / pH: x  Gluc: x / Ketone: x  / Bili: Negative / Urobili: 1.0 mg/dL   Blood: x / Protein: Negative mg/dL / Nitrite: Negative   Leuk Esterase: Negative / RBC: x / WBC x   Sq Epi: x / Non Sq Epi: x / Bacteria: x                                                  LIVER FUNCTIONS - ( 10 Danny 2025 08:49 )  Alb: 3.2 g/dL / Pro: 6.5 g/dL / ALK PHOS: 230 U/L / ALT: 11 U/L / AST: 26 U/L / GGT: x                                                  Urinalysis with Rflx Culture (collected 10 Danny 2025 13:25)                                                                                           MEDICATIONS  (STANDING):    MEDICATIONS  (PRN):         ==========ICU Consult ==========    Consult reason: syncope and hypokalemia    79 y.o F w/ PMHx of  HTN, HLD,  hypothyroidism, generalized depression, ADHD rheumatic fever, pericarditis, IgA def and recent fall with L1 compression fracture (5/10/25) Spine Surgery T11-L3 decompression/PSF with Dr. Barcenas and . Pt presenting again for another episode of syncope while at home. ICU consulted         PAST MEDICAL & SURGICAL HISTORY:  Osteoarthritis      Depression      HTN (hypertension)      HLD (hyperlipidemia)      Hypothyroidism      No significant past surgical history          PAST MEDICAL & SURGICAL HISTORY:  Osteoarthritis    Depression    HTN (hypertension)    HLD (hyperlipidemia)    Hypothyroidism    No significant past surgical history          Allergies    No Known Drug Allergies  Severe IgA deficency (Other)    Intolerances      Home Medications:  acetaminophen 500 mg oral tablet: 2 tab(s) orally every 8 hours (14 May 2025 15:51)  acyclovir 400 mg oral tablet: 1 tab(s) orally once a day (14 May 2025 16:15)  Adderall: orally as prescribed by psychiatrist (14 May 2025 16:19)  amLODIPine 5 mg oral tablet: 1 tab(s) orally once a day (14 May 2025 16:24)  calcitriol 0.25 mcg oral capsule: 1 cap(s) orally once a day (14 May 2025 16:24)  carvedilol 6.25 mg oral tablet: 1 tab(s) orally every 12 hours (14 May 2025 16:24)  clonazePAM:  (14 May 2025 16:19)  enoxaparin 30 mg/0.3 mL injectable solution: 30 injectable once a day (at bedtime) for DVT ppx at rehab, stop when ambulatory (14 May 2025 16:24)  klonopin: as prescribed by psychiatrist (14 May 2025 16:19)  Lipitor 80 mg oral tablet: 1 tab(s) orally once a day (14 May 2025 16:21)  oxyCODONE 10 mg oral tablet: 1 tab(s) orally every 4 hours As needed Severe Pain (7 - 10) (14 May 2025 15:51)  oxyCODONE 5 mg oral tablet: 1 tab(s) orally every 4 hours As needed Moderate Pain (4 - 6) (14 May 2025 15:51)  sodium bicarbonate 650 mg oral tablet: 1 tab(s) orally 3 times a day (14 May 2025 16:24)  Synthroid 75 mcg (0.075 mg) oral tablet: 1 tab(s) orally once a day (14 May 2025 16:15)  Wellbutrin  mg/24 hours oral tablet, extended release: 3 tab(s) orally once a day (at bedtime) (14 May 2025 16:14)        PAST MEDICAL & SURGICAL HISTORY:  Osteoarthritis      Depression      HTN (hypertension)      HLD (hyperlipidemia)      Hypothyroidism      No significant past surgical history          FAMILY HISTORY:  No pertinent family history in first degree relatives    :      ROS:  See HPI     ICU Vital Signs Last 24 Hrs  T(C): 36.6 (10 Danny 2025 10:19), Max: 36.6 (10 Danny 2025 08:36)  T(F): 97.8 (10 Danny 2025 10:19), Max: 97.9 (10 Danny 2025 08:36)  HR: 105 (10 Danny 2025 13:36) (78 - 105)  BP: 111/56 (10 Danny 2025 13:36) (90/55 - 149/66)  BP(mean): --  ABP: --  ABP(mean): --  RR: 18 (10 Danny 2025 13:36) (18 - 18)  SpO2: 94% (10 Danny 2025 13:36) (94% - 97%)    O2 Parameters below as of 10 Danny 2025 13:36  Patient On (Oxygen Delivery Method): room air            PHYSICAL EXAM  General: NAD  Head: NC/AT; MMM; PERRL; EOMI;  Neck: Supple; no JVD  Respiratory: CTAB; no wheezes/rales/rhonchi  Cardiovascular: Regular rhythm/rate; S1/S2+, no murmurs, rubs gallops   Gastrointestinal: Soft; NTND; bowel sounds normal and present  Extremities: WWP; no edema/cyanosis  Neurological: A&Ox3, CNII-XII grossly intact; no obvious focal deficits  Skin: Clean and intact. Good skin turgor. Without open wounds and sores          LABS:                          9.3    9.27  )-----------( 374      ( 10 Danny 2025 08:49 )             27.5                                               06-10    135  |  94[L]  |  14  ----------------------------<  106[H]  2.7[LL]   |  25  |  1.33[H]    Ca    8.0[L]      10 Danny 2025 08:49    TPro  6.5  /  Alb  3.2[L]  /  TBili  0.6  /  DBili  x   /  AST  26  /  ALT  11  /  AlkPhos  230[H]  06-10      PT/INR - ( 10 Danny 2025 08:49 )   PT: 13.3 sec;   INR: 1.16          PTT - ( 10 Danny 2025 08:49 )  PTT:27.3 sec                                       Urinalysis Basic - ( 10 Danny 2025 13:25 )    Color: Yellow / Appearance: Clear / SG: >1.030 / pH: x  Gluc: x / Ketone: x  / Bili: Negative / Urobili: 1.0 mg/dL   Blood: x / Protein: Negative mg/dL / Nitrite: Negative   Leuk Esterase: Negative / RBC: x / WBC x   Sq Epi: x / Non Sq Epi: x / Bacteria: x                                                  LIVER FUNCTIONS - ( 10 Danny 2025 08:49 )  Alb: 3.2 g/dL / Pro: 6.5 g/dL / ALK PHOS: 230 U/L / ALT: 11 U/L / AST: 26 U/L / GGT: x                                                  Urinalysis with Rflx Culture (collected 10 Danny 2025 13:25)                                                                                           MEDICATIONS  (STANDING):    MEDICATIONS  (PRN):         ==========ICU Consult ==========    Consult reason: syncope and hypokalemia    79 y.o F w/ PMHx of  HTN, HLD,  hypothyroidism, generalized depression, ADHD rheumatic fever, pericarditis, IgA def and recent fall with L1 compression fracture (5/10/25) Spine Surgery T11-L3 decompression/PSF with Dr. Barcenas and . Pt presenting again for another episode of syncope while at home. ICU consulted for hypokalemia         PAST MEDICAL & SURGICAL HISTORY:  Osteoarthritis      Depression      HTN (hypertension)      HLD (hyperlipidemia)      Hypothyroidism      No significant past surgical history          PAST MEDICAL & SURGICAL HISTORY:  Osteoarthritis    Depression    HTN (hypertension)    HLD (hyperlipidemia)    Hypothyroidism    No significant past surgical history          Allergies    No Known Drug Allergies  Severe IgA deficency (Other)    Intolerances      Home Medications:  acetaminophen 500 mg oral tablet: 2 tab(s) orally every 8 hours (14 May 2025 15:51)  acyclovir 400 mg oral tablet: 1 tab(s) orally once a day (14 May 2025 16:15)  Adderall: orally as prescribed by psychiatrist (14 May 2025 16:19)  amLODIPine 5 mg oral tablet: 1 tab(s) orally once a day (14 May 2025 16:24)  calcitriol 0.25 mcg oral capsule: 1 cap(s) orally once a day (14 May 2025 16:24)  carvedilol 6.25 mg oral tablet: 1 tab(s) orally every 12 hours (14 May 2025 16:24)  clonazePAM:  (14 May 2025 16:19)  enoxaparin 30 mg/0.3 mL injectable solution: 30 injectable once a day (at bedtime) for DVT ppx at rehab, stop when ambulatory (14 May 2025 16:24)  klonopin: as prescribed by psychiatrist (14 May 2025 16:19)  Lipitor 80 mg oral tablet: 1 tab(s) orally once a day (14 May 2025 16:21)  oxyCODONE 10 mg oral tablet: 1 tab(s) orally every 4 hours As needed Severe Pain (7 - 10) (14 May 2025 15:51)  oxyCODONE 5 mg oral tablet: 1 tab(s) orally every 4 hours As needed Moderate Pain (4 - 6) (14 May 2025 15:51)  sodium bicarbonate 650 mg oral tablet: 1 tab(s) orally 3 times a day (14 May 2025 16:24)  Synthroid 75 mcg (0.075 mg) oral tablet: 1 tab(s) orally once a day (14 May 2025 16:15)  Wellbutrin  mg/24 hours oral tablet, extended release: 3 tab(s) orally once a day (at bedtime) (14 May 2025 16:14)        PAST MEDICAL & SURGICAL HISTORY:  Osteoarthritis      Depression      HTN (hypertension)      HLD (hyperlipidemia)      Hypothyroidism      No significant past surgical history          FAMILY HISTORY:  No pertinent family history in first degree relatives    :      ROS:  See HPI     ICU Vital Signs Last 24 Hrs  T(C): 36.6 (10 Danny 2025 10:19), Max: 36.6 (10 Danny 2025 08:36)  T(F): 97.8 (10 Danny 2025 10:19), Max: 97.9 (10 Danny 2025 08:36)  HR: 105 (10 Danny 2025 13:36) (78 - 105)  BP: 111/56 (10 Danny 2025 13:36) (90/55 - 149/66)  BP(mean): --  ABP: --  ABP(mean): --  RR: 18 (10 Danny 2025 13:36) (18 - 18)  SpO2: 94% (10 Danny 2025 13:36) (94% - 97%)    O2 Parameters below as of 10 Danny 2025 13:36  Patient On (Oxygen Delivery Method): room air            PHYSICAL EXAM  General: NAD  Head: NC/AT; MMM; PERRL; EOMI;  Neck: Supple; no JVD  Respiratory: CTAB; no wheezes/rales/rhonchi  Cardiovascular: Regular rhythm/rate; S1/S2+, no murmurs, rubs gallops   Gastrointestinal: Soft; NTND; bowel sounds normal and present  Extremities: WWP; no edema/cyanosis  Neurological: A&Ox3, CNII-XII grossly intact; no obvious focal deficits  Skin: Clean and intact. Good skin turgor. Without open wounds and sores          LABS:                          9.3    9.27  )-----------( 374      ( 10 Danny 2025 08:49 )             27.5                                               06-10    135  |  94[L]  |  14  ----------------------------<  106[H]  2.7[LL]   |  25  |  1.33[H]    Ca    8.0[L]      10 Danny 2025 08:49    TPro  6.5  /  Alb  3.2[L]  /  TBili  0.6  /  DBili  x   /  AST  26  /  ALT  11  /  AlkPhos  230[H]  06-10      PT/INR - ( 10 Danny 2025 08:49 )   PT: 13.3 sec;   INR: 1.16          PTT - ( 10 Danny 2025 08:49 )  PTT:27.3 sec                                       Urinalysis Basic - ( 10 Danny 2025 13:25 )    Color: Yellow / Appearance: Clear / SG: >1.030 / pH: x  Gluc: x / Ketone: x  / Bili: Negative / Urobili: 1.0 mg/dL   Blood: x / Protein: Negative mg/dL / Nitrite: Negative   Leuk Esterase: Negative / RBC: x / WBC x   Sq Epi: x / Non Sq Epi: x / Bacteria: x                                                  LIVER FUNCTIONS - ( 10 Danny 2025 08:49 )  Alb: 3.2 g/dL / Pro: 6.5 g/dL / ALK PHOS: 230 U/L / ALT: 11 U/L / AST: 26 U/L / GGT: x                                                  Urinalysis with Rflx Culture (collected 10 Danny 2025 13:25)                                                                                           MEDICATIONS  (STANDING):    MEDICATIONS  (PRN):         ==========ICU Consult ==========    Consult reason: syncope and hypokalemia    79 y.o F w/ PMHx of  HTN, HLD,  hypothyroidism, generalized depression, ADHD rheumatic fever, pericarditis, IgA def and recent fall with L1 compression fracture (5/10/25) Spine Surgery T11-L3 decompression/PSF with Dr. Barcenas and lung adenocarcinoma undergoing outpt workup with onc.  Pt presenting again for another episode of syncope while at home. ICU consulted for hypokalemia and recurrent syncopal episodes.         PAST MEDICAL & SURGICAL HISTORY:  Osteoarthritis      Depression      HTN (hypertension)      HLD (hyperlipidemia)      Hypothyroidism      No significant past surgical history          PAST MEDICAL & SURGICAL HISTORY:  Osteoarthritis    Depression    HTN (hypertension)    HLD (hyperlipidemia)    Hypothyroidism    No significant past surgical history          Allergies    No Known Drug Allergies  Severe IgA deficency (Other)    Intolerances      Home Medications:  acetaminophen 500 mg oral tablet: 2 tab(s) orally every 8 hours (14 May 2025 15:51)  acyclovir 400 mg oral tablet: 1 tab(s) orally once a day (14 May 2025 16:15)  Adderall: orally as prescribed by psychiatrist (14 May 2025 16:19)  amLODIPine 5 mg oral tablet: 1 tab(s) orally once a day (14 May 2025 16:24)  calcitriol 0.25 mcg oral capsule: 1 cap(s) orally once a day (14 May 2025 16:24)  carvedilol 6.25 mg oral tablet: 1 tab(s) orally every 12 hours (14 May 2025 16:24)  clonazePAM:  (14 May 2025 16:19)  enoxaparin 30 mg/0.3 mL injectable solution: 30 injectable once a day (at bedtime) for DVT ppx at rehab, stop when ambulatory (14 May 2025 16:24)  klonopin: as prescribed by psychiatrist (14 May 2025 16:19)  Lipitor 80 mg oral tablet: 1 tab(s) orally once a day (14 May 2025 16:21)  oxyCODONE 10 mg oral tablet: 1 tab(s) orally every 4 hours As needed Severe Pain (7 - 10) (14 May 2025 15:51)  oxyCODONE 5 mg oral tablet: 1 tab(s) orally every 4 hours As needed Moderate Pain (4 - 6) (14 May 2025 15:51)  sodium bicarbonate 650 mg oral tablet: 1 tab(s) orally 3 times a day (14 May 2025 16:24)  Synthroid 75 mcg (0.075 mg) oral tablet: 1 tab(s) orally once a day (14 May 2025 16:15)  Wellbutrin  mg/24 hours oral tablet, extended release: 3 tab(s) orally once a day (at bedtime) (14 May 2025 16:14)        PAST MEDICAL & SURGICAL HISTORY:  Osteoarthritis      Depression      HTN (hypertension)      HLD (hyperlipidemia)      Hypothyroidism      No significant past surgical history          FAMILY HISTORY:  No pertinent family history in first degree relatives    :      ROS:  See HPI     ICU Vital Signs Last 24 Hrs  T(C): 36.6 (10 Danny 2025 10:19), Max: 36.6 (10 Danny 2025 08:36)  T(F): 97.8 (10 Danny 2025 10:19), Max: 97.9 (10 Danny 2025 08:36)  HR: 105 (10 Danny 2025 13:36) (78 - 105)  BP: 111/56 (10 Danny 2025 13:36) (90/55 - 149/66)  BP(mean): --  ABP: --  ABP(mean): --  RR: 18 (10 Danny 2025 13:36) (18 - 18)  SpO2: 94% (10 Danny 2025 13:36) (94% - 97%)    O2 Parameters below as of 10 Danny 2025 13:36  Patient On (Oxygen Delivery Method): room air            PHYSICAL EXAM  General: NAD  Head: NC/AT; MMM; PERRL; EOMI;  Neck: Supple; no JVD  Respiratory: CTAB; no wheezes/rales/rhonchi  Cardiovascular: Regular rhythm/rate; S1/S2+, no murmurs, rubs gallops   Gastrointestinal: Soft; NTND; bowel sounds normal and present  Extremities: WWP; no edema/cyanosis  Neurological: A&Ox3, CNII-XII grossly intact; no obvious focal deficits  Skin: Clean and intact. Good skin turgor. Without open wounds and sores          LABS:                          9.3    9.27  )-----------( 374      ( 10 Danny 2025 08:49 )             27.5                                               06-10    135  |  94[L]  |  14  ----------------------------<  106[H]  2.7[LL]   |  25  |  1.33[H]    Ca    8.0[L]      10 Danny 2025 08:49    TPro  6.5  /  Alb  3.2[L]  /  TBili  0.6  /  DBili  x   /  AST  26  /  ALT  11  /  AlkPhos  230[H]  06-10      PT/INR - ( 10 Danny 2025 08:49 )   PT: 13.3 sec;   INR: 1.16          PTT - ( 10 Danny 2025 08:49 )  PTT:27.3 sec                                       Urinalysis Basic - ( 10 Danny 2025 13:25 )    Color: Yellow / Appearance: Clear / SG: >1.030 / pH: x  Gluc: x / Ketone: x  / Bili: Negative / Urobili: 1.0 mg/dL   Blood: x / Protein: Negative mg/dL / Nitrite: Negative   Leuk Esterase: Negative / RBC: x / WBC x   Sq Epi: x / Non Sq Epi: x / Bacteria: x                                                  LIVER FUNCTIONS - ( 10 Danny 2025 08:49 )  Alb: 3.2 g/dL / Pro: 6.5 g/dL / ALK PHOS: 230 U/L / ALT: 11 U/L / AST: 26 U/L / GGT: x                                                  Urinalysis with Rflx Culture (collected 10 Danny 2025 13:25)                                                                                           MEDICATIONS  (STANDING):    MEDICATIONS  (PRN):

## 2025-06-10 NOTE — CONSULT NOTE ADULT - SUBJECTIVE AND OBJECTIVE BOX
**STROKE CONSULT NOTE**    Last known well time: 6/10/2025 at 7 am    HPI: 79y Female with PMHx of     T(C): 36.6 (06-10-25 @ 10:19), Max: 36.6 (06-10-25 @ 08:36)  HR: 105 (06-10-25 @ 13:36) (78 - 105)  BP: 111/56 (06-10-25 @ 13:36) (90/55 - 149/66)  RR: 18 (06-10-25 @ 13:36) (18 - 18)  SpO2: 94% (06-10-25 @ 13:36) (94% - 97%)    PAST MEDICAL & SURGICAL HISTORY:  Osteoarthritis      Depression      HTN (hypertension)      HLD (hyperlipidemia)      Hypothyroidism      No significant past surgical history          FAMILY HISTORY:  No pertinent family history in first degree relatives        SOCIAL HISTORY:   Patient lives with *** at ***.   Smoking status:  Drinking:  Drug Use:     ROS: ***  Constitutional: No fever, weight loss or fatigue  Eyes: No eye pain, visual disturbances, or discharge  ENMT:  No difficulty hearing, tinnitus; No sinus or throat pain  Neck: No pain or stiffness  Respiratory: No cough, wheezing, chills or hemoptysis  Cardiovascular: No chest pain, palpitations, shortness of breath, or leg swelling  Gastrointestinal: No abdominal pain. No nausea, vomiting or hematemesis; No diarrhea or constipation. Nohematochezia.  Genitourinary: No dysuria, frequency, hematuria or incontinence  Neurological: As per HPI  Skin: No itching, burning, rashes or lesions   Endocrine: No heat or cold intolerance; No hair loss  Musculoskeletal: No joint pain or swelling; No muscle, back or extremity pain  Heme/Lymph: No easy bruising or bleeding gums    MEDICATIONS  (STANDING):    MEDICATIONS  (PRN):    Allergies    No Known Drug Allergies  Severe IgA deficency (Other)    Intolerances      Vital Signs Last 24 Hrs  T(C): 36.6 (10 Danny 2025 10:19), Max: 36.6 (10 Danny 2025 08:36)  T(F): 97.8 (10 Danny 2025 10:19), Max: 97.9 (10 Danny 2025 08:36)  HR: 105 (10 Danny 2025 13:36) (78 - 105)  BP: 111/56 (10 Danny 2025 13:36) (90/55 - 149/66)  BP(mean): --  RR: 18 (10 Danny 2025 13:36) (18 - 18)  SpO2: 94% (10 Danny 2025 13:36) (94% - 97%)    Parameters below as of 10 Danny 2025 13:36  Patient On (Oxygen Delivery Method): room air        Physical exam:  Constitutional: No acute distress, conversant  Eyes: Anicteric sclerae, moist conjunctivae, see below for CNs  Neck: trachea midline, FROM, supple, no thyromegaly or lymphadenopathy  Cardiovascular: Regular rate and rhythm, no murmurs, rubs, or gallops. No carotid bruits.   Pulmonary: Anterior breath sounds clear bilaterally, no crackles or wheezing. No use of accessory muscles  GI: Abdomen soft, non-distended, non-tender  Extremities: Radial and DP pulses +2, no edema    Neurologic:  -Mental status: Awake, alert, oriented to person, age, and month. Speech is fluent with intact naming, repetition, and comprehension, no dysarthria. Recent and remote memory intact. Follows commands. Attention/concentration intact. Fund of knowledge appropriate.  -Cranial nerves:   II: Visual fields are full to confrontation.  III, IV, VI: Extraocular movements are intact without nystagmus. Pupils equally round and reactive to light  V:  Facial sensation V1-V3 equal and intact   VII: Face is symmetric with normal eye closure and smile  VIII: Hearing is bilaterally intact to finger rub  IX, X: Uvula is midline and soft palate rises symmetrically  XI: Head turning and shoulder shrug are intact.  XII: Tongue protrudes midline  Motor: Normal bulk and tone. No pronator drift. Strength bilateral upper extremity 5/5, bilateral lower extremities 5/5.  Rapid alternating movements intact and symmetric  Sensation: Intact to light touch bilaterally. No neglect or extinction on double simultaneous testing.  Coordination: No dysmetria on finger-to-nose and heel-to-shin bilaterally  Reflexes: Downgoing toes bilaterally   Gait: Narrow gait and steady    NIHSS: **** ASPECT Score: ***** ICH Score: ****** (GCS)    Fingerstick Blood Glucose: CAPILLARY BLOOD GLUCOSE      POCT Blood Glucose.: 119 mg/dL (10 Danny 2025 08:34)    LABS:                        9.3    9.27  )-----------( 374      ( 10 Danny 2025 08:49 )             27.5     06-10    135  |  94[L]  |  14  ----------------------------<  106[H]  2.7[LL]   |  25  |  1.33[H]    Ca    8.0[L]      10 Danny 2025 08:49    TPro  6.5  /  Alb  3.2[L]  /  TBili  0.6  /  DBili  x   /  AST  26  /  ALT  11  /  AlkPhos  230[H]  06-10    PT/INR - ( 10 Danny 2025 08:49 )   PT: 13.3 sec;   INR: 1.16          PTT - ( 10 Danny 2025 08:49 )  PTT:27.3 sec      Urinalysis Basic - ( 10 Danny 2025 13:25 )    Color: Yellow / Appearance: Clear / SG: >1.030 / pH: x  Gluc: x / Ketone: x  / Bili: Negative / Urobili: 1.0 mg/dL   Blood: x / Protein: Negative mg/dL / Nitrite: Negative   Leuk Esterase: Negative / RBC: x / WBC x   Sq Epi: x / Non Sq Epi: x / Bacteria: x        RADIOLOGY & ADDITIONAL STUDIES:      -----------------------------------------------------------------------------------------------------------------  IV-thrombolytic (Y/N):    ***                              Bolus time:    Tenecteplase Dose Verification w/ RN:  Reason thrombolytic not given: ***    **STROKE CONSULT NOTE**    Last known well time: 6/10/2025 at 7 am    HPI: 79y Female with PMHx of IgA deficiency, HTN, HLD, hypothyroidism, ADHD, depression, and L1 compression fracture (April 2025) presented with c/o inattentiveness and dysarthria. Per HHA, patient had an episode of inattentiveness with her overnight aid, where they were speaking to her and the patient appeared to space out and be unresponsive. HHA also noted dysarthria. Patient states that she doesn't remember this happening, and doesn't recall the event. HHA also reports that patient needed to use the bathroom, and assisted her, HHA left her for 5 minutes and called out to the patient, noted patient didn't respond. She went to check in on the patient     Cardu     T(C): 36.6 (06-10-25 @ 10:19), Max: 36.6 (06-10-25 @ 08:36)  HR: 105 (06-10-25 @ 13:36) (78 - 105)  BP: 111/56 (06-10-25 @ 13:36) (90/55 - 149/66)  RR: 18 (06-10-25 @ 13:36) (18 - 18)  SpO2: 94% (06-10-25 @ 13:36) (94% - 97%)    PAST MEDICAL & SURGICAL HISTORY:  Osteoarthritis      Depression      HTN (hypertension)      HLD (hyperlipidemia)      Hypothyroidism      No significant past surgical history          FAMILY HISTORY:  No pertinent family history in first degree relatives        SOCIAL HISTORY:   Patient lives with *** at ***.   Smoking status:  Drinking:  Drug Use:     ROS: ***  Constitutional: No fever, weight loss or fatigue  Eyes: No eye pain, visual disturbances, or discharge  ENMT:  No difficulty hearing, tinnitus; No sinus or throat pain  Neck: No pain or stiffness  Respiratory: No cough, wheezing, chills or hemoptysis  Cardiovascular: No chest pain, palpitations, shortness of breath, or leg swelling  Gastrointestinal: No abdominal pain. No nausea, vomiting or hematemesis; No diarrhea or constipation. Nohematochezia.  Genitourinary: No dysuria, frequency, hematuria or incontinence  Neurological: As per HPI  Skin: No itching, burning, rashes or lesions   Endocrine: No heat or cold intolerance; No hair loss  Musculoskeletal: No joint pain or swelling; No muscle, back or extremity pain  Heme/Lymph: No easy bruising or bleeding gums    MEDICATIONS  (STANDING):    MEDICATIONS  (PRN):    Allergies    No Known Drug Allergies  Severe IgA deficency (Other)    Intolerances      Vital Signs Last 24 Hrs  T(C): 36.6 (10 Danny 2025 10:19), Max: 36.6 (10 Danny 2025 08:36)  T(F): 97.8 (10 Danny 2025 10:19), Max: 97.9 (10 Danny 2025 08:36)  HR: 105 (10 Danny 2025 13:36) (78 - 105)  BP: 111/56 (10 Danny 2025 13:36) (90/55 - 149/66)  BP(mean): --  RR: 18 (10 Danny 2025 13:36) (18 - 18)  SpO2: 94% (10 Danny 2025 13:36) (94% - 97%)    Parameters below as of 10 Danny 2025 13:36  Patient On (Oxygen Delivery Method): room air        Physical exam:  Constitutional: No acute distress, conversant  Eyes: Anicteric sclerae, moist conjunctivae, see below for CNs  Neck: trachea midline, FROM, supple, no thyromegaly or lymphadenopathy  Cardiovascular: Regular rate and rhythm, no murmurs, rubs, or gallops. No carotid bruits.   Pulmonary: Anterior breath sounds clear bilaterally, no crackles or wheezing. No use of accessory muscles  GI: Abdomen soft, non-distended, non-tender  Extremities: Radial and DP pulses +2, no edema    Neurologic:  -Mental status: Awake, alert, oriented to person, age, and month. Speech is fluent with intact naming, repetition, and comprehension, no dysarthria. Recent and remote memory intact. Follows commands. Attention/concentration intact. Fund of knowledge appropriate.  -Cranial nerves:   II: Visual fields are full to confrontation.  III, IV, VI: Extraocular movements are intact without nystagmus. Pupils equally round and reactive to light  V:  Facial sensation V1-V3 equal and intact   VII: Face is symmetric with normal eye closure and smile  VIII: Hearing is bilaterally intact to finger rub  IX, X: Uvula is midline and soft palate rises symmetrically  XI: Head turning and shoulder shrug are intact.  XII: Tongue protrudes midline  Motor: Normal bulk and tone. No pronator drift. Strength bilateral upper extremity 5/5, bilateral lower extremities 5/5.  Rapid alternating movements intact and symmetric  Sensation: Intact to light touch bilaterally. No neglect or extinction on double simultaneous testing.  Coordination: No dysmetria on finger-to-nose and heel-to-shin bilaterally  Reflexes: Downgoing toes bilaterally   Gait: Narrow gait and steady    NIHSS: **** ASPECT Score: ***** ICH Score: ****** (GCS)    Fingerstick Blood Glucose: CAPILLARY BLOOD GLUCOSE      POCT Blood Glucose.: 119 mg/dL (10 Danny 2025 08:34)    LABS:                        9.3    9.27  )-----------( 374      ( 10 Dnany 2025 08:49 )             27.5     06-10    135  |  94[L]  |  14  ----------------------------<  106[H]  2.7[LL]   |  25  |  1.33[H]    Ca    8.0[L]      10 Danny 2025 08:49    TPro  6.5  /  Alb  3.2[L]  /  TBili  0.6  /  DBili  x   /  AST  26  /  ALT  11  /  AlkPhos  230[H]  06-10    PT/INR - ( 10 Danny 2025 08:49 )   PT: 13.3 sec;   INR: 1.16          PTT - ( 10 Danny 2025 08:49 )  PTT:27.3 sec      Urinalysis Basic - ( 10 Danny 2025 13:25 )    Color: Yellow / Appearance: Clear / SG: >1.030 / pH: x  Gluc: x / Ketone: x  / Bili: Negative / Urobili: 1.0 mg/dL   Blood: x / Protein: Negative mg/dL / Nitrite: Negative   Leuk Esterase: Negative / RBC: x / WBC x   Sq Epi: x / Non Sq Epi: x / Bacteria: x        RADIOLOGY & ADDITIONAL STUDIES:      -----------------------------------------------------------------------------------------------------------------  IV-thrombolytic (Y/N):    ***                              Bolus time:    Tenecteplase Dose Verification w/ RN:  Reason thrombolytic not given: ***    **STROKE CONSULT NOTE**    Last known well time: 6/10/2025 at 7 am    HPI: 79y Female with PMHx of IgA deficiency, HTN, HLD, hypothyroidism, ADHD, depression, and L1 compression fracture (April 2025) presented with c/o inattentiveness and dysarthria. Per HHA, patient had an episode of inattentiveness with her overnight aid Cardu, where they were speaking to her and the patient appeared to space out and be unresponsive. HHA also noted dysarthria. Patient states that she doesn't remember this happening, and doesn't recall the event. HHA also reports that patient needed to use the bathroom, and assisted her, HHA left her for 5 minutes and called out to the patient, noted patient didn't respond. She went to check in on the patient and noted patient was not responding to her. HHA called EMS and patient had improved by the time they arrived. Denies head trauma, soiling her self, urinating on herself, dysuria, pyuria, chest pain, and fevers. mRS 4. HCT without hemorrhage or stroke, no occlusion on CTA. Patient within window for TNK, but deferred as patient symptoms had resolved at arrival. Thrombectomy deferred given no LVO. Patient was previously evaluated by stroke team during last admission in May 2025. Patient had MR brain completed 6/2/2025 and was negative for stroke. At that time, patient had a similar episode.       T(C): 36.6 (06-10-25 @ 10:19), Max: 36.6 (06-10-25 @ 08:36)  HR: 105 (06-10-25 @ 13:36) (78 - 105)  BP: 111/56 (06-10-25 @ 13:36) (90/55 - 149/66)  RR: 18 (06-10-25 @ 13:36) (18 - 18)  SpO2: 94% (06-10-25 @ 13:36) (94% - 97%)    PAST MEDICAL & SURGICAL HISTORY:  Osteoarthritis      Depression      HTN (hypertension)      HLD (hyperlipidemia)      Hypothyroidism      No significant past surgical history          FAMILY HISTORY:  No pertinent family history in first degree relatives        SOCIAL HISTORY:   Patient lives at home and has 24 hour HHA   Smoking status: previous smoker, stopped in 1982   Drinking: None   Drug Use: None     ROS:   Constitutional: No fever, weight loss or fatigue  Eyes: No eye pain, visual disturbances, or discharge  ENMT:  No difficulty hearing, tinnitus; No sinus or throat pain  Neck: No pain or stiffness  Respiratory: No cough, wheezing, chills or hemoptysis  Cardiovascular: No chest pain, palpitations, shortness of breath, or leg swelling  Gastrointestinal: No abdominal pain. No nausea, vomiting or hematemesis; No diarrhea or constipation. Nohematochezia.  Genitourinary: No dysuria, frequency, hematuria or incontinence  Neurological: As per HPI  Skin: No itching, burning, rashes or lesions   Endocrine: No heat or cold intolerance; No hair loss  Musculoskeletal: No joint pain or swelling; No muscle, back or extremity pain  Heme/Lymph: No easy bruising or bleeding gums    MEDICATIONS  (STANDING):    MEDICATIONS  (PRN):    Allergies    No Known Drug Allergies  Severe IgA deficency (Other)    Intolerances      Vital Signs Last 24 Hrs  T(C): 36.6 (10 Danny 2025 10:19), Max: 36.6 (10 Danny 2025 08:36)  T(F): 97.8 (10 Danny 2025 10:19), Max: 97.9 (10 Danny 2025 08:36)  HR: 105 (10 Danny 2025 13:36) (78 - 105)  BP: 111/56 (10 Danny 2025 13:36) (90/55 - 149/66)  BP(mean): --  RR: 18 (10 Danny 2025 13:36) (18 - 18)  SpO2: 94% (10 Danny 2025 13:36) (94% - 97%)    Parameters below as of 10 Danny 2025 13:36  Patient On (Oxygen Delivery Method): room air        Physical exam:  Constitutional: No acute distress, conversant  Eyes: Anicteric sclerae, moist conjunctivae, see below for CNs  Neck: trachea midline, FROM, supple, no thyromegaly or lymphadenopathy  Cardiovascular: Regular rate and rhythm, no murmurs, rubs, or gallops. No carotid bruits.   Pulmonary: Anterior breath sounds clear bilaterally, no crackles or wheezing. No use of accessory muscles  GI: Abdomen soft, non-distended, non-tender  Extremities: Radial and DP pulses +2, no edema    Neurologic:  -Mental status: Awake, alert, oriented to person, age, and month. Speech is fluent with intact naming, repetition, and comprehension, no dysarthria. Recent and remote memory intact. Follows commands. Attention/concentration intact. Fund of knowledge appropriate.  -Cranial nerves:   II: Visual fields are full to confrontation.  III, IV, VI: Extraocular movements are intact without nystagmus. Pupils equally round and reactive to light  V:  Facial sensation V1-V3 equal and intact   VII: mild L facial asymmetry  VIII: Hearing is bilaterally intact to voice   XII: Tongue protrudes midline  Motor: Normal bulk and tone. No pronator drift. Strength bilateral upper extremity 5/5, bilateral lower extremities 5/5.  Rapid alternating movements intact and symmetric  Sensation: Intact to light touch bilaterally. No neglect or extinction on double simultaneous testing.  Coordination: No dysmetria on finger-to-nose   Reflexes: Downgoing toes bilaterally   Gait: Narrow gait and steady    NIHSS: 1    Fingerstick Blood Glucose: CAPILLARY BLOOD GLUCOSE      POCT Blood Glucose.: 119 mg/dL (10 Danny 2025 08:34)    LABS:                        9.3    9.27  )-----------( 374      ( 10 Danny 2025 08:49 )             27.5     06-10    135  |  94[L]  |  14  ----------------------------<  106[H]  2.7[LL]   |  25  |  1.33[H]    Ca    8.0[L]      10 Danny 2025 08:49    TPro  6.5  /  Alb  3.2[L]  /  TBili  0.6  /  DBili  x   /  AST  26  /  ALT  11  /  AlkPhos  230[H]  06-10    PT/INR - ( 10 Danny 2025 08:49 )   PT: 13.3 sec;   INR: 1.16          PTT - ( 10 Danny 2025 08:49 )  PTT:27.3 sec      Urinalysis Basic - ( 10 Danny 2025 13:25 )    Color: Yellow / Appearance: Clear / SG: >1.030 / pH: x  Gluc: x / Ketone: x  / Bili: Negative / Urobili: 1.0 mg/dL   Blood: x / Protein: Negative mg/dL / Nitrite: Negative   Leuk Esterase: Negative / RBC: x / WBC x   Sq Epi: x / Non Sq Epi: x / Bacteria: x        RADIOLOGY & ADDITIONAL STUDIES:    < from: CT Brain Stroke Protocol (06.10.25 @ 08:43) >  IMPRESSION:    CT HEAD:  No acute intracranial hemorrhage, mass effect or demarcated territorial   infarction.    Findings were discussed with MAR Baron 6/10/2025 8:45 AM by Dr. Gaines with read back confirmation.    CT PERFUSION:  Normal CT perfusion.    CTA NECK:  No evidence of significant stenosis or occlusion.    Again demonstrated is a right upper lobe ground glass opacity consistent   patient's known malignancy.    CTA HEAD:  No large vessel occlusion, significant stenosis or vascular abnormality   identified.    Stable 1-2 mm left paraophthalmic artery outpouching which may represent   aneurysm versus infundibulum.    Previously noted outpouching of the right ophthalmic artery appears to be   prominent origin of the ophthalmic artery rather than aneurysm.      < end of copied text >    -----------------------------------------------------------------------------------------------------------------  IV-thrombolytic (Y/N):    No                              Bolus time:    Tenecteplase Dose Verification w/ RN:  Reason thrombolytic not given: Symptoms resolved by the time patient presented to ED

## 2025-06-10 NOTE — EEG REPORT - NS EEG TEXT BOX
[facility] Department of Neurology  Inpatient Routine-Electroencephalography Report  [address] [place] [address], [address] [place] [id number], T: [phone]    Patient Name:	ISAAC OROURKE    :	1945  MRN:	[id number]    Study Start Date/Time:	6/10/2025, 2:34:12 PM        Brief Clinical History:  ISAAC OROURKE is a 79 year old Female; study performed to investigate for seizures or markers of epilepsy.   Technologist notes: -  Diagnosis Code:  R56.9 convulsions/seizure  CPT:   27417 (awake/drowsy)     Pertinent Medication:  n/a    Acquisition Details:  Electroencephalography was acquired using a minimum of 21 channels on an [employer] system v 9.3.1 with electrode placement according to the standard International 10-20 system following ACNS (American Clinical Neurophysiology Society) guidelines.  Anterior temporal T1 and T2 electrodes were utilized whenever possible.  The [employer] automated spike & seizure detections were all reviewed in detail, in addition to the entire raw EEG.    Findings:  Background:  continuous, with predominantly alpha and beta frequencies.  Generalized Slowing:  None  Symmetry/Focality: No persistent asymmetries of voltage or frequency.        Voltage:  Normal (20+ uV)  Organization:  Appropriate anterior-posterior gradient  Posterior Dominant Rhythm:  10 Hz symmetric, well-organized, and well-modulated  Sleep:  Symmetric, synchronous spindles and K complexes.  Variability:   Yes		Reactivity:  Yes    Spontaneous Activity:  No epileptiform discharges     Events:  1)	No electrographic seizures or significant clinical events occurred during this study.  Provocations:  •	Hyperventilation: was not performed.  •	Photic stimulation: was not performed.  FINAL Impression:  Normalawake and/or drowsy routine EEG  1)	 Unremarkable awake and sleep recording.    Final Clinical Correlation:  1)	There were no findings of active epilepsy, however this alone does not rule out the diagnosis.        Roz Ponce MD   Attending Neurologist, [facility] Epilepsy Program

## 2025-06-10 NOTE — ED ADULT NURSE REASSESSMENT NOTE - NS ED NURSE REASSESS COMMENT FT1
As per MD Black, pts q1hr neuro checks verbalized to be discontinued.  Confirmed at 2PM with Dr. Suh
pt. VS UTD, 2nd PIV placed, and ccm continued. ICU c/s at bedside now. Pt. is awake and comfortable att.
Assumed care from day shift RN. Pt aox4, just returned from CT scan. Placed back onto monitor. PT denies any complaints, no facial droop observed. VSS updated. Will continue to monitor.

## 2025-06-10 NOTE — CONSULT NOTE ADULT - ASSESSMENT
79 y.o F w/ PMHx of  HTN, HLD, hypothyroidism, generalized depression, ADHD rheumatic fever, pericarditis, IgA def and recent fall with L1 compression fracture (5/10/25) Spine Surgery T11-L3 decompression/PSF with Dr. Barcenas and lung adenocarcinoma undergoing outpt workup with oncology, presenting to hospital after witnessed syncopal event by HHA. ICU consulted i/s/o recurrent syncope and hypokalemia.    # Syncope  # Electrolyte derangement  Pt presenting with syncopal episode witnessed by HHA. Per HHA patient was not responding to questioning. Pt had prior syncopal episode resulting in a fall and L1 compression fracture. Since discharge, pt claims she has been       79 y.o F w/ PMHx of  HTN, HLD, hypothyroidism, generalized depression, ADHD rheumatic fever, pericarditis, IgA def and recent fall with L1 compression fracture (5/10/25) Spine Surgery T11-L3 decompression/PSF with Dr. Barcenas and lung adenocarcinoma undergoing outpt workup with oncology, presenting to hospital after witnessed syncopal event by HHA. ICU consulted i/s/o recurrent syncope and hypokalemia.    # Syncope  # Electrolyte derangement  Pt presenting with syncopal episode witnessed by HHA. Per HHA patient was not responding to questioning. Pt had prior syncopal episode resulting in a fall and L1 compression fracture. Since discharge, pt claims she has been having balance issues and waxing and waning difficulty in ambulation. Pt denies use of alcohol or new sedative meds at home. denies palpitations, SOTO, seizure like activity or incontinence. Pt tachycardic at time of exam. Hypokalemia to 2.7 on presentation.  - obtain CT PE for PE r/o  - tele monitoring to r/o cardiac etiology  - replete lytes, obtain mag and phos  - given that cardiac etiology on differential, would benefit from rhythm monitoring with holter  - outpt cards follow up    Dispo: recommend hospitalist tele for cardiac monitoring, case discussed with Dr. Isaacs

## 2025-06-10 NOTE — ED ADULT NURSE NOTE - OBJECTIVE STATEMENT
Pt BIBEMS s/p syncopy x 2 this AM.  Pts HHA stated pt had LT side facial droop and slurred speech.  LKW @ 7AM. Stroke code activated. Pt A&O x3, in NAD, breathing unlabored, comfortably on RA, speaking in complete sentences.  Initial NIHSS score 1 for LT side facial asymmetry

## 2025-06-10 NOTE — ED PROVIDER NOTE - CLINICAL SUMMARY MEDICAL DECISION MAKING FREE TEXT BOX
Patient with questionable syncope with slurred speech and possible new versus old left-sided facial droop noted on examination.  Patient speech is currently normal.  Vital signs are stable and patient looks well no acute distress.  Neuro is otherwise unremarkable.  Stroke called called at triage.  Plan stroke bundle CT CTA, labs urinalysis reassess need for additional testing and/or admission.  Differentials broad includes toxic metabolic encephalopathy, stroke, seizure, sepsis and/or infection, autoimmune condition and syncope.

## 2025-06-10 NOTE — ED PROVIDER NOTE - PHYSICAL EXAMINATION
CONSTITUTIONAL:  Well appearing, well nourished, awake, alert, oriented to person, place, time/situation and in no apparent distress.  HENMT:  Head is atraumatic normocephalic.    EYES:  Clear bilaterally, pupils equal, round and reactive to light, approximately 4-6 mm bilateral pupils size.  CARDIAC:  Normal rate, regular rhythm.  Heart sounds S1, S2.  No murmurs, rubs or gallops.  RESPIRATORY:  Lungs are clear bilaterally with good aeration.  No respiratory distress.  Non-tachypneic and non-labored.  No accessory muscle use.  Speaking full sentences.  No bony chest wall tenderness or crepitation palpated.  GASTROENTEROLOGY:  Belly is soft and non-tender in all four quadrants.  There is no rebound or guarding.    MUSCULOSKELETAL:  Spine appears normal, range of motion is not limited, no muscle or joint tenderness.  No extremity edema, tenderness, or evidence of trauma.  Bilateral radial, femoral, popliteal, dorsalis pedis, and posterior tibial pulses present, strong, 2+.  Cap refill is <2 seconds in all fingers and toes bilaterally.   NEUROLOGICAL:  Slight flattening of L NL fold.  Patient is alert, oriented x person, place and time.  Otherwise cranial nerves 2-12 are intact.  Normal gait and speech.  Cerebellar testing normal:  negative Romberg, normal coordination and normal finger to nose, heal to shin and rapid alternating movements.  Normal proprioception and sensory exam.  No pronator drift.  5/5 bl upper extremity and lower extremity strength.  SKIN:  Spine appears normal, range of motion is not limited, no muscle or joint tenderness

## 2025-06-10 NOTE — CONSULT NOTE ADULT - ASSESSMENT
79y Female with PMHx of IgA deficiency, HTN, HLD, hypothyroidism, ADHD, depression, and L1 compression fracture (April 2025) presented with c/o inattentiveness and dysarthria. HCT/CTA negative for stroke and occlusion. Patient found to have hypokalemia. Given presentation of symptoms, differentials include metabolic encephalopathy 2/2 hypokalemia, seizures, and cardiogenic syncope. Unlikely stroke given non-focal neurological symptoms and quick resolution of symptoms.     CT head showed: no acute hemorrhage or infarct   CTA/CTP with: no perfusion mismatch, 1-2 mm outpouching of the paraophthalmic left ICA   Initial NIHSS: 1, for L facial asymmetry, likely baseline      1) Stroke risk factors  - HTN     2) Further management  - obtain 20 minute EEG to evaluate for seizure  - outpt neurology follow up, call [phone] to set up an appointment   - provide stroke education  - no admission to stroke service at this time    Case discussed with Dr. Cho

## 2025-06-10 NOTE — PROCEDURE NOTE - NSUS ED ADDITIONAL DETAIL1 FT
Patient has tachycardia, normal LV function.  NO RV dilation noted. The heart appeared hyperdynamic with some LV effacement on short axis view suggesting hypovolemia. IVC 1.34 cm in size, fully collapsable. Mitral stenosis mild.

## 2025-06-10 NOTE — ED CLERICAL - NS ED CLERK NOTE PRE-ARRIVAL INFORMATION; ADDITIONAL PRE-ARRIVAL INFORMATION
This patient is part of the [facility] total cost of care cohort. Transitional Care Management (TCM) provides navigation to patients discharged to skilled nursing facilities on the [employer] platform

## 2025-06-10 NOTE — ED PROVIDER NOTE - OBJECTIVE STATEMENT
80 YO Female w/ PMHx of IgA deficiency, HTN, HLD, Hypothyroidism, ADHD, depression and L1 compression fracture (April 2025, wears TLSO brace) who originally presented to [facility] ED on 5/3/25 s/p fall associated with slurry speech and L facial droop and had neg stroke work up with normal MRI 8 days ago, now with episode of ? syncope while in chair as noted by HHA a little after 7am today after patient woke up normal, followed by slurred speech and L sided facial droop.  Patient denies the slurred speech.  BP in field normal.  Normal BGL and EKG.  Pt back to normal per EMS who noted the slurred speech.

## 2025-06-10 NOTE — STROKE CODE NOTE - IV ALTEPLASE EXCL REL HIDDEN
Received most recent diabetic eye exam scanned into chart today and sent to LPN-CC.       show patient

## 2025-06-11 ENCOUNTER — TRANSCRIPTION ENCOUNTER (OUTPATIENT)
Age: 80
End: 2025-06-11

## 2025-06-11 VITALS
SYSTOLIC BLOOD PRESSURE: 127 MMHG | HEART RATE: 90 BPM | RESPIRATION RATE: 16 BRPM | OXYGEN SATURATION: 96 % | TEMPERATURE: 98 F | DIASTOLIC BLOOD PRESSURE: 63 MMHG

## 2025-06-11 DIAGNOSIS — R74.8 ABNORMAL LEVELS OF OTHER SERUM ENZYMES: ICD-10-CM

## 2025-06-11 DIAGNOSIS — C34.90 MALIGNANT NEOPLASM OF UNSPECIFIED PART OF UNSPECIFIED BRONCHUS OR LUNG: ICD-10-CM

## 2025-06-11 DIAGNOSIS — E83.42 HYPOMAGNESEMIA: ICD-10-CM

## 2025-06-11 DIAGNOSIS — D64.9 ANEMIA, UNSPECIFIED: ICD-10-CM

## 2025-06-11 DIAGNOSIS — I10 ESSENTIAL (PRIMARY) HYPERTENSION: ICD-10-CM

## 2025-06-11 DIAGNOSIS — E87.6 HYPOKALEMIA: ICD-10-CM

## 2025-06-11 DIAGNOSIS — E78.5 HYPERLIPIDEMIA, UNSPECIFIED: ICD-10-CM

## 2025-06-11 DIAGNOSIS — Z29.9 ENCOUNTER FOR PROPHYLACTIC MEASURES, UNSPECIFIED: ICD-10-CM

## 2025-06-11 DIAGNOSIS — F32.9 MAJOR DEPRESSIVE DISORDER, SINGLE EPISODE, UNSPECIFIED: ICD-10-CM

## 2025-06-11 DIAGNOSIS — E03.9 HYPOTHYROIDISM, UNSPECIFIED: ICD-10-CM

## 2025-06-11 DIAGNOSIS — E83.39 OTHER DISORDERS OF PHOSPHORUS METABOLISM: ICD-10-CM

## 2025-06-11 LAB
ADD ON TEST-SPECIMEN IN LAB: SIGNIFICANT CHANGE UP
ALBUMIN SERPL ELPH-MCNC: 3 G/DL — LOW (ref 3.3–5)
ALP SERPL-CCNC: 208 U/L — HIGH (ref 40–120)
ALT FLD-CCNC: 11 U/L — SIGNIFICANT CHANGE UP (ref 10–45)
ANION GAP SERPL CALC-SCNC: 11 MMOL/L — SIGNIFICANT CHANGE UP (ref 5–17)
ANION GAP SERPL CALC-SCNC: 12 MMOL/L — SIGNIFICANT CHANGE UP (ref 5–17)
AST SERPL-CCNC: 26 U/L — SIGNIFICANT CHANGE UP (ref 10–40)
BILIRUB SERPL-MCNC: 0.3 MG/DL — SIGNIFICANT CHANGE UP (ref 0.2–1.2)
BLD GP AB SCN SERPL QL: NEGATIVE — SIGNIFICANT CHANGE UP
BUN SERPL-MCNC: 15 MG/DL — SIGNIFICANT CHANGE UP (ref 7–23)
BUN SERPL-MCNC: 16 MG/DL — SIGNIFICANT CHANGE UP (ref 7–23)
CALCIUM SERPL-MCNC: 7 MG/DL — LOW (ref 8.4–10.5)
CALCIUM SERPL-MCNC: 8 MG/DL — LOW (ref 8.4–10.5)
CHLORIDE SERPL-SCNC: 100 MMOL/L — SIGNIFICANT CHANGE UP (ref 96–108)
CHLORIDE SERPL-SCNC: 103 MMOL/L — SIGNIFICANT CHANGE UP (ref 96–108)
CO2 SERPL-SCNC: 25 MMOL/L — SIGNIFICANT CHANGE UP (ref 22–31)
CO2 SERPL-SCNC: 25 MMOL/L — SIGNIFICANT CHANGE UP (ref 22–31)
CREAT SERPL-MCNC: 1.25 MG/DL — SIGNIFICANT CHANGE UP (ref 0.5–1.3)
CREAT SERPL-MCNC: 1.28 MG/DL — SIGNIFICANT CHANGE UP (ref 0.5–1.3)
EGFR: 43 ML/MIN/1.73M2 — LOW
EGFR: 43 ML/MIN/1.73M2 — LOW
EGFR: 44 ML/MIN/1.73M2 — LOW
EGFR: 44 ML/MIN/1.73M2 — LOW
GLUCOSE SERPL-MCNC: 110 MG/DL — HIGH (ref 70–99)
GLUCOSE SERPL-MCNC: 96 MG/DL — SIGNIFICANT CHANGE UP (ref 70–99)
HCT VFR BLD CALC: 26.8 % — LOW (ref 34.5–45)
HGB BLD-MCNC: 8.7 G/DL — LOW (ref 11.5–15.5)
LACTATE SERPL-SCNC: 1.9 MMOL/L — SIGNIFICANT CHANGE UP (ref 0.5–2)
MAGNESIUM SERPL-MCNC: 1.3 MG/DL — LOW (ref 1.6–2.6)
MAGNESIUM SERPL-MCNC: 2.7 MG/DL — HIGH (ref 1.6–2.6)
MCHC RBC-ENTMCNC: 30.2 PG — SIGNIFICANT CHANGE UP (ref 27–34)
MCHC RBC-ENTMCNC: 32.5 G/DL — SIGNIFICANT CHANGE UP (ref 32–36)
MCV RBC AUTO: 93.1 FL — SIGNIFICANT CHANGE UP (ref 80–100)
NRBC BLD AUTO-RTO: 0 /100 WBCS — SIGNIFICANT CHANGE UP (ref 0–0)
PHOSPHATE SERPL-MCNC: 2.6 MG/DL — SIGNIFICANT CHANGE UP (ref 2.5–4.5)
PLATELET # BLD AUTO: 370 K/UL — SIGNIFICANT CHANGE UP (ref 150–400)
POTASSIUM SERPL-MCNC: 3.4 MMOL/L — LOW (ref 3.5–5.3)
POTASSIUM SERPL-MCNC: 3.5 MMOL/L — SIGNIFICANT CHANGE UP (ref 3.5–5.3)
POTASSIUM SERPL-SCNC: 3.4 MMOL/L — LOW (ref 3.5–5.3)
POTASSIUM SERPL-SCNC: 3.5 MMOL/L — SIGNIFICANT CHANGE UP (ref 3.5–5.3)
PROT SERPL-MCNC: 6.2 G/DL — SIGNIFICANT CHANGE UP (ref 6–8.3)
RAPID RVP RESULT: DETECTED
RBC # BLD: 2.88 M/UL — LOW (ref 3.8–5.2)
RBC # FLD: 16.6 % — HIGH (ref 10.3–14.5)
RH IG SCN BLD-IMP: POSITIVE — SIGNIFICANT CHANGE UP
RV+EV RNA SPEC QL NAA+PROBE: DETECTED
SARS-COV-2 RNA SPEC QL NAA+PROBE: SIGNIFICANT CHANGE UP
SODIUM SERPL-SCNC: 137 MMOL/L — SIGNIFICANT CHANGE UP (ref 135–145)
SODIUM SERPL-SCNC: 139 MMOL/L — SIGNIFICANT CHANGE UP (ref 135–145)
WBC # BLD: 5.4 K/UL — SIGNIFICANT CHANGE UP (ref 3.8–10.5)
WBC # FLD AUTO: 5.4 K/UL — SIGNIFICANT CHANGE UP (ref 3.8–10.5)

## 2025-06-11 PROCEDURE — 0042T: CPT

## 2025-06-11 PROCEDURE — 0225U NFCT DS DNA&RNA 21 SARSCOV2: CPT

## 2025-06-11 PROCEDURE — 99236 HOSP IP/OBS SAME DATE HI 85: CPT | Mod: GC

## 2025-06-11 PROCEDURE — 83735 ASSAY OF MAGNESIUM: CPT

## 2025-06-11 PROCEDURE — 95816 EEG AWAKE AND DROWSY: CPT

## 2025-06-11 PROCEDURE — 85610 PROTHROMBIN TIME: CPT

## 2025-06-11 PROCEDURE — 83605 ASSAY OF LACTIC ACID: CPT

## 2025-06-11 PROCEDURE — 85027 COMPLETE CBC AUTOMATED: CPT

## 2025-06-11 PROCEDURE — 71275 CT ANGIOGRAPHY CHEST: CPT

## 2025-06-11 PROCEDURE — 70496 CT ANGIOGRAPHY HEAD: CPT

## 2025-06-11 PROCEDURE — 70450 CT HEAD/BRAIN W/O DYE: CPT

## 2025-06-11 PROCEDURE — 99285 EMERGENCY DEPT VISIT HI MDM: CPT

## 2025-06-11 PROCEDURE — 70498 CT ANGIOGRAPHY NECK: CPT

## 2025-06-11 PROCEDURE — 86901 BLOOD TYPING SEROLOGIC RH(D): CPT

## 2025-06-11 PROCEDURE — 93005 ELECTROCARDIOGRAM TRACING: CPT

## 2025-06-11 PROCEDURE — 87040 BLOOD CULTURE FOR BACTERIA: CPT

## 2025-06-11 PROCEDURE — 71045 X-RAY EXAM CHEST 1 VIEW: CPT

## 2025-06-11 PROCEDURE — 82962 GLUCOSE BLOOD TEST: CPT

## 2025-06-11 PROCEDURE — 95954 EEG MONITORING/GIVING DRUGS: CPT

## 2025-06-11 PROCEDURE — 84100 ASSAY OF PHOSPHORUS: CPT

## 2025-06-11 PROCEDURE — 84484 ASSAY OF TROPONIN QUANT: CPT

## 2025-06-11 PROCEDURE — 80048 BASIC METABOLIC PNL TOTAL CA: CPT

## 2025-06-11 PROCEDURE — 36415 COLL VENOUS BLD VENIPUNCTURE: CPT

## 2025-06-11 PROCEDURE — 97161 PT EVAL LOW COMPLEX 20 MIN: CPT

## 2025-06-11 PROCEDURE — 86850 RBC ANTIBODY SCREEN: CPT

## 2025-06-11 PROCEDURE — 86900 BLOOD TYPING SEROLOGIC ABO: CPT

## 2025-06-11 PROCEDURE — 80053 COMPREHEN METABOLIC PANEL: CPT

## 2025-06-11 PROCEDURE — 81003 URINALYSIS AUTO W/O SCOPE: CPT

## 2025-06-11 PROCEDURE — 85730 THROMBOPLASTIN TIME PARTIAL: CPT

## 2025-06-11 PROCEDURE — 82977 ASSAY OF GGT: CPT

## 2025-06-11 PROCEDURE — 85025 COMPLETE CBC W/AUTO DIFF WBC: CPT

## 2025-06-11 RX ORDER — ACETAMINOPHEN 500 MG/5ML
650 LIQUID (ML) ORAL EVERY 6 HOURS
Refills: 0 | Status: DISCONTINUED | OUTPATIENT
Start: 2025-06-11 | End: 2025-06-11

## 2025-06-11 RX ORDER — CYCLOBENZAPRINE HYDROCHLORIDE 15 MG/1
1 CAPSULE, EXTENDED RELEASE ORAL
Refills: 0 | DISCHARGE

## 2025-06-11 RX ORDER — ATORVASTATIN CALCIUM 80 MG/1
80 TABLET, FILM COATED ORAL AT BEDTIME
Refills: 0 | Status: DISCONTINUED | OUTPATIENT
Start: 2025-06-11 | End: 2025-06-11

## 2025-06-11 RX ORDER — MELATONIN 5 MG
3 TABLET ORAL AT BEDTIME
Refills: 0 | Status: DISCONTINUED | OUTPATIENT
Start: 2025-06-11 | End: 2025-06-11

## 2025-06-11 RX ORDER — SODIUM CHLORIDE 9 G/1000ML
1000 INJECTION, SOLUTION INTRAVENOUS ONCE
Refills: 0 | Status: COMPLETED | OUTPATIENT
Start: 2025-06-11 | End: 2025-06-11

## 2025-06-11 RX ORDER — ENOXAPARIN SODIUM 100 MG/ML
40 INJECTION SUBCUTANEOUS EVERY 24 HOURS
Refills: 0 | Status: DISCONTINUED | OUTPATIENT
Start: 2025-06-11 | End: 2025-06-11

## 2025-06-11 RX ORDER — LEVOTHYROXINE SODIUM 300 MCG
75 TABLET ORAL EVERY 24 HOURS
Refills: 0 | Status: DISCONTINUED | OUTPATIENT
Start: 2025-06-11 | End: 2025-06-11

## 2025-06-11 RX ORDER — AMLODIPINE BESYLATE 10 MG/1
5 TABLET ORAL EVERY 24 HOURS
Refills: 0 | Status: DISCONTINUED | OUTPATIENT
Start: 2025-06-11 | End: 2025-06-11

## 2025-06-11 RX ORDER — BENZONATATE 100 MG
100 CAPSULE ORAL EVERY 8 HOURS
Refills: 0 | Status: DISCONTINUED | OUTPATIENT
Start: 2025-06-11 | End: 2025-06-11

## 2025-06-11 RX ORDER — BUPROPION HYDROBROMIDE 522 MG/1
450 TABLET, EXTENDED RELEASE ORAL AT BEDTIME
Refills: 0 | Status: DISCONTINUED | OUTPATIENT
Start: 2025-06-11 | End: 2025-06-11

## 2025-06-11 RX ORDER — CLONAZEPAM 0.5 MG/1
0.5 TABLET ORAL EVERY 24 HOURS
Refills: 0 | Status: DISCONTINUED | OUTPATIENT
Start: 2025-06-11 | End: 2025-06-11

## 2025-06-11 RX ORDER — CARVEDILOL 3.12 MG/1
6.25 TABLET, FILM COATED ORAL EVERY 12 HOURS
Refills: 0 | Status: DISCONTINUED | OUTPATIENT
Start: 2025-06-11 | End: 2025-06-11

## 2025-06-11 RX ORDER — SOD PHOS DI, MONO/K PHOS MONO 250 MG
1 TABLET ORAL ONCE
Refills: 0 | Status: COMPLETED | OUTPATIENT
Start: 2025-06-11 | End: 2025-06-11

## 2025-06-11 RX ORDER — CALCITRIOL 0.5 UG/1
0.25 CAPSULE, GELATIN COATED ORAL DAILY
Refills: 0 | Status: DISCONTINUED | OUTPATIENT
Start: 2025-06-11 | End: 2025-06-11

## 2025-06-11 RX ADMIN — SODIUM CHLORIDE 1000 MILLILITER(S): 9 INJECTION, SOLUTION INTRAVENOUS at 11:22

## 2025-06-11 RX ADMIN — CARVEDILOL 6.25 MILLIGRAM(S): 3.12 TABLET, FILM COATED ORAL at 05:35

## 2025-06-11 RX ADMIN — Medication 75 MICROGRAM(S): at 05:36

## 2025-06-11 RX ADMIN — AMLODIPINE BESYLATE 5 MILLIGRAM(S): 10 TABLET ORAL at 14:24

## 2025-06-11 RX ADMIN — Medication 1 PACKET(S): at 01:35

## 2025-06-11 RX ADMIN — Medication 1 PACKET(S): at 07:20

## 2025-06-11 RX ADMIN — Medication 40 MILLIEQUIVALENT(S): at 01:35

## 2025-06-11 RX ADMIN — Medication 100 MILLIGRAM(S): at 04:38

## 2025-06-11 RX ADMIN — CALCITRIOL 0.25 MICROGRAM(S): 0.5 CAPSULE, GELATIN COATED ORAL at 11:37

## 2025-06-11 RX ADMIN — Medication 400 MILLIGRAM(S): at 11:37

## 2025-06-11 RX ADMIN — Medication 40 MILLIEQUIVALENT(S): at 07:20

## 2025-06-11 RX ADMIN — ENOXAPARIN SODIUM 40 MILLIGRAM(S): 100 INJECTION SUBCUTANEOUS at 10:05

## 2025-06-11 RX ADMIN — Medication 25 GRAM(S): at 00:05

## 2025-06-11 NOTE — PHYSICAL THERAPY INITIAL EVALUATION ADULT - HEALTH SCREEN CRITERIA
Spoke with Bárbara, reception for Yady Ayala NP,  Surgery is 1/2, requesting documentation reflecting if pt is cleared for surgery based on recent lab results.   yes

## 2025-06-11 NOTE — DISCHARGE NOTE NURSING/CASE MANAGEMENT/SOCIAL WORK - FINANCIAL ASSISTANCE
[facility] provides services at a reduced cost to those who are determined to be eligible through [facility]’s financial assistance program. Information regarding [facility]’s financial assistance program can be found by going to [email or web address] or by calling [phone].

## 2025-06-11 NOTE — DISCHARGE NOTE PROVIDER - ATTENDING DISCHARGE PHYSICAL EXAMINATION:
78 YOF with PMH of HTN, HLD, hypothyroidism, rheumatic fever (no known valvular disease), remote history of pericarditis, IgA deficiency, L1 compression fx s/p T11-L3 PSF, anxiety/depression, ADHD, recent diagnosis of right apical NSCLC presenting after witnessed ?LOC while at home. Patient reports was sitting in wheelchair waiting for her aid to make her bed in the morning. Pt does not recall LOC but aid states patient was not responding for 2 min and had slurred speech with L facial droop upon waking. ROS notable for URI symptoms.     Stroke code in ED, low c/f acute stroke given non-focal neurological symptoms and quick resolution of symptoms. Spot EEG negative. ECG NSR, no events on tele, recent TTE grossly normal. Orthostatic positive with nurse but negative with PT. Overall impression is patient had encephalopathy 2/2 hypokalemia/hypomagnesemia vs polypharmacy ( takes every clonazepam every morning, prescribed oxycodone and cyclobenzaprine) vs viral illness (+enterovirus). Clinically non-toxic appearing, at neurologic baseline. Discussed avoidance of benzos especially concurrently with opioids, patient to discuss with prescribing physician about weaning ( has been on clonazepam for years for ADHD, does not wish to stop now).  has not been taking cyclobenzaprine - discontinue on discharge.     Plan for discharge home with close outpatient follow up. Return precautions provided, all questions answered.

## 2025-06-11 NOTE — DISCHARGE NOTE NURSING/CASE MANAGEMENT/SOCIAL WORK - NSDCVIVACCINE_GEN_ALL_CORE_FT
Tdap; 03-May-2025 15:38; Sowmya Adame); [employer]; [id number] (Exp. Date: 31-Jan-2027); IntraMuscular; Deltoid Left.; 0.5 milliLiter(s); VIS (VIS Published: 09-May-2013, VIS Presented: 03-May-2025);

## 2025-06-11 NOTE — CHART NOTE - NSCHARTNOTEFT_GEN_A_CORE
Outpatient appointment RESCHEDULED with medical oncology, as below. Please include in patient's discharge paperwork. Notified patient at bedside, as well.    Appointment time/date:  6/20/25 at 10:40am    Dr. Jong Parra  [address] [place] [phone].
Patient Demographic Information (PDI)       PDI	First Name	Last Name	Birth Date	Gender	Street Address	City	State	Zip Code  A	Misty	Pressley	1945	Female	[address]	[facility]	[address]  B	Misty M	Pressley	1945	Female	[address]	[facility]	[address]  C	Misty	Pressley	1945	Female	[address]	[facility]	[address]  D	Misty	Pressley	1945	Female	[address]	[facility]	[address]  E	Misty M	Pressley	1945	Female	[address]	[facility]	[address]  F	Misty M	Pressley	1945	Female	[address]	[facility]	[address]    Prescription Information      PDI Filter:    PDI	My Rx	Current Rx	Drug Type	Rx Written	Rx Dispensed	Drug	Quantity	Days Supply	Prescriber Name	Prescriber BASHIR #	Payment Method	Dispenser  B	N	N	O	06/04/2025	06/04/2025	oxycodone hcl (ir) 5 mg cap	20	5	Helen Ramos)	[id number]	Medicare	[facility]  B	N	Y	S	06/03/2025	06/03/2025	lisdexamfetamine 30 mg capsule	30	30	Raul Goyal	[id number]	Medicare	[facility]  B	N	Y	B	06/03/2025	06/03/2025	clonazepam 0.5 mg tablet	90	30	Raul Goyal	[id number]	Medicare	[facility]  C	N	N	O	05/21/2025	05/21/2025	oxycodone hcl (ir) 10 mg tab	15	3	Helen Ramos)	[id number]	Cash	[facility]  C	N	N	O	05/14/2025	05/14/2025	oxycodone hcl (ir) 10 mg tab	15	3	Helen Ramos)	[id number]	Cash	[facility]  C	N	N	O	05/14/2025	05/14/2025	oxycodone hcl (ir) 5 mg tablet	15	3	Helen Ramos)	[id number]	Cash	[facility]  F	N	N	S	04/30/2025	04/30/2025	lisdexamfetamine 30 mg capsule	30	30	Raul Goyal	[id number]	Medicare	[facility]  F	N	N	B	04/30/2025	04/30/2025	clonazepam 0.5 mg tablet	90	30	Raul Goyal	[id number]	Medicare	[facility]  A	N	N	O	04/02/2025	04/06/2025	oxycodone hcl (ir) 5 mg tablet	42	7	Karthik Barcenas R, MD	[id number]	Medicare	[facility]  D	N	N	O	04/01/2025	04/01/2025	oxycodone-acetaminophen 5-325 mg tablet	10	5	DiamondMichael	[id number]	Medicare	[id number]  D	N	N	O	03/27/2025	03/27/2025	oxycodone-acetaminophen 5-325 mg tablet	12	4	WallsAaron miller	[id number]	Medicare	[id number]  A	N	N	O	03/26/2025	03/26/2025	oxycodone hcl (ir) 5 mg tablet	2	1	[facility]	[id number]	Medicare	[facility]  E	N	N	S	03/26/2025	03/26/2025	lisdexamfetamine 30 mg capsule	30	30	Raul Goyal	[id number]	Medicare	[facility]  E	N	N	S	02/11/2025	02/14/2025	lisdexamfetamine 30 mg capsule	30	30	OccRaul oscar	[id number]	Medicare	[facility]  E	N	N	B	01/17/2025	01/17/2025	clonazepam 0.5 mg tablet	90	30	Raul Goyal	[id number]	Medicare	[facility]  E	N	N	S	01/17/2025	01/17/2025	lisdexamfetamine 30 mg capsule	30	30	ObeynilamRaul friedman	[id number]	Medicare	[facility]  A	N	N	S	12/09/2024	12/16/2024	lisdexamfetamine 30 mg capsule	30	30	Raul Goyal	[id number]	Medicare	[facility]  A	N	N	S	11/11/2024	11/17/2024	vyvanse 30 mg capsule	30	30	OccRaul oscar	[id number]	Medicare	[facility]  A	N	N	S	10/14/2024	10/17/2024	vyvanse 30 mg capsule	30	30	OcccelestinaRaul	[id number]	Medicare	[facility]  A	N	N	B	10/04/2024	10/07/2024	clonazepam 0.5 mg tablet	90	30	GarcíaRaul chaudhry	[id number]	Medicare	[facility]  A	N	N	S	09/16/2024	09/18/2024	lisdexamfetamine 30 mg capsule	30	30	OcccelestinaRaul	[id number]	Medicare	[facility]  A	N	N	S	08/11/2024	08/13/2024	lisdexamfetamine 30 mg capsule	30	30	OcccelestinaRaul	[id number]	Medicare	[facility]  A	N	N	S	07/15/2024	07/16/2024	lisdexamfetamine 30 mg capsule	30	30	OcccelestinaRaul	[id number]	Medicare	[facility]  A	N	N	B	07/16/2024	07/16/2024	clonazepam 0.5 mg tablet	90	30	LinnRaul friedman	[id number]	Medicare	[facility]  A	N	N	S	06/12/2024	06/16/2024	lisdexamfetamine 30 mg capsule	30	30	Dick Fox MD	[id number]	Medicare	[facility]

## 2025-06-11 NOTE — PHYSICAL THERAPY INITIAL EVALUATION ADULT - GENERAL OBSERVATIONS, REHAB EVAL
Pt received in bed in no acute distress, +EKG, heplock. Pt. states she normally has to stand for a minute before she starts ambulating.

## 2025-06-11 NOTE — H&P ADULT - HISTORY OF PRESENT ILLNESS
78-year-old female with HTN, HLD, hypothyroidism, generalized depression, ADHD, rheumatic fever, pericarditis, IgA deficiency, newly diagnosed NSCLC in [place], and a recent fall with L1 compression fracture (on 5/10/25 s/p T11-L3 decompression/PSF with Dr. Barcenas); now BIBEMS after a witnessed syncopal episode by HHA. Syncopal episode while in chair as noted by HHA around 7AM on 6/10, followed by slurred speech and L sided facial droop. Per EMS, who noted slurred speech, symptoms resolved and pt is back at baseline.     ED course:   Initial vital signs: T: 97.9F -> 100.3F rectally, HR: , BP: 90/55 -> 106/53, R: 18, SpO2: 94% on RA  Labs: significant for Hb 9.3, MCV 91.1, K 2.7 -> 3.4, Cr 1.33, Ca 8, albumin 3.2, Mg 1.2, phos 2.4, troponin 52 -> 47, , lactate 2.2   UA: spec gravity > 1.030  CXR: Clear lungs  CT head: No acute ICH.   CTA neck: No evidence of significant stenosis or occlusion.  CTA head:   CTPE: No PE. History of lung cancer with a 4.3 cm part solid and groundglass right apical mass. Additional scattered small left lung nodules which are unchanged.   EKG:   Medications:   Consults: ICU, Stroke, Heme/onc 78-year-old female with HTN, HLD, hypothyroidism, generalized depression, ADHD, rheumatic fever, pericarditis, IgA deficiency, newly diagnosed NSCLC in [place], and a recent fall with L1 compression fracture (on 5/10/25 s/p T11-L3 decompression/PSF with Dr. Barcenas); now BIBEMS after a witnessed syncopal episode by HHA. Syncopal episode while in chair as noted by HHA around 7AM on 6/10, followed by slurred speech and L sided facial droop. Per EMS, who noted slurred speech, symptoms resolved and pt is back at baseline.     ED course:   Initial vital signs: T: 97.9F -> 100.3F rectally, HR: , BP: 90/55 -> 106/53, R: 18, SpO2: 94% on RA  Labs: significant for Hb 9.3, MCV 91.1, K 2.7 -> 3.4, Cr 1.33, Ca 8, albumin 3.2, Mg 1.2, phos 2.4, troponin 52 -> 47, , lactate 2.2 -> 1.9  UA: spec gravity > 1.030  CXR: Clear lungs  CT head: No acute ICH.   CTA neck: No evidence of significant stenosis or occlusion.  CTA head: No large vessel occlusion, significant stenosis or vascular abnormality identified. Stable 1-2 mm left paraophthalmic artery outpouching which may represent aneurysm versus infundibulum. 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  CTPE: No PE. History of lung cancer with a 4.3 cm part solid and groundglass right apical mass. Additional scattered small left lung nodules which are unchanged.   EKG: NSR at 78bpm, QTc 440  Medications: KCl 40mEq PO x1, KCl 10mEq IV x1, Mg 4g IV x1, Tylenol 650mg PO x1, 1L LR bolus x1  Consults: ICU, Stroke, Heme/onc 78-year-old female with HTN, HLD, hypothyroidism, generalized depression, ADHD, rheumatic fever, pericarditis, IgA deficiency, newly diagnosed NSCLC in [place], and a recent fall with L1 compression fracture (on 5/10/25 s/p T11-L3 decompression/PSF with Dr. Barcenas); now BIBEMS after a witnessed syncopal episode by HHA on 6/10 around 7AM. Per pt, she used restroom in AM and had a BM. Then she went and sat on her wheelchair and per HHA, pt was not responding for a couple of minutes. When pt awakened, she was noted to have slurred speech and L sided facial droop per HHA. Upon EMS arrival, pt still had slurred speech, however, that resolved prior to arrival to the ED. Pt states she does not recall if she synopsized but even if she did, she was only out for 2-3 minutes because she was looking at the time. Pt denies any fever, chills, headache, dizziness, chest pain, SOB, palpitations, abdominal pain, n/v/d, dysuria or urinary frequency. Denies any recent falls or injuries.       ED course:   Initial vital signs: T: 97.9F -> 100.3F rectally, HR: , BP: 90/55 -> 106/53, R: 18, SpO2: 94% on RA  Labs: significant for Hb 9.3, MCV 91.1, K 2.7 -> 3.4, Cr 1.33, Ca 8, albumin 3.2, Mg 1.2, phos 2.4, troponin 52 -> 47, , lactate 2.2 -> 1.9  UA: spec gravity > 1.030  CXR: Clear lungs  CT head: No acute ICH.   CTA neck: No evidence of significant stenosis or occlusion.  CTA head: No large vessel occlusion, significant stenosis or vascular abnormality identified. Stable 1-2 mm left paraophthalmic artery outpouching which may represent aneurysm versus infundibulum. 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  CTPE: No PE. History of lung cancer with a 4.3 cm part solid and groundglass right apical mass. Additional scattered small left lung nodules which are unchanged.   EKG: NSR at 78bpm, QTc 440  Medications: KCl 40mEq PO x1, KCl 10mEq IV x1, Mg 4g IV x1, Tylenol 650mg PO x1, 1L LR bolus x1  Consults: ICU, Stroke, Heme/onc

## 2025-06-11 NOTE — H&P ADULT - PROBLEM SELECTOR PLAN 10
Pt with depression, on home Wellbutrin 150mg 3tabs qd.  - C/w home med    #Anxiety  Pt with anxiety, on home Klonopin 0.5mg qd.  - C/w home med as needed for anxiety

## 2025-06-11 NOTE — DISCHARGE NOTE NURSING/CASE MANAGEMENT/SOCIAL WORK - PATIENT PORTAL LINK FT
You can access the FollowMyHealth Patient Portal offered by [facility] by registering at the following website: [email or web address]. By joining [facility] FollowMyHealth portal, you will also be able to view your health information using other applications (apps) compatible with our system.

## 2025-06-11 NOTE — DISCHARGE NOTE PROVIDER - NSDCMRMEDTOKEN_GEN_ALL_CORE_FT
acetaminophen 500 mg oral tablet: 2 tab(s) orally every 8 hours  acyclovir 400 mg oral tablet: 1 tab(s) orally once a day  amLODIPine 5 mg oral tablet: 1 tab(s) orally once a day  calcitriol 0.25 mcg oral capsule: 1 cap(s) orally once a day  carvedilol 6.25 mg oral tablet: 1 tab(s) orally every 12 hours  clonazePAM 0.5 mg oral tablet: 1 tab(s) orally once a day  cyclobenzaprine 10 mg oral tablet: 1 tab(s) orally once a day  Lipitor 80 mg oral tablet: 1 tab(s) orally once a day  lisdexamfetamine 30 mg oral capsule: 1 cap(s) orally once a day  potassium chloride: 8 milliequivalent(s) orally once a day  Synthroid 75 mcg (0.075 mg) oral tablet: 1 tab(s) orally once a day  Wellbutrin  mg/24 hours oral tablet, extended release: 3 tab(s) orally once a day (at bedtime)   acetaminophen 500 mg oral tablet: 2 tab(s) orally every 8 hours  acyclovir 400 mg oral tablet: 1 tab(s) orally once a day  amLODIPine 5 mg oral tablet: 1 tab(s) orally once a day  calcitriol 0.25 mcg oral capsule: 1 cap(s) orally once a day  carvedilol 6.25 mg oral tablet: 1 tab(s) orally every 12 hours  clonazePAM 0.5 mg oral tablet: 1 tab(s) orally once a day  Lipitor 80 mg oral tablet: 1 tab(s) orally once a day  lisdexamfetamine 30 mg oral capsule: 1 cap(s) orally once a day  potassium chloride: 8 milliequivalent(s) orally once a day  Synthroid 75 mcg (0.075 mg) oral tablet: 1 tab(s) orally once a day  Wellbutrin  mg/24 hours oral tablet, extended release: 3 tab(s) orally once a day (at bedtime)   acetaminophen 500 mg oral tablet: 2 tab(s) orally every 8 hours  acyclovir 400 mg oral tablet: 1 tab(s) orally once a day  amLODIPine 5 mg oral tablet: 1 tab(s) orally once a day  calcitriol 0.25 mcg oral capsule: 1 cap(s) orally once a day  carvedilol 6.25 mg oral tablet: 1 tab(s) orally every 12 hours  clonazePAM 0.5 mg oral tablet: 1 tab(s) orally once a day  Home Physical Therapy : To complete at home under Physical Therapists guidance  Lipitor 80 mg oral tablet: 1 tab(s) orally once a day  lisdexamfetamine 30 mg oral capsule: 1 cap(s) orally once a day  potassium chloride: 8 milliequivalent(s) orally once a day  Synthroid 75 mcg (0.075 mg) oral tablet: 1 tab(s) orally once a day  Wellbutrin  mg/24 hours oral tablet, extended release: 3 tab(s) orally once a day (at bedtime)

## 2025-06-11 NOTE — H&P ADULT - ASSESSMENT
78-year-old female with HTN, HLD, hypothyroidism, generalized depression, ADHD, rheumatic fever, pericarditis, IgA deficiency, newly diagnosed NSCLC in [place], and a recent fall with L1 compression fracture (on 5/10/25 s/p T11-L3 decompression/PSF with Dr. Barcenas); now BIBEMS after a witnessed syncopal episode by HHA on 6/10 around 7AM. Stroke code was called in the ED, workup negative. Admitted to hospitalist [facility] for syncope.     #Syncope  #Normocytic anemia  #Hypokalemia  #Hypomag  #Hypophos  #Elevated ALP  #HTN  #HLD  #Hypothyroidism  #Depression  #ADHD  #NSCLC   78-year-old female with HTN, HLD, hypothyroidism, generalized depression, ADHD, rheumatic fever, pericarditis, IgA deficiency, newly diagnosed NSCLC in [place], and a recent fall with L1 compression fracture (on 5/10/25 s/p T11-L3 decompression/PSF with Dr. Barcenas); now BIBEMS after a witnessed syncopal episode by HHA on 6/10 around 7AM. Stroke code was called in the ED, workup negative. Admitted to hospitalist [facility] for syncope.

## 2025-06-11 NOTE — DISCHARGE NOTE PROVIDER - HOSPITAL COURSE
[ ASSESSMENT/1L (came w..., found to have...) ]     Hospital course (problem-based):    New medications:   Changes to old medications:  Items to follow up outpatient:  Physical exam at time of discharge:   78-year-old female with HTN, HLD, hypothyroidism, generalized depression, ADHD, rheumatic fever, pericarditis, IgA deficiency, newly diagnosed NSCLC in [place], and a recent fall with L1 compression fracture (on 5/10/25 s/p T11-L3 decompression/PSF with Dr. Barcenas) was BIBEMS after a witnessed syncopal episode by HHA on 6/10 around 7AM. Per pt, she used restroom in AM and had a BM. Then she went and sat on her wheelchair and per HHA, pt was not responding for a couple of minutes. When pt awakened, she was noted by HHA to have slurred speech and L sided facial droop per HHA. Upon EMS arrival, pt still had slurred speech, however, that resolved prior to arrival to the ED. Pt states she does not recall if she syncopized but even if she did, she was only out for 2-3 minutes because she was looking at the time.     Hospital course (problem-based):    New medications:   Changes to old medications:  Items to follow up outpatient:  Physical exam at time of discharge:          ED course:   Initial vital signs: T: 97.9F -> 100.3F rectally, HR: , BP: 90/55 -> 106/53, R: 18, SpO2: 94% on RA  Labs: significant for Hb 9.3, MCV 91.1, K 2.7 -> 3.4, Cr 1.33, Ca 8, albumin 3.2, Mg 1.2, phos 2.4, troponin 52 -> 47, , lactate 2.2 -> 1.9  UA: spec gravity > 1.030  CXR: Clear lungs  CT head: No acute ICH.   CTA neck: No evidence of significant stenosis or occlusion.  CTA head: No large vessel occlusion, significant stenosis or vascular abnormality identified. Stable 1-2 mm left paraophthalmic artery outpouching which may represent aneurysm versus infundibulum. 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  CTPE: No PE. History of lung cancer with a 4.3 cm part solid and groundglass right apical mass. Additional scattered small left lung nodules which are unchanged.   EKG: NSR at 78bpm, QTc 440  Medications: KCl 40mEq PO x1, KCl 10mEq IV x1, Mg 4g IV x1, Tylenol 650mg PO x1, 1L LR bolus x1  Consults: ICU, Stroke, Heme/onc   78-year-old female with HTN, HLD, hypothyroidism, generalized depression, ADHD, rheumatic fever, pericarditis, IgA deficiency, newly diagnosed NSCLC in [place], and a recent fall with L1 compression fracture (on 5/10/25 s/p T11-L3 decompression/PSF with Dr. Barcenas) was BIBEMS after a witnessed syncopal episode by HHA on 6/10 around 7AM. Per pt, she used restroom in AM and had a BM. Then she went and sat on her wheelchair and per HHA, pt was not responding for a couple of minutes. When pt awakened, she was noted by HHA to have slurred speech and L sided facial droop per HHA. Upon EMS arrival, pt still had slurred speech, however, that resolved prior to arrival to the ED. Pt states she does not recall if she syncopized but even if she did, she was only out for 2-3 minutes because she was looking at the time. BP in field normal. Normal BGL and EKG. NIH stroke scale was 1 (previously known moderate left facial paralysis), CT head showed no acute hemorrhage, EEG report was unremarkable,     Hospital course (problem-based):    Syncope.   Witnessed syncopal episode while on wheelchair by her HHA on 6/10 around 7AM. Stroke code was called in the ED, NIHSS of 1 (chronic L facial asymmetry), negative stroke workup, vEEG with no seizure activities. CTPE negative for PE. Ddx: likely situation syncope (post defecation) vs orthostatic hypotension vs cardiac arrhythmia (has electrolyte abnormalities, however, less likely given EKG with NSR)  - Obtain echo  - Obtain orthostatic vitals in AM  - F/u outpatient neurology.        New medications:   Changes to old medications:  Items to follow up outpatient:  Physical exam at time of discharge:          ED course:   Initial vital signs: T: 97.9F -> 100.3F rectally, HR: , BP: 90/55 -> 106/53, R: 18, SpO2: 94% on RA  Labs: significant for Hb 9.3, MCV 91.1, K 2.7 -> 3.4, Cr 1.33, Ca 8, albumin 3.2, Mg 1.2, phos 2.4, troponin 52 -> 47, , lactate 2.2 -> 1.9  UA: spec gravity > 1.030  CXR: Clear lungs  CT head: No acute ICH.   CTA neck: No evidence of significant stenosis or occlusion.  CTA head: No large vessel occlusion, significant stenosis or vascular abnormality identified. Stable 1-2 mm left paraophthalmic artery outpouching which may represent aneurysm versus infundibulum. 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  CTPE: No PE. History of lung cancer with a 4.3 cm part solid and groundglass right apical mass. Additional scattered small left lung nodules which are unchanged.   EKG: NSR at 78bpm, QTc 440  Medications: KCl 40mEq PO x1, KCl 10mEq IV x1, Mg 4g IV x1, Tylenol 650mg PO x1, 1L LR bolus x1  Consults: ICU, Stroke, Heme/onc   78-year-old female with HTN, HLD, hypothyroidism, generalized depression, ADHD, rheumatic fever, pericarditis, IgA deficiency, newly diagnosed NSCLC in [place], and a recent fall with L1 compression fracture (on 5/10/25 s/p T11-L3 decompression/PSF with Dr. Barcenas) was BIBEMS after a witnessed syncopal episode by HHA on 6/10 around 7AM. Per pt, she used restroom in AM and had a BM. Then she went and sat on her wheelchair and per HHA, pt was not responding for a couple of minutes. When pt awakened, she was noted by HHA to have slurred speech and L sided facial droop per HHA. Upon EMS arrival, pt still had slurred speech, however, that resolved prior to arrival to the ED. Pt states she does not recall if she syncopized but even if she did, she was only out for 2-3 minutes because she was looking at the time. BP in field normal. Normal BGL and EKG. NIH stroke scale was 1 (previously known moderate left facial paralysis), CT head showed no acute hemorrhage, EEG report was unremarkable. Found to be orthostatic positive, bolused with 1L, repeat negative    Hospital course (problem-based):  #Syncope.   Witnessed syncopal episode while on wheelchair by her HHA on 6/10 around 7AM. Stroke code was called in the ED, NIHSS of 1 (chronic L facial asymmetry), negative stroke workup, vEEG with no seizure activities. CTPE negative for PE. Ddx: likely situation syncope (post defecation) vs orthostatic hypotension vs cardiac arrhythmia (has electrolyte abnormalities, however, less likely given EKG with NSR)  Echo 5/2025 wnl   Orthostatics positive -> given 1L fluids -> repeat negative  - F/u outpatient PCP    #Hypophosphatemia  #hypokalemia  #hypoMg  likely iso poor PO intake  RESOLVED with repletion  Encouraged PO intake    #Elevated alkaline phosphatase level.  Pt with elevated ALP of 230 and GGT 44 (noted to be more elevated during last admission in May 2025). RUQ with hepatic steatosis Currently, with no abdominal pain, n/v. Abdominal exam was benign. R factor c/w cholestatic injury.  - Continue to monitor outpatient    #HTN (hypertension).   ·  Plan: Pt with HTN, on home Amlodipine 5mg qd and Coreg 6.25mg BID.  - C/w home meds.    # HLD (hyperlipidemia).   ·  Plan: Pt with HLD, on home Atorvastatin 80mg qhs.  - C/w home med.    #Hypothyroidism.   ·  Plan: Pt with hypothyroidism, on home Synthroid 75mcg qd.  - C/w home med.    #Major depression.   ·  Plan; Pt with depression, on home Wellbutrin 150mg 3tabs qd.  - C/w home med    #Anxiety  Pt with anxiety, on home Klonopin 0.5mg qd.  - C/w home med as needed for anxiety.    #ADHD.   ·  Plan: Pt with ADHD, on Vyvanse 30mg qd.  - c/w home med    #Non-small cell lung cancer (NSCLC).   ·  Plan: Had a recent diagnosis of NSCLC of [place] (pathology confirmed adenocarcinoma) in May 2025; recent MR brain on 6/2 showed no metastasis.   outpatient follow-up with thoracic and oncology    New medications: None  Changes to old medications: None  Items to follow up outpatient: PCP, thoracic, oncology  Physical exam at time of discharge:    Constitutional: resting comfortably in bed; NAD  ENT: PERRL, EOMI, MMM  Respiratory: CTA B/L; no W/R/R  Cardiac: +S1/S2; RRR; no M/R/G  Gastrointestinal: abdomen soft, NT/ND; no rebound or guarding  Back: spine midline, no bony tenderness; + vertical incision with sutures noted to midthoracic to lumbar region, well-healing, c/d/i  Extremities: WWP, no LE edema  Vascular: 2+ radial pulses B/L  Neurologic: AAOx3; CNII-XII grossly intact except for mild L facial droop (noted during last admission as well); no focal deficits          ED course:   Initial vital signs: T: 97.9F -> 100.3F rectally, HR: , BP: 90/55 -> 106/53, R: 18, SpO2: 94% on RA  Labs: significant for Hb 9.3, MCV 91.1, K 2.7 -> 3.4, Cr 1.33, Ca 8, albumin 3.2, Mg 1.2, phos 2.4, troponin 52 -> 47, , lactate 2.2 -> 1.9  UA: spec gravity > 1.030  CXR: Clear lungs  CT head: No acute ICH.   CTA neck: No evidence of significant stenosis or occlusion.  CTA head: No large vessel occlusion, significant stenosis or vascular abnormality identified. Stable 1-2 mm left paraophthalmic artery outpouching which may represent aneurysm versus infundibulum. 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  CTPE: No PE. History of lung cancer with a 4.3 cm part solid and groundglass right apical mass. Additional scattered small left lung nodules which are unchanged.   EKG: NSR at 78bpm, QTc 440  Medications: KCl 40mEq PO x1, KCl 10mEq IV x1, Mg 4g IV x1, Tylenol 650mg PO x1, 1L LR bolus x1  Consults: ICU, Stroke, Heme/onc   78-year-old female with HTN, HLD, hypothyroidism, generalized depression, ADHD, rheumatic fever, pericarditis, IgA deficiency, newly diagnosed NSCLC in [place], and a recent fall with L1 compression fracture (on 5/10/25 s/p T11-L3 decompression/PSF with Dr. Barcenas) was BIBEMS after a witnessed syncopal episode by HHA on 6/10 around 7AM. Per pt, she used restroom in AM and had a BM. Then she went and sat on her wheelchair and per HHA, pt was not responding for a couple of minutes. When pt awakened, she was noted by HHA to have slurred speech and L sided facial droop per HHA. Upon EMS arrival, pt still had slurred speech, however, that resolved prior to arrival to the ED. Pt states she does not recall if she syncopized but even if she did, she was only out for 2-3 minutes because she was looking at the time. BP in field normal. Normal BGL and EKG. NIH stroke scale was 1 (previously known moderate left facial paralysis), CT head showed no acute hemorrhage, EEG report was unremarkable. Found to be orthostatic positive, bolused with 1L, repeat negative    Hospital course (problem-based):  #Syncope.   Witnessed syncopal episode while on wheelchair by her HHA on 6/10 around 7AM. Stroke code was called in the ED, NIHSS of 1 (chronic L facial asymmetry), negative stroke workup, vEEG with no seizure activities. CTPE negative for PE. Ddx: likely situation syncope (post defecation) vs orthostatic hypotension vs cardiac arrhythmia (has electrolyte abnormalities, however, less likely given EKG with NSR)  Echo 5/2025 wnl   Orthostatics positive -> given 1L fluids -> repeat negative  - F/u outpatient PCP    #Hypophosphatemia  #hypokalemia  #hypoMg  likely iso poor PO intake  RESOLVED with repletion  Encouraged PO intake    #Elevated alkaline phosphatase level.  Pt with elevated ALP of 230 and GGT 44 (noted to be more elevated during last admission in May 2025). RUQ with hepatic steatosis Currently, with no abdominal pain, n/v. Abdominal exam was benign. R factor c/w cholestatic injury.  - Continue to monitor outpatient    #HTN (hypertension).   ·  Plan: Pt with HTN, on home Amlodipine 5mg qd and Coreg 6.25mg BID.  - C/w home meds.    # HLD (hyperlipidemia).   ·  Plan: Pt with HLD, on home Atorvastatin 80mg qhs.  - C/w home med.    #Hypothyroidism.   ·  Plan: Pt with hypothyroidism, on home Synthroid 75mcg qd.  - C/w home med.    #Major depression.   ·  Plan; Pt with depression, on home Wellbutrin 150mg 3tabs qd.  - C/w home med    #Anxiety  Pt with anxiety, on home Klonopin 0.5mg qd.  - C/w home med as needed for anxiety.    #ADHD.   ·  Plan: Pt with ADHD, on Vyvanse 30mg qd.  - c/w home med    #Non-small cell lung cancer (NSCLC).   ·  Plan: Had a recent diagnosis of NSCLC of [place] (pathology confirmed adenocarcinoma) in May 2025; recent MR brain on 6/2 showed no metastasis.   outpatient follow-up with thoracic and oncology    New medications: None  Changes to old medications: None  Items to follow up outpatient: PCP, thoracic, oncology  Physical exam at time of discharge:    Constitutional: resting comfortably in bed; NAD  ENT: PERRL, EOMI, MMM  Respiratory: CTA B/L; no W/R/R  Cardiac: +S1/S2; RRR; no M/R/G  Gastrointestinal: abdomen soft, NT/ND; no rebound or guarding  Back: spine midline, no bony tenderness; + vertical incision with sutures noted to midthoracic to lumbar region, well-healing, c/d/i  Extremities: WWP, no LE edema  Vascular: 2+ radial pulses B/L  Neurologic: AAOx3; CNII-XII grossly intact except for mild L facial droop (noted during last admission as well); no focal deficits   78-year-old female with HTN, HLD, hypothyroidism, generalized depression, ADHD, rheumatic fever, pericarditis, IgA deficiency, newly diagnosed NSCLC in [place], and a recent fall with L1 compression fracture (on 5/10/25 s/p T11-L3 decompression/PSF with Dr. Barcenas) was BIBEMS after a witnessed syncopal episode by HHA on 6/10 around 7AM. Per pt, she used restroom in AM and had a BM. Then she went and sat on her wheelchair and per HHA, pt was not responding for a couple of minutes. When pt awakened, she was noted by HHA to have slurred speech and L sided facial droop per HHA. Upon EMS arrival, pt still had slurred speech, however, that resolved prior to arrival to the ED. Pt states she does not recall if she syncopized but even if she did, she was only out for 2-3 minutes because she was looking at the time. BP in field normal. Normal BGL and EKG. NIH stroke scale was 1 (previously known moderate left facial paralysis), CT head showed no acute hemorrhage, EEG report was unremarkable. Found to be orthostatic positive, bolused with 1L, repeat negative. Some c/f polyphamarcy - on chronic benzo, recently prescribed opioid and cyclobenzaprine.     Hospital course (problem-based):  #Syncope.   Witnessed syncopal episode while on wheelchair by her HHA on 6/10 around 7AM. Stroke code was called in the ED, NIHSS of 1 (chronic L facial asymmetry), negative stroke workup, vEEG with no seizure activities. CTPE negative for PE. Ddx: likely situation syncope (post defecation) vs orthostatic hypotension vs cardiac arrhythmia (has electrolyte abnormalities, however, less likely given EKG with NSR)  Echo 5/2025 wnl   Orthostatics positive -> given 1L fluids -> repeat negative  - F/u outpatient PCP, advised to consider tapering of clonazepam  - instructed to avoid combining benzo with opioids     #Hypophosphatemia  #hypokalemia  #hypoMg  likely iso poor PO intake  RESOLVED with repletion  Encouraged PO intake    #Elevated alkaline phosphatase level.  Pt with elevated ALP of 230 and GGT 44 (noted to be more elevated during last admission in May 2025). RUQ with hepatic steatosis Currently, with no abdominal pain, n/v. Abdominal exam was benign. R factor c/w cholestatic injury.  - Continue to monitor outpatient    #HTN (hypertension).   ·  Plan: Pt with HTN, on home Amlodipine 5mg qd and Coreg 6.25mg BID.  - C/w home meds.    # HLD (hyperlipidemia).   ·  Plan: Pt with HLD, on home Atorvastatin 80mg qhs.  - C/w home med.    #Hypothyroidism.   ·  Plan: Pt with hypothyroidism, on home Synthroid 75mcg qd.  - C/w home med.    #Major depression.   ·  Plan; Pt with depression, on home Wellbutrin 150mg 3tabs qd.  - C/w home med    #Anxiety  Pt with anxiety, on home Klonopin 0.5mg qd.  - C/w home med as needed for anxiety.    #ADHD.   ·  Plan: Pt with ADHD, on Vyvanse 30mg qd.  - c/w home med    #Non-small cell lung cancer (NSCLC).   ·  Plan: Had a recent diagnosis of NSCLC of [place] (pathology confirmed adenocarcinoma) in May 2025; recent MR brain on 6/2 showed no metastasis.   outpatient follow-up with thoracic and oncology    New medications: None  Changes to old medications: None  Items to follow up outpatient: PCP, thoracic, oncology  Physical exam at time of discharge:    Constitutional: resting comfortably in bed; NAD  ENT: PERRL, EOMI, MMM  Respiratory: CTA B/L; no W/R/R  Cardiac: +S1/S2; RRR; no M/R/G  Gastrointestinal: abdomen soft, NT/ND; no rebound or guarding  Back: spine midline, no bony tenderness; + vertical incision with sutures noted to midthoracic to lumbar region, well-healing, c/d/i  Extremities: WWP, no LE edema  Vascular: 2+ radial pulses B/L  Neurologic: AAOx3; CNII-XII grossly intact except for mild L facial droop (noted during last admission as well); no focal deficits   78-year-old female with HTN, HLD, hypothyroidism, generalized depression, ADHD, rheumatic fever, pericarditis, IgA deficiency, newly diagnosed NSCLC in [place], and a recent fall with L1 compression fracture (on 5/10/25 s/p T11-L3 decompression/PSF with Dr. Barcenas) was BIBEMS after a witnessed syncopal episode by HHA on 6/10 around 7AM. Per pt, she used restroom in AM and had a BM. Then she went and sat on her wheelchair and per HHA, pt was not responding for a couple of minutes. When pt awakened, she was noted by HHA to have slurred speech and L sided facial droop per HHA. Upon EMS arrival, pt still had slurred speech, however, that resolved prior to arrival to the ED. Pt states she does not recall if she syncopized but even if she did, she was only out for 2-3 minutes because she was looking at the time. BP in field normal. Normal BGL and EKG. NIH stroke scale was 1 (previously known moderate left facial paralysis), CT head showed no acute hemorrhage, EEG report was unremarkable. Found to be orthostatic positive, bolused with 1L, repeat negative.     Hospital course (problem-based):  #Syncope.   Witnessed syncopal episode while on wheelchair by her HHA on 6/10 around 7AM. Stroke code was called in the ED, NIHSS of 1 (chronic L facial asymmetry), negative stroke workup, vEEG with no seizure activities. CTPE negative for PE. Ddx: likely situation syncope (post defecation) vs orthostatic hypotension vs cardiac arrhythmia (has electrolyte abnormalities, however, less likely given EKG with NSR)  Echo 5/2025 wnl   Orthostatics positive -> given 1L fluids -> repeat negative  - F/u outpatient PCP, advised to consider tapering of clonazepam  - instructed to avoid combining benzo with opioids     #Hypophosphatemia  #hypokalemia  #hypoMg  likely iso poor PO intake  RESOLVED with repletion  Encouraged PO intake    #Elevated alkaline phosphatase level.  Pt with elevated ALP of 230 and GGT 44 (noted to be more elevated during last admission in May 2025). RUQ with hepatic steatosis Currently, with no abdominal pain, n/v. Abdominal exam was benign. R factor c/w cholestatic injury.  - Continue to monitor outpatient    #HTN (hypertension).   ·  Plan: Pt with HTN, on home Amlodipine 5mg qd and Coreg 6.25mg BID.  - C/w home meds.    # HLD (hyperlipidemia).   ·  Plan: Pt with HLD, on home Atorvastatin 80mg qhs.  - C/w home med.    #Hypothyroidism.   ·  Plan: Pt with hypothyroidism, on home Synthroid 75mcg qd.  - C/w home med.    #Major depression.   ·  Plan; Pt with depression, on home Wellbutrin 150mg 3tabs qd.  - C/w home med    #Anxiety  Pt with anxiety, on home Klonopin 0.5mg qd.  - C/w home med as needed for anxiety.    #ADHD.   ·  Plan: Pt with ADHD, on Vyvanse 30mg qd.  - c/w home med    #Non-small cell lung cancer (NSCLC).   ·  Plan: Had a recent diagnosis of NSCLC of [place] (pathology confirmed adenocarcinoma) in May 2025; recent MR brain on 6/2 showed no metastasis.   outpatient follow-up with thoracic and oncology    New medications: None  Changes to old medications: None  Items to follow up outpatient: PCP, thoracic, oncology  Physical exam at time of discharge:    Constitutional: resting comfortably in bed; NAD  ENT: PERRL, EOMI, MMM  Respiratory: CTA B/L; no W/R/R  Cardiac: +S1/S2; RRR; no M/R/G  Gastrointestinal: abdomen soft, NT/ND; no rebound or guarding  Back: spine midline, no bony tenderness; + vertical incision with sutures noted to midthoracic to lumbar region, well-healing, c/d/i  Extremities: WWP, no LE edema  Vascular: 2+ radial pulses B/L  Neurologic: AAOx3; CNII-XII grossly intact except for mild L facial droop (noted during last admission as well); no focal deficits

## 2025-06-11 NOTE — H&P ADULT - ATTENDING COMMENTS
78 YOF with PMH of HTN, HLD, hypothyroidism, rheumatic fever (no known valvular disease), remote history of pericarditis, IgA deficiency, L1 compression fx s/p T11-L3 PSF, anxiety/depression, ADHD, recent diagnosis of right apical NSCLC presenting after witnessed ?LOC while at home. Patient reports was sitting in wheelchair waiting for her aid to make her bed in the morning. Pt does not recall LOC but aid states patient was not responding for 2 min and had slurred speech with L facial droop upon waking. ROS notable for URI symptoms.     Stroke code in ED, low c/f acute stroke given non-focal neurological symptoms and quick resolution of symptoms. Spot EEG negative. ECG NSR, no events on tele, recent TTE grossly normal. Orthostatic positive with nurse but negative with PT. Overall impression is patient had encephalopathy 2/2 hypokalemia/hypomagnesemia vs polypharmacy (states takes every clonazepam every morning, has not used cyclobenzaprine for over one week) vs viral illness (+enterovirus). Clinically non-toxic appearing, at neurologic baseline.     Plan  - PT evaluation  - discussed avoidance of benzos especially concurrently with opioids, patient to discuss with prescribing physician about weaning (states has been on clonazepam for years for ADHD, does not wish to stop now)    Dispo - anticipate home

## 2025-06-11 NOTE — H&P ADULT - NSHPSOCIALHISTORY_GEN_ALL_CORE
Former smoker, smoked 1.5PPD for 20 years, quit in 1984.  Drink 2 glasses of wine a month. Denies any recreational drug use.  Lives at home alone, has 24/7 HHA. Was discharged from [facility] 4 days prior to arrival.   Uses a wheelchair to ambulate or requires assistance from A.

## 2025-06-11 NOTE — H&P ADULT - PROBLEM SELECTOR PLAN 2
Pt noted to have K of 2.7. S/p KCl 40mEq PO x1, KCl 10mEq IV x1 in the ED -> repeat K 3.4. EKG with no t-wave inversion and u waves.   Pt states that she has lost around 20lbs within past month at [facility]. Pt states she did not like the food at [facility], it was so dry. Pt was started on KCl 8mEq qd while at [facility], however, states that she ran out of her KCl last week.   - Given another KCl 40mEq PO x1  - F/u AM BMP

## 2025-06-11 NOTE — PHYSICAL THERAPY INITIAL EVALUATION ADULT - ADDITIONAL COMMENTS
Pt reports living in apartment, without LORE, has 24/7 home health aid. Pt states she has w/c and RW and is able to ambulate short distances with assistance. Pt. recently discharge from rehab. No homecare set up upon discharge.

## 2025-06-11 NOTE — H&P ADULT - NSHPPHYSICALEXAM_GEN_ALL_CORE
.  VITAL SIGNS:  T(C): 37.1 (06-11-25 @ 00:10), Max: 37.9 (06-10-25 @ 18:52)  T(F): 98.7 (06-11-25 @ 00:10), Max: 100.3 (06-10-25 @ 18:52)  HR: 78 (06-11-25 @ 00:10) (78 - 106)  BP: 115/56 (06-11-25 @ 00:10) (90/55 - 149/66)  BP(mean): --  RR: 18 (06-11-25 @ 00:10) (17 - 18)  SpO2: 95% (06-11-25 @ 00:10) (93% - 97%)  Wt(kg): --    PHYSICAL EXAM:    Constitutional: resting comfortably in bed; NAD  Head: NC/AT  Eyes: PERRL, EOMI, anicteric sclera  ENT: no nasal discharge; uvula midline, no oropharyngeal erythema or exudates; MMM  Neck: supple; no JVD or thyromegaly  Respiratory: CTA B/L; no W/R/R, no retractions  Cardiac: +S1/S2; RRR; no M/R/G; PMI non-displaced  Gastrointestinal: abdomen soft, NT/ND; no rebound or guarding; +BSx4  Back: spine midline, no bony tenderness or step-offs; no CVAT B/L  Extremities: WWP, no clubbing or cyanosis; no peripheral edema  Musculoskeletal: NROM x4; no joint swelling, tenderness or erythema  Vascular: 2+ radial, femoral, DP/PT pulses B/L  Dermatologic: skin warm, dry and intact; no rashes, wounds, or scars  Lymphatic: no submandibular or cervical LAD  Neurologic: AAOx3; CNII-XII grossly intact; no focal deficits  Psychiatric: affect and characteristics of appearance, verbalizations, behaviors are appropriate .  VITAL SIGNS:  T(C): 37.1 (06-11-25 @ 00:10), Max: 37.9 (06-10-25 @ 18:52)  T(F): 98.7 (06-11-25 @ 00:10), Max: 100.3 (06-10-25 @ 18:52)  HR: 78 (06-11-25 @ 00:10) (78 - 106)  BP: 115/56 (06-11-25 @ 00:10) (90/55 - 149/66)  BP(mean): --  RR: 18 (06-11-25 @ 00:10) (17 - 18)  SpO2: 95% (06-11-25 @ 00:10) (93% - 97%)  Wt(kg): --    PHYSICAL EXAM:    Constitutional: resting comfortably in bed; NAD  ENT: PERRL, EOMI, MMM  Respiratory: CTA B/L; no W/R/R  Cardiac: +S1/S2; RRR; no M/R/G  Gastrointestinal: abdomen soft, NT/ND; no rebound or guarding  Back: spine midline, no bony tenderness; + vertical incision with sutures noted to midthoracic to lumbar region, well-healing, c/d/i  Extremities: WWP, no LE edema  Vascular: 2+ radial pulses B/L  Neurologic: AAOx3; CNII-XII grossly intact except for mild L facial droop (noted during last admission as well); no focal deficits

## 2025-06-11 NOTE — DISCHARGE NOTE PROVIDER - CARE PROVIDER_API CALL
Michael Fields  Internal Medicine  [address], [address]  [place] [address]  Phone: [phone]  Fax: [phone]  Established Patient  Scheduled Appointment: 06/17/2025 10:20 AM

## 2025-06-11 NOTE — DISCHARGE NOTE PROVIDER - NSDCFUSCHEDAPPT_GEN_ALL_CORE_FT
Mak Iqbal  [facility] Physician On license of [facility]  [facility] [address]  Scheduled Appointment: 06/12/2025    Jong Parra  [facility] Physician On license of [facility]  [facility] [address]  Scheduled Appointment: 06/13/2025     Mak Iqbal  [facility] Physician [facility]  [address]  Scheduled Appointment: 06/12/2025    Jong Parra  [facility] Physician [facility]  [facility] [address]  Scheduled Appointment: 06/13/2025    Michael Fields  [facility] Physician [facility]  [facility] [address]  Scheduled Appointment: 06/17/2025

## 2025-06-11 NOTE — H&P ADULT - PROBLEM SELECTOR PLAN 1
Pt s/p a witnessed syncopal episode while on wheelchair by her HHA on 6/10 around 7AM. Stroke code was called in the ED, NIHSS of 1 (chronic L facial asymmetry), negative stroke workup, vEEG with no seizure activities. CTPE negative for PE. Ddx: likely situation syncope (post defecation) vs orthostatic hypotension vs cardiac arrhythmia (has electrolyte abnormalities, however, less likely given EKG with NSR)  - Obtain echo  - Obtain orthostatic vitals in AM  - F/u outpatient neurology Pt s/p a witnessed syncopal episode while on wheelchair by her HHA on 6/10 around 7AM. Stroke code was called in the ED, NIHSS of 1 (chronic L facial asymmetry), negative stroke workup, vEEG with no seizure activities. CTPE negative for PE. Ddx: likely situation syncope (post defecation) vs orthostatic hypotension vs cardiac arrhythmia (has electrolyte abnormalities, however, less likely given EKG with NSR)  - Defer echo, recent TTE WNL  - Obtain orthostatic vitals in AM  - F/u outpatient neurology

## 2025-06-11 NOTE — DISCHARGE NOTE PROVIDER - NSDCCPCAREPLAN_GEN_ALL_CORE_FT
PRINCIPAL DISCHARGE DIAGNOSIS  Diagnosis: Syncope  Assessment and Plan of Treatment: Syncope, or passing out, happens when you don’t have enough blood flowing to your brain. There are many causes for this, depending on the type of syncope. You may have a medical condition that affects your nervous system or heart. Or you may have a condition that affects blood flow through your body and makes your blood pressure drop when you stand up after lying down.  Orthostatic syncope (also called postural hypotension) is a sudden drop in blood pressure from a change in position. It may happen when you stand up. This drop in your blood pressure was observed in the hospital when your blood pressure was measured while lying down, sitting at your bed, and then standing.   There are many different types of syncope which cannot be ruled out including the following. Reflex syncope (which includes vasovagal and situational syncope) is your body’s reaction to certain things happening to you or around you, like standing for a while or feeling stressed or using the toilet.   Cardiac syncope is a lack of blood flow to your brain because of an issue with your heart or blood vessels. These conditions can include: abnormal heart rhythm (arrhythmia), blood clot, heart valve disease or heart failure. This is why we performed an echocardiogram of your heart and monitored your heart electrical activity.   Neurologic syncope comes from an issue in your brain that limits blood flow there. It can happen when you have a neurological condition like a seizure, stroke or transient ischemic attack (TIA).  Postural orthostatic tachycardia syndrome (POTS) is an issue with maintaining blood pressure when you stand. But it also involves a fast heart rate (tachycardia) that happens when you stand.   Syncope may also occur with an unknown cause. An increased risk of a syncopal episode is a side effect of some medications.        PRINCIPAL DISCHARGE DIAGNOSIS  Diagnosis: Syncope  Assessment and Plan of Treatment: Syncope, or passing out, happens when you don’t have enough blood flowing to your brain. There are many causes for this, depending on the type of syncope. You may have a medical condition that affects your nervous system or heart. Or you may have a condition that affects blood flow through your body and makes your blood pressure drop when you stand up after lying down.  Orthostatic syncope (also called postural hypotension) is a sudden drop in blood pressure from a change in position. It may happen when you stand up. This drop in your blood pressure was observed in the hospital when your blood pressure was measured while lying down, sitting at your bed, and then standing.  You were given fluids for the orthostatic hypotension. Video EEG was negative for any seizure activity. Imaging of your head was negative for any acute findings or concern for stroke.   -------------------------------------------------------  Continue all your medications as previously taking  Please follow up with your primary care provider on 06/17  Please follow up with thoracic surgery (6/12) and oncology (6/13) as scheduled        PRINCIPAL DISCHARGE DIAGNOSIS  Diagnosis: Syncope  Assessment and Plan of Treatment: Syncope, or passing out, happens when you don’t have enough blood flowing to your brain. There are many causes for this, depending on the type of syncope. You may have a medical condition that affects your nervous system or heart. Or you may have a condition that affects blood flow through your body and makes your blood pressure drop when you stand up after lying down.  Orthostatic syncope (also called postural hypotension) is a sudden drop in blood pressure from a change in position. It may happen when you stand up. This drop in your blood pressure was observed in the hospital when your blood pressure was measured while lying down, sitting at your bed, and then standing.  You were given fluids for the orthostatic hypotension. Video EEG was negative for any seizure activity. Imaging of your head was negative for any acute findings or concern for stroke.  You were also seen by physical therapy who recommend home physical therapy sessions.  -------------------------------------------------------  Continue all your medications as previously taking  Continue physical therapy at home 2-3 times weekly  Please follow up with your primary care provider on 06/17  Please follow up with thoracic surgery (6/12) and oncology (6/13) as scheduled        PRINCIPAL DISCHARGE DIAGNOSIS  Diagnosis: Syncope  Assessment and Plan of Treatment: Syncope, or passing out, happens when you don’t have enough blood flowing to your brain. There are many causes for this, depending on the type of syncope. You may have a medical condition that affects your nervous system or heart. Or you may have a condition that affects blood flow through your body and makes your blood pressure drop when you stand up after lying down.  Orthostatic syncope (also called postural hypotension) is a sudden drop in blood pressure from a change in position. It may happen when you stand up. This drop in your blood pressure was observed in the hospital when your blood pressure was measured while lying down, sitting at your bed, and then standing.  You were given fluids for the orthostatic hypotension. Video EEG was negative for any seizure activity. Imaging of your head was negative for any acute findings or concern for stroke.  You were also seen by physical therapy who recommend home physical therapy sessions.  -------------------------------------------------------  Continue all your medications as previously taking. You should speak with your doctor about tapering off clonazepam - this medication can have strong adverse effects as you age. You should avoid taking clonazepam with other medications that casue drowsiness (like oxycodone or cyclobenzaprine)  Continue physical therapy at home 2-3 times weekly  Please follow up with your primary care provider on 06/17  Please follow up with thoracic surgery (6/12) and oncology (6/13) as scheduled

## 2025-06-11 NOTE — DISCHARGE NOTE PROVIDER - CARE PROVIDERS DIRECT ADDRESSES
[email or web address]

## 2025-06-11 NOTE — H&P ADULT - PROBLEM SELECTOR PLAN 12
Had a recent diagnosis of NSCLC of RUL (pathology confirmed adenocarcinoma) in May 2025; recent MR brain on 6/2 showed no metastasis.   - F/u heme/onc recs  - F/u outpatient with Dr. Parra and Dr. Iqbal (CT surgery)

## 2025-06-11 NOTE — H&P ADULT - PROBLEM SELECTOR PLAN 6
Pt with elevated ALP of 230 (noted to be more elevated during last admission in May 2025). Currently, with no abdominal pain, n/v. Abdominal exam was benign. R factor c/w cholestatic injury.  - Continue to monitor

## 2025-06-11 NOTE — H&P ADULT - PROBLEM SELECTOR PLAN 5
Nephrology Progress Note        Subjective:  Overnight events noted  Labs reviewed  NAD  S/p trach    Drips:    Recent procedures/images/events:    Physical Exam  Blood pressure 127/63, pulse (!) 112, temperature 98.2 °F (36.8 °C), temperature source Axillary, resp. rate 19, height 5' 8\" (1.727 m), weight 61.4 kg (135 lb 5.8 oz), SpO2 100%.      Lying in bed, trach  s1 an ds2, RRR  ctab  Abd benign  No edema  R UE digit amputations, R BKA        Assessment:  ESRD s/p kidney transplant- some reduced graft function  Tonic-clonic seizures w/ possible status epilepticus  Hypernatremia  Immunosuppressnat- [place], pred, Myfortic  Hyponatremia- hypovolemic, resolved  Osteomeylitis  DM2  HTN  Anemia- m/l 2/2 to KATHARINA        Plan:  Good graft function; trend bmp daily  Hypernatremia  improved w/ FWF; stable; cont trending bmp  Trend tacrolimus level (goal 4-6); level 2.8-> 2.1-> 2.0; cont increased dose of prograf 1mg/0.5mg dosing  Continue midodrine 10mg tid; titrate w/ bp readings  Improved urine output; hold diuretics  Keep euvolemic  Strict glycemic control w/ glucose <180  Will continue to monitor            Thank you for the opportunity to partake in this patient's care    Medications  Current Facility-Administered Medications   Medication Dose Route Frequency Provider Last Rate Last Admin    labetalol (NORMODYNE) injection 10 mg  10 mg Intravenous Q1H PRN Sweis, Argelia T, DO        sodium chloride 0.9% infusion   Intravenous Continuous PRN Yocasta Arias, CNP        potassium & sodium phosphates (PHOS-NAK) 280-160-250 MG powder packet 1 packet  1 packet Per G Tube Once Sweis, Argelia T, DO        And    potassium & sodium phosphates (PHOS-NAK) 280-160-250 MG powder packet 1 packet  1 packet Per G Tube Once Sweis, Argelia T, DO        TACROlimus (PROGRAF) 0.5 MG/ML (compounded) suspension 1 mg  1 mg Per PEG Tube Daily Tx Leilani Lemon MD   1 mg at 05/29/24 0827    TACROlimus (PROGRAF) 0.5 MG/ML  (compounded) suspension 0.5 mg  0.5 mg Per PEG Tube Nightly Tx Leilani Lemon MD   0.5 mg at 05/29/24 2115    chlorhexidine gluconate (PERIDEX) 0.12 % solution 15 mL  15 mL Swish & Spit 2 times per day Yocasta Arias CNP   15 mL at 05/29/24 2115    predniSONE (DELTASONE) tablet 10 mg  10 mg Per PEG Tube Daily with breakfast Jamee Gomes MD        propofol (DIPRIVAN) IV bolus 100 mg  100 mg Intravenous Once Keya Miles MD        lipase-protease-amylase 10,440-39,150-39,150 units (VIOKACE) per tablet 2 tablet  2 tablet Per PEG Tube PRN Argelia Calzada T DO        And    sodium bicarbonate tablet 650 mg  650 mg Per G Tube PRN Argelia Calzada T, DO        [START ON 6/4/2024] predniSONE (DELTASONE) tablet 5 mg  5 mg Per PEG Tube Daily with breakfast Argelia Calzada T, DO        fluconazole (DIFLUCAN) IVPB 200 mg  200 mg Intravenous Daily Leanne Hood CNP   Completed at 05/29/24 0951    fentaNYL (SUBLIMAZE) injection 25 mcg  25 mcg Intravenous Q1H PRN Maryjane Carvalho APNP   25 mcg at 05/30/24 0653    Potassium Standard Replacement Protocol (Levels 3.5 and lower)   Does not apply See Admin Instructions Umm Lynch CNP        Magnesium Standard Replacement Protocol   Does not apply See Admin Instructions Umm Lynch CNP        Phosphorus Standard Replacement Protocol   Does not apply See Admin Instructions Umm Lynch CNP        [Held by provider] insulin glargine (LANTUS) injection 10 Units  10 Units Subcutaneous 2 times per day Louisa Abdi NP   10 Units at 05/28/24 2030    [Held by provider] docusate sodium (COLACE) capsule 200 mg  200 mg Oral BID Argelia Calzada DO        Or    [Held by provider] docusate sodium (COLACE) 50 MG/5ML liquid 100 mg  100 mg Per PEG Tube BID Argelia Calzada DO   100 mg at 05/27/24 0819    acetaminophen (TYLENOL) tablet 650 mg  650 mg Per PEG Tube Q4H PRN Argelia Calzada DO   650 mg at 05/29/24 0516    Or     acetaminophen (TYLENOL) suppository 650 mg  650 mg Rectal Q4H PRN Sweis, Argelia T, DO        polyethylene glycol (MIRALAX) packet 17 g  17 g Per PEG Tube Daily PRN Irmais, Argelia T, DO        famotidine (PEPCID) tablet 20 mg  20 mg Per PEG Tube 2 times per day Sweis, Argelia T, DO   20 mg at 05/29/24 2115    dextrose (GLUTOSE) 40 % gel 15 g  15 g Per PEG Tube PRN Irmais, Argelia T, DO        dextrose (GLUTOSE) 40 % gel 30 g  30 g Per PEG Tube PRN Irmais, Argelia T, DO        heparin (porcine) injection 5,000 Units  5,000 Units Subcutaneous 3 times per day Louisa Abdi NP   5,000 Units at 05/29/24 0516    sodium chloride 0.9 % injection 10 mL  10 mL Injection PRN Elsi, Argelia T, DO        sodium chloride 0.9 % injection 10 mL  10 mL Injection PRN Kira Calzadae T, DO        sodium chloride 0.9 % injection 10 mL  10 mL Injection 2 times per day Irmais Argelia T, DO   10 mL at 05/29/24 2139    sodium chloride 0.9 % injection 10 mL  10 mL Injection 2 times per day Kira Calzadae T, DO   10 mL at 05/29/24 2139    sodium chloride 0.9 % injection 20 mL  20 mL Injection PRN Elsi, Argelia T, DO        lacosamide (VIMPAT INJECT) injection 150 mg  150 mg Intravenous 2 times per day Kira Calzadae T, DO   150 mg at 05/29/24 2115    midodrine (PROAMATINE) tablet 10 mg  10 mg Per PEG Tube TID AC Kira Calzadae T, DO   10 mg at 05/30/24 0638    tamsulosin (FLOMAX) capsule 0.8 mg  0.8 mg Per PEG Tube Daily PC Kira Calzadae T, DO   0.8 mg at 05/29/24 0828    mirtazapine (REMERON) tablet 7.5 mg  7.5 mg Per PEG Tube Nightly Kira Calzadae T, DO   7.5 mg at 05/29/24 2115    ipratropium-albuterol (DUONEB) 0.5-2.5 (3) MG/3ML nebulizer solution 3 mL  3 mL Nebulization Q4H Resp Sweis, Argelia T, DO   3 mL at 05/30/24 0744    dextrose 50 % injection 25 g  25 g Intravenous PRN Sweis, Argelia T, DO        dextrose 50 % injection 12.5 g  12.5 g Intravenous PRN Sweis, Argelia T, DO   12.5 g at 05/29/24 0615    glucagon  (GLUCAGEN) injection 1 mg  1 mg Intramuscular PRN Argelia Calzada DO        [Held by provider] insulin lispro (ADMELOG,HumaLOG) - Correction Dose   Subcutaneous 4 times per day Argelia Calzada DO   3 Units at 05/27/24 0549          Relevant Results  Recent Results (from the past 24 hour(s))   GLUCOSE, BEDSIDE - POINT OF CARE    Collection Time: 05/29/24  9:07 AM   Result Value Ref Range    GLUCOSE, BEDSIDE - POINT OF CARE 85 70 - 99 mg/dL   GLUCOSE, BEDSIDE - POINT OF CARE    Collection Time: 05/29/24 11:33 AM   Result Value Ref Range    GLUCOSE, BEDSIDE - POINT OF CARE 85 70 - 99 mg/dL   BRONCHIAL, BACTERIAL CULTURE WITH GRAM STAIN    Collection Time: 05/29/24  3:29 PM    Specimen: Lung, Right Middle Lobe; Lavage   Result Value Ref Range    CULTURE WITH GRAM STAIN, BRONCHIAL Culture in progress.     Gram Stain Few Polymorphonuclear cells.     Gram Stain Rare Epithelial cells.     Gram Stain No organisms seen.    BRONCHIAL, BACTERIAL CULTURE WITH GRAM STAIN    Collection Time: 05/29/24  3:29 PM    Specimen: Lung, Left Lower Lobe; Lavage   Result Value Ref Range    CULTURE WITH GRAM STAIN, BRONCHIAL Culture in progress.     Gram Stain Few Polymorphonuclear cells.     Gram Stain Rare Epithelial cells.     Gram Stain No organisms seen.    GLUCOSE, BEDSIDE - POINT OF CARE    Collection Time: 05/29/24  9:10 PM   Result Value Ref Range    GLUCOSE, BEDSIDE - POINT OF CARE 155 (H) 70 - 99 mg/dL   GLUCOSE, BEDSIDE - POINT OF CARE    Collection Time: 05/30/24  5:43 AM   Result Value Ref Range    GLUCOSE, BEDSIDE - POINT OF CARE 202 (H) 70 - 99 mg/dL   Basic Metabolic Panel    Collection Time: 05/30/24  6:37 AM   Result Value Ref Range    Fasting Status      Sodium 143 135 - 145 mmol/L    Potassium 4.9 3.4 - 5.1 mmol/L    Chloride 113 (H) 97 - 110 mmol/L    Carbon Dioxide 24 21 - 32 mmol/L    Anion Gap 11 7 - 19 mmol/L    Glucose 206 (H) 70 - 99 mg/dL    BUN 29 (H) 6 - 20 mg/dL    Creatinine 0.53 (L) 0.67 - 1.17 mg/dL     Glomerular Filtration Rate >90 >=60    BUN/Cr 55 (H) 7 - 25    Calcium 8.9 8.4 - 10.2 mg/dL   Magnesium    Collection Time: 05/30/24  6:37 AM   Result Value Ref Range    Magnesium 1.8 1.7 - 2.4 mg/dL   Iron And total Iron Binding Capacity    Collection Time: 05/30/24  6:37 AM   Result Value Ref Range    Iron 17 (L) 65 - 175 mcg/dL    Iron Binding Capacity 60 (L) 250 - 450 mcg/dL    Iron, Percent Saturation 28 15 - 45 %   Ferritin    Collection Time: 05/30/24  6:37 AM   Result Value Ref Range    Ferritin 834 (H) 26 - 388 ng/mL   Phosphorus    Collection Time: 05/30/24  6:37 AM   Result Value Ref Range    Phosphorus 2.1 (L) 2.4 - 4.7 mg/dL   CBC No Differential    Collection Time: 05/30/24  6:37 AM   Result Value Ref Range    WBC 12.0 (H) 4.2 - 11.0 K/mcL    RBC 2.39 (L) 4.50 - 5.90 mil/mcL    HGB 6.8 (LL) 13.0 - 17.0 g/dL    HCT 21.9 (L) 39.0 - 51.0 %    MCV 91.6 78.0 - 100.0 fl    MCH 28.5 26.0 - 34.0 pg    MCHC 31.1 (L) 32.0 - 36.5 g/dL     (L) 140 - 450 K/mcL    RDW-CV 17.6 (H) 11.0 - 15.0 %    RDW-SD 59.0 (H) 39.0 - 50.0 fL    NRBC 0 <=0 /100 WBC       I/O's    Intake/Output Summary (Last 24 hours) at 5/30/2024 0755  Last data filed at 5/30/2024 0700  Gross per 24 hour   Intake 306.25 ml   Output 1875 ml   Net -1568.75 ml            Leilani Lemon MD  5/30/2024 7:55 AM              Pt with Hb 9.3, MCV 91.1. No signs/symptoms of active bleeding. Seems to be at baseline.  - Monitor for signs/symptoms of bleeding  - Maintain active T&S, transfuse if Hb < 7

## 2025-06-11 NOTE — H&P ADULT - NSHPLABSRESULTS_GEN_ALL_CORE
9.3    9.27  )-----------( 374      ( 10 Danny 2025 08:49 )             27.5       06-11    139  |  103  |  16  ----------------------------<  110[H]  3.4[L]   |  25  |  1.28    Ca    7.0[L]      11 Jun 2025 00:05  Phos  2.4     06-10  Mg     1.3     06-11    TPro  6.5  /  Alb  3.2[L]  /  TBili  0.6  /  DBili  x   /  AST  26  /  ALT  11  /  AlkPhos  230[H]  06-10              Urinalysis Basic - ( 11 Jun 2025 00:05 )    Color: x / Appearance: x / SG: x / pH: x  Gluc: 110 mg/dL / Ketone: x  / Bili: x / Urobili: x   Blood: x / Protein: x / Nitrite: x   Leuk Esterase: x / RBC: x / WBC x   Sq Epi: x / Non Sq Epi: x / Bacteria: x        PT/INR - ( 10 Danny 2025 08:49 )   PT: 13.3 sec;   INR: 1.16          PTT - ( 10 Danny 2025 08:49 )  PTT:27.3 sec    Lactate Trend  06-11 @ 00:05 Lactate:1.9   06-10 @ 18:57 Lactate:2.2             CAPILLARY BLOOD GLUCOSE  119 (10 Danny 2025 16:01)      POCT Blood Glucose.: 119 mg/dL (10 Danny 2025 08:34)

## 2025-06-11 NOTE — PATIENT PROFILE ADULT - FALL HARM RISK - HARM RISK INTERVENTIONS

## 2025-06-11 NOTE — H&P ADULT - NSHPADDITIONALINFOADULT_GEN_ALL_CORE
PROPHYLACTIC MEASURES:  F: S/p 1L LR bolus in ED  E: Replete as needed  D: DASH diet  DVT ppx: Lovenox   Dispo: Hospitalist tele

## 2025-06-11 NOTE — DISCHARGE NOTE PROVIDER - PROVIDER TOKENS
PROVIDER:[TOKEN:[[id number]],SCHEDULEDAPPT:[06/17/2025],SCHEDULEDAPPTTIME:[10:20 AM],ESTABLISHEDPATIENT:[T]]

## 2025-06-12 ENCOUNTER — APPOINTMENT (OUTPATIENT)
Dept: THORACIC SURGERY | Facility: CLINIC | Age: 80
End: 2025-06-12
Payer: MEDICARE

## 2025-06-12 ENCOUNTER — NON-APPOINTMENT (OUTPATIENT)
Age: 80
End: 2025-06-12

## 2025-06-12 VITALS
OXYGEN SATURATION: 96 % | BODY MASS INDEX: 24.98 KG/M2 | HEIGHT: 63 IN | DIASTOLIC BLOOD PRESSURE: 58 MMHG | HEART RATE: 108 BPM | RESPIRATION RATE: 18 BRPM | SYSTOLIC BLOOD PRESSURE: 121 MMHG | WEIGHT: 141 LBS | TEMPERATURE: 97.6 F

## 2025-06-12 PROCEDURE — 99204 OFFICE O/P NEW MOD 45 MIN: CPT

## 2025-06-13 ENCOUNTER — APPOINTMENT (OUTPATIENT)
Dept: HEMATOLOGY ONCOLOGY | Facility: CLINIC | Age: 80
End: 2025-06-13
Payer: MEDICARE

## 2025-06-13 VITALS
HEIGHT: 63 IN | BODY MASS INDEX: 24.8 KG/M2 | DIASTOLIC BLOOD PRESSURE: 58 MMHG | HEART RATE: 83 BPM | WEIGHT: 140 LBS | RESPIRATION RATE: 18 BRPM | TEMPERATURE: 97.9 F | OXYGEN SATURATION: 98 % | SYSTOLIC BLOOD PRESSURE: 96 MMHG

## 2025-06-13 PROBLEM — Z60.2 LIVES ALONE: Status: ACTIVE | Noted: 2025-06-13

## 2025-06-13 PROCEDURE — G2211 COMPLEX E/M VISIT ADD ON: CPT

## 2025-06-13 PROCEDURE — 99215 OFFICE O/P EST HI 40 MIN: CPT

## 2025-06-16 ENCOUNTER — EMERGENCY (EMERGENCY)
Facility: HOSPITAL | Age: 80
LOS: 1 days | End: 2025-06-16
Attending: EMERGENCY MEDICINE | Admitting: EMERGENCY MEDICINE
Payer: MEDICARE

## 2025-06-16 VITALS
OXYGEN SATURATION: 96 % | TEMPERATURE: 98 F | HEART RATE: 81 BPM | SYSTOLIC BLOOD PRESSURE: 95 MMHG | DIASTOLIC BLOOD PRESSURE: 56 MMHG | WEIGHT: 139.99 LBS | HEIGHT: 63 IN | RESPIRATION RATE: 18 BRPM

## 2025-06-16 VITALS
RESPIRATION RATE: 18 BRPM | TEMPERATURE: 98 F | SYSTOLIC BLOOD PRESSURE: 119 MMHG | DIASTOLIC BLOOD PRESSURE: 70 MMHG | HEART RATE: 60 BPM | OXYGEN SATURATION: 98 %

## 2025-06-16 DIAGNOSIS — Z98.890 OTHER SPECIFIED POSTPROCEDURAL STATES: Chronic | ICD-10-CM

## 2025-06-16 LAB
ADD ON TEST-SPECIMEN IN LAB: SIGNIFICANT CHANGE UP
ANION GAP SERPL CALC-SCNC: 14 MMOL/L — SIGNIFICANT CHANGE UP (ref 5–17)
BASOPHILS # BLD AUTO: 0.03 K/UL — SIGNIFICANT CHANGE UP (ref 0–0.2)
BASOPHILS NFR BLD AUTO: 0.4 % — SIGNIFICANT CHANGE UP (ref 0–2)
BUN SERPL-MCNC: 15 MG/DL — SIGNIFICANT CHANGE UP (ref 7–23)
CALCIUM SERPL-MCNC: 8.2 MG/DL — LOW (ref 8.4–10.5)
CHLORIDE SERPL-SCNC: 101 MMOL/L — SIGNIFICANT CHANGE UP (ref 96–108)
CO2 SERPL-SCNC: 23 MMOL/L — SIGNIFICANT CHANGE UP (ref 22–31)
CREAT SERPL-MCNC: 1.59 MG/DL — HIGH (ref 0.5–1.3)
CULTURE RESULTS: SIGNIFICANT CHANGE UP
CULTURE RESULTS: SIGNIFICANT CHANGE UP
EGFR: 33 ML/MIN/1.73M2 — LOW
EGFR: 33 ML/MIN/1.73M2 — LOW
EOSINOPHIL # BLD AUTO: 0.14 K/UL — SIGNIFICANT CHANGE UP (ref 0–0.5)
EOSINOPHIL NFR BLD AUTO: 1.8 % — SIGNIFICANT CHANGE UP (ref 0–6)
GLUCOSE SERPL-MCNC: 109 MG/DL — HIGH (ref 70–99)
HCT VFR BLD CALC: 25.3 % — LOW (ref 34.5–45)
HGB BLD-MCNC: 8.3 G/DL — LOW (ref 11.5–15.5)
IMM GRANULOCYTES NFR BLD AUTO: 1.3 % — HIGH (ref 0–0.9)
LYMPHOCYTES # BLD AUTO: 1.38 K/UL — SIGNIFICANT CHANGE UP (ref 1–3.3)
LYMPHOCYTES # BLD AUTO: 17.3 % — SIGNIFICANT CHANGE UP (ref 13–44)
MCHC RBC-ENTMCNC: 30.4 PG — SIGNIFICANT CHANGE UP (ref 27–34)
MCHC RBC-ENTMCNC: 32.8 G/DL — SIGNIFICANT CHANGE UP (ref 32–36)
MCV RBC AUTO: 92.7 FL — SIGNIFICANT CHANGE UP (ref 80–100)
MONOCYTES # BLD AUTO: 0.45 K/UL — SIGNIFICANT CHANGE UP (ref 0–0.9)
MONOCYTES NFR BLD AUTO: 5.6 % — SIGNIFICANT CHANGE UP (ref 2–14)
NEUTROPHILS # BLD AUTO: 5.87 K/UL — SIGNIFICANT CHANGE UP (ref 1.8–7.4)
NEUTROPHILS NFR BLD AUTO: 73.6 % — SIGNIFICANT CHANGE UP (ref 43–77)
NRBC BLD AUTO-RTO: 0 /100 WBCS — SIGNIFICANT CHANGE UP (ref 0–0)
PLATELET # BLD AUTO: 402 K/UL — HIGH (ref 150–400)
POTASSIUM SERPL-MCNC: 3.7 MMOL/L — SIGNIFICANT CHANGE UP (ref 3.5–5.3)
POTASSIUM SERPL-SCNC: 3.7 MMOL/L — SIGNIFICANT CHANGE UP (ref 3.5–5.3)
RBC # BLD: 2.73 M/UL — LOW (ref 3.8–5.2)
RBC # FLD: 16.1 % — HIGH (ref 10.3–14.5)
SODIUM SERPL-SCNC: 138 MMOL/L — SIGNIFICANT CHANGE UP (ref 135–145)
SPECIMEN SOURCE: SIGNIFICANT CHANGE UP
SPECIMEN SOURCE: SIGNIFICANT CHANGE UP
TROPONIN T, HIGH SENSITIVITY RESULT: 35 NG/L — SIGNIFICANT CHANGE UP (ref 0–51)
WBC # BLD: 7.97 K/UL — SIGNIFICANT CHANGE UP (ref 3.8–10.5)
WBC # FLD AUTO: 7.97 K/UL — SIGNIFICANT CHANGE UP (ref 3.8–10.5)

## 2025-06-16 PROCEDURE — 99283 EMERGENCY DEPT VISIT LOW MDM: CPT | Mod: 25

## 2025-06-16 PROCEDURE — 36415 COLL VENOUS BLD VENIPUNCTURE: CPT

## 2025-06-16 PROCEDURE — 80048 BASIC METABOLIC PNL TOTAL CA: CPT

## 2025-06-16 PROCEDURE — 85025 COMPLETE CBC W/AUTO DIFF WBC: CPT

## 2025-06-16 PROCEDURE — 99285 EMERGENCY DEPT VISIT HI MDM: CPT | Mod: FS

## 2025-06-16 PROCEDURE — 82962 GLUCOSE BLOOD TEST: CPT

## 2025-06-16 PROCEDURE — 84484 ASSAY OF TROPONIN QUANT: CPT

## 2025-06-16 RX ADMIN — Medication 500 MILLILITER(S): at 16:50

## 2025-06-16 RX ADMIN — Medication 500 MILLILITER(S): at 14:17

## 2025-06-16 NOTE — ED ADULT NURSE NOTE - NSFALLRISKINTERV_ED_ALL_ED
Assistance OOB with selected safe patient handling equipment if applicable/Communicate fall risk and risk factors to all staff, patient, and family/Orthostatic vital signs/Provide visual cue: yellow wristband, yellow gown, etc/Reinforce activity limits and safety measures with patient and family/Call bell, personal items and telephone in reach/Instruct patient to call for assistance before getting out of bed/chair/stretcher/Non-slip footwear applied when patient is off stretcher/[place] to call system/Physically safe environment - no spills, clutter or unnecessary equipment/Purposeful Proactive Rounding/Room/bathroom lighting operational, light cord in reach

## 2025-06-16 NOTE — ED ADULT NURSE NOTE - NSFALLFACTORS_ED_ALL_ED
SEEN BY: Stella     CONSULT DATE: 05/16/2024      PROCEDURE: EGD     PROCEDURE DATE: 08/07/2024     ENDOSCOPIST: Dr. Douglas     LOCATION: [facility]     PREP GIVEN: EGD    Instructions were explained and given/mailed or emailed to patient.  Prep was sent to pharmacy on file.     Patient instructed to stop taking any GLP-1 medication 1 week prior to procedure.  (Ozempic, semaglutide, Wegovy, Trulicity, Mounjaro)    Phentermine please hold 2 weeks prior to procedure.      Syncope

## 2025-06-16 NOTE — ED PROVIDER NOTE - PROGRESS NOTE DETAILS
when the nurse went to do orthostatics upon standing pt started to syncopize. Nurse put patient back in bed and she immediately regained consciousness, episode only a few seconds. pt feeling better with fluids. Rpt orthostatics neg. pt states she recently started a new BP med because her BP had been high. Advised to hold BP med and call her doctor tomorrow. Will dc with aide

## 2025-06-16 NOTE — ED ADULT TRIAGE NOTE - CHIEF COMPLAINT QUOTE
Pt presents to ED BIBA form home with aide, per EMS pt syncopized on the wheelchair. Pt took her morning meds and after 6 minutes pt passed out. Denies CP or SOB pt was also found hypotensive in the field to 70's systolic received 600cc of NS with 18G on L AC. FS and EKG in prog. Denies falling or head injury, Pt A&Ox4, NAD.

## 2025-06-16 NOTE — ED PROVIDER NOTE - OBJECTIVE STATEMENT
78-year-old female with HTN, HLD, hypothyroidism, generalized depression, ADHD, rheumatic fever, pericarditis, IgA deficiency, newly diagnosed NSCLC in [place], and a recent fall with L1 compression fracture (on 5/10/25 s/p T11-L3 decompression/PSF with Dr. Barcenas)admitted 6/10-6/11 for syncope (stroke code -CTs neg, EEG report was unremarkable. Found to be orthostatic positive),  bib aide for  syncopal episode in the wheelchair for a few min. Aide states she was transferring the pt from the bed to the wheelchair and when she got in the wheelchair she slumped over and was unresponsive.  When  EMS arrived pt was hypotensive to systolic of 70, pt given 800 ccs of fluid. Pt reports feeling well. Denies CP, sob, HA, dizziness, n/v/d, dysuria, hematuria.

## 2025-06-16 NOTE — ED PROVIDER NOTE - CLINICAL SUMMARY MEDICAL DECISION MAKING FREE TEXT BOX
78-year-old female with HTN, HLD, hypothyroidism, generalized depression, ADHD, rheumatic fever, pericarditis, IgA deficiency, newly diagnosed NSCLC in [place], and a recent fall with L1 compression fracture (on 5/10/25 s/p T11-L3 decompression/PSF with Dr. Barcenas)admitted 6/10-6/11 for syncope (stroke code -CTs neg, EEG report was unremarkable. Found to be orthostatic positive),  bib aide for  syncopal episode in the wheelchair for a few min. Aide states she was transferring the pt from the bed to the wheelchair and when she got inthe wheelchair she slumped over and was unresponsive.  When  EMS arrived pt was hypotensive to systolic of 70, pt given 800 ccs of fluid. Pt reports feeling well. Denies CP, sob, HA, dizziness, n/v/d, dysuria, hematuria. /56. Not tachycardic. Exam unremarkable. EKG NSR @ 81, no ischemia. 78-year-old female with HTN, HLD, hypothyroidism, generalized depression, ADHD, rheumatic fever, pericarditis, IgA deficiency, newly diagnosed NSCLC in [place], and a recent fall with L1 compression fracture (on 5/10/25 s/p T11-L3 decompression/PSF with Dr. Barcenas)admitted 6/10-6/11 for syncope (stroke code -CTs neg, EEG report was unremarkable. Found to be orthostatic positive),  bib aide for  syncopal episode in the wheelchair for a few min. Aide states she was transferring the pt from the bed to the wheelchair and when she got inthe wheelchair she slumped over and was unresponsive.  When  EMS arrived pt was hypotensive to systolic of 70, pt given 800 ccs of fluid. Pt reports feeling well. Denies CP, sob, HA, dizziness, n/v/d, dysuria, hematuria. /56. Not tachycardic. Exam unremarkable. EKG NSR @ 81, no ischemia. likely orthostatic hypotension given recent neg w/u, will check labs including trop

## 2025-06-16 NOTE — ED ADULT NURSE REASSESSMENT NOTE - NS ED NURSE REASSESS COMMENT FT1
while obtaining patient blood pressure and heart rate orthostatics, during standing patient stated "oh no" then became unable to stand up without assistance and did not answer staff for ~3seconds. pt placed back in bed and was muttering, after sitting up ~15seconds later pt was awake and answering questions, AAOx4. pt blood pressure finished cycling after sitting back in bed, reading of 105/55, heart rate increased to 90bpm during standing eval. GEORGIANA Dave made aware.    layin/55, HR=81  sittin/57, HR=84

## 2025-06-16 NOTE — ED ADULT NURSE NOTE - OBJECTIVE STATEMENT
79yF presents to the ED with reports of syncope. pt BIBA from home with HHA at bedside. as per traige note, pt reported to have taken her morning medications. states ~6minutes after pt syncopized in wheelchair. no head trauma, no blood thinning medications reports. pt recent dx from hospital last week. pt given 600mL IVF by EMS.   on arrival to ED, pt awake, AAOx4. respirations even and unlabored. 79yF presents to the ED with reports of syncope. pt BIBA from home with HHA at bedside. as per triage note, pt reported to have taken her morning medications. states ~6minutes after pt syncopized in wheelchair. no head trauma, no blood thinning medications reports. pt recent dx from hospital last week. pt given 600mL IVF by EMS. as per patient, she remembers going to bed last night and states "the next thing I remember is waking up and having the EMT's at the bottom of my bed". pt denies any pain/discomfort. on arrival to ED, pt awake, AAOx4. respirations even and unlabored. PERRL intact, pupils 3mm reactive BL. Full strength in all extremities. +finger  BL.  No facial droop, dysarthia, slurred speech or weakness noted at this time. Pt denies headache, vision changes, numbness/tingling, dizziness/lightheadedness. 79yF presents to the ED with reports of syncope. pt BIBA from home with HHA at bedside. as per triage note, pt reported to have taken her morning medications. states ~6minutes after pt syncopized in wheelchair. no head trauma, no blood thinning medications reports. pt recent dx from hospital last week. pt given 600mL IVF by EMS. as per patient, she remembers going to bed last night and states "the next thing I remember is waking up and having the EMT's at the bottom of my bed". aide states she transferred the patient from bed to wheelchair and after that she stopped responding to the aide for ~12minutes. pt denies any pain/discomfort. on arrival to ED, pt awake, AAOx4. respirations even and unlabored. PERRL intact, pupils 3mm reactive BL. Full strength in all extremities. +finger  BL.  No facial droop, dysarthria, slurred speech or weakness noted at this time. Pt denies headache, vision changes, numbness/tingling, dizziness/lightheadedness.

## 2025-06-16 NOTE — ED PROVIDER NOTE - PATIENT PORTAL LINK FT
You can access the FollowMyHealth Patient Portal offered by [facility] by registering at the following website: [email or web address]. By joining [email or web address]’s FollowMyHealth portal, you will also be able to view your health information using other applications (apps) compatible with our system.

## 2025-06-16 NOTE — ED PROVIDER NOTE - NSFOLLOWUPINSTRUCTIONS_ED_ALL_ED_FT
Do not take your blood pressure meds and call your doctor tomorrow   Go slowly when changing positions     Orthostatic Hypotension  Blood pressure is a measurement of how strongly, or weakly, your circulating blood is pressing against the walls of your arteries. Orthostatic hypotension is a drop in blood pressure that can happen when you change positions, such as when you go from lying down to standing.    Arteries are blood vessels that carry blood from your heart throughout your body. When blood pressure is too low, you may not get enough blood to your brain or to the rest of your organs. Orthostatic hypotension can cause light-headedness, sweating, rapid heartbeat, blurred vision, and fainting. These symptoms require further investigation into the cause.    What are the causes?  Orthostatic hypotension can be caused by many things, including:  Sudden changes in posture, such as standing up quickly after you have been sitting or lying down.  Loss of blood (anemia) or loss of body fluids (dehydration).  Heart problems, neurologic problems, or hormone problems.  Pregnancy.  Aging. The risk for this condition increases as you get older.  Severe infection (sepsis).  Certain medicines, such as medicines for high blood pressure or medicines that make the body lose excess fluids (diuretics).  What are the signs or symptoms?  Symptoms of this condition may include:  Weakness, light-headedness, or dizziness.  Sweating.  Blurred vision.  Tiredness (fatigue).  Rapid heartbeat.  Fainting, in severe cases.  How is this diagnosed?  This condition is diagnosed based on:  Your symptoms and medical history.  Your blood pressure measurements. Your health care provider will check your blood pressure when you are:  Lying down.  Sitting.  Standing.  A blood pressure reading is recorded as two numbers, such as "120 over 80" (or 120/80). The first ("top") number is called the systolic pressure. It is a measure of the pressure in your arteries as your heart beats. The second ("bottom") number is called the diastolic pressure. It is a measure of the pressure in your arteries when your heart relaxes between beats. Blood pressure is measured in a unit called mmHg. Healthy blood pressure for most adults is 120/80 mmHg. Orthostatic hypotension is defined as a 20 mmHg drop in systolic pressure or a 10 mmHg drop in diastolic pressure within 3 minutes of standing.    Other information or tests that may be used to diagnose orthostatic hypotension include:  Your other vital signs, such as your heart rate and temperature.  Blood tests.  An electrocardiogram (ECG) or echocardiogram.  A Holter monitor. This is a device you wear that records your heart rhythm continuously, usually for 24–48 hours.  Tilt table test. For this test, you will be safely secured to a table that moves you from a lying position to an upright position. Your heart rhythm and blood pressure will be monitored during the test.  How is this treated?  This condition may be treated by:  Changing your diet. This may involve eating more salt (sodium) or drinking more water.  Changing the dosage of certain medicines you are taking that might be lowering your blood pressure.  Correcting the underlying reason for the orthostatic hypotension.  Wearing compression stockings.  Taking medicines to raise your blood pressure.  Avoiding actions that trigger symptoms.  Follow these instructions at home:  Medicines    Take over-the-counter and prescription medicines only as told by your health care provider.  Follow instructions from your health care provider about changing the dosage of your current medicines, if this applies.  Do not stop or adjust any of your medicines on your own.  Eating and drinking    Illustration of a person drinking a glass of water.  Drink enough fluid to keep your urine pale yellow.  Eat extra salt only as directed. Do not add extra salt to your diet unless advised by your health care provider.  Eat frequent, small meals.  Avoid standing up suddenly after eating.  General instructions    Compression stockings on a person's lower legs.  Get up slowly from lying down or sitting positions. This gives your blood pressure a chance to adjust.  Avoid hot showers and excessive heat as directed by your health care provider.  Engage in regular physical activity as directed by your health care provider.  If you have compression stockings, wear them as told.  Keep all follow-up visits. This is important.  Contact a health care provider if:  You have a fever for more than 2–3 days.  You feel more thirsty than usual.  You feel dizzy or weak.  Get help right away if:  You have chest pain.  You have a fast or irregular heartbeat.  You become sweaty or feel light-headed.  You feel short of breath.  You faint.  You have any symptoms of a stroke. "BE FAST" is an easy way to remember the main warning signs of a stroke:  B - Balance. Signs are dizziness, sudden trouble walking, or loss of balance.  E - Eyes. Signs are trouble seeing or a sudden change in vision.  F - Face. Signs are sudden weakness or numbness of the face, or the face or eyelid drooping on one side.  A - Arms. Signs are weakness or numbness in an arm. This happens suddenly and usually on one side of the body.  S - Speech. Signs are sudden trouble speaking, slurred speech, or trouble understanding what people say.  T - Time. Time to call emergency services. Write down what time symptoms started.  You have other signs of a stroke, such as:  A sudden, severe headache with no known cause.  Nausea or vomiting.  Seizure.  These symptoms may represent a serious problem that is an emergency. Do not wait to see if the symptoms will go away. Get medical help right away. Call your local emergency services (911 in the [place] Do not drive yourself to the hospital.    Summary  Orthostatic hypotension is a sudden drop in blood pressure.  It can cause light-headedness, sweating, rapid heartbeat, blurred vision, and fainting.  Orthostatic hypotension can be diagnosed by having your blood pressure taken while lying down, sitting, and then standing.  Treatment may involve changing your diet, wearing compression stockings, sitting up slowly, adjusting your medicines, or correcting the underlying reason for the orthostatic hypotension.  Get help right away if you have chest pain, a fast or irregular heartbeat, or symptoms of a stroke.  This information is not intended to replace advice given to you by your health care provider. Make sure you discuss any questions you have with your health care provider.

## 2025-06-16 NOTE — ED PROVIDER NOTE - ATTENDING APP SHARED VISIT CONTRIBUTION OF CARE
78-year-old female with HTN, HLD, hypothyroidism, generalized depression, ADHD, rheumatic fever, pericarditis, IgA deficiency, newly diagnosed NSCLC in [place], and a recent fall with L1 compression fracture (on 5/10/25 s/p T11-L3 decompression/PSF with Dr. Barcenas)admitted 6/10-6/11 for syncope (stroke code -CTs neg, EEG report was unremarkable. Found to be orthostatic positive),  bib aide for  syncopal episode in the wheelchair for a few min. Aide states she was transferring the pt from the bed to the wheelchair and when she got in the wheelchair she slumped over and was unresponsive.  When  EMS arrived pt was hypotensive to systolic of 70, pt given 800 ccs of fluid. Pt reports feeling well. Denies CP, sob, HA, dizziness, n/v/d, dysuria, hematuria.    . /56. Not tachycardic. Exam unremarkable. EKG NSR @ 81, no ischemia.    + orthostatics  VITAL SIGNS: I have reviewed nursing notes and confirm.  CONSTITUTIONAL: Well-developed; well-nourished; in no acute distress.  SKIN: Agree with RN documentation regarding decubitus evaluation. Remainder of skin exam is warm and dry, no acute rash.  HEAD: Normocephalic; atraumatic.  EYES: PERRL, EOM intact; conjunctiva and sclera clear.  ENT: No nasal discharge; airway clear.  NECK: Supple; non tender.  CARD: S1, S2 normal; no murmurs, gallops, or rubs. Regular rate and rhythm.  RESP: No wheezes, rales or rhonchi.  ABD: Normal bowel sounds; soft; non-distended; non-tender; no hepatosplenomegaly.  EXT: Normal ROM. No clubbing, cyanosis or edema.  LYMPH: No acute cervical adenopathy.  NEURO: Alert, oriented. Grossly unremarkable.  PSYCH: Cooperative, appropriate.    Pt well appearing on my exam - tolerating soup - receiving +1 L fluids in ED in addition to EMS fluids of 800 cc.  Pt recently had increase in BP meds this past month due to elevated bp - could be too high of a dose now.  Will recheck orthostatics after fluid bolus. Pt to check with PMD tomorrow regarding bp meds.

## 2025-06-16 NOTE — ED ADULT NURSE NOTE - BEFAST ARM SIDE DRIFT
Department of Anesthesiology  Postprocedure Note    Patient: Brunilda Goldsmith  MRN: [id number]  YOB: 1963  Date of evaluation: 8/24/2021  Time:  9:44 AM     Procedure Summary     Date: 08/24/21 Room / Location: [address] [place] [address] 02 / [address] [facility]    Anesthesia Start: 1090 Anesthesia Stop: 6420    Procedure: RIGHT CATARACT EXTRACTION WITH IOL (Right ) Diagnosis: (RIGHT CATARACT)    Surgeons: Xiomara Murcia MD Responsible Provider: Hannah Hernandez MD    Anesthesia Type: MAC ASA Status: 2          Anesthesia Type: MAC    Lilli Phase I: Lilli Score: 10    Lilli Phase II: Lilli Score: 10    Last vitals: Reviewed and per EMR flowsheets.        Anesthesia Post Evaluation    Patient location during evaluation: PACU  Patient participation: complete - patient participated  Level of consciousness: awake and alert  Airway patency: patent  Nausea & Vomiting: no nausea and no vomiting  Complications: no  Cardiovascular status: hemodynamically stable  Respiratory status: room air  Hydration status: stable
No

## 2025-06-17 ENCOUNTER — APPOINTMENT (OUTPATIENT)
Dept: INTERNAL MEDICINE | Facility: CLINIC | Age: 80
End: 2025-06-17
Payer: MEDICARE

## 2025-06-17 VITALS
BODY MASS INDEX: 24.8 KG/M2 | HEIGHT: 63 IN | OXYGEN SATURATION: 98 % | HEART RATE: 98 BPM | WEIGHT: 140 LBS | SYSTOLIC BLOOD PRESSURE: 118 MMHG | DIASTOLIC BLOOD PRESSURE: 73 MMHG

## 2025-06-17 PROCEDURE — 99213 OFFICE O/P EST LOW 20 MIN: CPT

## 2025-06-17 PROCEDURE — 93000 ELECTROCARDIOGRAM COMPLETE: CPT

## 2025-06-17 PROCEDURE — G2211 COMPLEX E/M VISIT ADD ON: CPT

## 2025-06-18 DIAGNOSIS — D80.2 SELECTIVE DEFICIENCY OF IMMUNOGLOBULIN A [IGA]: ICD-10-CM

## 2025-06-18 DIAGNOSIS — F90.9 ATTENTION-DEFICIT HYPERACTIVITY DISORDER, UNSPECIFIED TYPE: ICD-10-CM

## 2025-06-18 DIAGNOSIS — I10 ESSENTIAL (PRIMARY) HYPERTENSION: ICD-10-CM

## 2025-06-18 DIAGNOSIS — F32.89 OTHER SPECIFIED DEPRESSIVE EPISODES: ICD-10-CM

## 2025-06-18 DIAGNOSIS — E78.5 HYPERLIPIDEMIA, UNSPECIFIED: ICD-10-CM

## 2025-06-18 DIAGNOSIS — E03.9 HYPOTHYROIDISM, UNSPECIFIED: ICD-10-CM

## 2025-06-19 ENCOUNTER — APPOINTMENT (OUTPATIENT)
Dept: NEUROLOGY | Facility: CLINIC | Age: 80
End: 2025-06-19

## 2025-06-20 ENCOUNTER — TRANSCRIPTION ENCOUNTER (OUTPATIENT)
Age: 80
End: 2025-06-20

## 2025-06-24 DIAGNOSIS — E78.5 HYPERLIPIDEMIA, UNSPECIFIED: ICD-10-CM

## 2025-06-24 DIAGNOSIS — D80.2 SELECTIVE DEFICIENCY OF IMMUNOGLOBULIN A [IGA]: ICD-10-CM

## 2025-06-24 DIAGNOSIS — E83.42 HYPOMAGNESEMIA: ICD-10-CM

## 2025-06-24 DIAGNOSIS — E87.6 HYPOKALEMIA: ICD-10-CM

## 2025-06-24 DIAGNOSIS — Z79.899 OTHER LONG TERM (CURRENT) DRUG THERAPY: ICD-10-CM

## 2025-06-24 DIAGNOSIS — Z79.890 HORMONE REPLACEMENT THERAPY: ICD-10-CM

## 2025-06-24 DIAGNOSIS — B34.1 ENTEROVIRUS INFECTION, UNSPECIFIED: ICD-10-CM

## 2025-06-24 DIAGNOSIS — G92.8 OTHER TOXIC ENCEPHALOPATHY: ICD-10-CM

## 2025-06-24 DIAGNOSIS — T48.1X5A ADVERSE EFFECT OF SKELETAL MUSCLE RELAXANTS [NEUROMUSCULAR BLOCKING AGENTS], INITIAL ENCOUNTER: ICD-10-CM

## 2025-06-24 DIAGNOSIS — Z87.891 PERSONAL HISTORY OF NICOTINE DEPENDENCE: ICD-10-CM

## 2025-06-24 DIAGNOSIS — T42.4X5A ADVERSE EFFECT OF BENZODIAZEPINES, INITIAL ENCOUNTER: ICD-10-CM

## 2025-06-24 DIAGNOSIS — E83.39 OTHER DISORDERS OF PHOSPHORUS METABOLISM: ICD-10-CM

## 2025-06-24 DIAGNOSIS — F90.9 ATTENTION-DEFICIT HYPERACTIVITY DISORDER, UNSPECIFIED TYPE: ICD-10-CM

## 2025-06-24 DIAGNOSIS — F32.9 MAJOR DEPRESSIVE DISORDER, SINGLE EPISODE, UNSPECIFIED: ICD-10-CM

## 2025-06-24 DIAGNOSIS — Z79.02 LONG TERM (CURRENT) USE OF ANTITHROMBOTICS/ANTIPLATELETS: ICD-10-CM

## 2025-06-24 DIAGNOSIS — E03.9 HYPOTHYROIDISM, UNSPECIFIED: ICD-10-CM

## 2025-06-24 DIAGNOSIS — T40.2X5A ADVERSE EFFECT OF OTHER OPIOIDS, INITIAL ENCOUNTER: ICD-10-CM

## 2025-06-24 DIAGNOSIS — Z11.52 ENCOUNTER FOR SCREENING FOR COVID-19: ICD-10-CM

## 2025-06-24 DIAGNOSIS — I10 ESSENTIAL (PRIMARY) HYPERTENSION: ICD-10-CM

## 2025-06-24 DIAGNOSIS — I95.1 ORTHOSTATIC HYPOTENSION: ICD-10-CM

## 2025-06-24 DIAGNOSIS — D64.9 ANEMIA, UNSPECIFIED: ICD-10-CM

## 2025-06-24 DIAGNOSIS — R74.8 ABNORMAL LEVELS OF OTHER SERUM ENZYMES: ICD-10-CM

## 2025-06-25 ENCOUNTER — RX RENEWAL (OUTPATIENT)
Age: 80
End: 2025-06-25

## 2025-06-25 ENCOUNTER — APPOINTMENT (OUTPATIENT)
Dept: ORTHOPEDIC SURGERY | Facility: CLINIC | Age: 80
End: 2025-06-25

## 2025-06-25 DIAGNOSIS — Z98.890 OTHER SPECIFIED POSTPROCEDURAL STATES: Chronic | ICD-10-CM

## 2025-06-26 ENCOUNTER — OUTPATIENT (OUTPATIENT)
Dept: OUTPATIENT SERVICES | Facility: HOSPITAL | Age: 80
LOS: 1 days | End: 2025-06-26
Payer: MEDICARE

## 2025-06-26 ENCOUNTER — APPOINTMENT (OUTPATIENT)
Dept: HEART AND VASCULAR | Facility: CLINIC | Age: 80
End: 2025-06-26
Payer: MEDICARE

## 2025-06-26 VITALS
BODY MASS INDEX: 24.8 KG/M2 | HEART RATE: 83 BPM | TEMPERATURE: 98.3 F | DIASTOLIC BLOOD PRESSURE: 70 MMHG | HEIGHT: 63 IN | SYSTOLIC BLOOD PRESSURE: 162 MMHG | WEIGHT: 139.99 LBS | OXYGEN SATURATION: 97 %

## 2025-06-26 DIAGNOSIS — Z90.49 ACQUIRED ABSENCE OF OTHER SPECIFIED PARTS OF DIGESTIVE TRACT: Chronic | ICD-10-CM

## 2025-06-26 DIAGNOSIS — E89.0 POSTPROCEDURAL HYPOTHYROIDISM: Chronic | ICD-10-CM

## 2025-06-26 DIAGNOSIS — Z90.89 ACQUIRED ABSENCE OF OTHER ORGANS: Chronic | ICD-10-CM

## 2025-06-26 DIAGNOSIS — Z98.890 OTHER SPECIFIED POSTPROCEDURAL STATES: Chronic | ICD-10-CM

## 2025-06-26 DIAGNOSIS — C34.10 MALIGNANT NEOPLASM OF UPPER LOBE, UNSPECIFIED BRONCHUS OR LUNG: ICD-10-CM

## 2025-06-26 PROBLEM — Z01.818 PREOP EXAMINATION: Status: ACTIVE | Noted: 2025-06-12

## 2025-06-26 PROCEDURE — 94060 EVALUATION OF WHEEZING: CPT

## 2025-06-26 PROCEDURE — 94726 PLETHYSMOGRAPHY LUNG VOLUMES: CPT | Mod: 26

## 2025-06-26 PROCEDURE — 94760 N-INVAS EAR/PLS OXIMETRY 1: CPT

## 2025-06-26 PROCEDURE — 94729 DIFFUSING CAPACITY: CPT | Mod: 26

## 2025-06-26 PROCEDURE — 94726 PLETHYSMOGRAPHY LUNG VOLUMES: CPT

## 2025-06-26 PROCEDURE — 99214 OFFICE O/P EST MOD 30 MIN: CPT

## 2025-06-26 PROCEDURE — G2211 COMPLEX E/M VISIT ADD ON: CPT

## 2025-06-26 PROCEDURE — 94729 DIFFUSING CAPACITY: CPT

## 2025-06-26 PROCEDURE — 94010 BREATHING CAPACITY TEST: CPT | Mod: 26

## 2025-06-26 PROCEDURE — 93000 ELECTROCARDIOGRAM COMPLETE: CPT

## 2025-06-27 ENCOUNTER — APPOINTMENT (OUTPATIENT)
Dept: NEUROLOGY | Facility: CLINIC | Age: 80
End: 2025-06-27

## 2025-06-27 ENCOUNTER — NON-APPOINTMENT (OUTPATIENT)
Age: 80
End: 2025-06-27

## 2025-06-27 ENCOUNTER — RESULT REVIEW (OUTPATIENT)
Age: 80
End: 2025-06-27

## 2025-06-27 RX ORDER — LISDEXAMFETAMINE DIMESYLATE 20 MG/1
1 TABLET, CHEWABLE ORAL
Refills: 0 | DISCHARGE

## 2025-06-27 RX ORDER — CLONAZEPAM 0.5 MG/1
1 TABLET ORAL
Refills: 0 | DISCHARGE

## 2025-06-30 ENCOUNTER — NON-APPOINTMENT (OUTPATIENT)
Age: 80
End: 2025-06-30

## 2025-06-30 ENCOUNTER — APPOINTMENT (OUTPATIENT)
Dept: THORACIC SURGERY | Facility: HOSPITAL | Age: 80
End: 2025-06-30

## 2025-07-02 PROBLEM — C90.00 MULTIPLE MYELOMA NOT HAVING ACHIEVED REMISSION: Chronic | Status: ACTIVE | Noted: 2025-06-27

## 2025-07-02 PROBLEM — Z87.898 PERSONAL HISTORY OF OTHER SPECIFIED CONDITIONS: Chronic | Status: ACTIVE | Noted: 2025-06-27

## 2025-07-02 PROBLEM — C34.90 MALIGNANT NEOPLASM OF UNSPECIFIED PART OF UNSPECIFIED BRONCHUS OR LUNG: Chronic | Status: ACTIVE | Noted: 2025-06-27

## 2025-07-02 PROBLEM — Z87.898 PERSONAL HISTORY OF OTHER SPECIFIED CONDITIONS: Chronic | Status: ACTIVE | Noted: 2025-06-25

## 2025-07-02 PROBLEM — Z87.39 PERSONAL HISTORY OF OTHER DISEASES OF THE MUSCULOSKELETAL SYSTEM AND CONNECTIVE TISSUE: Chronic | Status: ACTIVE | Noted: 2025-06-27

## 2025-07-07 ENCOUNTER — APPOINTMENT (OUTPATIENT)
Dept: NUCLEAR MEDICINE | Facility: HOSPITAL | Age: 80
End: 2025-07-07
Payer: MEDICARE

## 2025-07-07 ENCOUNTER — OUTPATIENT (OUTPATIENT)
Dept: OUTPATIENT SERVICES | Facility: HOSPITAL | Age: 80
LOS: 1 days | End: 2025-07-07
Payer: MEDICARE

## 2025-07-07 DIAGNOSIS — Z90.89 ACQUIRED ABSENCE OF OTHER ORGANS: Chronic | ICD-10-CM

## 2025-07-07 DIAGNOSIS — Z98.890 OTHER SPECIFIED POSTPROCEDURAL STATES: Chronic | ICD-10-CM

## 2025-07-07 DIAGNOSIS — Z90.49 ACQUIRED ABSENCE OF OTHER SPECIFIED PARTS OF DIGESTIVE TRACT: Chronic | ICD-10-CM

## 2025-07-07 DIAGNOSIS — E89.0 POSTPROCEDURAL HYPOTHYROIDISM: Chronic | ICD-10-CM

## 2025-07-07 PROCEDURE — 78598 LUNG PERF&VENTILAT DIFERENTL: CPT | Mod: 26

## 2025-07-07 PROCEDURE — 78598 LUNG PERF&VENTILAT DIFERENTL: CPT

## 2025-07-07 PROCEDURE — A9567: CPT

## 2025-07-07 PROCEDURE — A9540: CPT

## 2025-07-11 ENCOUNTER — APPOINTMENT (OUTPATIENT)
Dept: ORTHOPEDIC SURGERY | Facility: CLINIC | Age: 80
End: 2025-07-11
Payer: MEDICARE

## 2025-07-11 PROCEDURE — 72070 X-RAY EXAM THORAC SPINE 2VWS: CPT

## 2025-07-11 PROCEDURE — 72100 X-RAY EXAM L-S SPINE 2/3 VWS: CPT

## 2025-07-11 PROCEDURE — 99214 OFFICE O/P EST MOD 30 MIN: CPT | Mod: 24

## 2025-07-16 ENCOUNTER — APPOINTMENT (OUTPATIENT)
Dept: RADIATION ONCOLOGY | Facility: CLINIC | Age: 80
End: 2025-07-16

## 2025-07-16 VITALS
OXYGEN SATURATION: 97 % | HEIGHT: 63 IN | HEART RATE: 100 BPM | WEIGHT: 138 LBS | SYSTOLIC BLOOD PRESSURE: 138 MMHG | BODY MASS INDEX: 24.45 KG/M2 | DIASTOLIC BLOOD PRESSURE: 76 MMHG | RESPIRATION RATE: 18 BRPM | TEMPERATURE: 96.7 F

## 2025-07-16 PROCEDURE — 99204 OFFICE O/P NEW MOD 45 MIN: CPT

## 2025-07-17 ENCOUNTER — APPOINTMENT (OUTPATIENT)
Dept: PULMONOLOGY | Facility: CLINIC | Age: 80
End: 2025-07-17
Payer: MEDICARE

## 2025-07-17 VITALS
WEIGHT: 138 LBS | HEART RATE: 114 BPM | OXYGEN SATURATION: 97 % | DIASTOLIC BLOOD PRESSURE: 82 MMHG | HEIGHT: 63 IN | TEMPERATURE: 97.3 F | RESPIRATION RATE: 12 BRPM | BODY MASS INDEX: 24.45 KG/M2 | SYSTOLIC BLOOD PRESSURE: 146 MMHG

## 2025-07-17 PROBLEM — R91.1 LUNG NODULE: Status: ACTIVE | Noted: 2025-07-17

## 2025-07-17 PROCEDURE — 99205 OFFICE O/P NEW HI 60 MIN: CPT

## 2025-07-19 ENCOUNTER — EMERGENCY (EMERGENCY)
Facility: HOSPITAL | Age: 80
LOS: 1 days | End: 2025-07-19
Admitting: STUDENT IN AN ORGANIZED HEALTH CARE EDUCATION/TRAINING PROGRAM
Payer: MEDICARE

## 2025-07-19 VITALS
WEIGHT: 139.99 LBS | TEMPERATURE: 98 F | RESPIRATION RATE: 18 BRPM | SYSTOLIC BLOOD PRESSURE: 162 MMHG | HEART RATE: 110 BPM | DIASTOLIC BLOOD PRESSURE: 77 MMHG | HEIGHT: 63 IN | OXYGEN SATURATION: 98 %

## 2025-07-19 VITALS
RESPIRATION RATE: 18 BRPM | TEMPERATURE: 98 F | SYSTOLIC BLOOD PRESSURE: 143 MMHG | HEART RATE: 100 BPM | DIASTOLIC BLOOD PRESSURE: 74 MMHG | OXYGEN SATURATION: 98 %

## 2025-07-19 DIAGNOSIS — Z90.89 ACQUIRED ABSENCE OF OTHER ORGANS: Chronic | ICD-10-CM

## 2025-07-19 DIAGNOSIS — E89.0 POSTPROCEDURAL HYPOTHYROIDISM: Chronic | ICD-10-CM

## 2025-07-19 DIAGNOSIS — Z98.890 OTHER SPECIFIED POSTPROCEDURAL STATES: Chronic | ICD-10-CM

## 2025-07-19 DIAGNOSIS — Z90.49 ACQUIRED ABSENCE OF OTHER SPECIFIED PARTS OF DIGESTIVE TRACT: Chronic | ICD-10-CM

## 2025-07-19 PROCEDURE — 10060 I&D ABSCESS SIMPLE/SINGLE: CPT

## 2025-07-19 PROCEDURE — 99283 EMERGENCY DEPT VISIT LOW MDM: CPT | Mod: 25

## 2025-07-19 PROCEDURE — 87077 CULTURE AEROBIC IDENTIFY: CPT

## 2025-07-19 PROCEDURE — 87205 SMEAR GRAM STAIN: CPT

## 2025-07-19 PROCEDURE — 99284 EMERGENCY DEPT VISIT MOD MDM: CPT | Mod: 25

## 2025-07-19 PROCEDURE — 87070 CULTURE OTHR SPECIMN AEROBIC: CPT

## 2025-07-19 RX ORDER — CEPHALEXIN 250 MG/1
1 CAPSULE ORAL
Qty: 28 | Refills: 0
Start: 2025-07-19 | End: 2025-07-25

## 2025-07-19 RX ORDER — IBUPROFEN 200 MG
600 TABLET ORAL ONCE
Refills: 0 | Status: COMPLETED | OUTPATIENT
Start: 2025-07-19 | End: 2025-07-19

## 2025-07-19 RX ORDER — SULFAMETHOXAZOLE/TRIMETHOPRIM 800-160 MG
1 TABLET ORAL
Qty: 14 | Refills: 0
Start: 2025-07-19 | End: 2025-07-25

## 2025-07-19 RX ADMIN — Medication 600 MILLIGRAM(S): at 12:48

## 2025-07-19 NOTE — ED PROVIDER NOTE - CLINICAL SUMMARY MEDICAL DECISION MAKING FREE TEXT BOX
From: Pillo Alan Jr.  To: Sola Griggs MD  Sent: 4/9/2020 11:40 AM CDT  Subject: Other    My daughter Luma did not get an email with instructions on the virtual visit.  
79 F pmh hypertension, hyperlipidemia, hypothyroidism, generalized depression, ADHA, IgA deficiency, and recent L1 compression fracture s/p incidental fall s/p T11-L3 decompression/PSF p/w L upper back pain/swelling x 4 days.  no f/c,  on exam pt tachy 107, afebrile, nad, back: healed thoracic/lumbar midline surgical scar, no erythema/swelling localized to surgical site, 3cm erythematous circular nodule w/ fluctuance/tenderness to L thoracic back lateral of midline, small open pustule w/ scant purulent dc, no crepitus, no streaking, no midline spinal ttp, moving all ext, NVI x 4 ext.  L back abscess, clinically does not involve spinal surgery site.  I&D performed, wound culture sent.  will dc w/ bactrim and keflex.  educated on wound care.  has f/u monday and will have wound check then.  discussed strict return parameters

## 2025-07-19 NOTE — ED PROCEDURE NOTE - I&D DEPTH OF TISSUE DRAINED
skin V-Y Plasty Text: The defect edges were debeveled with a #15 scalpel blade.  Given the location of the defect, shape of the defect and the proximity to free margins an V-Y advancement flap was deemed most appropriate.  Using a sterile surgical marker, an appropriate advancement flap was drawn incorporating the defect and placing the expected incisions within the relaxed skin tension lines where possible.    The area thus outlined was incised deep to adipose tissue with a #15 scalpel blade.  The skin margins were undermined to an appropriate distance in all directions utilizing iris scissors.

## 2025-07-19 NOTE — ED ADULT TRIAGE NOTE - NS ED TRIAGE AVPU SCALE
Alert-The patient is alert, awake and responds to voice. The patient is oriented to time, place, and person. The triage nurse is able to obtain subjective information.
No COVID test required

## 2025-07-19 NOTE — ED PROVIDER NOTE - NSICDXFAMILYHX_GEN_ALL_CORE_FT
FAMILY HISTORY:  Father  Still living? Unknown  FH: lung cancer, Age at diagnosis: Age Unknown  FH: multiple sclerosis, Age at diagnosis: Age Unknown

## 2025-07-19 NOTE — ED ADULT TRIAGE NOTE - CHIEF COMPLAINT QUOTE
Pt arrived to ED c/o cyst to the upper back x4 days. Pt denies f/c at home. Pt A&Ox4, ambulatory with steady gait, speaking in clear/full sentences, NAD,

## 2025-07-19 NOTE — ED PROVIDER NOTE - NSICDXPASTMEDICALHX_GEN_ALL_CORE_FT
PAST MEDICAL HISTORY:  Depression     H/O osteoporosis     H/O syncope     History of cognitive deficit     HLD (hyperlipidemia)     HTN (hypertension)     Hypothyroidism     IgA myeloma     Lung cancer     Osteoarthritis

## 2025-07-19 NOTE — ED ADULT NURSE NOTE - CAS EDN DISCHARGE ASSESSMENT
Office visit scheduled for 12/17/24 to address concern and referral request.    Alert and oriented to person, place and time/Awake

## 2025-07-19 NOTE — ED PROVIDER NOTE - NSFOLLOWUPINSTRUCTIONS_ED_ALL_ED_FT
Please rest and remain well hydrated with plenty of fluids.  You can take motrin 600-800mg and tylenol 650mg every 3 hours, switching between the two for pain/bodyaches or fevers (>100.4F/>38C)    Please take full course of antibiotics as prescribed    You should have wound evaluated on Monday at your appointment     Abscess    An abscess is an infected area that contains a collection of pus and debris. It can occur in almost any part of the body and occurs when the tissue gets infection. Symptoms include a painful mass that is red, warm, tender that might break open and HAVE drainage. If your health care provider gave you antibiotics make sure to take the full course and do not stop even if feeling better.     SEEK IMMEDIATE MEDICAL CARE IF YOU HAVE ANY OF THE FOLLOWING SYMPTOMS: chills, fever, muscle aches, or red streaking from the area.

## 2025-07-19 NOTE — ED PROVIDER NOTE - NSICDXPASTSURGICALHX_GEN_ALL_CORE_FT
PAST SURGICAL HISTORY:  H/O laminectomy     H/O partial thyroidectomy     S/P appendectomy     S/P foot surgery, right     S/P tonsillectomy

## 2025-07-19 NOTE — ED PROVIDER NOTE - PATIENT PORTAL LINK FT
You can access the FollowMyHealth Patient Portal offered by [facility] by registering at the following website: [email or web address]. By joining [employer]’s FollowMyHealth portal, you will also be able to view your health information using other applications (apps) compatible with our system.

## 2025-07-20 LAB
GRAM STN FLD: ABNORMAL
SPECIMEN SOURCE: SIGNIFICANT CHANGE UP

## 2025-07-20 NOTE — PROVIDER CONTACT NOTE (CRITICAL VALUE NOTIFICATION) - TEST AND RESULT REPORTED:
Wound Culture (back abscess)- no polymorphonuclear leukocytes seen per low power field. Rare gram positive cocci in clusters seen per oil power field

## 2025-07-21 ENCOUNTER — APPOINTMENT (OUTPATIENT)
Dept: CT IMAGING | Facility: HOSPITAL | Age: 80
End: 2025-07-21
Payer: MEDICARE

## 2025-07-21 ENCOUNTER — OUTPATIENT (OUTPATIENT)
Dept: OUTPATIENT SERVICES | Facility: HOSPITAL | Age: 80
LOS: 1 days | End: 2025-07-21

## 2025-07-21 DIAGNOSIS — Z90.89 ACQUIRED ABSENCE OF OTHER ORGANS: Chronic | ICD-10-CM

## 2025-07-21 DIAGNOSIS — Z98.890 OTHER SPECIFIED POSTPROCEDURAL STATES: Chronic | ICD-10-CM

## 2025-07-21 DIAGNOSIS — Z91.048 OTHER NONMEDICINAL SUBSTANCE ALLERGY STATUS: ICD-10-CM

## 2025-07-21 DIAGNOSIS — Z79.890 HORMONE REPLACEMENT THERAPY: ICD-10-CM

## 2025-07-21 DIAGNOSIS — F32.A DEPRESSION, UNSPECIFIED: ICD-10-CM

## 2025-07-21 DIAGNOSIS — E89.0 POSTPROCEDURAL HYPOTHYROIDISM: Chronic | ICD-10-CM

## 2025-07-21 DIAGNOSIS — L02.212 CUTANEOUS ABSCESS OF BACK [ANY PART, EXCEPT BUTTOCK]: ICD-10-CM

## 2025-07-21 DIAGNOSIS — E78.5 HYPERLIPIDEMIA, UNSPECIFIED: ICD-10-CM

## 2025-07-21 DIAGNOSIS — D80.2 SELECTIVE DEFICIENCY OF IMMUNOGLOBULIN A [IGA]: ICD-10-CM

## 2025-07-21 DIAGNOSIS — E03.9 HYPOTHYROIDISM, UNSPECIFIED: ICD-10-CM

## 2025-07-21 DIAGNOSIS — Z90.49 ACQUIRED ABSENCE OF OTHER SPECIFIED PARTS OF DIGESTIVE TRACT: Chronic | ICD-10-CM

## 2025-07-21 DIAGNOSIS — I10 ESSENTIAL (PRIMARY) HYPERTENSION: ICD-10-CM

## 2025-07-21 PROCEDURE — 71250 CT THORAX DX C-: CPT

## 2025-07-21 PROCEDURE — 71250 CT THORAX DX C-: CPT | Mod: 26

## 2025-07-22 ENCOUNTER — OUTPATIENT (OUTPATIENT)
Dept: OUTPATIENT SERVICES | Facility: HOSPITAL | Age: 80
LOS: 1 days | Discharge: HOME | End: 2025-07-22
Payer: MEDICARE

## 2025-07-22 ENCOUNTER — RESULT REVIEW (OUTPATIENT)
Age: 80
End: 2025-07-22

## 2025-07-22 ENCOUNTER — APPOINTMENT (OUTPATIENT)
Dept: PULMONOLOGY | Facility: HOSPITAL | Age: 80
End: 2025-07-22

## 2025-07-22 DIAGNOSIS — Z90.89 ACQUIRED ABSENCE OF OTHER ORGANS: Chronic | ICD-10-CM

## 2025-07-22 DIAGNOSIS — Z90.49 ACQUIRED ABSENCE OF OTHER SPECIFIED PARTS OF DIGESTIVE TRACT: Chronic | ICD-10-CM

## 2025-07-22 DIAGNOSIS — Z98.890 OTHER SPECIFIED POSTPROCEDURAL STATES: Chronic | ICD-10-CM

## 2025-07-22 DIAGNOSIS — E89.0 POSTPROCEDURAL HYPOTHYROIDISM: Chronic | ICD-10-CM

## 2025-07-22 PROCEDURE — 88305 TISSUE EXAM BY PATHOLOGIST: CPT

## 2025-07-22 PROCEDURE — 88333 PATH CONSLTJ SURG CYTO XM 1: CPT

## 2025-07-22 PROCEDURE — 76000 FLUOROSCOPY <1 HR PHYS/QHP: CPT

## 2025-07-22 PROCEDURE — 31627 NAVIGATIONAL BRONCHOSCOPY: CPT

## 2025-07-22 PROCEDURE — C9399: CPT

## 2025-07-22 PROCEDURE — 81455 SO/HL 51/>GSAP DNA/DNA&RNA: CPT

## 2025-07-22 PROCEDURE — 88305 TISSUE EXAM BY PATHOLOGIST: CPT | Mod: 26

## 2025-07-22 PROCEDURE — 88173 CYTOPATH EVAL FNA REPORT: CPT

## 2025-07-22 PROCEDURE — 71045 X-RAY EXAM CHEST 1 VIEW: CPT | Mod: 26

## 2025-07-22 PROCEDURE — 31628 BRONCHOSCOPY/LUNG BX EACH: CPT

## 2025-07-22 PROCEDURE — S2900: CPT

## 2025-07-22 PROCEDURE — 31652 BRONCH EBUS SAMPLNG 1/2 NODE: CPT

## 2025-07-22 PROCEDURE — 31629 BRONCHOSCOPY/NEEDLE BX EACH: CPT

## 2025-07-22 PROCEDURE — 71045 X-RAY EXAM CHEST 1 VIEW: CPT

## 2025-07-22 PROCEDURE — 88173 CYTOPATH EVAL FNA REPORT: CPT | Mod: 26

## 2025-07-22 PROCEDURE — 88381 MICRODISSECTION MANUAL: CPT

## 2025-07-22 NOTE — PRE-ANESTHESIA EVALUATION ADULT - NSANTHOSAYNRD_GEN_A_CORE
Is This A New Presentation, Or A Follow-Up?: Warts How Severe Are Your Warts?: mild No. SHANTE screening performed.  STOP BANG Legend: 0-2 = LOW Risk; 3-4 = INTERMEDIATE Risk; 5-8 = HIGH Risk

## 2025-07-24 LAB
CULTURE RESULTS: ABNORMAL
SPECIMEN SOURCE: SIGNIFICANT CHANGE UP

## 2025-07-24 NOTE — ED PROVIDER NOTE - PRO INTERPRETER NEED 2
Office Visit  5/30/2025    Saranya is here for evaluation following ER visit on 5/15/2025 due to pain from her shingles outbreak. She was having midsternal and right sided chest pain and since then has started to note improvement in the pain. She had not had improvement in her pain with the use of gabapentin and tramadol so her gabapentin was increased to 600 mg at bedtime, 300 mg in the morning, 300 mg in the afternoon and then I switched her from tramadol to hydrocodone on 5/16/2025.        traMADol (ULTRAM) 50 MG tablet         The original prescription was discontinued on 5/19/2025 by Milana Parker NP for the following reason: Therapy Completed. Renewing this prescription may not be appropriate.    Sig: Take 1 tablet by mouth every 6 hours as needed for Pain.    Disp: 30 tablet    Refills: 0    Start: 7/23/2025    Class: Eprescribe    PDMP Review May Be Needed    Non-formulary For: Herpes zoster without complication    Last ordered: 2 months ago (5/13/2025) by Milana Parker NP    Last dispensed: 5/14/2025    Rx #: [id number]          English

## 2025-08-01 ENCOUNTER — NON-APPOINTMENT (OUTPATIENT)
Age: 80
End: 2025-08-01

## 2025-08-07 ENCOUNTER — APPOINTMENT (OUTPATIENT)
Dept: HEMATOLOGY ONCOLOGY | Facility: CLINIC | Age: 80
End: 2025-08-07
Payer: MEDICARE

## 2025-08-07 VITALS
OXYGEN SATURATION: 98 % | SYSTOLIC BLOOD PRESSURE: 148 MMHG | BODY MASS INDEX: 24.8 KG/M2 | HEIGHT: 63 IN | DIASTOLIC BLOOD PRESSURE: 67 MMHG | WEIGHT: 140 LBS | RESPIRATION RATE: 18 BRPM | TEMPERATURE: 97.8 F | HEART RATE: 88 BPM

## 2025-08-07 DIAGNOSIS — D64.9 ANEMIA, UNSPECIFIED: ICD-10-CM

## 2025-08-07 DIAGNOSIS — C34.10 MALIGNANT NEOPLASM OF UPPER LOBE, UNSPECIFIED BRONCHUS OR LUNG: ICD-10-CM

## 2025-08-07 PROCEDURE — 99215 OFFICE O/P EST HI 40 MIN: CPT | Mod: 25

## 2025-08-07 PROCEDURE — 36415 COLL VENOUS BLD VENIPUNCTURE: CPT

## 2025-08-08 LAB
ALBUMIN SERPL ELPH-MCNC: 3.1 G/DL
ALP BLD-CCNC: 103 U/L
ALT SERPL-CCNC: 16 U/L
ANION GAP SERPL CALC-SCNC: -1 MMOL/L
AST SERPL-CCNC: 22 U/L
BILIRUB SERPL-MCNC: 0.5 MG/DL
BUN SERPL-MCNC: 33 MG/DL
CALCIUM SERPL-MCNC: 9.1 MG/DL
CHLORIDE SERPL-SCNC: 114 MMOL/L
CO2 SERPL-SCNC: 25 MMOL/L
CREAT SERPL-MCNC: 1.2 MG/DL
EGFRCR SERPLBLD CKD-EPI 2021: 46 ML/MIN/1.73M2
FERRITIN SERPL-MCNC: 427 NG/ML
FOLATE SERPL-MCNC: 6.1 NG/ML
GLUCOSE SERPL-MCNC: 99 MG/DL
HBV CORE IGG+IGM SER QL: NONREACTIVE
HBV SURFACE AB SER QL: NONREACTIVE
HBV SURFACE AG SER QL: NONREACTIVE
HCT VFR BLD CALC: 29 %
HGB BLD-MCNC: 9.7 G/DL
IRON SATN MFR SERPL: 17 %
IRON SERPL-MCNC: 42 UG/DL
LYMPHOCYTES # BLD AUTO: 1.1 K/UL
LYMPHOCYTES NFR BLD AUTO: 15.1 %
MAN DIFF?: NO
MCHC RBC-ENTMCNC: 30.5 PG
MCHC RBC-ENTMCNC: 33.4 G/DL
MCV RBC AUTO: 91.2 FL
NEUTROPHILS # BLD AUTO: 5.4 K/UL
NEUTROPHILS NFR BLD AUTO: 72.9 %
PLATELET # BLD AUTO: 332 K/UL
POTASSIUM SERPL-SCNC: 3.7 MMOL/L
PROT SERPL-MCNC: 6.6 G/DL
RBC # BLD: 3.18 M/UL
RBC # FLD: 15.2 %
SODIUM SERPL-SCNC: 138 MMOL/L
TIBC SERPL-MCNC: 247 UG/DL
UIBC SERPL-MCNC: 205 UG/DL
VIT B12 SERPL-MCNC: 408 PG/ML
WBC # FLD AUTO: 7.4 K/UL

## 2025-08-14 ENCOUNTER — APPOINTMENT (OUTPATIENT)
Dept: PULMONOLOGY | Facility: CLINIC | Age: 80
End: 2025-08-14

## 2025-08-14 ENCOUNTER — NON-APPOINTMENT (OUTPATIENT)
Age: 80
End: 2025-08-14

## 2025-08-20 ENCOUNTER — NON-APPOINTMENT (OUTPATIENT)
Age: 80
End: 2025-08-20

## 2025-08-21 ENCOUNTER — APPOINTMENT (OUTPATIENT)
Dept: INTERNAL MEDICINE | Facility: CLINIC | Age: 80
End: 2025-08-21
Payer: MEDICARE

## 2025-08-21 ENCOUNTER — APPOINTMENT (OUTPATIENT)
Dept: PULMONOLOGY | Facility: CLINIC | Age: 80
End: 2025-08-21
Payer: MEDICARE

## 2025-08-21 VITALS
SYSTOLIC BLOOD PRESSURE: 150 MMHG | HEART RATE: 94 BPM | TEMPERATURE: 97.5 F | RESPIRATION RATE: 12 BRPM | DIASTOLIC BLOOD PRESSURE: 75 MMHG | OXYGEN SATURATION: 97 % | BODY MASS INDEX: 25.58 KG/M2 | WEIGHT: 139 LBS | HEIGHT: 62 IN

## 2025-08-21 VITALS
OXYGEN SATURATION: 97 % | DIASTOLIC BLOOD PRESSURE: 74 MMHG | HEIGHT: 62 IN | BODY MASS INDEX: 25.58 KG/M2 | SYSTOLIC BLOOD PRESSURE: 136 MMHG | HEART RATE: 115 BPM | WEIGHT: 139 LBS

## 2025-08-21 DIAGNOSIS — R26.89 OTHER ABNORMALITIES OF GAIT AND MOBILITY: ICD-10-CM

## 2025-08-21 DIAGNOSIS — S32.010A WEDGE COMPRESSION FRACTURE OF FIRST LUMBAR VERTEBRA, INITIAL ENCOUNTER FOR CLOSED FRACTURE: ICD-10-CM

## 2025-08-21 PROCEDURE — G2211 COMPLEX E/M VISIT ADD ON: CPT

## 2025-08-21 PROCEDURE — 99214 OFFICE O/P EST MOD 30 MIN: CPT

## 2025-08-21 PROCEDURE — 99213 OFFICE O/P EST LOW 20 MIN: CPT

## 2025-08-21 RX ORDER — OXYCODONE AND ACETAMINOPHEN 5; 325 MG/1; MG/1
5-325 TABLET ORAL
Qty: 20 | Refills: 0 | Status: ACTIVE | COMMUNITY
Start: 2025-08-21 | End: 1900-01-01

## 2025-08-22 ENCOUNTER — NON-APPOINTMENT (OUTPATIENT)
Age: 80
End: 2025-08-22

## 2025-08-25 ENCOUNTER — NON-APPOINTMENT (OUTPATIENT)
Age: 80
End: 2025-08-25

## 2025-08-25 VITALS — SYSTOLIC BLOOD PRESSURE: 120 MMHG | HEART RATE: 80 BPM | DIASTOLIC BLOOD PRESSURE: 74 MMHG | OXYGEN SATURATION: 100 %

## 2025-08-27 ENCOUNTER — NON-APPOINTMENT (OUTPATIENT)
Age: 80
End: 2025-08-27

## 2025-09-02 ENCOUNTER — NON-APPOINTMENT (OUTPATIENT)
Age: 80
End: 2025-09-02

## 2025-09-02 ENCOUNTER — APPOINTMENT (OUTPATIENT)
Dept: HEMATOLOGY ONCOLOGY | Facility: CLINIC | Age: 80
End: 2025-09-02
Payer: MEDICARE

## 2025-09-02 VITALS
BODY MASS INDEX: 25.03 KG/M2 | WEIGHT: 136 LBS | OXYGEN SATURATION: 95 % | HEIGHT: 62 IN | RESPIRATION RATE: 18 BRPM | TEMPERATURE: 98.1 F | DIASTOLIC BLOOD PRESSURE: 58 MMHG | HEART RATE: 101 BPM | SYSTOLIC BLOOD PRESSURE: 116 MMHG

## 2025-09-02 DIAGNOSIS — C34.10 MALIGNANT NEOPLASM OF UPPER LOBE, UNSPECIFIED BRONCHUS OR LUNG: ICD-10-CM

## 2025-09-02 PROCEDURE — 99215 OFFICE O/P EST HI 40 MIN: CPT

## 2025-09-02 PROCEDURE — G2211 COMPLEX E/M VISIT ADD ON: CPT

## 2025-09-11 DIAGNOSIS — R41.89 OTHER SYMPTOMS AND SIGNS INVOLVING COGNITIVE FUNCTIONS AND AWARENESS: ICD-10-CM

## 2025-09-15 ENCOUNTER — NON-APPOINTMENT (OUTPATIENT)
Age: 80
End: 2025-09-15

## (undated) DEVICE — ION SWIVEL CONNECTOR

## (undated) DEVICE — ION VISION PROBE ADAPTOR

## (undated) DEVICE — FORCEP BIOPSY OVAL CUP DISP

## (undated) DEVICE — ION VISION PROBE BAG

## (undated) DEVICE — ION BIOPSY NEEDLE 21G

## (undated) DEVICE — ION SENSOR CONNECTION CLEANER

## (undated) DEVICE — ION FULLY ARTICULATING CATHETER

## (undated) DEVICE — ION CATHETER GUIDE

## (undated) DEVICE — NDL ASPIRATION VIZISHOT2 21G

## (undated) DEVICE — ION PERIPHERAL VISION PROBE